# Patient Record
Sex: MALE | Race: WHITE | Employment: UNEMPLOYED | URBAN - METROPOLITAN AREA
[De-identification: names, ages, dates, MRNs, and addresses within clinical notes are randomized per-mention and may not be internally consistent; named-entity substitution may affect disease eponyms.]

---

## 2019-03-15 ENCOUNTER — APPOINTMENT (OUTPATIENT)
Dept: GENERAL RADIOLOGY | Age: 69
DRG: 683 | End: 2019-03-15
Payer: MEDICARE

## 2019-03-15 ENCOUNTER — APPOINTMENT (OUTPATIENT)
Dept: ULTRASOUND IMAGING | Age: 69
DRG: 683 | End: 2019-03-15
Payer: MEDICARE

## 2019-03-15 ENCOUNTER — HOSPITAL ENCOUNTER (INPATIENT)
Age: 69
LOS: 5 days | Discharge: NURSING, REHAB OR LONG-TERM CARE | DRG: 683 | End: 2019-03-20
Attending: EMERGENCY MEDICINE | Admitting: INTERNAL MEDICINE
Payer: MEDICARE

## 2019-03-15 DIAGNOSIS — I95.9 HYPOTENSION, UNSPECIFIED HYPOTENSION TYPE: ICD-10-CM

## 2019-03-15 DIAGNOSIS — M10.00 IDIOPATHIC GOUT, UNSPECIFIED CHRONICITY, UNSPECIFIED SITE: ICD-10-CM

## 2019-03-15 DIAGNOSIS — M25.561 ACUTE PAIN OF RIGHT KNEE: ICD-10-CM

## 2019-03-15 DIAGNOSIS — N17.9 AKI (ACUTE KIDNEY INJURY) (HCC): Primary | ICD-10-CM

## 2019-03-15 DIAGNOSIS — M25.571 ACUTE RIGHT ANKLE PAIN: ICD-10-CM

## 2019-03-15 DIAGNOSIS — D50.8 OTHER IRON DEFICIENCY ANEMIA: ICD-10-CM

## 2019-03-15 DIAGNOSIS — N43.3 HYDROCELE, UNSPECIFIED HYDROCELE TYPE: ICD-10-CM

## 2019-03-15 LAB
ALBUMIN SERPL-MCNC: 2.8 G/DL (ref 3.5–4.6)
ALP BLD-CCNC: 190 U/L (ref 35–104)
ALT SERPL-CCNC: 16 U/L (ref 0–41)
ANION GAP SERPL CALCULATED.3IONS-SCNC: 15 MEQ/L (ref 9–15)
AST SERPL-CCNC: 20 U/L (ref 0–40)
BASOPHILS ABSOLUTE: 0 K/UL (ref 0–0.2)
BASOPHILS RELATIVE PERCENT: 0.2 %
BILIRUB SERPL-MCNC: 0.8 MG/DL (ref 0.2–0.7)
BUN BLDV-MCNC: 72 MG/DL (ref 8–23)
C-REACTIVE PROTEIN: 279.5 MG/L (ref 0–5)
CALCIUM SERPL-MCNC: 8.3 MG/DL (ref 8.5–9.9)
CHLORIDE BLD-SCNC: 99 MEQ/L (ref 95–107)
CHP ED QC CHECK: NORMAL
CO2: 24 MEQ/L (ref 20–31)
CREAT SERPL-MCNC: 2.53 MG/DL (ref 0.7–1.2)
EOSINOPHILS ABSOLUTE: 0 K/UL (ref 0–0.7)
EOSINOPHILS RELATIVE PERCENT: 0.3 %
GFR AFRICAN AMERICAN: 30.5
GFR NON-AFRICAN AMERICAN: 25.2
GLOBULIN: 4.3 G/DL (ref 2.3–3.5)
GLUCOSE BLD-MCNC: 154 MG/DL (ref 60–115)
GLUCOSE BLD-MCNC: 181 MG/DL (ref 60–115)
GLUCOSE BLD-MCNC: 43 MG/DL (ref 70–99)
GLUCOSE BLD-MCNC: 46 MG/DL (ref 60–115)
GLUCOSE BLD-MCNC: 58 MG/DL
GLUCOSE BLD-MCNC: 58 MG/DL (ref 60–115)
GLUCOSE BLD-MCNC: 95 MG/DL (ref 60–115)
HBA1C MFR BLD: 8.4 % (ref 4.8–5.9)
HCT VFR BLD CALC: 25 % (ref 42–52)
HEMOGLOBIN: 7.9 G/DL (ref 14–18)
LYMPHOCYTES ABSOLUTE: 0.4 K/UL (ref 1–4.8)
LYMPHOCYTES RELATIVE PERCENT: 3 %
MCH RBC QN AUTO: 23.6 PG (ref 27–31.3)
MCHC RBC AUTO-ENTMCNC: 31.4 % (ref 33–37)
MCV RBC AUTO: 75.3 FL (ref 80–100)
MONOCYTES ABSOLUTE: 1 K/UL (ref 0.2–0.8)
MONOCYTES RELATIVE PERCENT: 6.9 %
NEUTROPHILS ABSOLUTE: 13.2 K/UL (ref 1.4–6.5)
NEUTROPHILS RELATIVE PERCENT: 89.6 %
PDW BLD-RTO: 18.7 % (ref 11.5–14.5)
PERFORMED ON: ABNORMAL
PERFORMED ON: NORMAL
PLATELET # BLD: 244 K/UL (ref 130–400)
POTASSIUM SERPL-SCNC: 4 MEQ/L (ref 3.4–4.9)
RAPID INFLUENZA  B AGN: NEGATIVE
RAPID INFLUENZA A AGN: NEGATIVE
RBC # BLD: 3.32 M/UL (ref 4.7–6.1)
SEDIMENTATION RATE, ERYTHROCYTE: 120 MM (ref 0–20)
SODIUM BLD-SCNC: 138 MEQ/L (ref 135–144)
TOTAL PROTEIN: 7.1 G/DL (ref 6.3–8)
URIC ACID, SERUM: 12.1 MG/DL (ref 3.4–7)
WBC # BLD: 14.8 K/UL (ref 4.8–10.8)

## 2019-03-15 PROCEDURE — 6360000002 HC RX W HCPCS: Performed by: EMERGENCY MEDICINE

## 2019-03-15 PROCEDURE — 86140 C-REACTIVE PROTEIN: CPT

## 2019-03-15 PROCEDURE — 85025 COMPLETE CBC W/AUTO DIFF WBC: CPT

## 2019-03-15 PROCEDURE — 2580000003 HC RX 258: Performed by: EMERGENCY MEDICINE

## 2019-03-15 PROCEDURE — 87804 INFLUENZA ASSAY W/OPTIC: CPT

## 2019-03-15 PROCEDURE — 2580000003 HC RX 258: Performed by: INTERNAL MEDICINE

## 2019-03-15 PROCEDURE — 96375 TX/PRO/DX INJ NEW DRUG ADDON: CPT

## 2019-03-15 PROCEDURE — 1210000000 HC MED SURG R&B

## 2019-03-15 PROCEDURE — 6360000002 HC RX W HCPCS: Performed by: INTERNAL MEDICINE

## 2019-03-15 PROCEDURE — 76870 US EXAM SCROTUM: CPT

## 2019-03-15 PROCEDURE — 84550 ASSAY OF BLOOD/URIC ACID: CPT

## 2019-03-15 PROCEDURE — 94664 DEMO&/EVAL PT USE INHALER: CPT

## 2019-03-15 PROCEDURE — 99285 EMERGENCY DEPT VISIT HI MDM: CPT

## 2019-03-15 PROCEDURE — 36415 COLL VENOUS BLD VENIPUNCTURE: CPT

## 2019-03-15 PROCEDURE — 6370000000 HC RX 637 (ALT 250 FOR IP): Performed by: INTERNAL MEDICINE

## 2019-03-15 PROCEDURE — 2580000003 HC RX 258

## 2019-03-15 PROCEDURE — 73610 X-RAY EXAM OF ANKLE: CPT

## 2019-03-15 PROCEDURE — 71046 X-RAY EXAM CHEST 2 VIEWS: CPT

## 2019-03-15 PROCEDURE — 85652 RBC SED RATE AUTOMATED: CPT

## 2019-03-15 PROCEDURE — 80053 COMPREHEN METABOLIC PANEL: CPT

## 2019-03-15 PROCEDURE — 87040 BLOOD CULTURE FOR BACTERIA: CPT

## 2019-03-15 PROCEDURE — 96374 THER/PROPH/DIAG INJ IV PUSH: CPT

## 2019-03-15 PROCEDURE — 83036 HEMOGLOBIN GLYCOSYLATED A1C: CPT

## 2019-03-15 PROCEDURE — 73562 X-RAY EXAM OF KNEE 3: CPT

## 2019-03-15 RX ORDER — FINASTERIDE 5 MG/1
5 TABLET, FILM COATED ORAL DAILY
COMMUNITY

## 2019-03-15 RX ORDER — ATENOLOL 50 MG/1
50 TABLET ORAL 2 TIMES DAILY
Status: DISCONTINUED | OUTPATIENT
Start: 2019-03-15 | End: 2019-03-20 | Stop reason: HOSPADM

## 2019-03-15 RX ORDER — FUROSEMIDE 80 MG
80 TABLET ORAL DAILY
COMMUNITY

## 2019-03-15 RX ORDER — DEXTROSE MONOHYDRATE 25 G/50ML
25 INJECTION, SOLUTION INTRAVENOUS ONCE
Status: DISCONTINUED | OUTPATIENT
Start: 2019-03-15 | End: 2019-03-19

## 2019-03-15 RX ORDER — AMLODIPINE BESYLATE 5 MG/1
5 TABLET ORAL DAILY
COMMUNITY

## 2019-03-15 RX ORDER — SIMVASTATIN 5 MG
5 TABLET ORAL NIGHTLY
Status: ON HOLD | COMMUNITY
End: 2019-03-20 | Stop reason: HOSPADM

## 2019-03-15 RX ORDER — METHYLPREDNISOLONE SODIUM SUCCINATE 40 MG/ML
40 INJECTION, POWDER, LYOPHILIZED, FOR SOLUTION INTRAMUSCULAR; INTRAVENOUS DAILY
Status: DISCONTINUED | OUTPATIENT
Start: 2019-03-15 | End: 2019-03-17

## 2019-03-15 RX ORDER — SIMVASTATIN 10 MG
10 TABLET ORAL NIGHTLY
Status: DISCONTINUED | OUTPATIENT
Start: 2019-03-15 | End: 2019-03-20 | Stop reason: HOSPADM

## 2019-03-15 RX ORDER — IPRATROPIUM BROMIDE AND ALBUTEROL SULFATE 2.5; .5 MG/3ML; MG/3ML
1 SOLUTION RESPIRATORY (INHALATION) EVERY 4 HOURS PRN
Status: DISCONTINUED | OUTPATIENT
Start: 2019-03-15 | End: 2019-03-20 | Stop reason: HOSPADM

## 2019-03-15 RX ORDER — SODIUM CHLORIDE 9 MG/ML
INJECTION, SOLUTION INTRAVENOUS CONTINUOUS
Status: DISCONTINUED | OUTPATIENT
Start: 2019-03-15 | End: 2019-03-16

## 2019-03-15 RX ORDER — MORPHINE SULFATE 2 MG/ML
4 INJECTION, SOLUTION INTRAMUSCULAR; INTRAVENOUS
Status: DISCONTINUED | OUTPATIENT
Start: 2019-03-15 | End: 2019-03-20 | Stop reason: HOSPADM

## 2019-03-15 RX ORDER — LOSARTAN POTASSIUM 50 MG/1
50 TABLET ORAL DAILY
COMMUNITY

## 2019-03-15 RX ORDER — ONDANSETRON 2 MG/ML
4 INJECTION INTRAMUSCULAR; INTRAVENOUS ONCE
Status: COMPLETED | OUTPATIENT
Start: 2019-03-15 | End: 2019-03-15

## 2019-03-15 RX ORDER — ALBUTEROL SULFATE 2.5 MG/3ML
2.5 SOLUTION RESPIRATORY (INHALATION) EVERY 6 HOURS PRN
Status: DISCONTINUED | OUTPATIENT
Start: 2019-03-15 | End: 2019-03-20 | Stop reason: HOSPADM

## 2019-03-15 RX ORDER — INSULIN GLARGINE 100 [IU]/ML
44 INJECTION, SOLUTION SUBCUTANEOUS 2 TIMES DAILY
Status: ON HOLD | COMMUNITY
End: 2020-11-24 | Stop reason: SDUPTHER

## 2019-03-15 RX ORDER — LOPERAMIDE HYDROCHLORIDE 2 MG/1
2 CAPSULE ORAL 4 TIMES DAILY PRN
COMMUNITY

## 2019-03-15 RX ORDER — ATENOLOL 50 MG/1
50 TABLET ORAL 2 TIMES DAILY
COMMUNITY

## 2019-03-15 RX ORDER — KETOROLAC TROMETHAMINE 15 MG/ML
15 INJECTION, SOLUTION INTRAMUSCULAR; INTRAVENOUS ONCE
Status: COMPLETED | OUTPATIENT
Start: 2019-03-15 | End: 2019-03-15

## 2019-03-15 RX ORDER — DEXTROSE MONOHYDRATE 50 MG/ML
100 INJECTION, SOLUTION INTRAVENOUS PRN
Status: DISCONTINUED | OUTPATIENT
Start: 2019-03-15 | End: 2019-03-20 | Stop reason: HOSPADM

## 2019-03-15 RX ORDER — ONDANSETRON 2 MG/ML
4 INJECTION INTRAMUSCULAR; INTRAVENOUS EVERY 6 HOURS PRN
Status: DISCONTINUED | OUTPATIENT
Start: 2019-03-15 | End: 2019-03-20 | Stop reason: HOSPADM

## 2019-03-15 RX ORDER — 0.9 % SODIUM CHLORIDE 0.9 %
1000 INTRAVENOUS SOLUTION INTRAVENOUS ONCE
Status: COMPLETED | OUTPATIENT
Start: 2019-03-15 | End: 2019-03-15

## 2019-03-15 RX ORDER — DEXTROSE MONOHYDRATE 25 G/50ML
INJECTION, SOLUTION INTRAVENOUS
Status: COMPLETED
Start: 2019-03-15 | End: 2019-03-15

## 2019-03-15 RX ORDER — SODIUM CHLORIDE 0.9 % (FLUSH) 0.9 %
10 SYRINGE (ML) INJECTION EVERY 12 HOURS SCHEDULED
Status: DISCONTINUED | OUTPATIENT
Start: 2019-03-15 | End: 2019-03-20 | Stop reason: HOSPADM

## 2019-03-15 RX ORDER — SODIUM CHLORIDE 9 MG/ML
INJECTION, SOLUTION INTRAVENOUS CONTINUOUS
Status: DISCONTINUED | OUTPATIENT
Start: 2019-03-15 | End: 2019-03-15

## 2019-03-15 RX ORDER — ASPIRIN 81 MG/1
81 TABLET, CHEWABLE ORAL DAILY
Status: DISCONTINUED | OUTPATIENT
Start: 2019-03-15 | End: 2019-03-19

## 2019-03-15 RX ORDER — NICOTINE POLACRILEX 4 MG
15 LOZENGE BUCCAL PRN
Status: DISCONTINUED | OUTPATIENT
Start: 2019-03-15 | End: 2019-03-20 | Stop reason: HOSPADM

## 2019-03-15 RX ORDER — TAMSULOSIN HYDROCHLORIDE 0.4 MG/1
2 CAPSULE ORAL NIGHTLY
COMMUNITY

## 2019-03-15 RX ORDER — IPRATROPIUM BROMIDE AND ALBUTEROL SULFATE 2.5; .5 MG/3ML; MG/3ML
1 SOLUTION RESPIRATORY (INHALATION)
Status: DISCONTINUED | OUTPATIENT
Start: 2019-03-15 | End: 2019-03-15

## 2019-03-15 RX ORDER — TAMSULOSIN HYDROCHLORIDE 0.4 MG/1
0.4 CAPSULE ORAL DAILY
Status: DISCONTINUED | OUTPATIENT
Start: 2019-03-15 | End: 2019-03-20 | Stop reason: HOSPADM

## 2019-03-15 RX ORDER — FUROSEMIDE 80 MG
80 TABLET ORAL DAILY
Status: ON HOLD | COMMUNITY
End: 2019-03-15

## 2019-03-15 RX ORDER — SODIUM CHLORIDE 0.9 % (FLUSH) 0.9 %
10 SYRINGE (ML) INJECTION PRN
Status: DISCONTINUED | OUTPATIENT
Start: 2019-03-15 | End: 2019-03-20 | Stop reason: HOSPADM

## 2019-03-15 RX ORDER — DEXTROSE MONOHYDRATE 25 G/50ML
12.5 INJECTION, SOLUTION INTRAVENOUS PRN
Status: DISCONTINUED | OUTPATIENT
Start: 2019-03-15 | End: 2019-03-20 | Stop reason: HOSPADM

## 2019-03-15 RX ORDER — AMLODIPINE BESYLATE 5 MG/1
5 TABLET ORAL DAILY
Status: DISCONTINUED | OUTPATIENT
Start: 2019-03-15 | End: 2019-03-20 | Stop reason: HOSPADM

## 2019-03-15 RX ADMIN — MORPHINE SULFATE 4 MG: 2 INJECTION, SOLUTION INTRAMUSCULAR; INTRAVENOUS at 11:37

## 2019-03-15 RX ADMIN — DEXTROSE MONOHYDRATE 50 ML: 25 INJECTION, SOLUTION INTRAVENOUS at 13:25

## 2019-03-15 RX ADMIN — SODIUM CHLORIDE: 9 INJECTION, SOLUTION INTRAVENOUS at 18:29

## 2019-03-15 RX ADMIN — VANCOMYCIN HYDROCHLORIDE 1250 MG: 5 INJECTION, POWDER, LYOPHILIZED, FOR SOLUTION INTRAVENOUS at 18:29

## 2019-03-15 RX ADMIN — SODIUM CHLORIDE 1000 ML: 9 INJECTION, SOLUTION INTRAVENOUS at 11:37

## 2019-03-15 RX ADMIN — SIMVASTATIN 10 MG: 10 TABLET, FILM COATED ORAL at 23:00

## 2019-03-15 RX ADMIN — SODIUM CHLORIDE 1000 ML: 9 INJECTION, SOLUTION INTRAVENOUS at 13:48

## 2019-03-15 RX ADMIN — ATENOLOL 50 MG: 50 TABLET ORAL at 23:00

## 2019-03-15 RX ADMIN — ONDANSETRON 4 MG: 2 INJECTION INTRAMUSCULAR; INTRAVENOUS at 11:36

## 2019-03-15 RX ADMIN — PIPERACILLIN SODIUM,TAZOBACTAM SODIUM 3.38 G: 3; .375 INJECTION, POWDER, FOR SOLUTION INTRAVENOUS at 23:03

## 2019-03-15 RX ADMIN — METHYLPREDNISOLONE SODIUM SUCCINATE 40 MG: 40 INJECTION, POWDER, FOR SOLUTION INTRAMUSCULAR; INTRAVENOUS at 18:29

## 2019-03-15 RX ADMIN — KETOROLAC TROMETHAMINE 15 MG: 15 INJECTION, SOLUTION INTRAMUSCULAR; INTRAVENOUS at 11:36

## 2019-03-15 ASSESSMENT — PAIN DESCRIPTION - ONSET: ONSET: ON-GOING

## 2019-03-15 ASSESSMENT — ENCOUNTER SYMPTOMS
COUGH: 0
SORE THROAT: 0
NAUSEA: 0
DIARRHEA: 0
SHORTNESS OF BREATH: 0
BACK PAIN: 0
VOMITING: 0
ABDOMINAL PAIN: 0

## 2019-03-15 ASSESSMENT — PAIN DESCRIPTION - FREQUENCY: FREQUENCY: CONTINUOUS

## 2019-03-15 ASSESSMENT — PAIN DESCRIPTION - PROGRESSION: CLINICAL_PROGRESSION: GRADUALLY IMPROVING

## 2019-03-15 ASSESSMENT — PAIN SCALES - GENERAL
PAINLEVEL_OUTOF10: 5
PAINLEVEL_OUTOF10: 7
PAINLEVEL_OUTOF10: 7

## 2019-03-15 ASSESSMENT — PAIN DESCRIPTION - PAIN TYPE: TYPE: ACUTE PAIN

## 2019-03-15 ASSESSMENT — PAIN DESCRIPTION - ORIENTATION: ORIENTATION: LOWER

## 2019-03-15 ASSESSMENT — PAIN DESCRIPTION - LOCATION: LOCATION: BACK

## 2019-03-15 ASSESSMENT — PAIN DESCRIPTION - DESCRIPTORS: DESCRIPTORS: ACHING;CONSTANT;DISCOMFORT

## 2019-03-16 LAB
ANION GAP SERPL CALCULATED.3IONS-SCNC: 15 MEQ/L (ref 9–15)
BASOPHILS ABSOLUTE: 0 K/UL (ref 0–0.2)
BASOPHILS RELATIVE PERCENT: 0.1 %
BUN BLDV-MCNC: 71 MG/DL (ref 8–23)
CALCIUM SERPL-MCNC: 8.4 MG/DL (ref 8.5–9.9)
CHLORIDE BLD-SCNC: 101 MEQ/L (ref 95–107)
CO2: 20 MEQ/L (ref 20–31)
CREAT SERPL-MCNC: 2.38 MG/DL (ref 0.7–1.2)
CREATININE URINE: 88.9 MG/DL
EOSINOPHILS ABSOLUTE: 0 K/UL (ref 0–0.7)
EOSINOPHILS RELATIVE PERCENT: 0 %
GFR AFRICAN AMERICAN: 32.8
GFR NON-AFRICAN AMERICAN: 27.1
GLUCOSE BLD-MCNC: 224 MG/DL (ref 70–99)
GLUCOSE BLD-MCNC: 231 MG/DL (ref 60–115)
GLUCOSE BLD-MCNC: 314 MG/DL (ref 60–115)
GLUCOSE BLD-MCNC: 348 MG/DL (ref 60–115)
GLUCOSE BLD-MCNC: 352 MG/DL (ref 60–115)
HCT VFR BLD CALC: 24.7 % (ref 42–52)
HEMOGLOBIN: 7.6 G/DL (ref 14–18)
LYMPHOCYTES ABSOLUTE: 0.3 K/UL (ref 1–4.8)
LYMPHOCYTES RELATIVE PERCENT: 2.1 %
MAGNESIUM: 2.4 MG/DL (ref 1.7–2.4)
MCH RBC QN AUTO: 23.4 PG (ref 27–31.3)
MCHC RBC AUTO-ENTMCNC: 30.8 % (ref 33–37)
MCV RBC AUTO: 75.8 FL (ref 80–100)
MICROALBUMIN UR-MCNC: 1.4 MG/DL
MICROALBUMIN/CREAT UR-RTO: 15.7 MG/G (ref 0–30)
MONOCYTES ABSOLUTE: 0.3 K/UL (ref 0.2–0.8)
MONOCYTES RELATIVE PERCENT: 2.3 %
NEUTROPHILS ABSOLUTE: 13.1 K/UL (ref 1.4–6.5)
NEUTROPHILS RELATIVE PERCENT: 95.5 %
PDW BLD-RTO: 18.6 % (ref 11.5–14.5)
PERFORMED ON: ABNORMAL
PLATELET # BLD: 226 K/UL (ref 130–400)
POTASSIUM REFLEX MAGNESIUM: 5.1 MEQ/L (ref 3.4–4.9)
RBC # BLD: 3.26 M/UL (ref 4.7–6.1)
SODIUM BLD-SCNC: 136 MEQ/L (ref 135–144)
WBC # BLD: 13.7 K/UL (ref 4.8–10.8)

## 2019-03-16 PROCEDURE — 82570 ASSAY OF URINE CREATININE: CPT

## 2019-03-16 PROCEDURE — 2580000003 HC RX 258: Performed by: INTERNAL MEDICINE

## 2019-03-16 PROCEDURE — 85025 COMPLETE CBC W/AUTO DIFF WBC: CPT

## 2019-03-16 PROCEDURE — 6370000000 HC RX 637 (ALT 250 FOR IP): Performed by: INTERNAL MEDICINE

## 2019-03-16 PROCEDURE — 6360000002 HC RX W HCPCS: Performed by: INTERNAL MEDICINE

## 2019-03-16 PROCEDURE — 36415 COLL VENOUS BLD VENIPUNCTURE: CPT

## 2019-03-16 PROCEDURE — 80048 BASIC METABOLIC PNL TOTAL CA: CPT

## 2019-03-16 PROCEDURE — 82043 UR ALBUMIN QUANTITATIVE: CPT

## 2019-03-16 PROCEDURE — 83735 ASSAY OF MAGNESIUM: CPT

## 2019-03-16 PROCEDURE — 1210000000 HC MED SURG R&B

## 2019-03-16 RX ORDER — INSULIN GLARGINE 100 [IU]/ML
25 INJECTION, SOLUTION SUBCUTANEOUS 2 TIMES DAILY
Status: DISCONTINUED | OUTPATIENT
Start: 2019-03-16 | End: 2019-03-16

## 2019-03-16 RX ORDER — INSULIN GLARGINE 100 [IU]/ML
30 INJECTION, SOLUTION SUBCUTANEOUS 2 TIMES DAILY
Status: DISCONTINUED | OUTPATIENT
Start: 2019-03-16 | End: 2019-03-17

## 2019-03-16 RX ADMIN — PIPERACILLIN SODIUM,TAZOBACTAM SODIUM 3.38 G: 3; .375 INJECTION, POWDER, FOR SOLUTION INTRAVENOUS at 22:54

## 2019-03-16 RX ADMIN — SIMVASTATIN 10 MG: 10 TABLET, FILM COATED ORAL at 22:54

## 2019-03-16 RX ADMIN — METHYLPREDNISOLONE SODIUM SUCCINATE 40 MG: 40 INJECTION, POWDER, FOR SOLUTION INTRAMUSCULAR; INTRAVENOUS at 22:54

## 2019-03-16 RX ADMIN — ATENOLOL 50 MG: 50 TABLET ORAL at 08:42

## 2019-03-16 RX ADMIN — PIPERACILLIN SODIUM,TAZOBACTAM SODIUM 3.38 G: 3; .375 INJECTION, POWDER, FOR SOLUTION INTRAVENOUS at 07:01

## 2019-03-16 RX ADMIN — VANCOMYCIN HYDROCHLORIDE 1250 MG: 5 INJECTION, POWDER, LYOPHILIZED, FOR SOLUTION INTRAVENOUS at 17:01

## 2019-03-16 RX ADMIN — INSULIN LISPRO 2 UNITS: 100 INJECTION, SOLUTION INTRAVENOUS; SUBCUTANEOUS at 08:19

## 2019-03-16 RX ADMIN — INSULIN LISPRO 4 UNITS: 100 INJECTION, SOLUTION INTRAVENOUS; SUBCUTANEOUS at 16:55

## 2019-03-16 RX ADMIN — AMLODIPINE BESYLATE 5 MG: 5 TABLET ORAL at 08:43

## 2019-03-16 RX ADMIN — ATENOLOL 50 MG: 50 TABLET ORAL at 22:53

## 2019-03-16 RX ADMIN — SODIUM CHLORIDE: 9 INJECTION, SOLUTION INTRAVENOUS at 08:44

## 2019-03-16 RX ADMIN — INSULIN GLARGINE 30 UNITS: 100 INJECTION, SOLUTION SUBCUTANEOUS at 22:54

## 2019-03-16 RX ADMIN — INSULIN GLARGINE 25 UNITS: 100 INJECTION, SOLUTION SUBCUTANEOUS at 10:53

## 2019-03-16 RX ADMIN — ASPIRIN 81 MG 81 MG: 81 TABLET ORAL at 08:43

## 2019-03-16 RX ADMIN — INSULIN LISPRO 4 UNITS: 100 INJECTION, SOLUTION INTRAVENOUS; SUBCUTANEOUS at 12:30

## 2019-03-16 RX ADMIN — TAMSULOSIN HYDROCHLORIDE 0.4 MG: 0.4 CAPSULE ORAL at 08:43

## 2019-03-17 LAB
ANION GAP SERPL CALCULATED.3IONS-SCNC: 13 MEQ/L (ref 9–15)
BASOPHILS ABSOLUTE: 0 K/UL (ref 0–0.2)
BASOPHILS RELATIVE PERCENT: 0.1 %
BUN BLDV-MCNC: 86 MG/DL (ref 8–23)
CALCIUM SERPL-MCNC: 8.2 MG/DL (ref 8.5–9.9)
CHLORIDE BLD-SCNC: 99 MEQ/L (ref 95–107)
CO2: 21 MEQ/L (ref 20–31)
CREAT SERPL-MCNC: 2.83 MG/DL (ref 0.7–1.2)
EOSINOPHILS ABSOLUTE: 0 K/UL (ref 0–0.7)
EOSINOPHILS RELATIVE PERCENT: 0 %
GFR AFRICAN AMERICAN: 26.8
GFR NON-AFRICAN AMERICAN: 22.2
GLUCOSE BLD-MCNC: 362 MG/DL (ref 60–115)
GLUCOSE BLD-MCNC: 378 MG/DL (ref 70–99)
GLUCOSE BLD-MCNC: 385 MG/DL (ref 60–115)
GLUCOSE BLD-MCNC: 411 MG/DL (ref 60–115)
GLUCOSE BLD-MCNC: 421 MG/DL (ref 60–115)
HCT VFR BLD CALC: 25.3 % (ref 42–52)
HEMOGLOBIN: 7.7 G/DL (ref 14–18)
LYMPHOCYTES ABSOLUTE: 0.3 K/UL (ref 1–4.8)
LYMPHOCYTES RELATIVE PERCENT: 2 %
MCH RBC QN AUTO: 23.4 PG (ref 27–31.3)
MCHC RBC AUTO-ENTMCNC: 30.5 % (ref 33–37)
MCV RBC AUTO: 76.5 FL (ref 80–100)
MONOCYTES ABSOLUTE: 0.4 K/UL (ref 0.2–0.8)
MONOCYTES RELATIVE PERCENT: 2.3 %
NEUTROPHILS ABSOLUTE: 15 K/UL (ref 1.4–6.5)
NEUTROPHILS RELATIVE PERCENT: 95.6 %
PDW BLD-RTO: 18.8 % (ref 11.5–14.5)
PERFORMED ON: ABNORMAL
PLATELET # BLD: 287 K/UL (ref 130–400)
POTASSIUM SERPL-SCNC: 5.1 MEQ/L (ref 3.4–4.9)
POTASSIUM SERPL-SCNC: 5.4 MEQ/L (ref 3.4–4.9)
RBC # BLD: 3.3 M/UL (ref 4.7–6.1)
SODIUM BLD-SCNC: 133 MEQ/L (ref 135–144)
VANCOMYCIN TROUGH: 11.7 UG/ML (ref 10–20)
WBC # BLD: 15.7 K/UL (ref 4.8–10.8)

## 2019-03-17 PROCEDURE — 85025 COMPLETE CBC W/AUTO DIFF WBC: CPT

## 2019-03-17 PROCEDURE — 1210000000 HC MED SURG R&B

## 2019-03-17 PROCEDURE — 84132 ASSAY OF SERUM POTASSIUM: CPT

## 2019-03-17 PROCEDURE — 36415 COLL VENOUS BLD VENIPUNCTURE: CPT

## 2019-03-17 PROCEDURE — 6360000002 HC RX W HCPCS: Performed by: INTERNAL MEDICINE

## 2019-03-17 PROCEDURE — 80048 BASIC METABOLIC PNL TOTAL CA: CPT

## 2019-03-17 PROCEDURE — 80202 ASSAY OF VANCOMYCIN: CPT

## 2019-03-17 PROCEDURE — 6370000000 HC RX 637 (ALT 250 FOR IP): Performed by: INTERNAL MEDICINE

## 2019-03-17 PROCEDURE — 99222 1ST HOSP IP/OBS MODERATE 55: CPT | Performed by: INTERNAL MEDICINE

## 2019-03-17 PROCEDURE — G8978 MOBILITY CURRENT STATUS: HCPCS

## 2019-03-17 PROCEDURE — 2580000003 HC RX 258: Performed by: INTERNAL MEDICINE

## 2019-03-17 PROCEDURE — 97161 PT EVAL LOW COMPLEX 20 MIN: CPT

## 2019-03-17 PROCEDURE — 99221 1ST HOSP IP/OBS SF/LOW 40: CPT | Performed by: UROLOGY

## 2019-03-17 PROCEDURE — G8979 MOBILITY GOAL STATUS: HCPCS

## 2019-03-17 RX ORDER — INSULIN GLARGINE 100 [IU]/ML
40 INJECTION, SOLUTION SUBCUTANEOUS 2 TIMES DAILY
Status: DISCONTINUED | OUTPATIENT
Start: 2019-03-17 | End: 2019-03-20 | Stop reason: HOSPADM

## 2019-03-17 RX ADMIN — ASPIRIN 81 MG 81 MG: 81 TABLET ORAL at 09:42

## 2019-03-17 RX ADMIN — AMLODIPINE BESYLATE 5 MG: 5 TABLET ORAL at 09:41

## 2019-03-17 RX ADMIN — VANCOMYCIN HYDROCHLORIDE 1250 MG: 5 INJECTION, POWDER, LYOPHILIZED, FOR SOLUTION INTRAVENOUS at 22:55

## 2019-03-17 RX ADMIN — INSULIN GLARGINE 30 UNITS: 100 INJECTION, SOLUTION SUBCUTANEOUS at 08:15

## 2019-03-17 RX ADMIN — Medication 10 ML: at 00:03

## 2019-03-17 RX ADMIN — PIPERACILLIN SODIUM,TAZOBACTAM SODIUM 3.38 G: 3; .375 INJECTION, POWDER, FOR SOLUTION INTRAVENOUS at 14:29

## 2019-03-17 RX ADMIN — TAMSULOSIN HYDROCHLORIDE 0.4 MG: 0.4 CAPSULE ORAL at 09:42

## 2019-03-17 RX ADMIN — PIPERACILLIN SODIUM,TAZOBACTAM SODIUM 3.38 G: 3; .375 INJECTION, POWDER, FOR SOLUTION INTRAVENOUS at 06:15

## 2019-03-17 ASSESSMENT — PAIN DESCRIPTION - DESCRIPTORS: DESCRIPTORS: ACHING;CONSTANT;SHARP

## 2019-03-17 ASSESSMENT — PAIN DESCRIPTION - PAIN TYPE: TYPE: ACUTE PAIN

## 2019-03-17 ASSESSMENT — PAIN SCALES - GENERAL: PAINLEVEL_OUTOF10: 5

## 2019-03-17 ASSESSMENT — PAIN DESCRIPTION - FREQUENCY: FREQUENCY: CONTINUOUS

## 2019-03-17 ASSESSMENT — PAIN DESCRIPTION - LOCATION: LOCATION: FOOT;LEG

## 2019-03-17 ASSESSMENT — PAIN DESCRIPTION - ORIENTATION: ORIENTATION: RIGHT

## 2019-03-18 ENCOUNTER — APPOINTMENT (OUTPATIENT)
Dept: ULTRASOUND IMAGING | Age: 69
DRG: 683 | End: 2019-03-18
Payer: MEDICARE

## 2019-03-18 LAB
ANION GAP SERPL CALCULATED.3IONS-SCNC: 15 MEQ/L (ref 9–15)
BASOPHILS ABSOLUTE: 0 K/UL (ref 0–0.2)
BASOPHILS RELATIVE PERCENT: 0.3 %
BUN BLDV-MCNC: 90 MG/DL (ref 8–23)
CALCIUM SERPL-MCNC: 8.4 MG/DL (ref 8.5–9.9)
CHLORIDE BLD-SCNC: 101 MEQ/L (ref 95–107)
CO2: 20 MEQ/L (ref 20–31)
CREAT SERPL-MCNC: 2.97 MG/DL (ref 0.7–1.2)
EOSINOPHILS ABSOLUTE: 0 K/UL (ref 0–0.7)
EOSINOPHILS RELATIVE PERCENT: 0.2 %
FERRITIN: 103.6 NG/ML (ref 30–400)
GFR AFRICAN AMERICAN: 25.4
GFR NON-AFRICAN AMERICAN: 21
GLUCOSE BLD-MCNC: 124 MG/DL (ref 60–115)
GLUCOSE BLD-MCNC: 233 MG/DL (ref 70–99)
GLUCOSE BLD-MCNC: 405 MG/DL (ref 60–115)
GLUCOSE BLD-MCNC: 75 MG/DL (ref 60–115)
GLUCOSE BLD-MCNC: 91 MG/DL (ref 60–115)
HCT VFR BLD CALC: 25.7 % (ref 42–52)
HEMOGLOBIN: 7.9 G/DL (ref 14–18)
IRON SATURATION: 8 % (ref 11–46)
IRON: 20 UG/DL (ref 59–158)
LYMPHOCYTES ABSOLUTE: 0.8 K/UL (ref 1–4.8)
LYMPHOCYTES RELATIVE PERCENT: 6.6 %
MCH RBC QN AUTO: 23.4 PG (ref 27–31.3)
MCHC RBC AUTO-ENTMCNC: 30.6 % (ref 33–37)
MCV RBC AUTO: 76.4 FL (ref 80–100)
MONOCYTES ABSOLUTE: 1.1 K/UL (ref 0.2–0.8)
MONOCYTES RELATIVE PERCENT: 9 %
NEUTROPHILS ABSOLUTE: 10.2 K/UL (ref 1.4–6.5)
NEUTROPHILS RELATIVE PERCENT: 83.9 %
PDW BLD-RTO: 18.6 % (ref 11.5–14.5)
PERFORMED ON: ABNORMAL
PERFORMED ON: ABNORMAL
PERFORMED ON: NORMAL
PERFORMED ON: NORMAL
PLATELET # BLD: 340 K/UL (ref 130–400)
POTASSIUM SERPL-SCNC: 4.9 MEQ/L (ref 3.4–4.9)
RBC # BLD: 3.37 M/UL (ref 4.7–6.1)
SODIUM BLD-SCNC: 136 MEQ/L (ref 135–144)
TOTAL IRON BINDING CAPACITY: 250 UG/DL (ref 178–450)
WBC # BLD: 12.2 K/UL (ref 4.8–10.8)

## 2019-03-18 PROCEDURE — 6360000002 HC RX W HCPCS: Performed by: INTERNAL MEDICINE

## 2019-03-18 PROCEDURE — 99232 SBSQ HOSP IP/OBS MODERATE 35: CPT | Performed by: INTERNAL MEDICINE

## 2019-03-18 PROCEDURE — G8988 SELF CARE GOAL STATUS: HCPCS

## 2019-03-18 PROCEDURE — 6370000000 HC RX 637 (ALT 250 FOR IP): Performed by: NURSE PRACTITIONER

## 2019-03-18 PROCEDURE — 85025 COMPLETE CBC W/AUTO DIFF WBC: CPT

## 2019-03-18 PROCEDURE — G8987 SELF CARE CURRENT STATUS: HCPCS

## 2019-03-18 PROCEDURE — 82728 ASSAY OF FERRITIN: CPT

## 2019-03-18 PROCEDURE — 97116 GAIT TRAINING THERAPY: CPT

## 2019-03-18 PROCEDURE — 2580000003 HC RX 258: Performed by: INTERNAL MEDICINE

## 2019-03-18 PROCEDURE — 83550 IRON BINDING TEST: CPT

## 2019-03-18 PROCEDURE — 36415 COLL VENOUS BLD VENIPUNCTURE: CPT

## 2019-03-18 PROCEDURE — 76775 US EXAM ABDO BACK WALL LIM: CPT

## 2019-03-18 PROCEDURE — 51798 US URINE CAPACITY MEASURE: CPT

## 2019-03-18 PROCEDURE — 83540 ASSAY OF IRON: CPT

## 2019-03-18 PROCEDURE — 6370000000 HC RX 637 (ALT 250 FOR IP): Performed by: INTERNAL MEDICINE

## 2019-03-18 PROCEDURE — 97166 OT EVAL MOD COMPLEX 45 MIN: CPT

## 2019-03-18 PROCEDURE — 80048 BASIC METABOLIC PNL TOTAL CA: CPT

## 2019-03-18 PROCEDURE — 1210000000 HC MED SURG R&B

## 2019-03-18 RX ORDER — ACETAMINOPHEN 325 MG/1
650 TABLET ORAL EVERY 4 HOURS PRN
Status: DISCONTINUED | OUTPATIENT
Start: 2019-03-18 | End: 2019-03-20 | Stop reason: HOSPADM

## 2019-03-18 RX ADMIN — PIPERACILLIN SODIUM,TAZOBACTAM SODIUM 3.38 G: 3; .375 INJECTION, POWDER, FOR SOLUTION INTRAVENOUS at 09:03

## 2019-03-18 RX ADMIN — ACETAMINOPHEN 650 MG: 325 TABLET ORAL at 09:11

## 2019-03-18 RX ADMIN — ACETAMINOPHEN 650 MG: 325 TABLET ORAL at 16:13

## 2019-03-18 RX ADMIN — INSULIN GLARGINE 40 UNITS: 100 INJECTION, SOLUTION SUBCUTANEOUS at 12:27

## 2019-03-18 RX ADMIN — ASPIRIN 81 MG 81 MG: 81 TABLET ORAL at 09:02

## 2019-03-18 RX ADMIN — INSULIN GLARGINE 40 UNITS: 100 INJECTION, SOLUTION SUBCUTANEOUS at 00:05

## 2019-03-18 RX ADMIN — Medication 10 ML: at 00:03

## 2019-03-18 RX ADMIN — AMLODIPINE BESYLATE 5 MG: 5 TABLET ORAL at 09:02

## 2019-03-18 RX ADMIN — ATENOLOL 50 MG: 50 TABLET ORAL at 00:00

## 2019-03-18 RX ADMIN — ACETAMINOPHEN 650 MG: 325 TABLET ORAL at 01:23

## 2019-03-18 RX ADMIN — ATENOLOL 50 MG: 50 TABLET ORAL at 09:02

## 2019-03-18 RX ADMIN — PIPERACILLIN SODIUM,TAZOBACTAM SODIUM 3.38 G: 3; .375 INJECTION, POWDER, FOR SOLUTION INTRAVENOUS at 00:00

## 2019-03-18 RX ADMIN — SIMVASTATIN 10 MG: 10 TABLET, FILM COATED ORAL at 23:20

## 2019-03-18 RX ADMIN — TAMSULOSIN HYDROCHLORIDE 0.4 MG: 0.4 CAPSULE ORAL at 09:02

## 2019-03-18 RX ADMIN — SIMVASTATIN 10 MG: 10 TABLET, FILM COATED ORAL at 00:00

## 2019-03-18 RX ADMIN — Medication 10 ML: at 23:23

## 2019-03-18 RX ADMIN — AMPICILLIN SODIUM 1 G: 1 INJECTION, POWDER, FOR SOLUTION INTRAMUSCULAR; INTRAVENOUS at 16:03

## 2019-03-18 RX ADMIN — Medication 10 ML: at 09:22

## 2019-03-18 ASSESSMENT — PAIN SCALES - GENERAL
PAINLEVEL_OUTOF10: 6
PAINLEVEL_OUTOF10: 7
PAINLEVEL_OUTOF10: 8
PAINLEVEL_OUTOF10: 5
PAINLEVEL_OUTOF10: 8
PAINLEVEL_OUTOF10: 7
PAINLEVEL_OUTOF10: 8

## 2019-03-18 ASSESSMENT — PAIN DESCRIPTION - LOCATION
LOCATION: LEG
LOCATION: KNEE;LEG

## 2019-03-18 ASSESSMENT — PAIN DESCRIPTION - ORIENTATION
ORIENTATION: RIGHT

## 2019-03-18 ASSESSMENT — PAIN DESCRIPTION - DESCRIPTORS
DESCRIPTORS: ACHING
DESCRIPTORS: ACHING
DESCRIPTORS: SHOOTING
DESCRIPTORS: ACHING
DESCRIPTORS: ACHING

## 2019-03-18 ASSESSMENT — PAIN DESCRIPTION - FREQUENCY
FREQUENCY: INTERMITTENT
FREQUENCY: INTERMITTENT

## 2019-03-18 ASSESSMENT — PAIN DESCRIPTION - PAIN TYPE
TYPE: ACUTE PAIN

## 2019-03-18 ASSESSMENT — PAIN DESCRIPTION - PROGRESSION: CLINICAL_PROGRESSION: NOT CHANGED

## 2019-03-18 ASSESSMENT — ENCOUNTER SYMPTOMS
COLOR CHANGE: 1
RESPIRATORY NEGATIVE: 1
GASTROINTESTINAL NEGATIVE: 1

## 2019-03-19 ENCOUNTER — APPOINTMENT (OUTPATIENT)
Dept: ULTRASOUND IMAGING | Age: 69
DRG: 683 | End: 2019-03-19
Payer: MEDICARE

## 2019-03-19 PROBLEM — N40.0 UNSPECIFIED HYPERPLASIA OF PROSTATE WITHOUT URINARY OBSTRUCTION AND OTHER LOWER URINARY TRACT SYMPTOMS (LUTS): Status: ACTIVE | Noted: 2019-03-19

## 2019-03-19 PROBLEM — E66.01 MORBID OBESITY (HCC): Status: ACTIVE | Noted: 2019-03-19

## 2019-03-19 PROBLEM — B88.8 INFESTATION BY BED BUG: Chronic | Status: ACTIVE | Noted: 2019-03-19

## 2019-03-19 PROBLEM — I10 HYPERTENSION: Status: ACTIVE | Noted: 2019-03-19

## 2019-03-19 PROBLEM — M10.9 ACUTE GOUTY ARTHRITIS: Chronic | Status: ACTIVE | Noted: 2019-03-19

## 2019-03-19 PROBLEM — M54.9 BACK PAIN: Status: ACTIVE | Noted: 2019-03-19

## 2019-03-19 PROBLEM — M25.579 PAIN IN JOINT INVOLVING ANKLE AND FOOT: Status: ACTIVE | Noted: 2019-03-19

## 2019-03-19 PROBLEM — N40.0 UNSPECIFIED HYPERPLASIA OF PROSTATE WITHOUT URINARY OBSTRUCTION AND OTHER LOWER URINARY TRACT SYMPTOMS (LUTS): Chronic | Status: ACTIVE | Noted: 2019-03-19

## 2019-03-19 PROBLEM — E66.01 MORBID OBESITY (HCC): Chronic | Status: ACTIVE | Noted: 2019-03-19

## 2019-03-19 PROBLEM — D12.6 ADENOMATOUS POLYP OF COLON: Status: ACTIVE | Noted: 2019-03-19

## 2019-03-19 PROBLEM — M10.9 ACUTE GOUTY ARTHRITIS: Status: ACTIVE | Noted: 2019-03-19

## 2019-03-19 PROBLEM — D64.9 ANEMIA: Status: ACTIVE | Noted: 2019-03-19

## 2019-03-19 PROBLEM — I10 HYPERTENSION: Chronic | Status: ACTIVE | Noted: 2019-03-19

## 2019-03-19 PROBLEM — C61 PRIMARY MALIGNANT NEOPLASM OF PROSTATE (HCC): Status: ACTIVE | Noted: 2019-03-19

## 2019-03-19 PROBLEM — E11.39 TYPE 2 OR UNSPECIFIED TYPE DIABETES MELLITUS WITH OPHTHALMIC MANIFESTATIONS: Status: ACTIVE | Noted: 2019-03-19

## 2019-03-19 PROBLEM — D12.6 ADENOMATOUS POLYP OF COLON: Chronic | Status: ACTIVE | Noted: 2019-03-19

## 2019-03-19 PROBLEM — E11.39 TYPE 2 OR UNSPECIFIED TYPE DIABETES MELLITUS WITH OPHTHALMIC MANIFESTATIONS: Chronic | Status: ACTIVE | Noted: 2019-03-19

## 2019-03-19 PROBLEM — G47.33 OBSTRUCTIVE SLEEP APNEA OF ADULT: Chronic | Status: ACTIVE | Noted: 2019-03-19

## 2019-03-19 PROBLEM — E78.5 OTHER AND UNSPECIFIED HYPERLIPIDEMIA: Chronic | Status: ACTIVE | Noted: 2019-03-19

## 2019-03-19 PROBLEM — C61 PRIMARY MALIGNANT NEOPLASM OF PROSTATE (HCC): Chronic | Status: ACTIVE | Noted: 2019-03-19

## 2019-03-19 PROBLEM — E78.5 OTHER AND UNSPECIFIED HYPERLIPIDEMIA: Status: ACTIVE | Noted: 2019-03-19

## 2019-03-19 PROBLEM — B88.8 INFESTATION BY BED BUG: Status: ACTIVE | Noted: 2019-03-19

## 2019-03-19 PROBLEM — D64.9 ANEMIA: Chronic | Status: ACTIVE | Noted: 2019-03-19

## 2019-03-19 PROBLEM — M54.9 BACK PAIN: Chronic | Status: ACTIVE | Noted: 2019-03-19

## 2019-03-19 PROBLEM — G47.33 OBSTRUCTIVE SLEEP APNEA OF ADULT: Status: ACTIVE | Noted: 2019-03-19

## 2019-03-19 LAB
CREATININE URINE: 49.6 MG/DL
GLUCOSE BLD-MCNC: 112 MG/DL (ref 60–115)
GLUCOSE BLD-MCNC: 117 MG/DL (ref 60–115)
GLUCOSE BLD-MCNC: 150 MG/DL (ref 60–115)
GLUCOSE BLD-MCNC: 163 MG/DL (ref 60–115)
GLUCOSE BLD-MCNC: 60 MG/DL (ref 60–115)
GLUCOSE BLD-MCNC: 61 MG/DL (ref 60–115)
GLUCOSE BLD-MCNC: 79 MG/DL (ref 60–115)
GLUCOSE BLD-MCNC: 84 MG/DL (ref 60–115)
PERFORMED ON: ABNORMAL
PERFORMED ON: NORMAL
PROTEIN PROTEIN: 5 MG/DL
PROTEIN/CREAT RATIO: 0.1 ML/ML
PROTEIN/CREAT RATIO: 0.1 ML/ML (ref 0–0.2)

## 2019-03-19 PROCEDURE — 6360000002 HC RX W HCPCS: Performed by: INTERNAL MEDICINE

## 2019-03-19 PROCEDURE — 93971 EXTREMITY STUDY: CPT

## 2019-03-19 PROCEDURE — 6370000000 HC RX 637 (ALT 250 FOR IP): Performed by: INTERNAL MEDICINE

## 2019-03-19 PROCEDURE — 99232 SBSQ HOSP IP/OBS MODERATE 35: CPT | Performed by: INTERNAL MEDICINE

## 2019-03-19 PROCEDURE — 1210000000 HC MED SURG R&B

## 2019-03-19 PROCEDURE — 2580000003 HC RX 258: Performed by: INTERNAL MEDICINE

## 2019-03-19 PROCEDURE — 97110 THERAPEUTIC EXERCISES: CPT

## 2019-03-19 PROCEDURE — 97116 GAIT TRAINING THERAPY: CPT

## 2019-03-19 PROCEDURE — 84156 ASSAY OF PROTEIN URINE: CPT

## 2019-03-19 RX ORDER — HEPARIN SODIUM 5000 [USP'U]/ML
5000 INJECTION, SOLUTION INTRAVENOUS; SUBCUTANEOUS EVERY 8 HOURS SCHEDULED
Status: DISCONTINUED | OUTPATIENT
Start: 2019-03-19 | End: 2019-03-20 | Stop reason: HOSPADM

## 2019-03-19 RX ORDER — AMOXICILLIN 250 MG/1
500 CAPSULE ORAL 3 TIMES DAILY
Qty: 60 CAPSULE | Refills: 0 | Status: SHIPPED | OUTPATIENT
Start: 2019-03-19 | End: 2019-03-29

## 2019-03-19 RX ORDER — AMOXICILLIN 500 MG/1
500 CAPSULE ORAL EVERY 8 HOURS SCHEDULED
Status: DISCONTINUED | OUTPATIENT
Start: 2019-03-19 | End: 2019-03-20 | Stop reason: HOSPADM

## 2019-03-19 RX ADMIN — INSULIN GLARGINE 40 UNITS: 100 INJECTION, SOLUTION SUBCUTANEOUS at 23:18

## 2019-03-19 RX ADMIN — TAMSULOSIN HYDROCHLORIDE 0.4 MG: 0.4 CAPSULE ORAL at 08:50

## 2019-03-19 RX ADMIN — HEPARIN SODIUM 5000 UNITS: 5000 INJECTION INTRAVENOUS; SUBCUTANEOUS at 23:11

## 2019-03-19 RX ADMIN — AMPICILLIN SODIUM 1 G: 1 INJECTION, POWDER, FOR SOLUTION INTRAMUSCULAR; INTRAVENOUS at 08:48

## 2019-03-19 RX ADMIN — Medication 10 ML: at 08:52

## 2019-03-19 RX ADMIN — AMPICILLIN SODIUM 1 G: 1 INJECTION, POWDER, FOR SOLUTION INTRAMUSCULAR; INTRAVENOUS at 00:43

## 2019-03-19 RX ADMIN — ASPIRIN 81 MG 81 MG: 81 TABLET ORAL at 08:51

## 2019-03-19 RX ADMIN — AMOXICILLIN 500 MG: 500 CAPSULE ORAL at 16:11

## 2019-03-19 RX ADMIN — ATENOLOL 50 MG: 50 TABLET ORAL at 23:10

## 2019-03-19 RX ADMIN — Medication 10 ML: at 23:18

## 2019-03-19 RX ADMIN — AMOXICILLIN 500 MG: 500 CAPSULE ORAL at 23:11

## 2019-03-19 RX ADMIN — SIMVASTATIN 10 MG: 10 TABLET, FILM COATED ORAL at 23:11

## 2019-03-19 ASSESSMENT — PAIN SCALES - GENERAL: PAINLEVEL_OUTOF10: 7

## 2019-03-19 ASSESSMENT — ENCOUNTER SYMPTOMS
GASTROINTESTINAL NEGATIVE: 1
RESPIRATORY NEGATIVE: 1
COLOR CHANGE: 1

## 2019-03-19 ASSESSMENT — PAIN DESCRIPTION - LOCATION: LOCATION: LEG

## 2019-03-19 ASSESSMENT — PAIN DESCRIPTION - ORIENTATION: ORIENTATION: RIGHT

## 2019-03-19 ASSESSMENT — PAIN DESCRIPTION - DESCRIPTORS: DESCRIPTORS: ACHING;TENDER

## 2019-03-19 ASSESSMENT — PAIN DESCRIPTION - PAIN TYPE: TYPE: ACUTE PAIN

## 2019-03-20 VITALS
OXYGEN SATURATION: 94 % | BODY MASS INDEX: 49.43 KG/M2 | SYSTOLIC BLOOD PRESSURE: 137 MMHG | DIASTOLIC BLOOD PRESSURE: 48 MMHG | HEART RATE: 59 BPM | HEIGHT: 66 IN | RESPIRATION RATE: 22 BRPM | TEMPERATURE: 97.9 F | WEIGHT: 307.54 LBS

## 2019-03-20 LAB
ANION GAP SERPL CALCULATED.3IONS-SCNC: 13 MEQ/L (ref 9–15)
BLOOD CULTURE, ROUTINE: NORMAL
BUN BLDV-MCNC: 59 MG/DL (ref 8–23)
CALCIUM SERPL-MCNC: 8.2 MG/DL (ref 8.5–9.9)
CHLORIDE BLD-SCNC: 107 MEQ/L (ref 95–107)
CO2: 24 MEQ/L (ref 20–31)
CREAT SERPL-MCNC: 1.67 MG/DL (ref 0.7–1.2)
CULTURE, BLOOD 2: NORMAL
GFR AFRICAN AMERICAN: 49.3
GFR NON-AFRICAN AMERICAN: 40.8
GLUCOSE BLD-MCNC: 134 MG/DL (ref 60–115)
GLUCOSE BLD-MCNC: 136 MG/DL (ref 70–99)
GLUCOSE BLD-MCNC: 139 MG/DL (ref 60–115)
GLUCOSE BLD-MCNC: 140 MG/DL (ref 60–115)
HCT VFR BLD CALC: 25.8 % (ref 42–52)
HEMOGLOBIN: 8.1 G/DL (ref 14–18)
LV EF: 55 %
LVEF MODALITY: NORMAL
PERFORMED ON: ABNORMAL
POTASSIUM SERPL-SCNC: 5 MEQ/L (ref 3.4–4.9)
SODIUM BLD-SCNC: 144 MEQ/L (ref 135–144)

## 2019-03-20 PROCEDURE — 6370000000 HC RX 637 (ALT 250 FOR IP): Performed by: NURSE PRACTITIONER

## 2019-03-20 PROCEDURE — 2580000003 HC RX 258: Performed by: INTERNAL MEDICINE

## 2019-03-20 PROCEDURE — 80048 BASIC METABOLIC PNL TOTAL CA: CPT

## 2019-03-20 PROCEDURE — 99232 SBSQ HOSP IP/OBS MODERATE 35: CPT | Performed by: INTERNAL MEDICINE

## 2019-03-20 PROCEDURE — 6370000000 HC RX 637 (ALT 250 FOR IP): Performed by: INTERNAL MEDICINE

## 2019-03-20 PROCEDURE — 97535 SELF CARE MNGMENT TRAINING: CPT

## 2019-03-20 PROCEDURE — 85018 HEMOGLOBIN: CPT

## 2019-03-20 PROCEDURE — 6360000002 HC RX W HCPCS: Performed by: INTERNAL MEDICINE

## 2019-03-20 PROCEDURE — 93306 TTE W/DOPPLER COMPLETE: CPT

## 2019-03-20 PROCEDURE — 2700000000 HC OXYGEN THERAPY PER DAY

## 2019-03-20 PROCEDURE — 36415 COLL VENOUS BLD VENIPUNCTURE: CPT

## 2019-03-20 PROCEDURE — 85014 HEMATOCRIT: CPT

## 2019-03-20 RX ORDER — AMOXICILLIN 500 MG/1
500 CAPSULE ORAL EVERY 8 HOURS SCHEDULED
Qty: 30 CAPSULE | Refills: 0 | Status: SHIPPED | OUTPATIENT
Start: 2019-03-20 | End: 2019-03-30

## 2019-03-20 RX ORDER — SIMVASTATIN 10 MG
10 TABLET ORAL NIGHTLY
Qty: 30 TABLET | Refills: 3 | Status: SHIPPED | OUTPATIENT
Start: 2019-03-20

## 2019-03-20 RX ADMIN — AMLODIPINE BESYLATE 5 MG: 5 TABLET ORAL at 08:54

## 2019-03-20 RX ADMIN — ACETAMINOPHEN 650 MG: 325 TABLET ORAL at 14:14

## 2019-03-20 RX ADMIN — AMOXICILLIN 500 MG: 500 CAPSULE ORAL at 05:55

## 2019-03-20 RX ADMIN — AMOXICILLIN 500 MG: 500 CAPSULE ORAL at 13:26

## 2019-03-20 RX ADMIN — INSULIN GLARGINE 40 UNITS: 100 INJECTION, SOLUTION SUBCUTANEOUS at 08:55

## 2019-03-20 RX ADMIN — HEPARIN SODIUM 5000 UNITS: 5000 INJECTION INTRAVENOUS; SUBCUTANEOUS at 13:26

## 2019-03-20 RX ADMIN — Medication 10 ML: at 09:00

## 2019-03-20 RX ADMIN — ATENOLOL 50 MG: 50 TABLET ORAL at 08:54

## 2019-03-20 RX ADMIN — DARBEPOETIN ALFA 40 MCG: 40 SOLUTION INTRAVENOUS; SUBCUTANEOUS at 16:59

## 2019-03-20 RX ADMIN — HEPARIN SODIUM 5000 UNITS: 5000 INJECTION INTRAVENOUS; SUBCUTANEOUS at 05:54

## 2019-03-20 RX ADMIN — TAMSULOSIN HYDROCHLORIDE 0.4 MG: 0.4 CAPSULE ORAL at 08:54

## 2019-03-20 ASSESSMENT — PAIN DESCRIPTION - PAIN TYPE: TYPE: ACUTE PAIN

## 2019-03-20 ASSESSMENT — PAIN DESCRIPTION - LOCATION: LOCATION: LEG

## 2019-03-20 ASSESSMENT — ENCOUNTER SYMPTOMS
COLOR CHANGE: 1
RESPIRATORY NEGATIVE: 1
GASTROINTESTINAL NEGATIVE: 1

## 2019-03-20 ASSESSMENT — PAIN DESCRIPTION - ORIENTATION: ORIENTATION: RIGHT

## 2019-03-20 ASSESSMENT — PAIN SCALES - GENERAL
PAINLEVEL_OUTOF10: 5

## 2020-01-20 ENCOUNTER — HOSPITAL ENCOUNTER (EMERGENCY)
Age: 70
Discharge: HOME | End: 2020-01-20
Attending: EMERGENCY MEDICINE
Payer: MEDICARE

## 2020-01-20 VITALS
OXYGEN SATURATION: 94 % | SYSTOLIC BLOOD PRESSURE: 156 MMHG | WEIGHT: 300 LBS | DIASTOLIC BLOOD PRESSURE: 85 MMHG | TEMPERATURE: 98.9 F | BODY MASS INDEX: 48.21 KG/M2 | HEART RATE: 63 BPM | HEIGHT: 66 IN | RESPIRATION RATE: 18 BRPM

## 2020-01-20 PROCEDURE — 6370000000 HC RX 637 (ALT 250 FOR IP): Performed by: EMERGENCY MEDICINE

## 2020-01-20 PROCEDURE — 99283 EMERGENCY DEPT VISIT LOW MDM: CPT

## 2020-01-20 RX ORDER — ACETAMINOPHEN 325 MG/1
650 TABLET ORAL ONCE
Status: COMPLETED | OUTPATIENT
Start: 2020-01-20 | End: 2020-01-20

## 2020-01-20 RX ADMIN — ACETAMINOPHEN 650 MG: 325 TABLET ORAL at 06:28

## 2020-01-20 ASSESSMENT — PAIN DESCRIPTION - FREQUENCY: FREQUENCY: CONTINUOUS

## 2020-01-20 ASSESSMENT — PAIN SCALES - GENERAL
PAINLEVEL_OUTOF10: 8
PAINLEVEL_OUTOF10: 2

## 2020-01-20 ASSESSMENT — PAIN DESCRIPTION - ORIENTATION: ORIENTATION: RIGHT

## 2020-01-20 ASSESSMENT — PAIN DESCRIPTION - LOCATION: LOCATION: KNEE

## 2020-01-20 ASSESSMENT — PAIN DESCRIPTION - PAIN TYPE: TYPE: ACUTE PAIN

## 2020-01-20 ASSESSMENT — PAIN DESCRIPTION - DESCRIPTORS: DESCRIPTORS: ACHING

## 2020-01-20 NOTE — ED TRIAGE NOTES
Patient brought in by [facility] for pain that was similar to cellulitis. Patient states \"the pain is in his right knee. \" no injury or fall noted.

## 2020-01-20 NOTE — ED NOTES
Patient ambulated with the assistance of this nurse and walker. Patient tolerated walking 15 ft.       Bryan Santo RN  01/20/20 0700

## 2020-01-20 NOTE — ED NOTES
Dr. Janet Tellez at the bedside at this time. Assessment completed.       Srini Grider RN  01/20/20 4548

## 2020-01-20 NOTE — ED PROVIDER NOTES
mouth daily    SIMVASTATIN (ZOCOR) 10 MG TABLET    Take 1 tablet by mouth nightly    TAMSULOSIN (FLOMAX) 0.4 MG CAPSULE    Take 2 tablets by mouth nightly    VITAMIN D 1000 UNITS CAPS    Take 2 tablets by mouth daily       ALLERGIES     Patient has no known allergies. FAMILY HISTORY     History reviewed. No pertinent family history.        SOCIAL HISTORY       Social History     Socioeconomic History    Marital status: Single     Spouse name: None    Number of children: None    Years of education: None    Highest education level: None   Occupational History    None   Social Needs    Financial resource strain: None    Food insecurity:     Worry: None     Inability: None    Transportation needs:     Medical: None     Non-medical: None   Tobacco Use    Smoking status: Never Smoker    Smokeless tobacco: Never Used   Substance and Sexual Activity    Alcohol use: None    Drug use: Never    Sexual activity: None   Lifestyle    Physical activity:     Days per week: None     Minutes per session: None    Stress: None   Relationships    Social connections:     Talks on phone: None     Gets together: None     Attends Bahai service: None     Active member of club or organization: None     Attends meetings of clubs or organizations: None     Relationship status: None    Intimate partner violence:     Fear of current or ex partner: None     Emotionally abused: None     Physically abused: None     Forced sexual activity: None   Other Topics Concern    None   Social History Narrative         Lives With: Alone    Type of Home: House Two level, Able to Live on Main level with bedroom/bathroom    Home Access: Stairs to enter without rails - Number of Steps: 6    Bathroom Shower/Tub: Tub/Shower unit    Bathroom Toilet: Standard    Home Equipment: Cane, Rolling walker    ADL Assistance: Independent    Homemaking Assistance: Independent    Homemaking Responsibilities: Yes    Ambulation Assistance: Independent(Cane) below, if available at the time of this note:    No orders to display       LABS:  Labs Reviewed - No data to display    All other labs were within normal range or not returned as of this dictation. EMERGENCY DEPARTMENT COURSE and DIFFERENTIAL DIAGNOSIS/MDM:   Vitals:    Vitals:    01/20/20 0610   BP: (!) 156/85   Pulse: 63   Resp: 18   Temp: 98.9 °F (37.2 °C)   TempSrc: Oral   SpO2: 94%   Weight: 300 lb (136.1 kg)   Height: 5' 6\" (1.676 m)       MDM  Number of Diagnoses or Management Options  Acute pain of right knee:   Diagnosis management comments: Presents with R knee pain. Has not taken anything prior to arrival.  Given tylenol for symptom relief. No injury noted, will not XR at this time. Patient has not swelling, no history of DVT; will not order ultrasound. No erythema or fever to suggest cellulitis. Suspect musculoskeletal origin, patient instructed to call PCP for outpatient follow up (at [facility]) upon discharge for appt in 2-3 days. Patient will be discharged home in good condition. Patient has been hemodynamically stable throughout ED course and is appropriate for outpatient follow up. Patient should follow up with PCP in 2-3 days or return to ED immediately for any new or worsening symptoms. Patient is well appearing on discharge and agreeable with plan of care. Patient Progress  Patient progress: stable      Procedures    CRITICAL CARE TIME   Total Critical Care time was 0 minutes, excluding separately reportable procedures. There was a high probability of clinically significant/life threatening deterioration in the patient's condition which required my urgent intervention. FINAL IMPRESSION      1.  Acute pain of right knee          DISPOSITION/PLAN   DISPOSITION Decision To Discharge 01/20/2020 06:17:10 AM      (Please note that portions of this note were completed with a voice recognition program.  Efforts were made to edit the dictations but occasionally words are mis-transcribed.)    Moon Jefferson MD (electronically signed)  Attending Emergency Physician        Moon Jefferson MD  01/20/20 1265

## 2020-11-20 ENCOUNTER — HOSPITAL ENCOUNTER (INPATIENT)
Age: 70
LOS: 4 days | Discharge: HOME | DRG: 689 | End: 2020-11-24
Attending: FAMILY MEDICINE | Admitting: FAMILY MEDICINE
Payer: MEDICARE

## 2020-11-20 ENCOUNTER — APPOINTMENT (OUTPATIENT)
Dept: GENERAL RADIOLOGY | Age: 70
DRG: 689 | End: 2020-11-20
Payer: MEDICARE

## 2020-11-20 PROBLEM — N39.0 UTI (URINARY TRACT INFECTION): Status: ACTIVE | Noted: 2020-11-20

## 2020-11-20 LAB
ALBUMIN SERPL-MCNC: 3.9 G/DL (ref 3.5–4.6)
ALP BLD-CCNC: 134 U/L (ref 35–104)
ALT SERPL-CCNC: 11 U/L (ref 0–41)
ANION GAP SERPL CALCULATED.3IONS-SCNC: 9 MEQ/L (ref 9–15)
AST SERPL-CCNC: 16 U/L (ref 0–40)
BACTERIA: ABNORMAL /HPF
BASOPHILS ABSOLUTE: 0 K/UL (ref 0–0.2)
BASOPHILS RELATIVE PERCENT: 0.8 %
BILIRUB SERPL-MCNC: 0.6 MG/DL (ref 0.2–0.7)
BILIRUBIN URINE: NEGATIVE
BLOOD, URINE: ABNORMAL
BUN BLDV-MCNC: 44 MG/DL (ref 8–23)
CALCIUM SERPL-MCNC: 8.8 MG/DL (ref 8.5–9.9)
CHLORIDE BLD-SCNC: 106 MEQ/L (ref 95–107)
CLARITY: ABNORMAL
CO2: 30 MEQ/L (ref 20–31)
COLOR: YELLOW
CREAT SERPL-MCNC: 2.17 MG/DL (ref 0.7–1.2)
EKG ATRIAL RATE: 46 BPM
EKG P AXIS: 60 DEGREES
EKG P-R INTERVAL: 182 MS
EKG Q-T INTERVAL: 540 MS
EKG QRS DURATION: 100 MS
EKG QTC CALCULATION (BAZETT): 472 MS
EKG R AXIS: 0 DEGREES
EKG T AXIS: 0 DEGREES
EKG VENTRICULAR RATE: 46 BPM
EOSINOPHILS ABSOLUTE: 0 K/UL (ref 0–0.7)
EOSINOPHILS RELATIVE PERCENT: 0.8 %
EPITHELIAL CELLS, UA: ABNORMAL /HPF (ref 0–5)
GFR AFRICAN AMERICAN: 36.3
GFR NON-AFRICAN AMERICAN: 30
GLOBULIN: 3.1 G/DL (ref 2.3–3.5)
GLUCOSE BLD-MCNC: 120 MG/DL (ref 70–99)
GLUCOSE BLD-MCNC: 96 MG/DL (ref 60–115)
GLUCOSE BLD-MCNC: 98 MG/DL (ref 60–115)
GLUCOSE URINE: NEGATIVE MG/DL
HBA1C MFR BLD: 8.2 % (ref 4.8–5.9)
HCT VFR BLD CALC: 39.6 % (ref 42–52)
HEMOGLOBIN: 12.6 G/DL (ref 14–18)
HYALINE CASTS: ABNORMAL /HPF (ref 0–5)
KETONES, URINE: NEGATIVE MG/DL
LEUKOCYTE ESTERASE, URINE: ABNORMAL
LYMPHOCYTES ABSOLUTE: 0.5 K/UL (ref 1–4.8)
LYMPHOCYTES RELATIVE PERCENT: 9.8 %
MAGNESIUM: 2.6 MG/DL (ref 1.7–2.4)
MCH RBC QN AUTO: 29.4 PG (ref 27–31.3)
MCHC RBC AUTO-ENTMCNC: 31.9 % (ref 33–37)
MCV RBC AUTO: 92.2 FL (ref 80–100)
MONOCYTES ABSOLUTE: 0.7 K/UL (ref 0.2–0.8)
MONOCYTES RELATIVE PERCENT: 13.3 %
NEUTROPHILS ABSOLUTE: 4.1 K/UL (ref 1.4–6.5)
NEUTROPHILS RELATIVE PERCENT: 75.3 %
NITRITE, URINE: POSITIVE
PDW BLD-RTO: 17.2 % (ref 11.5–14.5)
PERFORMED ON: NORMAL
PERFORMED ON: NORMAL
PH UA: 7.5 (ref 5–9)
PLATELET # BLD: 162 K/UL (ref 130–400)
POTASSIUM SERPL-SCNC: 4.5 MEQ/L (ref 3.4–4.9)
PROTEIN UA: NEGATIVE MG/DL
RBC # BLD: 4.3 M/UL (ref 4.7–6.1)
RBC UA: ABNORMAL /HPF (ref 0–5)
SODIUM BLD-SCNC: 145 MEQ/L (ref 135–144)
SPECIFIC GRAVITY UA: 1.01 (ref 1–1.03)
TOTAL PROTEIN: 7 G/DL (ref 6.3–8)
TROPONIN: 0.04 NG/ML (ref 0–0.01)
URINE REFLEX TO CULTURE: YES
UROBILINOGEN, URINE: 0.2 E.U./DL
WBC # BLD: 5.4 K/UL (ref 4.8–10.8)
WBC UA: >100 /HPF (ref 0–5)

## 2020-11-20 PROCEDURE — 6370000000 HC RX 637 (ALT 250 FOR IP): Performed by: FAMILY MEDICINE

## 2020-11-20 PROCEDURE — 36415 COLL VENOUS BLD VENIPUNCTURE: CPT

## 2020-11-20 PROCEDURE — 6360000002 HC RX W HCPCS: Performed by: NURSE PRACTITIONER

## 2020-11-20 PROCEDURE — 2580000003 HC RX 258: Performed by: FAMILY MEDICINE

## 2020-11-20 PROCEDURE — 2580000003 HC RX 258: Performed by: NURSE PRACTITIONER

## 2020-11-20 PROCEDURE — 93005 ELECTROCARDIOGRAM TRACING: CPT

## 2020-11-20 PROCEDURE — 87086 URINE CULTURE/COLONY COUNT: CPT

## 2020-11-20 PROCEDURE — 1210000000 HC MED SURG R&B

## 2020-11-20 PROCEDURE — 81001 URINALYSIS AUTO W/SCOPE: CPT

## 2020-11-20 PROCEDURE — 87186 SC STD MICRODIL/AGAR DIL: CPT

## 2020-11-20 PROCEDURE — 99283 EMERGENCY DEPT VISIT LOW MDM: CPT

## 2020-11-20 PROCEDURE — 6360000002 HC RX W HCPCS: Performed by: FAMILY MEDICINE

## 2020-11-20 PROCEDURE — 80053 COMPREHEN METABOLIC PANEL: CPT

## 2020-11-20 PROCEDURE — 83735 ASSAY OF MAGNESIUM: CPT

## 2020-11-20 PROCEDURE — 84484 ASSAY OF TROPONIN QUANT: CPT

## 2020-11-20 PROCEDURE — 85025 COMPLETE CBC W/AUTO DIFF WBC: CPT

## 2020-11-20 PROCEDURE — 83036 HEMOGLOBIN GLYCOSYLATED A1C: CPT

## 2020-11-20 PROCEDURE — 87077 CULTURE AEROBIC IDENTIFY: CPT

## 2020-11-20 PROCEDURE — 71045 X-RAY EXAM CHEST 1 VIEW: CPT

## 2020-11-20 RX ORDER — ATENOLOL 50 MG/1
50 TABLET ORAL 2 TIMES DAILY
Status: ON HOLD | COMMUNITY
End: 2020-11-23 | Stop reason: HOSPADM

## 2020-11-20 RX ORDER — AMLODIPINE BESYLATE 5 MG/1
5 TABLET ORAL DAILY
Status: DISCONTINUED | OUTPATIENT
Start: 2020-11-20 | End: 2020-11-24 | Stop reason: HOSPADM

## 2020-11-20 RX ORDER — SODIUM CHLORIDE 0.9 % (FLUSH) 0.9 %
10 SYRINGE (ML) INJECTION PRN
Status: DISCONTINUED | OUTPATIENT
Start: 2020-11-20 | End: 2020-11-24 | Stop reason: HOSPADM

## 2020-11-20 RX ORDER — DEXTROSE MONOHYDRATE 25 G/50ML
12.5 INJECTION, SOLUTION INTRAVENOUS PRN
Status: DISCONTINUED | OUTPATIENT
Start: 2020-11-20 | End: 2020-11-24 | Stop reason: HOSPADM

## 2020-11-20 RX ORDER — LOSARTAN POTASSIUM 50 MG/1
50 TABLET ORAL DAILY
Status: DISCONTINUED | OUTPATIENT
Start: 2020-11-20 | End: 2020-11-24 | Stop reason: HOSPADM

## 2020-11-20 RX ORDER — ASPIRIN 81 MG/1
162 TABLET, CHEWABLE ORAL DAILY
Status: DISCONTINUED | OUTPATIENT
Start: 2020-11-20 | End: 2020-11-24 | Stop reason: HOSPADM

## 2020-11-20 RX ORDER — ATENOLOL 50 MG/1
50 TABLET ORAL 2 TIMES DAILY
Status: DISCONTINUED | OUTPATIENT
Start: 2020-11-20 | End: 2020-11-24 | Stop reason: HOSPADM

## 2020-11-20 RX ORDER — DEXTROSE MONOHYDRATE 50 MG/ML
100 INJECTION, SOLUTION INTRAVENOUS PRN
Status: DISCONTINUED | OUTPATIENT
Start: 2020-11-20 | End: 2020-11-24 | Stop reason: HOSPADM

## 2020-11-20 RX ORDER — NICOTINE POLACRILEX 4 MG
15 LOZENGE BUCCAL PRN
Status: DISCONTINUED | OUTPATIENT
Start: 2020-11-20 | End: 2020-11-24 | Stop reason: HOSPADM

## 2020-11-20 RX ORDER — POLYETHYLENE GLYCOL 3350 17 G/17G
17 POWDER, FOR SOLUTION ORAL DAILY PRN
Status: DISCONTINUED | OUTPATIENT
Start: 2020-11-20 | End: 2020-11-24 | Stop reason: HOSPADM

## 2020-11-20 RX ORDER — PROMETHAZINE HYDROCHLORIDE 25 MG/1
12.5 TABLET ORAL EVERY 6 HOURS PRN
Status: DISCONTINUED | OUTPATIENT
Start: 2020-11-20 | End: 2020-11-24 | Stop reason: HOSPADM

## 2020-11-20 RX ORDER — AMLODIPINE BESYLATE 5 MG/1
5 TABLET ORAL DAILY
Status: ON HOLD | COMMUNITY
End: 2020-11-20

## 2020-11-20 RX ORDER — FINASTERIDE 5 MG/1
5 TABLET, FILM COATED ORAL DAILY
Status: DISCONTINUED | OUTPATIENT
Start: 2020-11-20 | End: 2020-11-24 | Stop reason: HOSPADM

## 2020-11-20 RX ORDER — ACETAMINOPHEN 325 MG/1
650 TABLET ORAL EVERY 6 HOURS PRN
Status: DISCONTINUED | OUTPATIENT
Start: 2020-11-20 | End: 2020-11-24 | Stop reason: HOSPADM

## 2020-11-20 RX ORDER — ACETAMINOPHEN 650 MG/1
650 SUPPOSITORY RECTAL EVERY 6 HOURS PRN
Status: DISCONTINUED | OUTPATIENT
Start: 2020-11-20 | End: 2020-11-24 | Stop reason: HOSPADM

## 2020-11-20 RX ORDER — ATORVASTATIN CALCIUM 10 MG/1
10 TABLET, FILM COATED ORAL DAILY
Status: DISCONTINUED | OUTPATIENT
Start: 2020-11-20 | End: 2020-11-24 | Stop reason: HOSPADM

## 2020-11-20 RX ORDER — INSULIN GLARGINE 100 [IU]/ML
44 INJECTION, SOLUTION SUBCUTANEOUS 2 TIMES DAILY
Status: DISCONTINUED | OUTPATIENT
Start: 2020-11-20 | End: 2020-11-24

## 2020-11-20 RX ORDER — FUROSEMIDE 40 MG/1
40 TABLET ORAL DAILY
Status: ON HOLD | COMMUNITY
End: 2020-11-20

## 2020-11-20 RX ORDER — SODIUM CHLORIDE 0.9 % (FLUSH) 0.9 %
10 SYRINGE (ML) INJECTION EVERY 12 HOURS SCHEDULED
Status: DISCONTINUED | OUTPATIENT
Start: 2020-11-20 | End: 2020-11-24 | Stop reason: HOSPADM

## 2020-11-20 RX ORDER — ONDANSETRON 2 MG/ML
4 INJECTION INTRAMUSCULAR; INTRAVENOUS EVERY 6 HOURS PRN
Status: DISCONTINUED | OUTPATIENT
Start: 2020-11-20 | End: 2020-11-24 | Stop reason: HOSPADM

## 2020-11-20 RX ORDER — 0.9 % SODIUM CHLORIDE 0.9 %
1000 INTRAVENOUS SOLUTION INTRAVENOUS ONCE
Status: COMPLETED | OUTPATIENT
Start: 2020-11-20 | End: 2020-11-20

## 2020-11-20 RX ORDER — TAMSULOSIN HYDROCHLORIDE 0.4 MG/1
0.4 CAPSULE ORAL DAILY
Status: DISCONTINUED | OUTPATIENT
Start: 2020-11-20 | End: 2020-11-24 | Stop reason: HOSPADM

## 2020-11-20 RX ORDER — FUROSEMIDE 80 MG
80 TABLET ORAL DAILY
Status: DISCONTINUED | OUTPATIENT
Start: 2020-11-20 | End: 2020-11-24 | Stop reason: HOSPADM

## 2020-11-20 RX ADMIN — SODIUM CHLORIDE 1000 ML: 9 INJECTION, SOLUTION INTRAVENOUS at 16:08

## 2020-11-20 RX ADMIN — ACETAMINOPHEN 650 MG: 325 TABLET ORAL at 21:41

## 2020-11-20 RX ADMIN — PIPERACILLIN AND TAZOBACTAM 3.38 G: 3; .375 INJECTION, POWDER, LYOPHILIZED, FOR SOLUTION INTRAVENOUS at 20:04

## 2020-11-20 RX ADMIN — ENOXAPARIN SODIUM 40 MG: 40 INJECTION SUBCUTANEOUS at 21:39

## 2020-11-20 RX ADMIN — Medication 10 ML: at 20:04

## 2020-11-20 RX ADMIN — CEFTRIAXONE SODIUM 1 G: 1 INJECTION, POWDER, FOR SOLUTION INTRAMUSCULAR; INTRAVENOUS at 18:32

## 2020-11-20 ASSESSMENT — ENCOUNTER SYMPTOMS
RHINORRHEA: 0
SHORTNESS OF BREATH: 0
BACK PAIN: 0
WHEEZING: 0
COLOR CHANGE: 0
CONSTIPATION: 0
SORE THROAT: 0
COUGH: 0
BLOOD IN STOOL: 0
DIARRHEA: 0
NAUSEA: 0
EYE DISCHARGE: 0
EYE PAIN: 0
ABDOMINAL PAIN: 0
EYE REDNESS: 0
TROUBLE SWALLOWING: 0
VOMITING: 0

## 2020-11-20 ASSESSMENT — PAIN SCALES - GENERAL: PAINLEVEL_OUTOF10: 4

## 2020-11-20 NOTE — ED NOTES
Bed: 08  Expected date: 11/20/20  Expected time:   Means of arrival:   Comments:  65 yea old hypoglycemia     April L Ford Basurto RN  11/20/20 6913

## 2020-11-20 NOTE — ED TRIAGE NOTES
Pt comes to the ED today [facility]  [facility] was sent to do a well check on pt and found him on the floor with a blood sugar of 48  [facility] administered 25g of D-10 pts BS was 215 before arrival to the ED  Pt lives at home by himself  Pt states his BS is usually high  Pt seems confused with a delay in response A&Ox3 but knows where he is at and the year.

## 2020-11-20 NOTE — ED PROVIDER NOTES
[address] [facility] ED  EMERGENCY DEPARTMENT ENCOUNTER      Pt Name: Maulik Yepez  MRN: [id number]  [place] 1950  Date of evaluation: 11/20/2020  Provider: SHALA Newberry CNP    CHIEF COMPLAINT       Chief Complaint   Patient presents with    Hypoglycemia         HISTORY OF PRESENT ILLNESS   (Location/Symptom, Timing/Onset,Context/Setting, Quality, Duration, Modifying Factors, Severity)  Note limiting factors. Maulik Yepez is a 65 y.o. male who presents to the emergency department with a chief complaint of hyperglycemia. Upon arrival patient states that he does not feel good. He is bradycardic and he states that usually his heart rate is high. He is denying any chest pain or shortness of breath. He is denying nausea vomiting or diarrhea. He reports mild dizziness but no diaphoresis. Nursing Notes were reviewed. REVIEW OF SYSTEMS    (2-9 systems for level 4, 10 or more for level 5)     Review of Systems   Constitutional: Negative for activity change, appetite change, fatigue and fever. HENT: Negative for congestion, ear pain, rhinorrhea, sore throat and trouble swallowing. Eyes: Negative for pain, discharge and redness. Respiratory: Negative for cough, shortness of breath and wheezing. Cardiovascular: Negative for chest pain and palpitations. Gastrointestinal: Negative for abdominal pain, blood in stool, constipation, diarrhea, nausea and vomiting. Endocrine: Negative for polydipsia and polyuria. Genitourinary: Negative for decreased urine volume, dysuria, flank pain and hematuria. Musculoskeletal: Negative for arthralgias, back pain and myalgias. Skin: Negative for color change, rash and wound. Neurological: Positive for dizziness and weakness. Negative for syncope, light-headedness and headaches. Psychiatric/Behavioral: Negative for behavioral problems. All other systems reviewed and are negative.       Except as noted above the remainder of the review of systems was reviewed and negative. PAST MEDICAL HISTORY     Past Medical History:   Diagnosis Date    Chronic kidney disease     Diabetes mellitus ([facility] [address]     Hypertension      History reviewed. No pertinent surgical history.   Social History     Socioeconomic History    Marital status: Single     Spouse name: None    Number of children: None    Years of education: None    Highest education level: None   Occupational History    None   Social Needs    Financial resource strain: None    Food insecurity     Worry: None     Inability: None    Transportation needs     Medical: None     Non-medical: None   Tobacco Use    Smoking status: Never Smoker    Smokeless tobacco: Never Used   Substance and Sexual Activity    Alcohol use: None    Drug use: Never    Sexual activity: None   Lifestyle    Physical activity     Days per week: None     Minutes per session: None    Stress: None   Relationships    Social connections     Talks on phone: None     Gets together: None     Attends Orthodox service: None     Active member of club or organization: None     Attends meetings of clubs or organizations: None     Relationship status: None    Intimate partner violence     Fear of current or ex partner: None     Emotionally abused: None     Physically abused: None     Forced sexual activity: None   Other Topics Concern    None   Social History Narrative         Lives With: Alone    Type of Home: House Two level, Able to Live on Main level with bedroom/bathroom    Home Access: Stairs to enter without rails - Number of Steps: 6    Bathroom Shower/Tub: Tub/Shower unit    Bathroom Toilet: Standard    Home Equipment: Cane, Rolling walker    ADL Assistance: Independent    Homemaking Assistance: Independent    Homemaking Responsibilities: Yes    Ambulation Assistance: Independent(Cane)    Transfer Assistance: Independent    Active : Yes       SCREENINGS             PHYSICAL EXAM    (up to 7 for level 4, 8 or more for level 5)     ED Triage Vitals [11/20/20 1512]   BP Temp Temp Source Pulse Resp SpO2 Height Weight   115/70 96.1 °F (35.6 °C) Temporal (!) 46 18 98 % 5' 6\" (1.676 m) 270 lb (122.5 kg)       Physical Exam  Vitals signs and nursing note reviewed. Constitutional:       General: He is not in acute distress. Appearance: He is well-developed. He is not diaphoretic. HENT:      Head: Normocephalic and atraumatic. Nose: Nose normal.   Eyes:      Conjunctiva/sclera: Conjunctivae normal.      Pupils: Pupils are equal, round, and reactive to light. Neck:      Musculoskeletal: Normal range of motion and neck supple. Cardiovascular:      Rate and Rhythm: Regular rhythm. Bradycardia present. Heart sounds: Normal heart sounds. Pulmonary:      Effort: Pulmonary effort is normal. No respiratory distress. Breath sounds: Normal breath sounds. No wheezing. Abdominal:      General: Bowel sounds are normal.      Palpations: Abdomen is soft. Tenderness: There is no abdominal tenderness. Skin:     General: Skin is warm and dry. Capillary Refill: Capillary refill takes less than 2 seconds. Findings: No rash. Neurological:      Mental Status: He is alert and oriented to person, place, and time. Cranial Nerves: No cranial nerve deficit. Psychiatric:         Behavior: Behavior normal.         RESULTS     EKG: All EKG's are interpreted by the Emergency Department Physician who either signs or Co-signsthis chart in the absence of a cardiologist.    Sinus bradycardia rate of 46 bpm no ST ovation is noted QT of 540.     RADIOLOGY:   Non-plain filmimages such as CT, Ultrasound and MRI are read by the radiologist. Plain radiographic images are visualized and preliminarily interpreted by the emergency physician with the below findings:    Bilateral poss infiltrates     Interpretation per the Radiologist below, if available at the time ofthis note:    XR CHEST PORTABLE    (Results Pending) ED BEDSIDE ULTRASOUND:   Performed by ED Physician - none    LABS:  Labs Reviewed   COMPREHENSIVE METABOLIC PANEL - Abnormal; Notable for the following components:       Result Value    Sodium 145 (*)     Glucose 120 (*)     BUN 44 (*)     CREATININE 2.17 (*)     GFR Non- 30.0 (*)     GFR  36.3 (*)     Alkaline Phosphatase 134 (*)     All other components within normal limits   CBC WITH AUTO DIFFERENTIAL - Abnormal; Notable for the following components:    RBC 4.30 (*)     Hemoglobin 12.6 (*)     Hematocrit 39.6 (*)     MCHC 31.9 (*)     RDW 17.2 (*)     Lymphocytes Absolute 0.5 (*)     All other components within normal limits   MAGNESIUM - Abnormal; Notable for the following components:    Magnesium 2.6 (*)     All other components within normal limits   TROPONIN - Abnormal; Notable for the following components:    Troponin 0.042 (*)     All other components within normal limits    Narrative:     Dafne Stuart tel. [phone],  Trop results called to and read back by Violette Cho, 11/20/2020 16:44, by  Ken Tovar   URINE RT REFLEX TO CULTURE - Abnormal; Notable for the following components:    Clarity, UA CLOUDY (*)     Blood, Urine TRACE (*)     Nitrite, Urine POSITIVE (*)     Leukocyte Esterase, Urine LARGE (*)     All other components within normal limits   MICROSCOPIC URINALYSIS - Abnormal; Notable for the following components:    Bacteria, UA FEW (*)     WBC, UA >100 (*)     RBC, UA 3-5 (*)     All other components within normal limits   CULTURE, URINE   LACTIC ACID, PLASMA       All other labs were within normal range or not returned as of this dictation.     EMERGENCY DEPARTMENT COURSE and DIFFERENTIAL DIAGNOSIS/MDM:   Vitals:    Vitals:    11/20/20 1530 11/20/20 1545 11/20/20 1600 11/20/20 1714   BP: 107/65  120/64 (!) 118/56   Pulse:  (!) 48 (!) 44 (!) 46   Resp:  14 11 11   Temp:       TempSrc:       SpO2: 96% 99% 97% 96%   Weight:       Height:                MDM Patient is a 75-year-old male presenting to the ER for hyperglycemia. Patient is hemodynamically stable nontoxic-appearing. Incidental finding of 46 heart rate and patient a little dizzy with it and he has a UTI. His chest x-ray is also mildly concerning for bilateral pneumonia. I spoke to Dr. Terrace Riedel and patient is admitted for the UTI and bradycardia. Also spoke to , cardiologist, who can see the patient in consult about the bradycardia. CRITICAL CARE TIME       CONSULTS:  None    PROCEDURES:  Unless otherwise noted below, none     Procedures    FINAL IMPRESSION      1. Urinary tract infection without hematuria, site unspecified    2. Bradycardia          DISPOSITION/PLAN   DISPOSITION Decision To Admit 11/20/2020 05:41:32 PM      PATIENT REFERRED TO:  No follow-up provider specified.     DISCHARGE MEDICATIONS:  New Prescriptions    No medications on file          (Please notethat portions of this note were completed with a voice recognition program.  Efforts were made to edit the dictations but occasionally words are mis-transcribed.)    SHALA Newberry CNP (electronically signed)  Attending Emergency Physician          SHALA Allen CNP  11/20/20 3219

## 2020-11-21 LAB
GLUCOSE BLD-MCNC: 146 MG/DL (ref 60–115)
GLUCOSE BLD-MCNC: 184 MG/DL (ref 60–115)
GLUCOSE BLD-MCNC: 268 MG/DL (ref 60–115)
GLUCOSE BLD-MCNC: 70 MG/DL (ref 60–115)
PERFORMED ON: ABNORMAL
PERFORMED ON: NORMAL

## 2020-11-21 PROCEDURE — 6360000002 HC RX W HCPCS: Performed by: FAMILY MEDICINE

## 2020-11-21 PROCEDURE — 1210000000 HC MED SURG R&B

## 2020-11-21 PROCEDURE — 2580000003 HC RX 258: Performed by: FAMILY MEDICINE

## 2020-11-21 PROCEDURE — 97162 PT EVAL MOD COMPLEX 30 MIN: CPT

## 2020-11-21 PROCEDURE — 2700000000 HC OXYGEN THERAPY PER DAY

## 2020-11-21 PROCEDURE — 6370000000 HC RX 637 (ALT 250 FOR IP): Performed by: FAMILY MEDICINE

## 2020-11-21 RX ADMIN — AMLODIPINE BESYLATE 5 MG: 5 TABLET ORAL at 09:06

## 2020-11-21 RX ADMIN — Medication 10 ML: at 09:04

## 2020-11-21 RX ADMIN — Medication 10 ML: at 20:12

## 2020-11-21 RX ADMIN — PIPERACILLIN AND TAZOBACTAM 3.38 G: 3; .375 INJECTION, POWDER, LYOPHILIZED, FOR SOLUTION INTRAVENOUS at 12:43

## 2020-11-21 RX ADMIN — ACETAMINOPHEN 650 MG: 325 TABLET ORAL at 09:12

## 2020-11-21 RX ADMIN — ENOXAPARIN SODIUM 40 MG: 40 INJECTION SUBCUTANEOUS at 20:11

## 2020-11-21 RX ADMIN — ASPIRIN 81 MG 162 MG: 81 TABLET ORAL at 09:06

## 2020-11-21 RX ADMIN — TAMSULOSIN HYDROCHLORIDE 0.4 MG: 0.4 CAPSULE ORAL at 09:07

## 2020-11-21 RX ADMIN — ACETAMINOPHEN 650 MG: 325 TABLET ORAL at 16:55

## 2020-11-21 RX ADMIN — LOSARTAN POTASSIUM 50 MG: 50 TABLET, FILM COATED ORAL at 09:06

## 2020-11-21 RX ADMIN — FINASTERIDE 5 MG: 5 TABLET, FILM COATED ORAL at 09:12

## 2020-11-21 RX ADMIN — ATORVASTATIN CALCIUM 10 MG: 10 TABLET, FILM COATED ORAL at 09:06

## 2020-11-21 RX ADMIN — FUROSEMIDE 80 MG: 80 TABLET ORAL at 09:06

## 2020-11-21 RX ADMIN — PIPERACILLIN AND TAZOBACTAM 3.38 G: 3; .375 INJECTION, POWDER, LYOPHILIZED, FOR SOLUTION INTRAVENOUS at 20:12

## 2020-11-21 RX ADMIN — ATENOLOL 50 MG: 50 TABLET ORAL at 20:11

## 2020-11-21 RX ADMIN — PIPERACILLIN AND TAZOBACTAM 3.38 G: 3; .375 INJECTION, POWDER, LYOPHILIZED, FOR SOLUTION INTRAVENOUS at 04:13

## 2020-11-21 RX ADMIN — INSULIN GLARGINE 44 UNITS: 100 INJECTION, SOLUTION SUBCUTANEOUS at 21:48

## 2020-11-21 ASSESSMENT — PAIN SCALES - GENERAL
PAINLEVEL_OUTOF10: 0
PAINLEVEL_OUTOF10: 3
PAINLEVEL_OUTOF10: 2
PAINLEVEL_OUTOF10: 4

## 2020-11-21 ASSESSMENT — PAIN DESCRIPTION - PAIN TYPE: TYPE: CHRONIC PAIN

## 2020-11-21 ASSESSMENT — PAIN DESCRIPTION - ORIENTATION: ORIENTATION: RIGHT;LEFT

## 2020-11-21 ASSESSMENT — PAIN DESCRIPTION - LOCATION: LOCATION: HIP

## 2020-11-21 NOTE — PROGRESS NOTES
Treatment Pain Screening:   Pain Screening  Patient Currently in Pain: Yes  Pain Assessment  Pain Assessment: 0-10  Pain Level: 2  Pain Type: Chronic pain  Pain Location: Hip  Pain Orientation: Right;Left    Prior Level of Function:  Social/Functional History  Lives With: Alone  Type of Home: House  Home Layout: Two level, Bed/Bath upstairs(stair lift)  Home Access: Ramped entrance  Bathroom Shower/Tub: Tub/Shower unit  Bathroom Equipment: Shower chair, Hand-held shower  Home Equipment: (rollator)  ADL Assistance: Independent  Homemaking Assistance: Independent  Homemaking Responsibilities: Yes  Ambulation Assistance: Independent(rollator)  Transfer Assistance: Independent  Active : Yes    OBJECTIVE:   Vision: Impaired  Vision Exceptions: Wears glasses for reading  Hearing: Exceptions to [employer]  Hearing Exceptions: Hard of hearing/hearing concerns; No hearing aid    Cognition:  Overall Orientation Status: Within Functional Limits  Follows Commands: Within Functional Limits    Observation/Palpation  Observation: pleasant, cooperative, on 2 L O2 NC    ROM:  RLE PROM: WFL  LLE PROM: WFL    Strength:  Strength RLE  Comment: grossly 4+/5 except hip4/5  Strength LLE  Comment: grossly 4+/5 except hip4/5    Neuro:  Balance  Posture: Fair  Sitting - Static: Good  Sitting - Dynamic: Good  Standing - Static: Fair;+  Standing - Dynamic: Fair     Tone RLE  RLE Tone: Normotonic  Tone LLE  LLE Tone: Normotonic  Motor Control  Gross Motor?: WFL  Sensation  Overall Sensation Status: WFL    Bed mobility  Supine to Sit: Unable to assess  Sit to Supine: Unable to assess  Comment: pt up to bed side chair prior to and after eval    Transfers  Sit to Stand: Independent  Stand to sit: Independent    Ambulation  Ambulation?: Yes  Ambulation 1  Surface: level tile  Device: No Device  Assistance: Supervision  Gait Deviations: Increased TRISH  Distance: 40ft x2  Comments: trendelenburg lurch gait pattern that normalized by end of gait trials. Pt reports that he has  OA    Activity Tolerance  Activity Tolerance: Patient Tolerated treatment well      PT Education  PT Education: PT Role;Plan of Care;Home Exercise Program    ASSESSMENT:   Body structures, Functions, Activity limitations: Decreased ROM  Decision Making: Medium Complexity  History: med  Exam: med  Clinical Presentation: med    Prognosis: Good  Barriers to Learning: none    DISCHARGE RECOMMENDATIONS:  Discharge Recommendations: Continue to assess pending progress    Assessment: Pt demonstrates safe functional mobility in room without AD. Pt mildly SOB on 2 L O2 NC, SPO2 98%, BP 90 bpm. Pt reports that he is functioning at his baseline. N Pt needs identified at this time. REQUIRES PT FOLLOW UP: Yes      PLAN OF CARE:  Plan  Times per week: eval only  Safety Devices  Type of devices: Left in chair, Chair alarm in place, Call light within reach    Goals:  Patient goals : \"go home\"    [facility] ([address] [place] [address]) [address] Raw Score : 22     Therapy Time:   Individual   Time In 32 61 16   Time Out 1550   Minutes [address] [place], [place], 11/21/20 at 4:20 PM       Definitions for assistance levels  Independent = pt does not require any physical supervision or assistance from another person for activity completion. Device may be needed.   Stand by assistance = pt requires verbal cues or instructions from another person, close to but not touching, to perform the activity  Minimal assistance= pt performs 75% or more of the activity; assistance is required to complete the activity  Moderate assistance= pt performs 50% of the activity; assistance is required to complete the activity  Maximal assistance = pt performs 25% of the activity; assistance is required to complete the activity  Dependent = pt requires total physical assistance to accomplish the task

## 2020-11-21 NOTE — PLAN OF CARE
Problem: Fluid Volume:  Goal: Ability to maintain a balanced intake and output will improve  Description: Ability to maintain a balanced intake and output will improve  11/21/2020 1121 by Sonia Shah RN  Outcome: Ongoing  11/20/2020 2207 by Yudith Ontiveros RN  Outcome: Ongoing     Problem: Health Behavior:  Goal: Ability to identify and utilize available resources and services will improve  Description: Ability to identify and utilize available resources and services will improve  11/21/2020 1121 by Sonia Shah RN  Outcome: Ongoing  11/20/2020 2207 by Yudith Ontiveros RN  Outcome: Ongoing  Goal: Ability to manage health-related needs will improve  Description: Ability to manage health-related needs will improve  11/21/2020 1121 by Sonia Shah RN  Outcome: Ongoing  11/20/2020 2207 by Yudith Ontiveros RN  Outcome: Ongoing     Problem: Metabolic:  Goal: Ability to maintain appropriate glucose levels will improve  Description: Ability to maintain appropriate glucose levels will improve  11/21/2020 1121 by Sonia Shah RN  Outcome: Ongoing  11/20/2020 2207 by Yudith Ontiveros RN  Outcome: Ongoing     Problem: Nutritional:  Goal: Maintenance of adequate nutrition will improve  Description: Maintenance of adequate nutrition will improve  11/21/2020 1121 by Sonia Shah RN  Outcome: Ongoing  11/20/2020 2207 by Yudith Ontiveros RN  Outcome: Ongoing  Goal: Progress toward achieving an optimal weight will improve  Description: Progress toward achieving an optimal weight will improve  11/21/2020 1121 by Sonia Shah RN  Outcome: Ongoing  11/20/2020 2207 by Yudith Ontiveros RN  Outcome: Ongoing     Problem: Falls - Risk of:  Goal: Will remain free from falls  Description: Will remain free from falls  Outcome: Ongoing  Goal: Absence of physical injury  Description: Absence of physical injury  Outcome: Ongoing

## 2020-11-21 NOTE — H&P
Hospital Medicine  History and Physical    Patient:  Guido King  MRN: [id number]    CHIEF COMPLAINT:    Chief Complaint   Patient presents with    Hypoglycemia       History Obtained From:  patient  Primary Care Physician: [facility]    HISTORY OF PRESENT ILLNESS:   The patient is a 65 y.o. male who presents with a generalized complaint of \"not feeling well\" and fatigue. He was noted to have a bg of 49. He states this began earlier in the day. He endorses pain with urination. He denies cp, sob. Past Medical History:      Diagnosis Date    Chronic kidney disease     Diabetes mellitus ([facility] [address]     Hypertension        Past Surgical History:  History reviewed. No pertinent surgical history. Medications Prior to Admission:    Prior to Admission medications    Medication Sig Start Date End Date Taking?  Authorizing Provider   atenolol (TENORMIN) 50 MG tablet Take 50 mg by mouth 2 times daily    Historical Provider, MD   simvastatin (ZOCOR) 10 MG tablet Take 1 tablet by mouth nightly 3/20/19   Reji Common, MD   amLODIPine (NORVASC) 5 MG tablet Take 5 mg by mouth daily    Historical Provider, MD   aspirin 81 MG tablet Take 2 tablets by mouth daily    Historical Provider, MD   vitamin D 1000 units CAPS Take 2 tablets by mouth daily    Historical Provider, MD   furosemide (LASIX) 80 MG tablet Take 80 mg by mouth daily    Historical Provider, MD   insulin glargine (LANTUS) 100 UNIT/ML injection vial Inject 44 Units into the skin 2 times daily    Historical Provider, MD   losartan (COZAAR) 50 MG tablet Take 50 mg by mouth daily    Historical Provider, MD   tamsulosin (FLOMAX) 0.4 MG capsule Take 2 tablets by mouth nightly    Historical Provider, MD   atenolol (TENORMIN) 50 MG tablet Take 50 mg by mouth 2 times daily    Historical Provider, MD   finasteride (PROSCAR) 5 MG tablet Take 5 mg by mouth daily    Historical Provider, MD   loperamide (IMODIUM) 2 MG capsule Take 2 mg by mouth 4 times daily as needed for Diarrhea    Historical Provider, MD       Allergies:  Niacin and Fluorescein    Social History:   TOBACCO:   reports that he has never smoked. He has never used smokeless tobacco.  ETOH:   has no history on file for alcohol. Family History:   History reviewed. No pertinent family history. REVIEW OF SYSTEMS:  Ten systems reviewed and negative except for as above. Physical Exam:    Vitals: /69   Pulse 53   Temp 97.4 °F (36.3 °C) (Oral)   Resp 16   Ht 5' 6\" (1.676 m)   Wt 270 lb (122.5 kg)   SpO2 100%   BMI 43.58 kg/m²   Constitutional: alert, appears stated age and cooperative  Skin: Skin color, texture, turgor normal. No rashes or lesions  Eyes:Eye: Normal external eye, conjunctiva, WILLOW. ENT: Head: Normocephalic, no lesions, without obvious abnormality. Neck: no adenopathy, no carotid bruit, no JVD, supple, symmetrical, trachea midline and thyroid not enlarged, symmetric, no tenderness/mass/nodules  Respiratory: clear to auscultation bilaterally  Cardiovascular: regular rate and rhythm, S1, S2 normal, no murmur, click, rub or gallop  Gastrointestinal: soft, non-tender; bowel sounds normal; no masses,  no organomegaly  Genitourinary: Deferred  Musculoskeletal:extremities normal, atraumatic, no cyanosis or edema  Neurologic: Mental status AAOx3 No facial asymmetry or droop. Normal muscle strength b/l. CN II-XII grossly intact. Psychiatric: Appropriate mood and affect.  Good insight and judgement  Hematologic: No obvious bruising or bleeding    Recent Labs     11/20/20  1600   WBC 5.4   HGB 12.6*        Recent Labs     11/20/20  1600   *   K 4.5      CO2 30   BUN 44*   CREATININE 2.17*   GLUCOSE 120*   AST 16   ALT 11   BILITOT 0.6   ALKPHOS 134*     Troponin T:   Recent Labs     11/20/20  1600   TROPONINI 0.042*     URINALYSIS:  Recent Labs     11/20/20  1600   COLORU Yellow   PHUR 7.5   WBCUA >100*   RBCUA 3-5*   BACTERIA FEW*   CLARITYU CLOUDY*   SPECGRAV 1.010 LEUKOCYTESUR LARGE*   UROBILINOGEN 0.2   BILIRUBINUR Negative   BLOODU TRACE*   GLUCOSEU Negative     -----------------------------------------------------------------   No results found. Assessment and Plan   1. Pyelonephritis  1. 11/20 - IV antibiotics, urine culture pending  2. Bibasilar PNA  1. 11/20 - on zosyn as above, speech eval  3. CKD stage 3b  4. DM2  1. 11/20 - ssi, ac/hs checks, a1c  5. HTN  1. 11/20 - cont home meds  6.  DVT proph    Patient Active Problem List   Diagnosis Code    MAYKEL (acute kidney injury) ([facility] [address] N17.9    Hydrocele N43.3    Hypertension I10    Type II diabetes mellitus, uncontrolled ([facility] [address] E11.65    Acute gouty arthritis M10.9    Adenomatous polyp of colon D12.6    Anemia D64.9    Back pain M54.9    Infestation by bed bug B88.8    Morbid obesity (HCC) E66.01    Obstructive sleep apnea of adult G47.33    Other and unspecified hyperlipidemia E78.5    Unspecified hyperplasia of prostate without urinary obstruction and other lower urinary tract symptoms (LUTS) N40.0    Type 2 or unspecified type diabetes mellitus with ophthalmic manifestations E11.39    Primary malignant neoplasm of prostate ([facility] [address] C61    Pain in joint involving ankle and foot M25.579    UTI (urinary tract infection) N39.0       Hamlet Scruggs MD  Admitting Hospitalist

## 2020-11-22 LAB
ANION GAP SERPL CALCULATED.3IONS-SCNC: 10 MEQ/L (ref 9–15)
BASOPHILS ABSOLUTE: 0.1 K/UL (ref 0–0.2)
BASOPHILS RELATIVE PERCENT: 1.1 %
BUN BLDV-MCNC: 46 MG/DL (ref 8–23)
CALCIUM SERPL-MCNC: 8.7 MG/DL (ref 8.5–9.9)
CHLORIDE BLD-SCNC: 104 MEQ/L (ref 95–107)
CO2: 29 MEQ/L (ref 20–31)
CREAT SERPL-MCNC: 2.24 MG/DL (ref 0.7–1.2)
EOSINOPHILS ABSOLUTE: 0.2 K/UL (ref 0–0.7)
EOSINOPHILS RELATIVE PERCENT: 4 %
GFR AFRICAN AMERICAN: 35
GFR NON-AFRICAN AMERICAN: 28.9
GLUCOSE BLD-MCNC: 108 MG/DL (ref 60–115)
GLUCOSE BLD-MCNC: 110 MG/DL (ref 60–115)
GLUCOSE BLD-MCNC: 198 MG/DL (ref 60–115)
GLUCOSE BLD-MCNC: 79 MG/DL (ref 60–115)
GLUCOSE BLD-MCNC: 89 MG/DL (ref 70–99)
HCT VFR BLD CALC: 37.3 % (ref 42–52)
HEMOGLOBIN: 11.8 G/DL (ref 14–18)
LYMPHOCYTES ABSOLUTE: 0.9 K/UL (ref 1–4.8)
LYMPHOCYTES RELATIVE PERCENT: 15.8 %
MCH RBC QN AUTO: 29.1 PG (ref 27–31.3)
MCHC RBC AUTO-ENTMCNC: 31.5 % (ref 33–37)
MCV RBC AUTO: 92.3 FL (ref 80–100)
MONOCYTES ABSOLUTE: 0.8 K/UL (ref 0.2–0.8)
MONOCYTES RELATIVE PERCENT: 15 %
NEUTROPHILS ABSOLUTE: 3.6 K/UL (ref 1.4–6.5)
NEUTROPHILS RELATIVE PERCENT: 64.1 %
PDW BLD-RTO: 17.1 % (ref 11.5–14.5)
PERFORMED ON: ABNORMAL
PERFORMED ON: NORMAL
PLATELET # BLD: 153 K/UL (ref 130–400)
POTASSIUM REFLEX MAGNESIUM: 4.8 MEQ/L (ref 3.4–4.9)
RBC # BLD: 4.04 M/UL (ref 4.7–6.1)
SODIUM BLD-SCNC: 143 MEQ/L (ref 135–144)
WBC # BLD: 5.6 K/UL (ref 4.8–10.8)

## 2020-11-22 PROCEDURE — 97166 OT EVAL MOD COMPLEX 45 MIN: CPT

## 2020-11-22 PROCEDURE — 2700000000 HC OXYGEN THERAPY PER DAY

## 2020-11-22 PROCEDURE — 80048 BASIC METABOLIC PNL TOTAL CA: CPT

## 2020-11-22 PROCEDURE — 1210000000 HC MED SURG R&B

## 2020-11-22 PROCEDURE — 92610 EVALUATE SWALLOWING FUNCTION: CPT

## 2020-11-22 PROCEDURE — 2580000003 HC RX 258: Performed by: FAMILY MEDICINE

## 2020-11-22 PROCEDURE — 36415 COLL VENOUS BLD VENIPUNCTURE: CPT

## 2020-11-22 PROCEDURE — 6360000002 HC RX W HCPCS: Performed by: FAMILY MEDICINE

## 2020-11-22 PROCEDURE — 85025 COMPLETE CBC W/AUTO DIFF WBC: CPT

## 2020-11-22 PROCEDURE — 6370000000 HC RX 637 (ALT 250 FOR IP): Performed by: FAMILY MEDICINE

## 2020-11-22 RX ORDER — SODIUM CHLORIDE 9 MG/ML
INJECTION, SOLUTION INTRAVENOUS CONTINUOUS
Status: DISCONTINUED | OUTPATIENT
Start: 2020-11-22 | End: 2020-11-23

## 2020-11-22 RX ADMIN — SODIUM CHLORIDE: 9 INJECTION, SOLUTION INTRAVENOUS at 16:33

## 2020-11-22 RX ADMIN — ATENOLOL 50 MG: 50 TABLET ORAL at 08:29

## 2020-11-22 RX ADMIN — INSULIN GLARGINE 44 UNITS: 100 INJECTION, SOLUTION SUBCUTANEOUS at 08:29

## 2020-11-22 RX ADMIN — AMLODIPINE BESYLATE 5 MG: 5 TABLET ORAL at 08:29

## 2020-11-22 RX ADMIN — ENOXAPARIN SODIUM 40 MG: 40 INJECTION SUBCUTANEOUS at 08:29

## 2020-11-22 RX ADMIN — FUROSEMIDE 80 MG: 80 TABLET ORAL at 08:29

## 2020-11-22 RX ADMIN — PIPERACILLIN AND TAZOBACTAM 3.38 G: 3; .375 INJECTION, POWDER, LYOPHILIZED, FOR SOLUTION INTRAVENOUS at 19:57

## 2020-11-22 RX ADMIN — ATORVASTATIN CALCIUM 10 MG: 10 TABLET, FILM COATED ORAL at 08:29

## 2020-11-22 RX ADMIN — PIPERACILLIN AND TAZOBACTAM 3.38 G: 3; .375 INJECTION, POWDER, LYOPHILIZED, FOR SOLUTION INTRAVENOUS at 12:03

## 2020-11-22 RX ADMIN — FINASTERIDE 5 MG: 5 TABLET, FILM COATED ORAL at 08:29

## 2020-11-22 RX ADMIN — ATENOLOL 50 MG: 50 TABLET ORAL at 19:57

## 2020-11-22 RX ADMIN — PIPERACILLIN AND TAZOBACTAM 3.38 G: 3; .375 INJECTION, POWDER, LYOPHILIZED, FOR SOLUTION INTRAVENOUS at 04:31

## 2020-11-22 RX ADMIN — LOSARTAN POTASSIUM 50 MG: 50 TABLET, FILM COATED ORAL at 08:29

## 2020-11-22 RX ADMIN — ASPIRIN 81 MG 162 MG: 81 TABLET ORAL at 08:29

## 2020-11-22 RX ADMIN — TAMSULOSIN HYDROCHLORIDE 0.4 MG: 0.4 CAPSULE ORAL at 08:29

## 2020-11-22 RX ADMIN — INSULIN GLARGINE 44 UNITS: 100 INJECTION, SOLUTION SUBCUTANEOUS at 21:11

## 2020-11-22 ASSESSMENT — PAIN SCALES - GENERAL
PAINLEVEL_OUTOF10: 4
PAINLEVEL_OUTOF10: 3

## 2020-11-22 ASSESSMENT — PAIN DESCRIPTION - PAIN TYPE: TYPE: CHRONIC PAIN

## 2020-11-22 ASSESSMENT — PAIN DESCRIPTION - ORIENTATION: ORIENTATION: RIGHT;LEFT

## 2020-11-22 ASSESSMENT — PAIN DESCRIPTION - LOCATION
LOCATION: HIP;KNEE
LOCATION: HIP

## 2020-11-22 ASSESSMENT — PAIN - FUNCTIONAL ASSESSMENT: PAIN_FUNCTIONAL_ASSESSMENT: 0-10

## 2020-11-22 NOTE — PROGRESS NOTES
[facility]   Facility/Department: [facility] SURG UNIT  Speech Language Pathology  Clinical Bedside Swallow Evaluation    NAME:Amrik Haro  : 1950 (57 y.o.)   MRN: [id number]  ROOM: [place] [address]  ADMISSION DATE: 2020  PATIENT DIAGNOSIS(ES): UTI (urinary tract infection) [N39.0]  Chief Complaint   Patient presents with    Hypoglycemia     Patient Active Problem List    Diagnosis Date Noted    UTI (urinary tract infection) 2020    Hypertension 2019    Type II diabetes mellitus, uncontrolled ([address] 2019    Acute gouty arthritis 2019    Adenomatous polyp of colon 2019    Anemia 2019    Back pain 2019    Infestation by bed bug 2019    Morbid obesity ([address] 2019    Obstructive sleep apnea of adult 2019    Other and unspecified hyperlipidemia 2019    Unspecified hyperplasia of prostate without urinary obstruction and other lower urinary tract symptoms (LUTS) 2019    Type 2 or unspecified type diabetes mellitus with ophthalmic manifestations 2019    Primary malignant neoplasm of prostate ([address] 2019    Pain in joint involving ankle and foot 2019    Hydrocele     MAYKEL (acute kidney injury) ([address] 03/15/2019     Past Medical History:   Diagnosis Date    Chronic kidney disease     Diabetes mellitus ([address]     Hypertension      History reviewed. No pertinent surgical history. Allergies   Allergen Reactions    Niacin     Fluorescein Diarrhea and Nausea And Vomiting       DATE ONSET: 2020    Date of Evaluation: 2020   Evaluating Therapist: Humberto Talavera SLP    Recommended Diet and Intervention  Diet Solids Recommendation: Regular  Liquid Consistency Recommendation: Thin  Recommended Form of Meds: PO     Compensatory Swallowing Strategies  Compensatory Swallowing Strategies:  Alternate solids and liquids;Small bites/sips;Upright as possible for all oral intake    Reason for Referral  Katarina Winters Princess M[place]ares was referred for a bedside swallow evaluation to assess the efficiency of his swallow function, identify signs and symptoms of aspiration and make recommendations regarding safe dietary consistencies, effective compensatory strategies, and safe eating environment. General  Chart Reviewed: Yes  Subjective  Subjective: Patient was pleasant and cooperative throughout evaluation. Patient reports poor dentition but reports that this has never stopped him from eating solid and tough foods. Patient also reports hx of Bell's Palsy on both the right and left side of his face. He reports no residual difficulties other than difficulty closing his eyes at times. Behavior/Cognition: Alert; Cooperative;Pleasant mood  Respiratory Status: O2 via nasual cannula  O2 Device: Nasal cannula  Communication Observation: Functional  Follows Directions: Simple  Dentition: Some missing teeth;Poor dental/oral hygeine  Patient Positioning: Upright in chair  Baseline Vocal Quality: Normal  Volitional Cough: Strong  Prior Dysphagia History: Patient denies hx of dysphagia  Consistencies Administered: Reg solid; Dysphagia Pureed (Dysphagia I); Thin    Current Diet level:  Current Diet : Regular  Current Liquid Diet : Thin    Oral Motor Deficits  Oral/Motor  Oral Motor: Exceptions to [facility]  Labial Symmetry: Abnormal symmetry left    Oral Phase Dysfunction  Oral Phase  Oral Phase: Exceptions  Oral Phase Dysfunction  Impaired Mastication: Reg Solid  Lingual/Palatal Residue: Reg solid  Oral Phase  Oral Phase - Comment: Patient presents with mild oral dysphagia characterized by left side facial droop, impaired and prolonged mastication, and lingual residue following regular solids. Patient was able to independently clear all residue with liquid wash.      Indicators of Pharyngeal Phase Dysfunction   Pharyngeal Phase  Pharyngeal Phase: WFL  Pharyngeal Phase   Pharyngeal: Patient presents with functional pharyngeal phase of the swallow. Impression  Dysphagia Diagnosis: Mild oral stage dysphagia  Dysphagia Impression : Patient presents with mild oral dysphagia characterized by left side facial droop, impaired and prolonged mastication, and lingual residue following regular solids. Patient was able to independently clear all residue with liquid wash. Patient presents with pharyngeal phase of the swallow that is within functional limits. Patient demonstrated a timely swallow onset, good laryngeal elevation, and no s/s of aspiration. Dysphagia Outcome Severity Scale: Level 6: Within functional limits/Modified independence     Treatment Plan  Requires SLP Intervention: No  Duration/Frequency of Treatment: N/A        Prognosis  Individuals consulted  Consulted and agree with results and recommendations: Patient;RN(LISBETH mullen)    Education  Patient Education: Patient educated on results of BSE  Patient Education Response: Verbalizes understanding  Safety Devices in place: Yes  Type of devices: Chair alarm in place;Call light within reach    Pain Assessment:  Initial Assessment:  Patient c/o: Level 4 pain in hip but reported that it is an acceptable level for treatment. Re-assessment:  Patient c/o: Level 4 pain in hip.         Therapy Time  SLP Individual Minutes  Time In: 0900  Time Out: 9233  Minutes: 25              Signature: Electronically signed by PIPO Salgado on 11/22/2020 at 9:45 AM

## 2020-11-22 NOTE — CARE COORDINATION
[facility] IS A [facility]. HE STATES IF BED IS AVAILABLE HE WILL TRANSFER IF NOT BE DISCHARGED. IMM WENT OVER WITH THE PATIENT/VERBALLY UNDERSTOOD/SIGNED ON CHART. [facility] Case Management Initial Discharge Assessment    Met with Patient to discuss discharge plan. PCP: [facility]        PATIENT STATES HE GOES TO THE [facility]  OFFICE--HAS A FEMALE PHYSICIAN BUT CANNOT REMEMBER HER NAME                        Date of Last Visit: ABOUT A MONTH    If no PCP, list provided? N/A    Discharge Planning    Living Arrangements: independently at home    Who do you live with? ALONE    Who helps you with your care:  self    If lives at home:     Do you have any barriers navigating in your home? no    Patient can perform ADL? Yes    Current Services (outpatient and in home) :  None    Dialysis: No    Is transportation available to get to your appointments? Yes    DME Equipment:  yes - HAS ELECTRIC W/C TO GO UP AND DOWN STEPS, HAS A CANE/WALKER DENIES ANY NEW NEEDS. Respiratory equipment: CPAP without O2--STATES HIS CPAP KEEPS BREAKING AT THE SEAL. PATIENT INSTURCTED TO [facility] MACHINE TO [facility] AND HAVE THEM LOOK AT IT. HE MAY NEED REFITTED OR A DIFFERENT SEAL. PATIENT THINKS HE NEEDS HIS PRESSURE LOWERED. PATIENT INSTRUCTED TO FOLLOW UP ON [facility] REGARDING THE CPAP. [facility] provider[facility]     Pharmacy: [address] [facility] [address] with Medication Assistance Program?  No      Patient agreeable to [facility]? Declined--STATES HE CAN ONLY GET THAT THROUGH THE [facility]  BUT REALLY DOESN'T THINK HE NEEDS IT. Patient agreeable to SNF/Rehab? Declined--SAME AS ABOVE    Other discharge needs identified? Other CPAP HELP AT [facility] PER PATIENT THE SEAL BREAKS/HE NEEDS A DIFFERENT MASK AND DECREASE IN PRESSURE ON MACHINE. PATIENT IS ALSO ON 2L 02 WILL ATTEMPT TO WEAN PRIOR TO D/C BUT IF NEEDED PATIENT WILL HAVE TO FOLLOW UP AT THE [facility] FOR OXYGEN TESTING TO BE APPROVED.  ALSO OCCUPATIONAL THERAPY CONCERNED REGARDING PATIENT ABILITY TO TAKE CARE OF SELF. PATIENT INCONTINENT IN THE CHAIR IN THE ROOM DURING HER EVAL. PT DID NOT PUT ON PROGRAM    Freedom of choice list provided with basic dialogue that supports the patient's individualized plan of care/goals and shares the quality data associated with the providers. Yes--HOWEVER PATIENT IS LIMITED D/T HIS REQUEST TO BE ALL [facility]    Does Patient Have a High-Risk for Readmission Diagnosis (CHF, PN, MI, COPD)? No    The plan for Transition of Care is related to the following treatment goals: TO [facility]     Initial Discharge Plan? (Note: please see concurrent daily documentation for any updates after initial note). TO GO BACK HOME. NEEDS NEW CPAP, ? O2. PLANS TO ONLY FOLLOW UP WITH [facility] FOR HIS TREATMENTS.      The Patient and/or patient representative: Bryan Pineda was provided with choice of any post-acute providers for care and equipment and agrees with discharge plan  Yes    Electronically signed by Naun Cottrell RN on 11/22/2020 at 10:23 AM

## 2020-11-22 NOTE — CARE COORDINATION
CALL [facility]ED TO [facility] TO VERIFY BENEFITS AND TO NOTIFY OF ADMISSION. PHONE NUMBER [phone] EXT [phone] DOES NOT ANSWER. NO VOICEMAIL AVAILABLE. WILL ATTEMPT TO CALL AGAIN TOMORROW.

## 2020-11-22 NOTE — PROGRESS NOTES
Hospitalist Daily Progress Note  Name: Albino Lewis  Age: 65 y.o. Gender: male  CodeStatus: Full Code  Allergies: Niacin  Fluorescein    Chief Complaint:Hypoglycemia      Primary Care Provider: Rohit Gallardo Team: Treatment Team: Attending Provider: Ena Cruz MD; Utilization Reviewer: Senthil Charles RN; Registered Nurse: Lana Serrano RN; Tech: Spencer Butler    Admission Date: 11/20/2020      Subjective: No chest pain, sob, nausea. Resting in chair, feels somewhat improved. Physical Exam  Vitals signs and nursing note reviewed. Constitutional:       Appearance: Normal appearance. Cardiovascular:      Rate and Rhythm: Normal rate and regular rhythm. Pulmonary:      Effort: Pulmonary effort is normal.      Breath sounds: Normal breath sounds. Abdominal:      General: Bowel sounds are normal.      Palpations: Abdomen is soft. Musculoskeletal: Normal range of motion. Skin:     General: Skin is warm and dry. Neurological:      Mental Status: He is alert and oriented to person, place, and time. Mental status is at baseline.          Medications:  Reviewed    Infusion Medications:    sodium chloride      dextrose       Scheduled Medications:    sodium chloride flush  10 mL Intravenous 2 times per day    enoxaparin  40 mg Subcutaneous Daily    piperacillin-tazobactam  3.375 g Intravenous Q8H    influenza virus vaccine  0.5 mL Intramuscular Prior to discharge    amLODIPine  5 mg Oral Daily    aspirin  162 mg Oral Daily    atenolol  50 mg Oral BID    finasteride  5 mg Oral Daily    furosemide  80 mg Oral Daily    insulin glargine  44 Units Subcutaneous BID    losartan  50 mg Oral Daily    atorvastatin  10 mg Oral Daily    tamsulosin  0.4 mg Oral Daily    insulin lispro  0-12 Units Subcutaneous TID WC    insulin lispro  0-6 Units Subcutaneous Nightly     PRN Meds: sodium chloride flush, acetaminophen **OR** acetaminophen, polyethylene glycol, promethazine **OR** ondansetron, glucose, dextrose, glucagon (rDNA), dextrose    Labs:   Recent Labs     11/20/20  1600 11/22/20  0635   WBC 5.4 5.6   HGB 12.6* 11.8*   HCT 39.6* 37.3*    153     Recent Labs     11/20/20  1600 11/22/20  0635   * 143   K 4.5 4.8    104   CO2 30 29   BUN 44* 46*   CREATININE 2.17* 2.24*   CALCIUM 8.8 8.7     Recent Labs     11/20/20  1600   AST 16   ALT 11   BILITOT 0.6   ALKPHOS 134*     No results for input(s): INR in the last 72 hours. Recent Labs     11/20/20  1600   TROPONINI 0.042*       Urinalysis:   Lab Results   Component Value Date    NITRU POSITIVE 11/20/2020    WBCUA >100 11/20/2020    BACTERIA FEW 11/20/2020    RBCUA 3-5 11/20/2020    BLOODU TRACE 11/20/2020    SPECGRAV 1.010 11/20/2020    GLUCOSEU Negative 11/20/2020       Radiology:   Most recent    Chest CT      WITH CONTRAST:No results found for this or any previous visit. WITHOUT CONTRAST: No results found for this or any previous visit. CXR      2-view:   Results for orders placed during the hospital encounter of 03/15/19   XR CHEST STANDARD (2 VW)    Narrative EXAMINATION: XR CHEST (2 VW)    CLINICAL HISTORY: WHEEZING, SHORTNESS OF BREATH    COMPARISONS: AUGUST 1, 2013    FINDINGS: Osseous structures intact. Cardiopericardial silhouette normal. Pulmonary vasculature normal. Lungs clear. Impression NO ACUTE CARDIOPULMONARY DISEASE. Portable:   Results for orders placed during the hospital encounter of 11/20/20   XR CHEST PORTABLE    Narrative XR CHEST PORTABLE : 11/20/2020    CLINICAL HISTORY:  cp .    COMPARISON: 3/15/2019. TECHNIQUE: ROUTINE      FINDINGS:    A poor inspiratory volume is present with mild patchy infiltrate of the mid to lower lung fields, suspicious for bronchopneumonia and/or aspiration. The heart remains borderline enlarged, exaggerated by technique.     There is no significant pleural effusion, vascular congestion, pneumothorax, or displaced fractures identified. Impression BIBASILAR INFILTRATES SUSPICIOUS FOR BRONCHOPNEUMONIA AND/OR ASPIRATION. Echo No results found for this or any previous visit. Assessment/Plan:    Active Hospital Problems    Diagnosis Date Noted    UTI (urinary tract infection) [N39.0] 11/20/2020     1. Pyelonephritis  1. 11/20 - IV antibiotics, urine culture pending  2. 11/21 - proteus, sens pending  2. Bibasilar PNA  1. 11/20 - on zosyn as above, speech eval  3. CKD stage 3b  4. DM2  1. 11/20 - ssi, ac/hs checks, a1c  5. HTN  1. 11/20 - cont home meds  6.  DVT proph    Electronically signed by Dash Ahmadi MD on 11/22/2020 at 3:42 PM

## 2020-11-22 NOTE — PROGRESS NOTES
[facility] OCCUPATIONAL THERAPY EVALUATION - ACUTE     NAME: Julia Snow  : 1950 (51 y.o.)  MRN: [id number]  CODE STATUS: Full Code  Room: P787/E946-56    Date of Service: 2020    Patient Diagnosis(es): UTI (urinary tract infection) [N39.0]   Chief Complaint   Patient presents with    Hypoglycemia     Patient Active Problem List    Diagnosis Date Noted    UTI (urinary tract infection) 2020    Hypertension 2019    Type II diabetes mellitus, uncontrolled ([facility] 2019    Acute gouty arthritis 2019    Adenomatous polyp of colon 2019    Anemia 2019    Back pain 2019    Infestation by bed bug 2019    Morbid obesity ([facility] 2019    Obstructive sleep apnea of adult 2019    Other and unspecified hyperlipidemia 2019    Unspecified hyperplasia of prostate without urinary obstruction and other lower urinary tract symptoms (LUTS) 2019    Type 2 or unspecified type diabetes mellitus with ophthalmic manifestations 2019    Primary malignant neoplasm of prostate ([facility] 2019    Pain in joint involving ankle and foot 2019    Hydrocele     MAYKEL (acute kidney injury) ([facility] 03/15/2019        Past Medical History:   Diagnosis Date    Chronic kidney disease     Diabetes mellitus ([facility]     Hypertension      History reviewed. No pertinent surgical history. Restrictions  Restrictions/Precautions: Fall Risk     Safety Devices: Safety Devices  Safety Devices in place: Yes  Type of devices:  All fall risk precautions in place        Subjective  Pre Treatment Pain Screening  Pain at present: 3  Intervention List: Patient able to continue with treatment    Pain Reassessment:   Pain Assessment  Patient Currently in Pain: Yes  Pain Level: 3  Pain Type: Chronic pain  Pain Location: Hip, Knee  Pain Orientation: Right, Left       Prior Level of Function:  Social/Functional History  Lives With: Alone  Type of Home: [facility] was soiled, clothing wet and socks wet)    Vision and Hearing Status:  Vision  Vision: Impaired  Vision Exceptions: Wears glasses for reading  Hearing  Hearing: Exceptions to [facility]  Hearing Exceptions: Hard of hearing/hearing concerns, No hearing aid     ROM:   LUE AROM (degrees)  LUE AROM : WFL  RUE AROM (degrees)  RUE AROM : WFL    Strength:  LUE Strength  Gross LUE Strength: Exceptions to [facility]  LUE Strength Comment: grossly 3+/5  RUE Strength  Gross RUE Strength: Exceptions to [facility]  RUE Strength Comment: grossly 3+/5    Coordination, Tone, Quality of Movement:    Tone RUE  RUE Tone: Normotonic  Tone LUE  LUE Tone: Normotonic  Coordination  Movements Are Fluid And Coordinated: Yes    Hand Dominance:  Hand Dominance  Hand Dominance: Right    ADL Status:  ADL  Grooming: Supervision  UE Bathing: Supervision  LE Bathing: Stand by assistance  UE Dressing: Stand by assistance  LE Dressing: Maximum assistance(max A to don socks- pt reports that he wears sandles at home and does not don socks)  Toileting: Contact guard assistance  Toilet Transfers  Toilet - Technique: Ambulating  Equipment Used: Grab bars  Toilet Transfer: Contact guard assistance  Toilet Transfers Comments: Assist for sit to stand transfer from toilet       Therapy key for assistance levels -   Independent = Pt. is able to perform task with no assistance but may require a device   Stand by assistance = Pt. does not perform task at an independent level but does not need physical assistance, requires verbal cues  Minimal, Moderate, Maximal Assistance = Pt. requires physical assistance (25%, 50%, 75% assist from helper) for task but is able to actively participate in task   Dependent = Pt. requires total assistance with task and is not able to actively participate with task completion     Functional Mobility:  Functional Mobility  Functional - Mobility Device: Rolling Walker  Assist Level: Contact guard assistance  Functional Mobility Comments: SOB with ambulation, LOB x 1  Transfers  Sit to stand: Stand by assistance  Stand to sit: Stand by assistance    Bed Mobility  Bed mobility  Supine to Sit: Unable to assess  Sit to Supine: Unable to assess    Seated and Standing Balance:  Balance  Sitting Balance: Independent  Standing Balance: Supervision    Functional Endurance:  Activity Tolerance  Activity Tolerance: Patient Tolerated treatment well  Activity Tolerance: SOB with ambulation and toileting    D/C Recommendations:  OT D/C RECOMMENDATIONS  REQUIRES OT FOLLOW UP: Yes    Equipment Recommendations:  OT Equipment Recommendations  Equipment Needed: Yes  Mobility Devices: ADL Assistive Devices  ADL Assistive Devices: Long-handled Sponge, Reacher    OT Education:   OT Education  OT Education: OT Role, Plan of Care, ADL Adaptive Strategies, Precautions    OT Follow Up:  OT D/C RECOMMENDATIONS  REQUIRES OT FOLLOW UP: Yes       Assessment/Discharge Disposition:  Assessment: Pt is a 66 y/o male, recently admitted to [facility] d/t UTI. Pt lives alone and was found at home by [facility] while they were completing routine checks. Pt presented with very low blood sugar. Pt states he has a gf and neighbor to assist as needed. Pt was not oriented to date (stated it was December) and was unaware that he was soiled (brief, clothing, and socks) but stated that sensation was [facility]. Pt presents with increased edema in B LEs and stated that he does not wear socks at home and was previously indepednent. This therapist has concern with self care and awareness of need for bathing/cleaning. Pt may benefit from skilled OT intervention for increased independence and safety with ADLs, IADLs, and safety to decrease the burden of care.   Performance deficits / Impairments: Decreased functional mobility , Decreased endurance, Decreased ADL status, Decreased sensation, Decreased cognition, Decreased safe awareness, Decreased high-level IADLs, Decreased strength  Prognosis: [address]  Discharge Recommendations: Continue to assess pending progress  Decision Making: Medium Complexity  History: multi comorbidities  Exam: 8 deficits  Assistance / Modification: CGA (max A for LB dressing)    Six Click Score   How much help for putting on and taking off regular lower body clothing?: A Lot  How much help for Bathing?: A Lot  How much help for Toileting?: A Little  How much help for putting on and taking off regular upper body clothing?: A Little  How much help for taking care of personal grooming?: A Little  How much help for eating meals?: None  AM-PAC Inpatient Daily Activity Raw Score: 17  AM-PAC Inpatient ADL T-Scale Score : 37.26  ADL Inpatient CMS 0-100% Score: 50.11    Plan:  Plan  Times per week: 1-3x/week  Times per day: Daily  Plan weeks: Duration of acute stay  Current Treatment Recommendations: Strengthening, Endurance Training, Patient/Caregiver Education & Training, Cognitive Reorientation, Self-Care / ADL, Equipment Evaluation, Education, & procurement, Home Management Training, Functional Mobility Training, Safety Education & Training    Goals:   Patient will:    - Improve functional endurance to tolerate/complete 30 mins of ADL's  - Be MOD I  in UB ADLs   - Be MOD I  in LB ADLs  - Be MOD I  in ADL transfers without LOB  - Be MOD I  in toileting tasks    Patient Goal: Patient goals :  To get back home     Discussed and agreed upon: Yes Comments:     Therapy Time:   OT Individual Minutes  Time In: 1002  Time Out: 1028  Minutes: 26    Eval: 26 minutes     Electronically signed by:    LA Griffith  11/22/2020, 10:50 AM

## 2020-11-22 NOTE — PROGRESS NOTES
Assessment completed. Patient sitting comfortably in chair. Denies any SOB, N/V, dizziness, or pain at this time. Denies any further needs. Call light is within reach.   Electronically signed by Riya Kauffman RN on 11/22/2020 at 3:29 AM [facility] telephone: [phone]

## 2020-11-22 NOTE — CARE COORDINATION
[facility]OND [facility] TO VERIFY BENEFITS AND TO NOTIFY OF ADMISSION. PHONE NUMBER [phone] EXT [phone] DOES NOT ANSWER. NO VOICEMAIL AVAILABLE. WILL NEED TO HAVE  REACH OUT ON Monday FOR [facility] BENEFIT INFORMATION.

## 2020-11-23 ENCOUNTER — APPOINTMENT (OUTPATIENT)
Dept: GENERAL RADIOLOGY | Age: 70
DRG: 689 | End: 2020-11-23
Payer: MEDICARE

## 2020-11-23 LAB
ANION GAP SERPL CALCULATED.3IONS-SCNC: 9 MEQ/L (ref 9–15)
BUN BLDV-MCNC: 44 MG/DL (ref 8–23)
CALCIUM SERPL-MCNC: 8.1 MG/DL (ref 8.5–9.9)
CHLORIDE BLD-SCNC: 107 MEQ/L (ref 95–107)
CO2: 29 MEQ/L (ref 20–31)
CREAT SERPL-MCNC: 2.12 MG/DL (ref 0.7–1.2)
GFR AFRICAN AMERICAN: 37.3
GFR NON-AFRICAN AMERICAN: 30.8
GLUCOSE BLD-MCNC: 109 MG/DL (ref 60–115)
GLUCOSE BLD-MCNC: 137 MG/DL (ref 60–115)
GLUCOSE BLD-MCNC: 49 MG/DL (ref 70–99)
GLUCOSE BLD-MCNC: 88 MG/DL (ref 60–115)
ORGANISM: ABNORMAL
PERFORMED ON: ABNORMAL
PERFORMED ON: NORMAL
PERFORMED ON: NORMAL
POTASSIUM REFLEX MAGNESIUM: 4.3 MEQ/L (ref 3.4–4.9)
SODIUM BLD-SCNC: 145 MEQ/L (ref 135–144)
URINE CULTURE, ROUTINE: ABNORMAL

## 2020-11-23 PROCEDURE — 6370000000 HC RX 637 (ALT 250 FOR IP): Performed by: INTERNAL MEDICINE

## 2020-11-23 PROCEDURE — 80048 BASIC METABOLIC PNL TOTAL CA: CPT

## 2020-11-23 PROCEDURE — 6360000002 HC RX W HCPCS: Performed by: INTERNAL MEDICINE

## 2020-11-23 PROCEDURE — 6360000002 HC RX W HCPCS: Performed by: FAMILY MEDICINE

## 2020-11-23 PROCEDURE — 1210000000 HC MED SURG R&B

## 2020-11-23 PROCEDURE — 94640 AIRWAY INHALATION TREATMENT: CPT

## 2020-11-23 PROCEDURE — 6370000000 HC RX 637 (ALT 250 FOR IP): Performed by: FAMILY MEDICINE

## 2020-11-23 PROCEDURE — 36415 COLL VENOUS BLD VENIPUNCTURE: CPT

## 2020-11-23 PROCEDURE — 71046 X-RAY EXAM CHEST 2 VIEWS: CPT

## 2020-11-23 PROCEDURE — 94761 N-INVAS EAR/PLS OXIMETRY MLT: CPT

## 2020-11-23 PROCEDURE — 2580000003 HC RX 258: Performed by: FAMILY MEDICINE

## 2020-11-23 PROCEDURE — 2700000000 HC OXYGEN THERAPY PER DAY

## 2020-11-23 RX ORDER — IPRATROPIUM BROMIDE AND ALBUTEROL SULFATE 2.5; .5 MG/3ML; MG/3ML
1 SOLUTION RESPIRATORY (INHALATION) ONCE
Status: COMPLETED | OUTPATIENT
Start: 2020-11-23 | End: 2020-11-23

## 2020-11-23 RX ORDER — CEFUROXIME AXETIL 500 MG/1
250 TABLET ORAL EVERY 12 HOURS SCHEDULED
Status: DISCONTINUED | OUTPATIENT
Start: 2020-11-23 | End: 2020-11-24 | Stop reason: HOSPADM

## 2020-11-23 RX ORDER — CEFUROXIME AXETIL 250 MG/1
250 TABLET ORAL EVERY 12 HOURS SCHEDULED
Qty: 10 TABLET | Refills: 0 | Status: SHIPPED | OUTPATIENT
Start: 2020-11-23 | End: 2020-11-28

## 2020-11-23 RX ORDER — FUROSEMIDE 10 MG/ML
20 INJECTION INTRAMUSCULAR; INTRAVENOUS ONCE
Status: COMPLETED | OUTPATIENT
Start: 2020-11-23 | End: 2020-11-23

## 2020-11-23 RX ADMIN — ATENOLOL 50 MG: 50 TABLET ORAL at 09:52

## 2020-11-23 RX ADMIN — ASPIRIN 81 MG 162 MG: 81 TABLET ORAL at 09:55

## 2020-11-23 RX ADMIN — ACETAMINOPHEN 650 MG: 325 TABLET ORAL at 21:33

## 2020-11-23 RX ADMIN — CEFUROXIME AXETIL 250 MG: 500 TABLET ORAL at 13:39

## 2020-11-23 RX ADMIN — Medication 10 ML: at 10:15

## 2020-11-23 RX ADMIN — FINASTERIDE 5 MG: 5 TABLET, FILM COATED ORAL at 09:55

## 2020-11-23 RX ADMIN — AMLODIPINE BESYLATE 5 MG: 5 TABLET ORAL at 09:55

## 2020-11-23 RX ADMIN — ATENOLOL 50 MG: 50 TABLET ORAL at 21:31

## 2020-11-23 RX ADMIN — PIPERACILLIN AND TAZOBACTAM 3.38 G: 3; .375 INJECTION, POWDER, LYOPHILIZED, FOR SOLUTION INTRAVENOUS at 04:29

## 2020-11-23 RX ADMIN — IPRATROPIUM BROMIDE AND ALBUTEROL SULFATE 1 AMPULE: .5; 3 SOLUTION RESPIRATORY (INHALATION) at 16:40

## 2020-11-23 RX ADMIN — ATORVASTATIN CALCIUM 10 MG: 10 TABLET, FILM COATED ORAL at 09:55

## 2020-11-23 RX ADMIN — CEFUROXIME AXETIL 250 MG: 500 TABLET ORAL at 21:31

## 2020-11-23 RX ADMIN — ACETAMINOPHEN 650 MG: 325 TABLET ORAL at 09:52

## 2020-11-23 RX ADMIN — INSULIN GLARGINE 44 UNITS: 100 INJECTION, SOLUTION SUBCUTANEOUS at 09:56

## 2020-11-23 RX ADMIN — INSULIN GLARGINE 44 UNITS: 100 INJECTION, SOLUTION SUBCUTANEOUS at 21:37

## 2020-11-23 RX ADMIN — SODIUM CHLORIDE: 9 INJECTION, SOLUTION INTRAVENOUS at 02:14

## 2020-11-23 RX ADMIN — FUROSEMIDE 20 MG: 10 INJECTION, SOLUTION INTRAVENOUS at 17:48

## 2020-11-23 RX ADMIN — ENOXAPARIN SODIUM 40 MG: 40 INJECTION SUBCUTANEOUS at 09:55

## 2020-11-23 RX ADMIN — LOSARTAN POTASSIUM 50 MG: 50 TABLET, FILM COATED ORAL at 09:55

## 2020-11-23 RX ADMIN — FUROSEMIDE 80 MG: 80 TABLET ORAL at 09:55

## 2020-11-23 RX ADMIN — TAMSULOSIN HYDROCHLORIDE 0.4 MG: 0.4 CAPSULE ORAL at 09:55

## 2020-11-23 RX ADMIN — Medication 10 ML: at 21:46

## 2020-11-23 ASSESSMENT — PAIN SCALES - GENERAL
PAINLEVEL_OUTOF10: 6
PAINLEVEL_OUTOF10: 2
PAINLEVEL_OUTOF10: 6
PAINLEVEL_OUTOF10: 6

## 2020-11-23 NOTE — PROGRESS NOTES
Hospitalist Daily Progress Note  Name: Elmer Rivera  Age: 65 y.o. Gender: male  CodeStatus: Full Code  Allergies: Niacin  Fluorescein    Chief Complaint:Hypoglycemia      Primary Care Provider: Rohit Gallardo Team: Treatment Team: Attending Provider: Irving Gomes MD; Utilization Reviewer: Nela Hutchinson RN; Registered Nurse: Tasha Diaz RN; Tech: Lemond Or; Registered Nurse: Maryjo Borjas RN; Patient Care Tech: Shannan Rosenberg    Admission Date: 11/20/2020      Subjective: No chest pain, sob, nausea. Resting in chair, unchanged from previous. Physical Exam  Vitals signs and nursing note reviewed. Constitutional:       Appearance: Normal appearance. Cardiovascular:      Rate and Rhythm: Normal rate and regular rhythm. Pulmonary:      Effort: Pulmonary effort is normal.      Breath sounds: Normal breath sounds. Abdominal:      General: Bowel sounds are normal.      Palpations: Abdomen is soft. Musculoskeletal: Normal range of motion. Skin:     General: Skin is warm and dry. Neurological:      Mental Status: He is alert and oriented to person, place, and time. Mental status is at baseline.          Medications:  Reviewed    Infusion Medications:    sodium chloride 100 mL/hr at 11/22/20 1633    dextrose       Scheduled Medications:    sodium chloride flush  10 mL Intravenous 2 times per day    enoxaparin  40 mg Subcutaneous Daily    piperacillin-tazobactam  3.375 g Intravenous Q8H    influenza virus vaccine  0.5 mL Intramuscular Prior to discharge    amLODIPine  5 mg Oral Daily    aspirin  162 mg Oral Daily    atenolol  50 mg Oral BID    finasteride  5 mg Oral Daily    furosemide  80 mg Oral Daily    insulin glargine  44 Units Subcutaneous BID    losartan  50 mg Oral Daily    atorvastatin  10 mg Oral Daily    tamsulosin  0.4 mg Oral Daily    insulin lispro  0-12 Units Subcutaneous TID WC    insulin lispro  0-6 Units Subcutaneous Nightly PRN Meds: sodium chloride flush, acetaminophen **OR** acetaminophen, polyethylene glycol, promethazine **OR** ondansetron, glucose, dextrose, glucagon (rDNA), dextrose    Labs:   Recent Labs     11/20/20  1600 11/22/20  0635   WBC 5.4 5.6   HGB 12.6* 11.8*   HCT 39.6* 37.3*    153     Recent Labs     11/20/20  1600 11/22/20  0635   * 143   K 4.5 4.8    104   CO2 30 29   BUN 44* 46*   CREATININE 2.17* 2.24*   CALCIUM 8.8 8.7     Recent Labs     11/20/20  1600   AST 16   ALT 11   BILITOT 0.6   ALKPHOS 134*     No results for input(s): INR in the last 72 hours. Recent Labs     11/20/20  1600   TROPONINI 0.042*       Urinalysis:   Lab Results   Component Value Date    NITRU POSITIVE 11/20/2020    WBCUA >100 11/20/2020    BACTERIA FEW 11/20/2020    RBCUA 3-5 11/20/2020    BLOODU TRACE 11/20/2020    SPECGRAV 1.010 11/20/2020    GLUCOSEU Negative 11/20/2020       Radiology:   Most recent    Chest CT      WITH CONTRAST:No results found for this or any previous visit. WITHOUT CONTRAST: No results found for this or any previous visit. CXR      2-view:   Results for orders placed during the hospital encounter of 03/15/19   XR CHEST STANDARD (2 VW)    Narrative EXAMINATION: XR CHEST (2 VW)    CLINICAL HISTORY: WHEEZING, SHORTNESS OF BREATH    COMPARISONS: AUGUST 1, 2013    FINDINGS: Osseous structures intact. Cardiopericardial silhouette normal. Pulmonary vasculature normal. Lungs clear. Impression NO ACUTE CARDIOPULMONARY DISEASE. Portable:   Results for orders placed during the hospital encounter of 11/20/20   XR CHEST PORTABLE    Narrative XR CHEST PORTABLE : 11/20/2020    CLINICAL HISTORY:  cp .    COMPARISON: 3/15/2019. TECHNIQUE: ROUTINE      FINDINGS:    A poor inspiratory volume is present with mild patchy infiltrate of the mid to lower lung fields, suspicious for bronchopneumonia and/or aspiration.      The heart remains borderline enlarged, exaggerated by technique. There is no significant pleural effusion, vascular congestion, pneumothorax, or displaced fractures identified. Impression BIBASILAR INFILTRATES SUSPICIOUS FOR BRONCHOPNEUMONIA AND/OR ASPIRATION. Echo No results found for this or any previous visit. Assessment/Plan:    Active Hospital Problems    Diagnosis Date Noted    UTI (urinary tract infection) [N39.0] 11/20/2020     1. Pyelonephritis  1. 11/20 - IV antibiotics, urine culture pending  2. 11/21 - proteus, sens pending  3. 11/22 - culture still pending. 2. Bibasilar PNA  1. 11/20 - on zosyn as above, speech eval  3. CKD stage 3b  4. DM2  1. 11/20 - ssi, ac/hs checks, a1c  5. HTN  1. 11/20 - cont home meds  6.  DVT proph    Dispo - home likely 11/23 if sensitivities return    Electronically signed by Rudolph Coburn MD on 11/22/2020 at 7:34 PM

## 2020-11-23 NOTE — DISCHARGE SUMMARY
Hospital Medicine Discharge Summary    Prince Marcum  :  1950  MRN:  [id number]    Admit date:  2020  Discharge date:  2020    Admitting Physician:  Myrima Palacio MD  Primary Care Physician: Shonna 32      Discharge Diagnoses:      Pyelonephritis  Bronchopneumonia  Debility  Obesity  CKD3  DM2      Chief Complaint   Patient presents with   [employer] Hypoglycemia     Hospital Course:       Patient was admitted with hypoglycemia. However he was found to have pyelonephritis and pneumonia. He was treated with IV antibiotic. He grew Proteus in his urine. His chest x-ray was consistent with bronchopneumonia. He was treated with IV Rocephin. He received 5 days while hospitalized. He was switched to Ceftin prior to discharge. He was discharged home in a stable condition. Of note, patient required 2 L of oxygen during this hospitalization. Patient oxygen was weaned off prior to discharge. Patient is to follow-up with his PCP at the [address] [place] [address] in 1 week. Exam on discharge:   BP (!) 123/48   Pulse 58   Temp 97.2 °F (36.2 °C) (Oral)   Resp 17   Ht 5' 6\" (1.676 m)   Wt 270 lb (122.5 kg)   SpO2 99%   BMI 43.58 kg/m²   General appearance: No apparent distress, appears stated age and cooperative. HEENT: Pupils equal, round, and reactive to light. Conjunctivae/corneas clear. Neck: Supple, with full range of motion. No jugular venous distention. Trachea midline. Respiratory:  Normal respiratory effort. Clear to auscultation, bilaterally without Rales/Wheezes/Rhonchi. Cardiovascular: Regular rate and rhythm with normal S1/S2 without murmurs, rubs or gallops. Abdomen: Soft, non-tender, non-distended with normal bowel sounds. Musculoskeletal: No clubbing, cyanosis or edema bilaterally. Full range of motion without deformity. Skin: Skin color, texture, turgor normal.  No rashes or lesions. Neuro: Non Focal. Symetrical motor and tone. Nl Comprehension, Alert,awake and oriented. NL CN. Symetrical tone and reflexes. Psychiatric: Alert and oriented, thought content appropriate, normal insight  Capillary Refill: Brisk,< 3 seconds   Peripheral Pulses: +2 palpable, equal bilaterally     Patient was seen by the following consultants while admitted to [facility]:   Consults:  None    Significant Diagnostic Studies:    Refer to chart     Please refer to chart if no studies are shown here    Xr Chest Portable    Result Date: 11/21/2020  XR CHEST PORTABLE : 11/20/2020 CLINICAL HISTORY:  cp . COMPARISON: 3/15/2019. TECHNIQUE: ROUTINE FINDINGS: A poor inspiratory volume is present with mild patchy infiltrate of the mid to lower lung fields, suspicious for bronchopneumonia and/or aspiration. The heart remains borderline enlarged, exaggerated by technique. There is no significant pleural effusion, vascular congestion, pneumothorax, or displaced fractures identified. BIBASILAR INFILTRATES SUSPICIOUS FOR BRONCHOPNEUMONIA AND/OR ASPIRATION.         Discharge Medications:       Giovanni Harman   Home Medication Instructions F:[id number]    Printed on:11/23/20 1053   Medication Information                      amLODIPine (NORVASC) 5 MG tablet  Take 5 mg by mouth daily             aspirin 81 MG tablet  Take 2 tablets by mouth daily             atenolol (TENORMIN) 50 MG tablet  Take 50 mg by mouth 2 times daily             finasteride (PROSCAR) 5 MG tablet  Take 5 mg by mouth daily             furosemide (LASIX) 80 MG tablet  Take 80 mg by mouth daily             insulin glargine (LANTUS) 100 UNIT/ML injection vial  Inject 44 Units into the skin 2 times daily             loperamide (IMODIUM) 2 MG capsule  Take 2 mg by mouth 4 times daily as needed for Diarrhea             losartan (COZAAR) 50 MG tablet  Take 50 mg by mouth daily             simvastatin (ZOCOR) 10 MG tablet  Take 1 tablet by mouth nightly             tamsulosin (FLOMAX) 0.4 MG capsule  Take 2 tablets by mouth nightly vitamin D 1000 units CAPS  Take 2 tablets by mouth daily                 Disposition:   If discharged to Home, Any [facility] needs that were indicated and/or required as been addressed and set up by Social Work. Condition at discharge: good     Activity: activity as tolerated    Total time taken for discharging this patient: 40 minutes. Greater than 70% of time was spent focused exclusively on this patient. Time was taken to review chart, discuss plans with consultants, reconciling medications, discussing plan answering questions with patient.      Ajit Bae  11/23/2020, 10:51 AM  ----------------------------------------------------------------------------------------------------------------------    Frankie Priest,

## 2020-11-23 NOTE — PROGRESS NOTES
Pt resting in bed no distress. Up in room stand by assist.  meds given per orders. Tolerating general diet. No nausea. No SOB or discomfort. Fall precautions in place. Call light in reach.

## 2020-11-23 NOTE — DISCHARGE INSTR - DIET

## 2020-11-23 NOTE — PLAN OF CARE
Problem: Fluid Volume:  Goal: Ability to maintain a balanced intake and output will improve  Outcome: Ongoing     Problem: Health Behavior:  Goal: Ability to identify and utilize available resources and services will improve  Outcome: Ongoing  Goal: Ability to manage health-related needs will improve  Outcome: Ongoing     Problem: Metabolic:  Goal: Ability to maintain appropriate glucose levels will improve  Outcome: Ongoing     Problem: Nutritional:  Goal: Maintenance of adequate nutrition will improve  Outcome: Ongoing  Goal: Progress toward achieving an optimal weight will improve  Outcome: Ongoing     Problem: Nutritional:  Goal: Maintenance of adequate nutrition will improve  Outcome: Ongoing  Goal: Progress toward achieving an optimal weight will improve  Outcome: Ongoing     Problem: Falls - Risk of:  Goal: Will remain free from falls  Outcome: Ongoing  Goal: Absence of physical injury  Outcome: Ongoing     Problem: IP MOBILITY  Goal: LTG - patient will demonstrate safe mobility requirements  Outcome: Ongoing     Problem: Pain:  Description: Pain management should include both nonpharmacologic and pharmacologic interventions.   Goal: Pain level will decrease  Outcome: Ongoing  Goal: Control of acute pain  Outcome: Ongoing  Goal: Control of chronic pain  Outcome: Ongoing

## 2020-11-23 NOTE — FLOWSHEET NOTE
Patient has been sitting up in the chair since early this  Morning; attempted to wean off oxygen but O2. sat will drop to 87-90%Rheumatoid Arthritis. Notified DR Jessica Lopez about it. Incontinent of urine at times but also uses the urinal. Appetite good. Lungs sound clear and diminished. Has bilateral lower legs edema and slightly red or discolored. Alert and oriented. No complaints offered.

## 2020-11-24 VITALS
HEIGHT: 66 IN | DIASTOLIC BLOOD PRESSURE: 54 MMHG | TEMPERATURE: 97.2 F | BODY MASS INDEX: 43.39 KG/M2 | SYSTOLIC BLOOD PRESSURE: 133 MMHG | WEIGHT: 270 LBS | RESPIRATION RATE: 18 BRPM | HEART RATE: 60 BPM | OXYGEN SATURATION: 91 %

## 2020-11-24 LAB
GLUCOSE BLD-MCNC: 104 MG/DL (ref 60–115)
GLUCOSE BLD-MCNC: 127 MG/DL (ref 60–115)
GLUCOSE BLD-MCNC: 41 MG/DL (ref 60–115)
PERFORMED ON: ABNORMAL
PERFORMED ON: ABNORMAL
PERFORMED ON: NORMAL

## 2020-11-24 PROCEDURE — 94761 N-INVAS EAR/PLS OXIMETRY MLT: CPT

## 2020-11-24 PROCEDURE — 6360000002 HC RX W HCPCS: Performed by: FAMILY MEDICINE

## 2020-11-24 PROCEDURE — 2580000003 HC RX 258: Performed by: FAMILY MEDICINE

## 2020-11-24 PROCEDURE — 6370000000 HC RX 637 (ALT 250 FOR IP): Performed by: INTERNAL MEDICINE

## 2020-11-24 PROCEDURE — 6370000000 HC RX 637 (ALT 250 FOR IP): Performed by: FAMILY MEDICINE

## 2020-11-24 RX ORDER — INSULIN GLARGINE 100 [IU]/ML
20 INJECTION, SOLUTION SUBCUTANEOUS 2 TIMES DAILY
Qty: 1 VIAL | Refills: 3 | Status: SHIPPED
Start: 2020-11-24 | End: 2021-02-02 | Stop reason: SDUPTHER

## 2020-11-24 RX ORDER — INSULIN GLARGINE 100 [IU]/ML
20 INJECTION, SOLUTION SUBCUTANEOUS 2 TIMES DAILY
Status: DISCONTINUED | OUTPATIENT
Start: 2020-11-24 | End: 2020-11-24 | Stop reason: HOSPADM

## 2020-11-24 RX ADMIN — Medication 15 G: at 07:40

## 2020-11-24 RX ADMIN — Medication 10 ML: at 09:44

## 2020-11-24 RX ADMIN — AMLODIPINE BESYLATE 5 MG: 5 TABLET ORAL at 09:43

## 2020-11-24 RX ADMIN — FINASTERIDE 5 MG: 5 TABLET, FILM COATED ORAL at 09:43

## 2020-11-24 RX ADMIN — LOSARTAN POTASSIUM 50 MG: 50 TABLET, FILM COATED ORAL at 09:44

## 2020-11-24 RX ADMIN — FUROSEMIDE 80 MG: 80 TABLET ORAL at 09:44

## 2020-11-24 RX ADMIN — ATORVASTATIN CALCIUM 10 MG: 10 TABLET, FILM COATED ORAL at 09:43

## 2020-11-24 RX ADMIN — ENOXAPARIN SODIUM 40 MG: 40 INJECTION SUBCUTANEOUS at 09:44

## 2020-11-24 RX ADMIN — ATENOLOL 50 MG: 50 TABLET ORAL at 09:43

## 2020-11-24 RX ADMIN — ACETAMINOPHEN 650 MG: 325 TABLET ORAL at 09:42

## 2020-11-24 RX ADMIN — TAMSULOSIN HYDROCHLORIDE 0.4 MG: 0.4 CAPSULE ORAL at 09:44

## 2020-11-24 RX ADMIN — ASPIRIN 81 MG 162 MG: 81 TABLET ORAL at 09:44

## 2020-11-24 RX ADMIN — CEFUROXIME AXETIL 250 MG: 500 TABLET ORAL at 09:43

## 2020-11-24 ASSESSMENT — PAIN SCALES - GENERAL: PAINLEVEL_OUTOF10: 3

## 2020-11-24 NOTE — PROGRESS NOTES
Physician Progress Note    11/24/2020   9:57 AM    Name:  Guillermo Gerber  MRN:    [id number]      Day: 4     Admit Date: 11/20/2020  3:24 PM  PCP: [facility]    Code Status:  Full Code      Assessment and Plan: Active Problems/ diagnosis:     Pyelonephritis  Bronchopneumonia with hypoxia   Debility  Obesity  CKD3  DM2  hypoglycemia     Plan  Home O2 eval today  Decrease lantus to 20 bid given hypoglcemia   Dc on oral abx as ordered   Resume home meds  Follow up with PCP at the [place] [id number] 1 week     DVT PPx     Subjective:      no new events. No new symptoms. Glucose dropped to 40's this morning. Eating well. Physical Examination:      Vitals:  BP (!) 133/54   Pulse 60   Temp 97.2 °F (36.2 °C) (Oral)   Resp 17   Ht 5' 6\" (1.676 m)   Wt 270 lb (122.5 kg)   SpO2 98%   BMI 43.58 kg/m²   No data recorded. General appearance: alert, cooperative and no distress  Mental Status: oriented to person, place and time and normal affect  Lungs: clear to auscultation bilaterally, normal effort  Heart: regular rate and rhythm, no murmur  Abdomen: soft, nontender, nondistended, bowel sounds present, no masses  Extremities: no edema, redness, tenderness in the calves  Skin: no gross lesions, rashes    Data:     Labs:  Recent Labs     11/22/20  0635   WBC 5.6   HGB 11.8*        Recent Labs     11/22/20  0635 11/23/20  0629    145*   K 4.8 4.3    107   CO2 29 29   BUN 46* 44*   CREATININE 2.24* 2.12*   GLUCOSE 89 49*     No results for input(s): AST, ALT, ALB, BILITOT, ALKPHOS in the last 72 hours.     Current Facility-Administered Medications   Medication Dose Route Frequency Provider Last Rate Last Dose    insulin glargine (LANTUS) injection vial 20 Units  20 Units Subcutaneous BID Pitney Fadumo, DO        cefUROXime (CEFTIN) tablet 250 mg  250 mg Oral 2 times per day Pitney Fadumo, DO   250 mg at 11/24/20 0943    sodium chloride flush 0.9 % injection 10 mL  10 mL Intravenous 2 times per day Maru Westbrook MD   10 mL at 11/24/20 0944    sodium chloride flush 0.9 % injection 10 mL  10 mL Intravenous PRN Maru Westbrook MD        acetaminophen (TYLENOL) tablet 650 mg  650 mg Oral Q6H PRN Maru Westbrook MD   650 mg at 11/24/20 2879    Or    acetaminophen (TYLENOL) suppository 650 mg  650 mg Rectal Q6H PRN Maru Westbrook MD        polyethylene glycol [facility]) packet 17 g  17 g Oral Daily PRN Maru Westbrook MD        [facility]) tablet 12.5 mg  12.5 mg Oral Q6H PRN Maru Westbrook MD        Or    ondansetron [facility]) injection 4 mg  4 mg Intravenous Q6H PRN Maru Westbrook MD        enoxaparin (LOVENOX) injection 40 mg  40 mg Subcutaneous Daily Maru Westbrook MD   40 mg at 11/24/20 0944    influenza quadrivalent split vaccine (FLUZONE;FLUARIX;FLULAVAL;AFLURIA) injection 0.5 mL  0.5 mL Intramuscular Prior to discharge Maru Westbrook MD        amLODIPine [facility]) tablet 5 mg  5 mg Oral Daily Maru Westbrook MD   5 mg at 11/24/20 0070    aspirin chewable tablet 162 mg  162 mg Oral Daily Maru Westbrook MD   162 mg at 11/24/20 0944    atenolol (TENORMIN) tablet 50 mg  50 mg Oral BID Maru Westbrook MD   50 mg at 11/24/20 1822    finasteride (PROSCAR) tablet 5 mg  5 mg Oral Daily Maru Westbrook MD   5 mg at 11/24/20 7153    furosemide (LASIX) tablet 80 mg  80 mg Oral Daily Maru Westbrook MD   80 mg at 11/24/20 0944    losartan (COZAAR) tablet 50 mg  50 mg Oral Daily Maru Westbrook MD   50 mg at 11/24/20 0944    atorvastatin (LIPITOR) tablet 10 mg  10 mg Oral Daily Maru Westbrook MD   10 mg at 11/24/20 0943    tamsulosin (FLOMAX) capsule 0.4 mg  0.4 mg Oral Daily Maru Westbrook MD   0.4 mg at 11/24/20 0944    glucose (GLUTOSE) 40 % oral gel 15 g  15 g Oral PRN Maru Westbrook MD   15 g at 11/24/20 0740    dextrose 50 % IV solution  12.5 g Intravenous PRN Maru Westbrook MD        glucagon (rDNA) injection 1 mg  1 mg Intramuscular PRN Ena Cruz MD        dextrose 5 % solution  100 mL/hr Intravenous PRN Ena Cruz MD        insulin lispro (HUMALOG) injection vial 0-12 Units  0-12 Units Subcutaneous TID [facility] Ena Cruz MD   2 Units at 11/23/20 0956    insulin lispro (HUMALOG) injection vial 0-6 Units  0-6 Units Subcutaneous Nightly Ena Cruz MD   1 Units at 11/22/20 2110       Additional work up or/and treatment plan may be added today or then after based on clinical progression. I am managing a portion of pt care. Some medical issues are handled by other specialists. Additional work up and treatment should be done in out pt setting by pt PCP and other out pt providers. In addition to examining and evaluating pt, I spent additional time explaining care, normaland abnormal findings, and treatment plan. All of pt questions were answered. Counseling, diet and education were provided. Case will be discussed with nursing staff when appropriate. Family will be updated if and when appropriate.        Electronically signed by Feliciano Loaiza DO on 11/24/2020 at 9:57 AM

## 2020-11-24 NOTE — PROGRESS NOTES
Pt's last glucose was 127. He did not qualify for home oxygen. I went over d/c paperwork with the patient. He has a good understanding of med changes & follow up appointment. He is currently waiting on his ride home. IV was taken out.

## 2020-11-24 NOTE — PROGRESS NOTES
Home 02 eval= Pt on room air with 02 sat of 91% at rest.Pt tried to stand up at bed side and 02 sat increased to 94%the patient having hard time to stand up. No 02 needed.  RN notified(Josseline)

## 2020-11-24 NOTE — FLOWSHEET NOTE
Patient resting in bed with eyes closed. O2 off per requested with no respiratory distress noted. Last O2.sat 95% on RA. At 19:30 pm. Lung clear and dimiiiiiiiiiiiinished. Sat up in the chair all day this tonight. Bilateral lower extremety has 3-4+ non-pitting edema . Medicated with Tylenol 2 tabs for right knee pain before bedtime.

## 2020-11-24 NOTE — ADT AUTH CERT
cr for 11/22 per request by Shayne Gooden RN         Review Status  Review Entered    In Primary  11/24/2020 13:55        Criteria Review    11/22/2020  care day 3  ? VS  temp 97.9, pulse 62, resp 18, bp 139/53, pulse ox 95 % on 1l nc   ?    ? Physical Exam  ? Cardiovascular:   ?    Rate and Rhythm: Normal rate and regular rhythm. Pulmonary:   ?    Effort: Pulmonary effort is normal.      Breath sounds: Normal breath sounds. Abdominal:      General: Bowel sounds are normal.      Palpations: Abdomen is soft. ?    ? Radiology testing/Labs pertinent  ? Bun 46  ? Creat 2.24  ? Gfr 28.98  ? Rbc 4.04  ? H/h 11.8/ 37.2  ? Mchc 31.5  ? Rdw 17.1  ? Lymph 0.9   ?    ? Meds-name, dose, route,rate  ? Scheduled Meds:   insulin glargine  20 Units Subcutaneous BID    cefUROXime  250 mg Oral 2 times per day    sodium chloride flush  10 mL Intravenous 2 times per day    enoxaparin  40 mg Subcutaneous Daily    influenza virus vaccine  0.5 mL Intramuscular Prior to discharge    amLODIPine  5 mg Oral Daily    aspirin  162 mg Oral Daily    atenolol  50 mg Oral BID    finasteride  5 mg Oral Daily    furosemide  80 mg Oral Daily    losartan  50 mg Oral Daily    atorvastatin  10 mg Oral Daily    tamsulosin  0.4 mg Oral Daily    insulin lispro  0-12 Units Subcutaneous TID WC    insulin lispro  0-6 Units Subcutaneous Nightly §      § Continuous Infusions:ns at 100 cc/h  §    § PRN Meds:.prn- none this day   §    § Treatment plan-attending, consults  § Medical impression       Active Hospital Problems     Diagnosis Date Noted    UTI (urinary tract infection) [N39.0] 11/20/2020 §      2. Pyelonephritis  § 11/20 - IV antibiotics, urine culture pending  § 11/21 - proteus, sens pending  3. Bibasilar PNA  § 11/20 - on zosyn as above, speech eval  4. CKD stage 3b  5. DM2  § 11/20 - ssi, ac/hs checks, a1c  6. HTN  § 11/20 - cont home meds  7.  DVT proph  §            Urinary Tract Infection (UTI) - Care Day 4 (11/23/2020) by Nael Pagan LISBETH Ely         Review Status  Review Entered    Completed  11/23/2020 17:41        Criteria Review       Care Day: 4 Care Date: 11/23/2020 Level of Care: Inpatient Floor    Guideline Day 2    Level Of Care    (X) Floor    11/23/2020 5:41 PM EST by Rosalio Ely      inpt floor diet- general tele monitor ordered up with assist o2 therapy ordered    Clinical Status    (X) * Hypotension absent    11/23/2020 5:41 PM EST by Ro Ely      temp 97.2, pulse 58,. resp 17, bp 123/48, pulse ox 87 % on 1l nc    (X) * Mental status at baseline    (X) * Afebrile or fever improved    (X) * Vomiting absent or improved    Activity    (X) * Ambulatory    11/23/2020 5:41 PM EST by Rosalio Ely      up with assist    Routes    (X) IV fluids, medications    11/23/2020 5:41 PM EST by Rosalio Ely      lovenox 40 mg sc daily  iv ns at 100 cc/h  prn- tylneol 650 mg po prn x1    (X) Advance diet as tolerated    11/23/2020 5:41 PM EST by Rosalio Ely      diet- general    Interventions    ( ) * Reversible urinary system abnormality (eg, obstruction, abscess) absent, addressed, or to be treated at next level of care    ( ) WBC    11/23/2020 5:41 PM EST by Rosalio Ely      na 145  bun 44  creat 2.12  gfr 30.8  gluc 49  ca 8.1    (X) Renal function tests    11/23/2020 5:41 PM EST by Rosalio Ely      cxr  There is blunting of the costophrenic recesses seen on the lateral view, posteriorly indicating small bilateral pleural effusions.  This is a low inspiratory volume study with bronchovascular crowding. Medications    (X) Antibiotics    11/23/2020 5:41 PM EST by Rosalio Ely      po ceftin 250 mg po bid  iv zosyn 3.375 gm iv q8h    * Milestone    Additional Notes    11/23/2020       Physical Exam    Cardiovascular: Regular rate and rhythm with normal S1/S2 without murmurs, rubs or gallops. Abdomen: Soft, non-tender, non-distended with normal bowel sounds.     Musculoskeletal: No clubbing, cyanosis or edema bilaterally.  Full range of motion without deformity. Skin: Skin color, texture, turgor normal.  No rashes or lesions. Neuro: Non Focal. Symetrical motor and tone. Nl Comprehension, Alert,awake and oriented. NL CN. Symetrical tone and reflexes. Meds- names, dose, route, rate    Scheduled Meds:cefUROXime, 250 mg, Oral, 2 times per day    furosemide, 20 mg, Intravenous, Once    enoxaparin, 40 mg, Subcutaneous, Daily    influenza virus vaccine, 0.5 mL, Intramuscular, Prior to discharge    amLODIPine, 5 mg, Oral, Daily    aspirin, 162 mg, Oral, Daily    atenolol, 50 mg, Oral, BID    finasteride, 5 mg, Oral, Daily    furosemide, 80 mg, Oral, Daily    insulin glargine, 44 Units, Subcutaneous, BID    losartan, 50 mg, Oral, Daily    atorvastatin, 10 mg, Oral, Daily    tamsulosin, 0.4 mg, Oral, Daily    insulin lispro, 0-12 Units, Subcutaneous, TID WC    insulin lispro, 0-6 Units, Subcutaneous, Nightly       Treatment plan attending/ consults    Medical impression    Pyelonephritis    Bronchopneumonia    Debility    Obesity    CKD3    DM2         Patient was admitted with hypoglycemia.  However he was found to have pyelonephritis and pneumonia.  He was treated with IV antibiotic. [facility] grew Proteus in his urine.  His chest x-ray was consistent with bronchopneumonia.  He was treated with IV Rocephin.  He received 5 days while hospitalized. [facility] was switched to Ceftin prior to discharge. [facility] was discharged home in a stable condition.  Of note, patient required 2 L of oxygen during this hospitalization.  Patient oxygen was weaned off prior to discharge.        Possible d/c later today

## 2020-11-25 NOTE — TELEPHONE ENCOUNTER
Pt calling not seen yet here establishing with Dr Sabino Garcia on Monday 11/30. Was in the hospital and put on  Ceftin was told to call the Dr because he has diarrhea.  Going to the bathroom 3-4 times a day watery       Pt [phone]

## 2020-11-30 ENCOUNTER — OFFICE VISIT (OUTPATIENT)
Dept: FAMILY MEDICINE CLINIC | Age: 70
End: 2020-11-30
Payer: MEDICARE

## 2020-11-30 ENCOUNTER — TELEPHONE (OUTPATIENT)
Dept: FAMILY MEDICINE CLINIC | Age: 70
End: 2020-11-30

## 2020-11-30 VITALS
HEART RATE: 47 BPM | TEMPERATURE: 97.8 F | SYSTOLIC BLOOD PRESSURE: 124 MMHG | RESPIRATION RATE: 16 BRPM | HEIGHT: 66 IN | WEIGHT: 315 LBS | DIASTOLIC BLOOD PRESSURE: 80 MMHG | OXYGEN SATURATION: 98 % | BODY MASS INDEX: 50.62 KG/M2

## 2020-11-30 PROCEDURE — 3052F HG A1C>EQUAL 8.0%<EQUAL 9.0%: CPT | Performed by: STUDENT IN AN ORGANIZED HEALTH CARE EDUCATION/TRAINING PROGRAM

## 2020-11-30 PROCEDURE — A6537 GC STOCKING FULL LNGTH 30-40: HCPCS | Performed by: STUDENT IN AN ORGANIZED HEALTH CARE EDUCATION/TRAINING PROGRAM

## 2020-11-30 PROCEDURE — 99203 OFFICE O/P NEW LOW 30 MIN: CPT | Performed by: STUDENT IN AN ORGANIZED HEALTH CARE EDUCATION/TRAINING PROGRAM

## 2020-11-30 RX ORDER — ERYTHROMYCIN 5 MG/G
OINTMENT OPHTHALMIC
COMMUNITY
Start: 2020-07-21 | End: 2020-11-30

## 2020-11-30 RX ORDER — AMMONIUM LACTATE 12 G/100G
LOTION TOPICAL
COMMUNITY
Start: 2020-02-28

## 2020-11-30 RX ORDER — CEFUROXIME AXETIL 250 MG/1
250 TABLET ORAL 2 TIMES DAILY
COMMUNITY
End: 2021-01-04 | Stop reason: ALTCHOICE

## 2020-11-30 RX ORDER — LANCING DEVICE/LANCETS
KIT MISCELLANEOUS
COMMUNITY
Start: 2020-07-20

## 2020-11-30 RX ORDER — ALBUTEROL SULFATE 90 UG/1
AEROSOL, METERED RESPIRATORY (INHALATION)
COMMUNITY
Start: 2020-08-26

## 2020-11-30 RX ORDER — INSULIN ASPART 100 [IU]/ML
INJECTION, SOLUTION INTRAVENOUS; SUBCUTANEOUS
COMMUNITY
Start: 2020-09-01 | End: 2021-02-02 | Stop reason: SDUPTHER

## 2020-11-30 RX ORDER — ALLOPURINOL 100 MG/1
TABLET ORAL
COMMUNITY
Start: 2020-06-22

## 2020-11-30 RX ORDER — LORATADINE 10 MG/1
TABLET ORAL
COMMUNITY
Start: 2020-02-28

## 2020-11-30 RX ORDER — SILDENAFIL 100 MG/1
TABLET, FILM COATED ORAL
COMMUNITY
Start: 2020-07-27

## 2020-11-30 SDOH — ECONOMIC STABILITY: FOOD INSECURITY: WITHIN THE PAST 12 MONTHS, THE FOOD YOU BOUGHT JUST DIDN'T LAST AND YOU DIDN'T HAVE MONEY TO GET MORE.: SOMETIMES TRUE

## 2020-11-30 SDOH — ECONOMIC STABILITY: INCOME INSECURITY: HOW HARD IS IT FOR YOU TO PAY FOR THE VERY BASICS LIKE FOOD, HOUSING, MEDICAL CARE, AND HEATING?: NOT VERY HARD

## 2020-11-30 SDOH — ECONOMIC STABILITY: TRANSPORTATION INSECURITY
IN THE PAST 12 MONTHS, HAS THE LACK OF TRANSPORTATION KEPT YOU FROM MEDICAL APPOINTMENTS OR FROM GETTING MEDICATIONS?: NO

## 2020-11-30 SDOH — ECONOMIC STABILITY: TRANSPORTATION INSECURITY
IN THE PAST 12 MONTHS, HAS LACK OF TRANSPORTATION KEPT YOU FROM MEETINGS, WORK, OR FROM GETTING THINGS NEEDED FOR DAILY LIVING?: NO

## 2020-11-30 SDOH — ECONOMIC STABILITY: FOOD INSECURITY: WITHIN THE PAST 12 MONTHS, YOU WORRIED THAT YOUR FOOD WOULD RUN OUT BEFORE YOU GOT MONEY TO BUY MORE.: NEVER TRUE

## 2020-11-30 ASSESSMENT — ENCOUNTER SYMPTOMS
SINUS PRESSURE: 0
ABDOMINAL PAIN: 0
SORE THROAT: 0
COUGH: 0
SHORTNESS OF BREATH: 0
VOMITING: 0

## 2020-11-30 ASSESSMENT — PATIENT HEALTH QUESTIONNAIRE - PHQ9
1. LITTLE INTEREST OR PLEASURE IN DOING THINGS: 0
2. FEELING DOWN, DEPRESSED OR HOPELESS: 0
SUM OF ALL RESPONSES TO PHQ QUESTIONS 1-9: 0
SUM OF ALL RESPONSES TO PHQ9 QUESTIONS 1 & 2: 0
SUM OF ALL RESPONSES TO PHQ QUESTIONS 1-9: 0
SUM OF ALL RESPONSES TO PHQ QUESTIONS 1-9: 0

## 2020-11-30 NOTE — TELEPHONE ENCOUNTER
Pt is confused about his simvastatin 10 mg tablet. He states that he was originally taking 1/2 of a tablet, but now you have him taking a full tablet. Pt would like to discuss this.

## 2020-11-30 NOTE — TELEPHONE ENCOUNTER
I didn't change his medication. It may have been entered into his list incorrectly. He can continue with his 1/2 tablet if that is what the [place] prescribed for him.  Thank you

## 2020-11-30 NOTE — PATIENT INSTRUCTIONS
Patient Education        Leg and Ankle Edema: Care Instructions  Your Care Instructions  Swelling in the legs, ankles, and feet is called edema. It is common after you sit or stand for a while. Long plane flights or car rides often cause swelling in the legs and feet. You may also have swelling if you have to stand for long periods of time at your job. Problems with the veins in the legs (varicose veins) and changes in hormones can also cause swelling. Sometimes the swelling in the ankles and feet is caused by a more serious problem, such as heart failure, infection, blood clots, or liver or kidney disease. Follow-up care is a key part of your treatment and safety. Be sure to make and go to all appointments, and call your doctor if you are having problems. It's also a good idea to know your test results and keep a list of the medicines you take. How can you care for yourself at home? · If your doctor gave you medicine, take it as prescribed. Call your doctor if you think you are having a problem with your medicine. · Whenever you are resting, raise your legs up. Try to keep the swollen area higher than the level of your heart. · Take breaks from standing or sitting in one position. ? Walk around to increase the blood flow in your lower legs. ? Move your feet and ankles often while you stand, or tighten and relax your leg muscles. · Wear support stockings. Put them on in the morning, before swelling gets worse. · Eat a balanced diet. Lose weight if you need to. · Limit the amount of salt (sodium) in your diet. Salt holds fluid in the body and may increase swelling. When should you call for help? Call 911 anytime you think you may need emergency care. For example, call if:    · You have symptoms of a blood clot in your lung (called a pulmonary embolism). These may include:  ? Sudden chest pain. ? Trouble breathing. ? Coughing up blood.    Call your doctor now or seek immediate medical care if:    · You have signs of a blood clot, such as:  ? Pain in your calf, back of the knee, thigh, or groin. ? Redness and swelling in your leg or groin.     · You have symptoms of infection, such as:  ? Increased pain, swelling, warmth, or redness. ? Red streaks or pus. ? A fever. Watch closely for changes in your health, and be sure to contact your doctor if:    · Your swelling is getting worse.     · You have new or worsening pain in your legs.     · You do not get better as expected. Where can you learn more? Go to https://[email or web address] org and sign in to your [employer] account. Enter J789 in the [employer] box to learn more about \"Leg and Ankle Edema: Care Instructions. \"     If you do not have an account, please click on the \"Sign Up Now\" link. Current as of: June 26, 2019               Content Version: 12.6  © 5020-3880 [employer]. Care instructions adapted under license by [facility] ([facility]). If you have questions about a medical condition or this instruction, always ask your healthcare professional. [place] [address] any warranty or liability for your use of this information.

## 2020-12-04 NOTE — TELEPHONE ENCOUNTER
Okay, if he calls back he can continue his current dose of simvastatin. I thought he was just following up with us acutely after his hospital follow up and continuing to see the [facility] for his chronic conditions.

## 2020-12-20 PROBLEM — N39.0 UTI (URINARY TRACT INFECTION): Status: RESOLVED | Noted: 2020-11-20 | Resolved: 2020-12-20

## 2021-01-04 ENCOUNTER — OFFICE VISIT (OUTPATIENT)
Dept: FAMILY MEDICINE CLINIC | Age: 71
End: 2021-01-04
Payer: MEDICARE

## 2021-01-04 VITALS
DIASTOLIC BLOOD PRESSURE: 70 MMHG | BODY MASS INDEX: 48.86 KG/M2 | HEART RATE: 46 BPM | HEIGHT: 66 IN | SYSTOLIC BLOOD PRESSURE: 136 MMHG | WEIGHT: 304 LBS | OXYGEN SATURATION: 97 % | TEMPERATURE: 98.5 F

## 2021-01-04 DIAGNOSIS — Z00.00 ROUTINE GENERAL MEDICAL EXAMINATION AT A HEALTH CARE FACILITY: Primary | ICD-10-CM

## 2021-01-04 DIAGNOSIS — E11.65 UNCONTROLLED TYPE 2 DIABETES MELLITUS WITH HYPERGLYCEMIA (HCC): ICD-10-CM

## 2021-01-04 DIAGNOSIS — B35.6 TINEA CRURIS: ICD-10-CM

## 2021-01-04 PROCEDURE — G0439 PPPS, SUBSEQ VISIT: HCPCS | Performed by: STUDENT IN AN ORGANIZED HEALTH CARE EDUCATION/TRAINING PROGRAM

## 2021-01-04 RX ORDER — CLOTRIMAZOLE 1 %
CREAM (GRAM) TOPICAL
Qty: 28 G | Refills: 1 | Status: SHIPPED | OUTPATIENT
Start: 2021-01-04 | End: 2021-01-11

## 2021-01-04 ASSESSMENT — PATIENT HEALTH QUESTIONNAIRE - PHQ9
SUM OF ALL RESPONSES TO PHQ QUESTIONS 1-9: 0
SUM OF ALL RESPONSES TO PHQ QUESTIONS 1-9: 0
1. LITTLE INTEREST OR PLEASURE IN DOING THINGS: 0

## 2021-01-04 ASSESSMENT — LIFESTYLE VARIABLES
HOW OFTEN DURING THE LAST YEAR HAVE YOU FOUND THAT YOU WERE NOT ABLE TO STOP DRINKING ONCE YOU HAD STARTED: 0
HOW OFTEN DURING THE LAST YEAR HAVE YOU BEEN UNABLE TO REMEMBER WHAT HAPPENED THE NIGHT BEFORE BECAUSE YOU HAD BEEN DRINKING: 0
AUDIT TOTAL SCORE: 1
HOW OFTEN DURING THE LAST YEAR HAVE YOU FAILED TO DO WHAT WAS NORMALLY EXPECTED FROM YOU BECAUSE OF DRINKING: 0
HAVE YOU OR SOMEONE ELSE BEEN INJURED AS A RESULT OF YOUR DRINKING: 0
HOW OFTEN DURING THE LAST YEAR HAVE YOU NEEDED AN ALCOHOLIC DRINK FIRST THING IN THE MORNING TO GET YOURSELF GOING AFTER A NIGHT OF HEAVY DRINKING: 0

## 2021-01-04 NOTE — PATIENT INSTRUCTIONS
Keep a log of sugars and insulin doses over the next two weeks      Personalized Preventive Plan for Fidel Franks - 1/4/2021  Medicare offers a range of preventive health benefits. Some of the tests and screenings are paid in full while other may be subject to a deductible, co-insurance, and/or copay. Some of these benefits include a comprehensive review of your medical history including lifestyle, illnesses that may run in your family, and various assessments and screenings as appropriate. After reviewing your medical record and screening and assessments performed today your provider may have ordered immunizations, labs, imaging, and/or referrals for you. A list of these orders (if applicable) as well as your Preventive Care list are included within your After Visit Summary for your review. Other Preventive Recommendations:    · A preventive eye exam performed by an eye specialist is recommended every 1-2 years to screen for glaucoma; cataracts, macular degeneration, and other eye disorders. · A preventive dental visit is recommended every 6 months. · Try to get at least 150 minutes of exercise per week or 10,000 steps per day on a pedometer . · Order or download the FREE \"Exercise & Physical Activity: Your Everyday Guide\" from [employer]. Call 4-339.129.9125 or search [employer] online. · You need 7131-5864 mg of calcium and 3451-9775 IU of vitamin D per day. It is possible to meet your calcium requirement with diet alone, but a vitamin D supplement is usually necessary to meet this goal.  · When exposed to the sun, use a sunscreen that protects against both UVA and UVB radiation with an SPF of 30 or greater. Reapply every 2 to 3 hours or after sweating, drying off with a towel, or swimming. · Always wear a seat belt when traveling in a car. Always wear a helmet when riding a bicycle or motorcycle.

## 2021-02-02 ENCOUNTER — OFFICE VISIT (OUTPATIENT)
Dept: ENDOCRINOLOGY | Age: 71
End: 2021-02-02
Payer: MEDICARE

## 2021-02-02 VITALS
WEIGHT: 302 LBS | BODY MASS INDEX: 48.74 KG/M2 | SYSTOLIC BLOOD PRESSURE: 139 MMHG | HEART RATE: 62 BPM | OXYGEN SATURATION: 91 % | DIASTOLIC BLOOD PRESSURE: 72 MMHG

## 2021-02-02 DIAGNOSIS — E11.65 UNCONTROLLED TYPE 2 DIABETES MELLITUS WITH HYPERGLYCEMIA (HCC): Primary | Chronic | ICD-10-CM

## 2021-02-02 DIAGNOSIS — N18.4 STAGE 4 CHRONIC KIDNEY DISEASE (HCC): ICD-10-CM

## 2021-02-02 LAB
CHP ED QC CHECK: NORMAL
GLUCOSE BLD-MCNC: 214 MG/DL

## 2021-02-02 PROCEDURE — 95250 CONT GLUC MNTR PHYS/QHP EQP: CPT | Performed by: INTERNAL MEDICINE

## 2021-02-02 PROCEDURE — 99203 OFFICE O/P NEW LOW 30 MIN: CPT | Performed by: INTERNAL MEDICINE

## 2021-02-02 RX ORDER — INSULIN ASPART 100 [IU]/ML
INJECTION, SOLUTION INTRAVENOUS; SUBCUTANEOUS
Qty: 5 PEN | Refills: 3
Start: 2021-02-02 | End: 2021-03-02 | Stop reason: SDUPTHER

## 2021-02-02 RX ORDER — INSULIN GLARGINE 100 [IU]/ML
INJECTION, SOLUTION SUBCUTANEOUS
Qty: 1 VIAL | Refills: 3
Start: 2021-02-02 | End: 2021-03-02 | Stop reason: SDUPTHER

## 2021-02-02 ASSESSMENT — ENCOUNTER SYMPTOMS: BLURRED VISION: 1

## 2021-02-02 NOTE — PROGRESS NOTES
Subjective:      Patient ID: Denice Patel is a 65 y.o. male. Patient referred here for uncontrolled diabetes complication diabetes include heart disease nephropathy neuropathy apathy patient getting as a diabetes for 20 years currently on Lantus 12 while takes 20 units twice a day and NovoLog 3 times a day occasionally has low sugars in the test usually 2 times daily  Patient recently admitted in the hospital for hypoglycemia fluctuating glucose pneumonia  Diabetes  He presents for his initial diabetic visit. He has type 2 diabetes mellitus. His disease course has been fluctuating. There are no hypoglycemic associated symptoms. Associated symptoms include blurred vision. Pertinent negatives for diabetes include no polydipsia and no polyuria. Symptoms are worsening. Diabetic complications include heart disease, nephropathy, peripheral neuropathy and retinopathy. Risk factors for coronary artery disease include obesity and stress. Current diabetic treatment includes insulin injections. He is currently taking insulin pre-breakfast, pre-lunch, pre-dinner and at bedtime.  (Lab Results       Component                Value               Date                       LABA1C                   8.2 (H)             11/20/2020              )       Patient Active Problem List   Diagnosis    MAYKEL (acute kidney injury) ([address]    Hydrocele    Hypertension    Type II diabetes mellitus, uncontrolled ([address]    Acute gouty arthritis    Adenomatous polyp of colon    Anemia    Back pain    Infestation by bed bug    Morbid obesity ([address]    Obstructive sleep apnea of adult    Other and unspecified hyperlipidemia    Unspecified hyperplasia of prostate without urinary obstruction and other lower urinary tract symptoms (LUTS)    Type 2 or unspecified type diabetes mellitus with ophthalmic manifestations    Primary malignant neoplasm of prostate ([address]    Pain in joint involving ankle and foot     Social History     Socioeconomic History    Marital status: Single     Spouse name: Not on file    Number of children: Not on file    Years of education: Not on file    Highest education level: Not on file   Occupational History    Not on file   Social Needs    Financial resource strain: Not very hard    Food insecurity     Worry: Never true     Inability: Sometimes true    Transportation needs     Medical: No     Non-medical: No   Tobacco Use    Smoking status: Never Smoker    Smokeless tobacco: Never Used   Substance and Sexual Activity    Alcohol use: Not on file    Drug use: Never    Sexual activity: Not on file   Lifestyle    Physical activity     Days per week: Not on file     Minutes per session: Not on file    Stress: Not on file   Relationships    Social connections     Talks on phone: Not on file     Gets together: Not on file     Attends Oriental orthodox service: Not on file     Active member of club or organization: Not on file     Attends meetings of clubs or organizations: Not on file     Relationship status: Not on file    Intimate partner violence     Fear of current or ex partner: Not on file     Emotionally abused: Not on file     Physically abused: Not on file     Forced sexual activity: Not on file   Other Topics Concern    Not on file   Social History Narrative         Lives With: Alone    Type of Home: House Two level, Able to Live on Main level with bedroom/bathroom    Home Access: Stairs to enter without rails - Number of Steps: 6    Bathroom Shower/Tub: Tub/Shower unit    Bathroom Toilet: Standard    Home Equipment: Cane, Rolling walker    ADL Assistance: Independent    Homemaking Assistance: Independent    Homemaking Responsibilities: Yes    Ambulation Assistance: Independent(Cane)    Transfer Assistance: Independent    Active : Yes     Past Surgical History:   Procedure Laterality Date    APPENDECTOMY      age 13     No family history on file.     Allergies   Allergen Reactions    Niacin     Fluorescein Diarrhea and Nausea And Vomiting       Current Outpatient Medications:     insulin aspart (NOVOLOG FLEXPEN) 100 UNIT/ML injection pen, INJECT 24 UNITS SUBCUTANEOUSLY WITH BREAKFAST AND INJECT 12 UNITS WITH LUNCH AND INJECT 24 UNITS WITH DINNER SKIP IF NO MEAL/CARBOHYDRATE INTAKE (DISCARD PEN 28 DAYS AFTER FIRST USE), Disp: 5 pen, Rfl: 3    insulin glargine (LANTUS) 100 UNIT/ML injection vial, 30 units at lunch, Disp: 1 vial, Rfl: 3    Lancets Misc. (ACCU-CHEK SOFTCLIX LANCET DEV) KIT, USE LANCET(S) TESTING AS DIRECTED FOR BLOOD SUGAR, Disp: , Rfl:     allopurinol (ZYLOPRIM) 100 MG tablet, TAKE TWO TABLETS BY MOUTH EVERY DAY (WITH FOOD AND FLUIDS), Disp: , Rfl:     ammonium lactate (LAC-HYDRIN) 12 % lotion, APPLY A SUFFICIENT AMOUNT EXTERNALLY TWICE A DAY TO DRY SKIN ON LEGS, Disp: , Rfl:     loratadine (CLARITIN) 10 MG tablet, TAKE ONE TABLET BY MOUTH EVERY DAY (WITH FOOD), Disp: , Rfl:     sildenafil (VIAGRA) 100 MG tablet, TAKE ONE-HALF TABLET BY MOUTH AN HOUR_BEFORE SEX.  (NO MORE THAN 1 DOSE PER 24 HOURS) NO NITRATES, Disp: , Rfl:     albuterol sulfate  (90 Base) MCG/ACT inhaler, INHALE 2 PUFFS BY MOUTH FOUR TIMES A DAY AS NEEDED FOR SHORTNESS OF BREATH OR WHEEZING., Disp: , Rfl:     Dextrose, Diabetic Use, (GLUCOSE) 1 g CHEW, Take by mouth, Disp: , Rfl:     simvastatin (ZOCOR) 10 MG tablet, Take 1 tablet by mouth nightly, Disp: 30 tablet, Rfl: 3    amLODIPine (NORVASC) 5 MG tablet, Take 5 mg by mouth daily, Disp: , Rfl:     aspirin 81 MG tablet, Take 2 tablets by mouth daily, Disp: , Rfl:     vitamin D 1000 units CAPS, Take 2 tablets by mouth daily, Disp: , Rfl:     furosemide (LASIX) 80 MG tablet, Take 80 mg by mouth daily, Disp: , Rfl:     losartan (COZAAR) 50 MG tablet, Take 50 mg by mouth daily, Disp: , Rfl:     tamsulosin (FLOMAX) 0.4 MG capsule, Take 2 tablets by mouth nightly, Disp: , Rfl:     atenolol (TENORMIN) 50 MG tablet, Take 50 mg by mouth 2 times daily, Disp: , Rfl:    finasteride (PROSCAR) 5 MG tablet, Take 5 mg by mouth daily, Disp: , Rfl:     loperamide (IMODIUM) 2 MG capsule, Take 2 mg by mouth 4 times daily as needed for Diarrhea, Disp: , Rfl:       Review of Systems   Eyes: Positive for blurred vision. Cardiovascular: Positive for leg swelling. Endocrine: Negative for polydipsia and polyuria. Musculoskeletal: Positive for arthralgias. Neurological: Negative. Psychiatric/Behavioral: Positive for dysphoric mood. All other systems reviewed and are negative. Vitals:    02/02/21 1105   BP: 139/72   Pulse: 62   SpO2: 91%   Weight: (!) 302 lb (137 kg)       Objective:   Physical Exam  Vitals signs reviewed. Constitutional:       Appearance: Normal appearance. He is obese. HENT:      Head: Normocephalic and atraumatic. Right Ear: External ear normal.      Left Ear: External ear normal.      Nose: Nose normal.      Mouth/Throat:      Mouth: Mucous membranes are moist.   Eyes:      General: No scleral icterus. Right eye: No discharge. Left eye: No discharge. Extraocular Movements: Extraocular movements intact. Conjunctiva/sclera: Conjunctivae normal.   Neck:      Musculoskeletal: Normal range of motion. Cardiovascular:      Rate and Rhythm: Normal rate. Heart sounds: Normal heart sounds. Pulmonary:      Breath sounds: Wheezing and rhonchi present. Abdominal:      Palpations: Abdomen is soft. Musculoskeletal: Normal range of motion. Right lower leg: Edema present. Left lower leg: Edema present. Skin:     General: Skin is warm and dry. Neurological:      General: No focal deficit present. Mental Status: He is alert and oriented to person, place, and time. Psychiatric:         Mood and Affect: Mood normal.         Behavior: Behavior normal.         Assessment:       Diagnosis Orders   1. Uncontrolled type 2 diabetes mellitus with hyperglycemia (HCC)  POCT Glucose   2.  Stage 4 chronic kidney disease ([address]             Plan:      Orders Placed This Encounter   Procedures    POCT Glucose     Orders Placed This Encounter   Procedures    POCT Glucose    OH CONT GLUC MNTR PHYSICIAN/QHP PROVIDED EQUIPTMENT     Orders Placed This Encounter   Medications    insulin aspart (NOVOLOG FLEXPEN) 100 UNIT/ML injection pen     Sig: INJECT 24 UNITS SUBCUTANEOUSLY WITH BREAKFAST AND INJECT 12 UNITS WITH LUNCH AND INJECT 24 UNITS WITH DINNER SKIP IF NO MEAL/CARBOHYDRATE INTAKE (DISCARD PEN 28 DAYS AFTER FIRST USE)     Dispense:  5 pen     Refill:  3    insulin glargine (LANTUS) 100 UNIT/ML injection vial     Si units at lunch     Dispense:  1 vial     Refill:  3     Lower or change Lantus to 30 units at lunchtime discontinue Lantus at bedtime continue NovoLog 24 units at breakfast 12 at lunch and 24 units at dinnertime will do 2-week continuous glucose monitoring avoid hypoglycemia in view of part of multiple comorbid condition  More than 50% of our 35-minute spent patient education counseling thank you for the referral        Reena Doran MD

## 2021-02-18 ENCOUNTER — NURSE ONLY (OUTPATIENT)
Dept: ENDOCRINOLOGY | Age: 71
End: 2021-02-18
Payer: MEDICARE

## 2021-02-18 DIAGNOSIS — E11.65 UNCONTROLLED TYPE 2 DIABETES MELLITUS WITH HYPERGLYCEMIA (HCC): Primary | ICD-10-CM

## 2021-02-18 PROCEDURE — 95251 CONT GLUC MNTR ANALYSIS I&R: CPT | Performed by: INTERNAL MEDICINE

## 2021-03-02 ENCOUNTER — OFFICE VISIT (OUTPATIENT)
Dept: ENDOCRINOLOGY | Age: 71
End: 2021-03-02
Payer: MEDICARE

## 2021-03-02 VITALS
BODY MASS INDEX: 47.89 KG/M2 | DIASTOLIC BLOOD PRESSURE: 61 MMHG | SYSTOLIC BLOOD PRESSURE: 119 MMHG | OXYGEN SATURATION: 92 % | HEART RATE: 52 BPM | WEIGHT: 298 LBS | HEIGHT: 66 IN

## 2021-03-02 DIAGNOSIS — E11.65 UNCONTROLLED TYPE 2 DIABETES MELLITUS WITH HYPERGLYCEMIA (HCC): Primary | ICD-10-CM

## 2021-03-02 LAB
CHP ED QC CHECK: NORMAL
GLUCOSE BLD-MCNC: 289 MG/DL

## 2021-03-02 PROCEDURE — 82962 GLUCOSE BLOOD TEST: CPT | Performed by: INTERNAL MEDICINE

## 2021-03-02 PROCEDURE — 99213 OFFICE O/P EST LOW 20 MIN: CPT | Performed by: INTERNAL MEDICINE

## 2021-03-02 RX ORDER — INSULIN GLARGINE 100 [IU]/ML
INJECTION, SOLUTION SUBCUTANEOUS
Qty: 1 VIAL | Refills: 3 | Status: SHIPPED | OUTPATIENT
Start: 2021-03-02 | End: 2021-10-20 | Stop reason: SDUPTHER

## 2021-03-02 RX ORDER — INSULIN ASPART 100 [IU]/ML
INJECTION, SOLUTION INTRAVENOUS; SUBCUTANEOUS
Qty: 5 PEN | Refills: 3 | Status: SHIPPED | OUTPATIENT
Start: 2021-03-02 | End: 2021-11-02 | Stop reason: SDUPTHER

## 2021-03-02 NOTE — PROGRESS NOTES
Subjective:      Patient ID: Aleida Nicole is a 66 y.o. male. Follow-up on type 2 diabetes patient currently on Lantus and Humalog had 2-week continuous glucose monitoring  Complicated diabetes include chronic kidney disease  Diabetes  He presents for his follow-up diabetic visit. He has type 2 diabetes mellitus. Risk factors for coronary artery disease include obesity. Current diabetic treatment includes insulin injections. He is currently taking insulin pre-breakfast, pre-lunch, pre-dinner and at bedtime. His overall blood glucose range is >200 mg/dl.  (Reviewed 2-week glucose monitoring average blood sugar 237 target range 24% high 33% very high 42% low was 1%    Overall significant variability of glucose all throughout the day higher blood sugars in the second half of the day)        Patient Active Problem List   Diagnosis    [facility]EL [facility]) ([facility]    Hydrocele    Hypertension    Type II diabetes mellitus, uncontrolled (HCC)    Acute gouty arthritis    Adenomatous polyp of colon    Anemia    Back pain    Infestation by bed bug    Morbid obesity (HCC)    Obstructive sleep apnea of adult    Other and unspecified hyperlipidemia    Unspecified hyperplasia of prostate without urinary obstruction and other lower urinary tract symptoms (LUTS)    Type 2 or unspecified type diabetes mellitus with ophthalmic manifestations    Primary malignant neoplasm of prostate (HCC)    Pain in joint involving ankle and foot     Allergies   Allergen Reactions    Niacin     Fluorescein Diarrhea and Nausea And Vomiting       Current Outpatient Medications:     insulin aspart (NOVOLOG FLEXPEN) 100 UNIT/ML injection pen, INJECT 24 UNITS SUBCUTANEOUSLY WITH BREAKFAST AND INJECT 18 UNITS WITH LUNCH AND INJECT 28UNITS WITH DINNER SKIP IF NO MEAL/CARBOHYDRATE INTAKE (DISCARD PEN 28 DAYS AFTER FIRST USE), Disp: 5 pen, Rfl: 3    insulin glargine (LANTUS) 100 UNIT/ML injection vial, 40 units at lunch, Disp: 1 vial, Rfl: 3    Lancets Misc. (ACCU-CHEK SOFTCLIX LANCET DEV) KIT, USE LANCET(S) TESTING AS DIRECTED FOR BLOOD SUGAR, Disp: , Rfl:     allopurinol (ZYLOPRIM) 100 MG tablet, TAKE TWO TABLETS BY MOUTH EVERY DAY (WITH FOOD AND FLUIDS), Disp: , Rfl:     ammonium lactate (LAC-HYDRIN) 12 % lotion, APPLY A SUFFICIENT AMOUNT EXTERNALLY TWICE A DAY TO DRY SKIN ON LEGS, Disp: , Rfl:     loratadine (CLARITIN) 10 MG tablet, TAKE ONE TABLET BY MOUTH EVERY DAY (WITH FOOD), Disp: , Rfl:     sildenafil (VIAGRA) 100 MG tablet, TAKE ONE-HALF TABLET BY MOUTH AN HOUR_BEFORE SEX. (NO MORE THAN 1 DOSE PER 24 HOURS) NO NITRATES, Disp: , Rfl:     albuterol sulfate  (90 Base) MCG/ACT inhaler, INHALE 2 PUFFS BY MOUTH FOUR TIMES A DAY AS NEEDED FOR SHORTNESS OF BREATH OR WHEEZING., Disp: , Rfl:     Dextrose, Diabetic Use, (GLUCOSE) 1 g CHEW, Take by mouth, Disp: , Rfl:     simvastatin (ZOCOR) 10 MG tablet, Take 1 tablet by mouth nightly, Disp: 30 tablet, Rfl: 3    amLODIPine (NORVASC) 5 MG tablet, Take 5 mg by mouth daily, Disp: , Rfl:     aspirin 81 MG tablet, Take 2 tablets by mouth daily, Disp: , Rfl:     vitamin D 1000 units CAPS, Take 2 tablets by mouth daily, Disp: , Rfl:     furosemide (LASIX) 80 MG tablet, Take 80 mg by mouth daily, Disp: , Rfl:     losartan (COZAAR) 50 MG tablet, Take 50 mg by mouth daily, Disp: , Rfl:     tamsulosin (FLOMAX) 0.4 MG capsule, Take 2 tablets by mouth nightly, Disp: , Rfl:     atenolol (TENORMIN) 50 MG tablet, Take 50 mg by mouth 2 times daily, Disp: , Rfl:     finasteride (PROSCAR) 5 MG tablet, Take 5 mg by mouth daily, Disp: , Rfl:     loperamide (IMODIUM) 2 MG capsule, Take 2 mg by mouth 4 times daily as needed for Diarrhea, Disp: , Rfl:       Review of Systems    Vitals:    03/02/21 1106   BP: 119/61   Pulse: 52   SpO2: 92%   Weight: 298 lb (135.2 kg)   Height: 5' 6\" (1.676 m)       Objective:   Physical Exam  Vitals signs reviewed.    Constitutional:       Appearance: Normal appearance. He is obese. HENT:      Head: Normocephalic and atraumatic. Right Ear: External ear normal.      Left Ear: External ear normal.      Nose: Nose normal.   Neck:      Musculoskeletal: Normal range of motion and neck supple. Cardiovascular:      Rate and Rhythm: Bradycardia present. Musculoskeletal:      Right lower leg: Edema present. Left lower leg: Edema present. Neurological:      Mental Status: He is alert. Assessment:       Diagnosis Orders   1.  Uncontrolled type 2 diabetes mellitus with hyperglycemia ([facility])  POCT Glucose           Plan:       Increase NovoLog to 24 units at breakfast 8 units at lunch and 28 units at supper  Increase Lantus to 40 units at lunch A1c goal of 8 or lower  Orders Placed This Encounter   Medications    insulin aspart (NOVOLOG FLEXPEN) 100 UNIT/ML injection pen     Sig: INJECT 24 UNITS SUBCUTANEOUSLY WITH BREAKFAST AND INJECT 18 UNITS WITH LUNCH AND INJECT 28UNITS WITH DINNER SKIP IF NO MEAL/CARBOHYDRATE INTAKE (DISCARD PEN 28 DAYS AFTER FIRST USE)     Dispense:  5 pen     Refill:  3    insulin glargine (LANTUS) 100 UNIT/ML injection vial     Si units at lunch     Dispense:  1 vial     Refill:  3     Orders Placed This Encounter   Procedures    Basic Metabolic Panel     Standing Status:   Future     Standing Expiration Date:   3/2/2022    Hemoglobin A1C     Standing Status:   Future     Standing Expiration Date:   3/2/2022    POCT Glucose               Reena Doran MD

## 2021-08-10 ENCOUNTER — APPOINTMENT (OUTPATIENT)
Dept: GENERAL RADIOLOGY | Age: 71
End: 2021-08-10
Payer: MEDICARE

## 2021-08-10 ENCOUNTER — HOSPITAL ENCOUNTER (EMERGENCY)
Age: 71
Discharge: HOME | End: 2021-08-10
Payer: MEDICARE

## 2021-08-10 VITALS
OXYGEN SATURATION: 93 % | BODY MASS INDEX: 48.21 KG/M2 | TEMPERATURE: 96.7 F | DIASTOLIC BLOOD PRESSURE: 71 MMHG | HEIGHT: 66 IN | HEART RATE: 57 BPM | RESPIRATION RATE: 18 BRPM | WEIGHT: 300 LBS | SYSTOLIC BLOOD PRESSURE: 144 MMHG

## 2021-08-10 DIAGNOSIS — Z00.8 ENCOUNTER FOR MEDICAL ASSESSMENT: Primary | ICD-10-CM

## 2021-08-10 PROCEDURE — 99283 EMERGENCY DEPT VISIT LOW MDM: CPT

## 2021-08-10 PROCEDURE — 74019 RADEX ABDOMEN 2 VIEWS: CPT

## 2021-08-10 RX ORDER — CEPHALEXIN 500 MG/1
1000 CAPSULE ORAL 2 TIMES DAILY
Qty: 28 CAPSULE | Refills: 0 | Status: SHIPPED | OUTPATIENT
Start: 2021-08-10 | End: 2021-08-17

## 2021-08-10 ASSESSMENT — ENCOUNTER SYMPTOMS
COUGH: 0
DIARRHEA: 0
ABDOMINAL PAIN: 0
COLOR CHANGE: 0
SHORTNESS OF BREATH: 0
RHINORRHEA: 0
BLOOD IN STOOL: 0
NAUSEA: 0
SORE THROAT: 0
VOMITING: 0

## 2021-08-10 NOTE — ED TRIAGE NOTES
Pt to ER with c/o broken insulin needle in abdomen, pt denies pain, pt a&ox3, resp even and unlabored

## 2021-08-10 NOTE — ED PROVIDER NOTES
[facility] ED  eMERGENCY dEPARTMENT eNCOUnter      Pt Name: Joe Sunshine  MRN: [id number]  [place] 1950  Date of evaluation: 8/10/2021  Provider: DINESH Poole      HISTORY OF PRESENT ILLNESS    Joe Sunshine is a 66 y.o. male with PMHx of DM 2, obesity, hypertension presents to the emergency department with FB in abdomen. Pt says around 11pm he was injected himself with insulin needle in LLQ when he started to feel the medicine drip down his abdomen and leg and pulled out needle and no needle was noted on syringe. HPI    Nursing Notes were reviewed. REVIEW OF SYSTEMS       Review of Systems   Constitutional: Negative for appetite change, chills and fever. HENT: Negative for congestion, rhinorrhea and sore throat. Respiratory: Negative for cough and shortness of breath. Cardiovascular: Negative for chest pain. Gastrointestinal: Negative for abdominal pain, blood in stool, diarrhea, nausea and vomiting. Genitourinary: Negative for difficulty urinating. Musculoskeletal: Negative for neck stiffness. Skin: Negative for color change and rash. Neurological: Negative for dizziness, syncope, weakness, light-headedness, numbness and headaches. All other systems reviewed and are negative. PAST MEDICAL HISTORY     Past Medical History:   Diagnosis Date    Chronic kidney disease     Diabetes mellitus ([address]     Hypertension          SURGICAL HISTORY       Past Surgical History:   Procedure Laterality Date    APPENDECTOMY      age 13         CURRENT MEDICATIONS       Previous Medications    ALBUTEROL SULFATE  (90 BASE) MCG/ACT INHALER    INHALE 2 PUFFS BY MOUTH FOUR TIMES A DAY AS NEEDED FOR SHORTNESS OF BREATH OR WHEEZING.     ALLOPURINOL (ZYLOPRIM) 100 MG TABLET    TAKE TWO TABLETS BY MOUTH EVERY DAY (WITH FOOD AND FLUIDS)    AMLODIPINE (NORVASC) 5 MG TABLET    Take 5 mg by mouth daily    AMMONIUM LACTATE (LAC-HYDRIN) 12 % LOTION    APPLY A SUFFICIENT AMOUNT EXTERNALLY TWICE A DAY TO DRY SKIN ON LEGS    ASPIRIN 81 MG TABLET    Take 2 tablets by mouth daily    ATENOLOL (TENORMIN) 50 MG TABLET    Take 50 mg by mouth 2 times daily    DEXTROSE, DIABETIC USE, (GLUCOSE) 1 G CHEW    Take by mouth    FINASTERIDE (PROSCAR) 5 MG TABLET    Take 5 mg by mouth daily    FUROSEMIDE (LASIX) 80 MG TABLET    Take 80 mg by mouth daily    INSULIN ASPART (NOVOLOG FLEXPEN) 100 UNIT/ML INJECTION PEN    INJECT 24 UNITS SUBCUTANEOUSLY WITH BREAKFAST AND INJECT 18 UNITS WITH LUNCH AND INJECT 28UNITS WITH DINNER SKIP IF NO MEAL/CARBOHYDRATE INTAKE (DISCARD PEN 28 DAYS AFTER FIRST USE)    INSULIN GLARGINE (LANTUS) 100 UNIT/ML INJECTION VIAL    40 units at lunch    LANCETS MISC. (ACCU-CHEK SOFTCLIX LANCET DEV) KIT    USE LANCET(S) TESTING AS DIRECTED FOR BLOOD SUGAR    LOPERAMIDE (IMODIUM) 2 MG CAPSULE    Take 2 mg by mouth 4 times daily as needed for Diarrhea    LORATADINE (CLARITIN) 10 MG TABLET    TAKE ONE TABLET BY MOUTH EVERY DAY (WITH FOOD)    LOSARTAN (COZAAR) 50 MG TABLET    Take 50 mg by mouth daily    SILDENAFIL (VIAGRA) 100 MG TABLET    TAKE ONE-HALF TABLET BY MOUTH AN HOUR_BEFORE SEX. (NO MORE THAN 1 DOSE PER 24 HOURS) NO NITRATES    SIMVASTATIN (ZOCOR) 10 MG TABLET    Take 1 tablet by mouth nightly    TAMSULOSIN (FLOMAX) 0.4 MG CAPSULE    Take 2 tablets by mouth nightly    VITAMIN D 1000 UNITS CAPS    Take 2 tablets by mouth daily       ALLERGIES     Niacin and Fluorescein    FAMILY HISTORY     History reviewed. No pertinent family history.        SOCIAL HISTORY       Social History     Socioeconomic History    Marital status: Single     Spouse name: None    Number of children: None    Years of education: None    Highest education level: None   Occupational History    None   Tobacco Use    Smoking status: Never Smoker    Smokeless tobacco: Never Used   Substance and Sexual Activity    Alcohol use: None    Drug use: Never    Sexual activity: None   Other Topics Concern  None   Social History Narrative         Lives With: Alone    Type of Home: House Two level, Able to Live on Main level with bedroom/bathroom    Home Access: Stairs to enter without rails - Number of Steps: 6    Bathroom Shower/Tub: Tub/Shower unit    Bathroom Toilet: Standard    Home Equipment: Trudell Organ, Rolling walker    ADL Assistance: Independent    Homemaking Assistance: Independent    Homemaking Responsibilities: Yes    Ambulation Assistance: Independent(Cane)    Transfer Assistance: Independent    Active : Yes     Social Determinants of Health     Financial Resource Strain: Low Risk     Difficulty of Paying Living Expenses: Not very hard   Food Insecurity: Food Insecurity Present    Worried About Running Out of Food in the Last Year: Never true    Amy of Food in the Last Year: Sometimes true   Transportation Needs: No Transportation Needs    Lack of Transportation (Medical): No    Lack of Transportation (Non-Medical): No   Physical Activity:     Days of Exercise per Week:     Minutes of Exercise per Session:    Stress:     Feeling of Stress :    Social Connections:     Frequency of Communication with Friends and Family:     Frequency of Social Gatherings with Friends and Family:     Attends Gnosticism Services:     Active Member of Clubs or Organizations:     Attends Club or Organization Meetings:     Marital Status:    Intimate Partner Violence:     Fear of Current or Ex-Partner:     Emotionally Abused:     Physically Abused:     Sexually Abused:          PHYSICAL EXAM         ED Triage Vitals   BP Temp Temp Source Pulse Resp SpO2 Height Weight   08/10/21 0012 08/10/21 0012 08/10/21 0012 08/10/21 0012 08/10/21 0012 08/10/21 0015 08/10/21 0012 08/10/21 0012   (!) 144/71 96.7 °F (35.9 °C) Temporal 57 18 93 % 5' 6\" (1.676 m) 300 lb (136.1 kg)       Physical Exam  Constitutional:       Appearance: He is well-developed. HENT:      Head: Normocephalic and atraumatic.    Eyes: Conjunctiva/sclera: Conjunctivae normal.      Pupils: Pupils are equal, round, and reactive to light. Neck:      Trachea: No tracheal deviation. Cardiovascular:      Heart sounds: Normal heart sounds. Pulmonary:      Effort: Pulmonary effort is normal. No respiratory distress. Breath sounds: Normal breath sounds. No stridor. Abdominal:      General: Bowel sounds are normal. There is no distension. Palpations: Abdomen is soft. There is no mass. Tenderness: There is no abdominal tenderness. There is no guarding or rebound. Musculoskeletal:         General: Normal range of motion. Cervical back: Normal range of motion and neck supple. Skin:     General: Skin is warm and dry. Capillary Refill: Capillary refill takes less than 2 seconds. Findings: No rash. Neurological:      Mental Status: He is alert and oriented to person, place, and time. Deep Tendon Reflexes: Reflexes are normal and symmetric. Psychiatric:         Behavior: Behavior normal.         Thought Content: Thought content normal.         Judgment: Judgment normal.         DIAGNOSTIC RESULTS     EKG:All EKG's are interpreted by the Emergency Department Physician who either signs or Co-signs this chart in the absence of a cardiologist.        RADIOLOGY:   Non-plain film images such as CT, Ultrasound and MRI are read by theradiologist. Plain radiographic images are visualized and preliminarily interpreted by the emergency physician with the below findings:    Interpretation per theRadiologist below, if available at the time of this note:    XR ABDOMEN (2 VIEWS)    (Results Pending)           LABS:  Labs Reviewed - No data to display    All other labs were within normal range or not returned as of this dictation.     EMERGENCY DEPARTMENT COURSE and DIFFERENTIAL DIAGNOSIS/MDM:   Vitals:    Vitals:    08/10/21 0012 08/10/21 0015   BP: (!) 144/71    Pulse: 57    Resp: 18    Temp: 96.7 °F (35.9 °C)    TempSrc: Temporal    SpO2:  93%   Weight: 300 lb (136.1 kg)    Height: 5' 6\" (1.676 m)          MDM    Abdominal XR obtained showing no obvious FB. Bedside US used without needle or other FB noted. Patient is now thinking that maybe the needle fell out afterwards. No obvious palpable foreign body or needle noted. Patient placed on Keflex and given general surgery follow-up. Standard anticipatory guidance given to patient upon discharge. Have given them a specific time frame in which to follow-up and who to follow-up with. I have also advised them that they should return to the emergency department if they get worse, or not getting better or develop any new or concerning symptoms. Patient demonstrates understanding. CRITICAL CARE TIME   Total Critical Caretime was 0 minutes, excluding separately reportable procedures. There was a high probability of clinically significant/life threatening deterioration in the patient's condition which required my urgent intervention. Procedures    FINAL IMPRESSION      1. Encounter for medical assessment          DISPOSITION/PLAN   DISPOSITION Decision To Discharge 08/10/2021 02:41:58 AM      PATIENT REFERRED TO:  Thor Welch MD  [facility] [address]  [address] [facility]  [phone]            DISCHARGE MEDICATIONS:  New Prescriptions    CEPHALEXIN (KEFLEX) 500 MG CAPSULE    Take 2 capsules by mouth 2 times daily for 7 days          (Please notethat portions of this note were completed with a voice recognition program.  Efforts were made to edit the dictations but occasionally words are mis-transcribed. )    DINESH Reyes (electronically signed)  Emergency Physician Assistant         [place], [place]  08/10/21 [id number]

## 2021-10-20 DIAGNOSIS — E11.65 UNCONTROLLED TYPE 2 DIABETES MELLITUS WITH HYPERGLYCEMIA (HCC): Primary | ICD-10-CM

## 2021-10-20 RX ORDER — INSULIN GLARGINE 100 [IU]/ML
INJECTION, SOLUTION SUBCUTANEOUS
Qty: 10 ML | Refills: 2 | Status: SHIPPED | OUTPATIENT
Start: 2021-10-20 | End: 2021-11-02 | Stop reason: SDUPTHER

## 2021-11-02 ENCOUNTER — OFFICE VISIT (OUTPATIENT)
Dept: ENDOCRINOLOGY | Age: 71
End: 2021-11-02
Payer: MEDICARE

## 2021-11-02 VITALS
BODY MASS INDEX: 49.02 KG/M2 | SYSTOLIC BLOOD PRESSURE: 134 MMHG | HEIGHT: 66 IN | WEIGHT: 305 LBS | DIASTOLIC BLOOD PRESSURE: 78 MMHG | OXYGEN SATURATION: 90 % | HEART RATE: 59 BPM

## 2021-11-02 DIAGNOSIS — E11.65 UNCONTROLLED TYPE 2 DIABETES MELLITUS WITH HYPERGLYCEMIA (HCC): Primary | ICD-10-CM

## 2021-11-02 LAB
CHP ED QC CHECK: NORMAL
GLUCOSE BLD-MCNC: 182 MG/DL
HBA1C MFR BLD: 9.6 %

## 2021-11-02 PROCEDURE — 99213 OFFICE O/P EST LOW 20 MIN: CPT | Performed by: INTERNAL MEDICINE

## 2021-11-02 PROCEDURE — 83036 HEMOGLOBIN GLYCOSYLATED A1C: CPT | Performed by: INTERNAL MEDICINE

## 2021-11-02 PROCEDURE — 95250 CONT GLUC MNTR PHYS/QHP EQP: CPT | Performed by: INTERNAL MEDICINE

## 2021-11-02 RX ORDER — INSULIN ASPART 100 [IU]/ML
INJECTION, SOLUTION INTRAVENOUS; SUBCUTANEOUS
Qty: 5 PEN | Refills: 3 | Status: SHIPPED | OUTPATIENT
Start: 2021-11-02 | End: 2021-11-30 | Stop reason: SDUPTHER

## 2021-11-02 RX ORDER — INSULIN GLARGINE 100 [IU]/ML
INJECTION, SOLUTION SUBCUTANEOUS
Qty: 30 ML | Refills: 2 | Status: SHIPPED | OUTPATIENT
Start: 2021-11-02 | End: 2022-03-01 | Stop reason: SDUPTHER

## 2021-11-02 NOTE — PROGRESS NOTES
2021    Assessment:       Diagnosis Orders   1.  Uncontrolled type 2 diabetes mellitus with hyperglycemia (HCC)  POCT Glucose    POCT glycosylated hemoglobin (Hb A1C)    Basic Metabolic Panel    Hemoglobin A1C    Microalbumin / Creatinine Urine Ratio    Lipid Panel         PLAN:     Orders Placed This Encounter   Procedures    Basic Metabolic Panel     Standing Status:   Future     Standing Expiration Date:   2022    Hemoglobin A1C     Standing Status:   Future     Standing Expiration Date:   2022    Microalbumin / Creatinine Urine Ratio     Standing Status:   Future     Standing Expiration Date:   2022    Lipid Panel     Standing Status:   Future     Standing Expiration Date:   2022     Order Specific Question:   Is Patient Fasting?/# of Hours     Answer:   10-12    POCT Glucose    POCT glycosylated hemoglobin (Hb A1C)     Increase Lantus to 50 units twice a day NovoLog increased to 35 am  20 lunchtime 35 units dinner hyperbilirubin 2 weeks glucose glucose monitoring  A1c goal of 7 or lower  Orders Placed This Encounter   Medications    insulin glargine (LANTUS) 100 UNIT/ML injection vial     Si units  Twice day     Dispense:  30 mL     Refill:  2    insulin aspart (NOVOLOG FLEXPEN) 100 UNIT/ML injection pen     Sig: INJECT 35 UNITS SUBCUTANEOUSLY WITH BREAKFAST AND INJECT 20 UNITS WITH LUNCH AND INJECT 35 UNITS WITH DINNER SKIP IF NO MEAL/CARBOHYDRATE INTAKE (DISCARD PEN 28 DAYS AFTER FIRST USE)     Dispense:  5 pen     Refill:  3           Orders Placed This Encounter   Procedures    POCT Glucose    POCT glycosylated hemoglobin (Hb A1C)       Subjective:     Chief Complaint   Patient presents with    Diabetes     Vitals:    21 0928   BP: 134/78   Pulse: 59   SpO2: 90%   Weight: (!) 305 lb (138.3 kg)   Height: 5' 6\" (1.676 m)     Wt Readings from Last 3 Encounters:   21 (!) 305 lb (138.3 kg)   08/10/21 300 lb (136.1 kg)   21 298 lb (135.2 kg)     BP Readings from Last 3 Encounters:   11/02/21 134/78   08/10/21 (!) 144/71   03/02/21 119/61     7 to 8-month follow-up for type 2 diabetes patient is currently taking Lantus 40 units twice a day NovoLog 3 times a day 30 in the morning 15 at lunch and 30 units at bedtime overall blood sugars 2-300 A1c done today 0.6 testing twice daily history of morbid obesity chronic kidney disease no recent labs to review    Diabetes  He presents for his follow-up diabetic visit. He has type 2 diabetes mellitus. His disease course has been fluctuating. Associated symptoms include fatigue. Symptoms are worsening. Diabetic complications include nephropathy. Risk factors for coronary artery disease include obesity. Current diabetic treatment includes insulin injections. He is currently taking insulin pre-breakfast, pre-lunch, pre-dinner and at bedtime. His overall blood glucose range is >200 mg/dl.  (Lab Results       Component                Value               Date                       LABA1C                   9.6                 11/02/2021            )     Past Medical History:   Diagnosis Date    Chronic kidney disease     Diabetes mellitus ([facility] [address]     Hypertension      Past Surgical History:   Procedure Laterality Date    APPENDECTOMY      age 13     Social History     Socioeconomic History    Marital status: Single     Spouse name: Not on file    Number of children: Not on file    Years of education: Not on file    Highest education level: Not on file   Occupational History    Not on file   Tobacco Use    Smoking status: Never Smoker    Smokeless tobacco: Never Used   Substance and Sexual Activity    Alcohol use: Not on file    Drug use: Never    Sexual activity: Not on file   Other Topics Concern    Not on file   Social History Narrative         Lives With: Alone    Type of Home: House Two level, Able to Live on Main level with bedroom/bathroom    Home Access: Stairs to enter without rails - Number of Steps: 6 Bathroom Shower/Tub: Tub/Shower unit    Bathroom Toilet: Standard    Home Equipment: [employer], Rolling walker    ADL Assistance: Independent    Homemaking Assistance: Independent    Homemaking Responsibilities: Yes    Ambulation Assistance: Independent(Cane)    Transfer Assistance: Independent    Active : Yes     Social Determinants of Health     Financial Resource Strain: Low Risk     Difficulty of Paying Living Expenses: Not very hard   Food Insecurity: Food Insecurity Present    Worried About Running Out of Food in the Last Year: Never true    Amy of Food in the Last Year: Sometimes true   Transportation Needs: No Transportation Needs    Lack of Transportation (Medical): No    Lack of Transportation (Non-Medical): No   Physical Activity:     Days of Exercise per Week:     Minutes of Exercise per Session:    Stress:     Feeling of Stress :    Social Connections:     Frequency of Communication with Friends and Family:     Frequency of Social Gatherings with Friends and Family:     Attends Orthodoxy Services:     Active Member of Clubs or Organizations:     Attends Club or Organization Meetings:     Marital Status:    Intimate Partner Violence:     Fear of Current or Ex-Partner:     Emotionally Abused:     Physically Abused:     Sexually Abused:      No family history on file.   Allergies   Allergen Reactions    Niacin     Fluorescein Diarrhea and Nausea And Vomiting       Current Outpatient Medications:     insulin glargine (LANTUS) 100 UNIT/ML injection vial, 40 units at lunch, Disp: 10 mL, Rfl: 2    insulin aspart (NOVOLOG FLEXPEN) 100 UNIT/ML injection pen, INJECT 24 UNITS SUBCUTANEOUSLY WITH BREAKFAST AND INJECT 18 UNITS WITH LUNCH AND INJECT 28UNITS WITH DINNER SKIP IF NO MEAL/CARBOHYDRATE INTAKE (DISCARD PEN 28 DAYS AFTER FIRST USE), Disp: 5 pen, Rfl: 3    Lancets Misc. (ACCU-CHEK SOFTCLIX LANCET DEV) KIT, USE LANCET(S) TESTING AS DIRECTED FOR BLOOD SUGAR, Disp: , Rfl:    allopurinol (ZYLOPRIM) 100 MG tablet, TAKE TWO TABLETS BY MOUTH EVERY DAY (WITH FOOD AND FLUIDS), Disp: , Rfl:     ammonium lactate (LAC-HYDRIN) 12 % lotion, APPLY A SUFFICIENT AMOUNT EXTERNALLY TWICE A DAY TO DRY SKIN ON LEGS, Disp: , Rfl:     loratadine (CLARITIN) 10 MG tablet, TAKE ONE TABLET BY MOUTH EVERY DAY (WITH FOOD), Disp: , Rfl:     sildenafil (VIAGRA) 100 MG tablet, TAKE ONE-HALF TABLET BY MOUTH AN HOUR_BEFORE SEX.  (NO MORE THAN 1 DOSE PER 24 HOURS) NO NITRATES, Disp: , Rfl:     albuterol sulfate  (90 Base) MCG/ACT inhaler, INHALE 2 PUFFS BY MOUTH FOUR TIMES A DAY AS NEEDED FOR SHORTNESS OF BREATH OR WHEEZING., Disp: , Rfl:     Dextrose, Diabetic Use, (GLUCOSE) 1 g CHEW, Take by mouth, Disp: , Rfl:     simvastatin (ZOCOR) 10 MG tablet, Take 1 tablet by mouth nightly, Disp: 30 tablet, Rfl: 3    amLODIPine (NORVASC) 5 MG tablet, Take 5 mg by mouth daily, Disp: , Rfl:     aspirin 81 MG tablet, Take 2 tablets by mouth daily, Disp: , Rfl:     vitamin D 1000 units CAPS, Take 2 tablets by mouth daily, Disp: , Rfl:     furosemide (LASIX) 80 MG tablet, Take 80 mg by mouth daily, Disp: , Rfl:     losartan (COZAAR) 50 MG tablet, Take 50 mg by mouth daily, Disp: , Rfl:     tamsulosin (FLOMAX) 0.4 MG capsule, Take 2 tablets by mouth nightly, Disp: , Rfl:     atenolol (TENORMIN) 50 MG tablet, Take 50 mg by mouth 2 times daily, Disp: , Rfl:     finasteride (PROSCAR) 5 MG tablet, Take 5 mg by mouth daily, Disp: , Rfl:     loperamide (IMODIUM) 2 MG capsule, Take 2 mg by mouth 4 times daily as needed for Diarrhea, Disp: , Rfl:   Lab Results   Component Value Date     (H) 11/23/2020    K 4.3 11/23/2020     11/23/2020    CO2 29 11/23/2020    BUN 44 (H) 11/23/2020    CREATININE 2.12 (H) 11/23/2020    GLUCOSE 182 11/02/2021    CALCIUM 8.1 (L) 11/23/2020    PROT 7.0 11/20/2020    LABALBU 3.9 11/20/2020    BILITOT 0.6 11/20/2020    ALKPHOS 134 (H) 11/20/2020    AST 16 11/20/2020    ALT 11 11/20/2020    LABGLOM 30.8 (L) 11/23/2020    GFRAA 37.3 (L) 11/23/2020    GLOB 3.1 11/20/2020     Lab Results   Component Value Date    WBC 5.6 11/22/2020    HGB 11.8 (L) 11/22/2020    HCT 37.3 (L) 11/22/2020    MCV 92.3 11/22/2020     11/22/2020     Lab Results   Component Value Date    LABA1C 9.6 11/02/2021    LABA1C 8.2 (H) 11/20/2020    LABA1C 8.4 (H) 03/15/2019     Lab Results   Component Value Date    HDL 29 (L) 08/01/2013    LDLCALC 40 08/01/2013    CHOL 127 08/01/2013    TRIG 288 (H) 08/01/2013     No results found for: TESTM  No results found for: TSH, TSHREFLEX, FT3, T4FREE  No results found for: TPOABS    Review of Systems   Constitutional: Positive for fatigue. Objective:   Physical Exam  Vitals reviewed. Constitutional:       General: He is not in acute distress. Appearance: Normal appearance. He is obese. HENT:      Head: Normocephalic and atraumatic. Hair is normal.      Right Ear: External ear normal.      Left Ear: External ear normal.      Nose: Nose normal.   Eyes:      General: No scleral icterus. Right eye: No discharge. Left eye: No discharge. Extraocular Movements: Extraocular movements intact. Conjunctiva/sclera: Conjunctivae normal.   Neck:      Trachea: Trachea normal.   Cardiovascular:      Rate and Rhythm: Normal rate. Pulmonary:      Effort: Pulmonary effort is normal.   Abdominal:      Palpations: Abdomen is soft. Musculoskeletal:         General: Normal range of motion. Cervical back: Normal range of motion and neck supple. Neurological:      General: No focal deficit present. Mental Status: He is alert and oriented to person, place, and time.    Psychiatric:         Mood and Affect: Mood normal.         Behavior: Behavior normal.

## 2021-11-12 ENCOUNTER — TELEPHONE (OUTPATIENT)
Dept: FAMILY MEDICINE CLINIC | Age: 71
End: 2021-11-12

## 2021-11-23 DIAGNOSIS — E11.65 UNCONTROLLED TYPE 2 DIABETES MELLITUS WITH HYPERGLYCEMIA (HCC): ICD-10-CM

## 2021-11-23 LAB
ANION GAP SERPL CALCULATED.3IONS-SCNC: 10 MEQ/L (ref 9–15)
BUN BLDV-MCNC: 47 MG/DL (ref 8–23)
CALCIUM SERPL-MCNC: 9.2 MG/DL (ref 8.5–9.9)
CHLORIDE BLD-SCNC: 103 MEQ/L (ref 95–107)
CHOLESTEROL, TOTAL: 105 MG/DL (ref 0–199)
CO2: 30 MEQ/L (ref 20–31)
CREAT SERPL-MCNC: 1.94 MG/DL (ref 0.7–1.2)
CREATININE URINE: 59.4 MG/DL
GFR AFRICAN AMERICAN: 41.2
GFR NON-AFRICAN AMERICAN: 34
GLUCOSE BLD-MCNC: 101 MG/DL (ref 70–99)
HDLC SERPL-MCNC: 31 MG/DL (ref 40–59)
LDL CHOLESTEROL CALCULATED: 58 MG/DL (ref 0–129)
MICROALBUMIN UR-MCNC: 2.6 MG/DL
MICROALBUMIN/CREAT UR-RTO: 43.8 MG/G (ref 0–30)
POTASSIUM SERPL-SCNC: 4.5 MEQ/L (ref 3.4–4.9)
SODIUM BLD-SCNC: 143 MEQ/L (ref 135–144)
TRIGL SERPL-MCNC: 80 MG/DL (ref 0–150)

## 2021-11-25 LAB — HBA1C MFR BLD: 9.4 % (ref 4.8–5.9)

## 2021-11-30 ENCOUNTER — OFFICE VISIT (OUTPATIENT)
Dept: ENDOCRINOLOGY | Age: 71
End: 2021-11-30
Payer: MEDICARE

## 2021-11-30 VITALS
SYSTOLIC BLOOD PRESSURE: 128 MMHG | HEIGHT: 66 IN | WEIGHT: 306 LBS | DIASTOLIC BLOOD PRESSURE: 64 MMHG | BODY MASS INDEX: 49.18 KG/M2 | HEART RATE: 56 BPM

## 2021-11-30 DIAGNOSIS — E11.65 UNCONTROLLED TYPE 2 DIABETES MELLITUS WITH HYPERGLYCEMIA (HCC): Primary | ICD-10-CM

## 2021-11-30 LAB
CHP ED QC CHECK: NORMAL
GLUCOSE BLD-MCNC: 146 MG/DL

## 2021-11-30 PROCEDURE — 99213 OFFICE O/P EST LOW 20 MIN: CPT | Performed by: INTERNAL MEDICINE

## 2021-11-30 PROCEDURE — 82962 GLUCOSE BLOOD TEST: CPT | Performed by: INTERNAL MEDICINE

## 2021-11-30 RX ORDER — INSULIN ASPART 100 [IU]/ML
INJECTION, SOLUTION INTRAVENOUS; SUBCUTANEOUS
Qty: 10 PEN | Refills: 3 | Status: SHIPPED | OUTPATIENT
Start: 2021-11-30 | End: 2022-03-01 | Stop reason: SDUPTHER

## 2021-11-30 NOTE — PROGRESS NOTES
coronary artery disease include obesity. Current diabetic treatment includes insulin injections. He is currently taking insulin pre-breakfast, pre-lunch, pre-dinner and at bedtime. His overall blood glucose range is >200 mg/dl. (Lab Results       Component                Value               Date                       LABA1C                   9.4 (H)             11/23/2021              ) An ACE inhibitor/angiotensin II receptor blocker is being taken.      Past Medical History:   Diagnosis Date    Chronic kidney disease     Diabetes mellitus ([facility] [address]     Hypertension      Past Surgical History:   Procedure Laterality Date    APPENDECTOMY      age 13     Social History     Socioeconomic History    Marital status: Single     Spouse name: Not on file    Number of children: Not on file    Years of education: Not on file    Highest education level: Not on file   Occupational History    Not on file   Tobacco Use    Smoking status: Never Smoker    Smokeless tobacco: Never Used   Substance and Sexual Activity    Alcohol use: Not on file    Drug use: Never    Sexual activity: Not on file   Other Topics Concern    Not on file   Social History Narrative         Lives With: Alone    Type of Home: House Two level, Able to Live on Main level with bedroom/bathroom    Home Access: Stairs to enter without rails - Number of Steps: 6    Bathroom Shower/Tub: Tub/Shower unit    Bathroom Toilet: Standard    Home Equipment: [place], Rolling walker    ADL Assistance: [address]: Independent    Homemaking Responsibilities: Yes    Ambulation Assistance: Independent(Cane)    Transfer Assistance: Independent    Active : Yes     Social Determinants of Health     Financial Resource Strain: Low Risk     Difficulty of Paying Living Expenses: Not very hard   Food Insecurity: Food Insecurity Present    Worried About Running Out of Food in the Last Year: Never true    Amy of Food in the Last Year: Sometimes true   Transportation Needs: No Transportation Needs    Lack of Transportation (Medical): No    Lack of Transportation (Non-Medical): No   Physical Activity:     Days of Exercise per Week: Not on file    Minutes of Exercise per Session: Not on file   Stress:     Feeling of Stress : Not on file   Social Connections:     Frequency of Communication with Friends and Family: Not on file    Frequency of Social Gatherings with Friends and Family: Not on file    Attends Alevism Services: Not on file    Active Member of [address] [place] [address] or Organizations: Not on file    Attends Club or Organization Meetings: Not on file    Marital Status: Not on file   Intimate Partner Violence:     Fear of Current or Ex-Partner: Not on file    Emotionally Abused: Not on file    Physically Abused: Not on file    Sexually Abused: Not on file   Housing Stability:     Unable to Pay for Housing in the Last Year: Not on file    Number of [place] in the Last Year: Not on file    Unstable Housing in the Last Year: Not on file     History reviewed. No pertinent family history.   Allergies   Allergen Reactions    Niacin     Fluorescein Diarrhea and Nausea And Vomiting       Current Outpatient Medications:     insulin glargine (LANTUS) 100 UNIT/ML injection vial, 50 units  Twice day, Disp: 30 mL, Rfl: 2    insulin aspart (NOVOLOG FLEXPEN) 100 UNIT/ML injection pen, INJECT 35 UNITS SUBCUTANEOUSLY WITH BREAKFAST AND INJECT 20 UNITS WITH LUNCH AND INJECT 35 UNITS WITH DINNER SKIP IF NO MEAL/CARBOHYDRATE INTAKE (DISCARD PEN 28 DAYS AFTER FIRST USE), Disp: 5 pen, Rfl: 3    Lancets Misc. (ACCU-CHEK SOFTCLIX LANCET DEV) KIT, USE LANCET(S) TESTING AS DIRECTED FOR BLOOD SUGAR, Disp: , Rfl:     allopurinol (ZYLOPRIM) 100 MG tablet, TAKE TWO TABLETS BY MOUTH EVERY DAY (WITH FOOD AND FLUIDS), Disp: , Rfl:     ammonium lactate (LAC-HYDRIN) 12 % lotion, APPLY A SUFFICIENT AMOUNT EXTERNALLY TWICE A DAY TO DRY SKIN ON LEGS, Disp: , Rfl:   loratadine (CLARITIN) 10 MG tablet, TAKE ONE TABLET BY MOUTH EVERY DAY (WITH FOOD), Disp: , Rfl:     sildenafil (VIAGRA) 100 MG tablet, TAKE ONE-HALF TABLET BY MOUTH AN HOUR_BEFORE SEX.  (NO MORE THAN 1 DOSE PER 24 HOURS) NO NITRATES, Disp: , Rfl:     albuterol sulfate  (90 Base) MCG/ACT inhaler, INHALE 2 PUFFS BY MOUTH FOUR TIMES A DAY AS NEEDED FOR SHORTNESS OF BREATH OR WHEEZING., Disp: , Rfl:     Dextrose, Diabetic Use, (GLUCOSE) 1 g CHEW, Take by mouth, Disp: , Rfl:     simvastatin (ZOCOR) 10 MG tablet, Take 1 tablet by mouth nightly, Disp: 30 tablet, Rfl: 3    amLODIPine (NORVASC) 5 MG tablet, Take 5 mg by mouth daily, Disp: , Rfl:     aspirin 81 MG tablet, Take 2 tablets by mouth daily, Disp: , Rfl:     vitamin D 1000 units CAPS, Take 2 tablets by mouth daily, Disp: , Rfl:     furosemide (LASIX) 80 MG tablet, Take 80 mg by mouth daily, Disp: , Rfl:     losartan (COZAAR) 50 MG tablet, Take 50 mg by mouth daily, Disp: , Rfl:     tamsulosin (FLOMAX) 0.4 MG capsule, Take 2 tablets by mouth nightly, Disp: , Rfl:     atenolol (TENORMIN) 50 MG tablet, Take 50 mg by mouth 2 times daily, Disp: , Rfl:     finasteride (PROSCAR) 5 MG tablet, Take 5 mg by mouth daily, Disp: , Rfl:     loperamide (IMODIUM) 2 MG capsule, Take 2 mg by mouth 4 times daily as needed for Diarrhea, Disp: , Rfl:   Lab Results   Component Value Date     11/23/2021    K 4.5 11/23/2021     11/23/2021    CO2 30 11/23/2021    BUN 47 (H) 11/23/2021    CREATININE 1.94 (H) 11/23/2021    GLUCOSE 146 11/30/2021    CALCIUM 9.2 11/23/2021    PROT 7.0 11/20/2020    LABALBU 3.9 11/20/2020    BILITOT 0.6 11/20/2020    ALKPHOS 134 (H) 11/20/2020    AST 16 11/20/2020    ALT 11 11/20/2020    LABGLOM 34.0 (L) 11/23/2021    GFRAA 41.2 (L) 11/23/2021    GLOB 3.1 11/20/2020     Lab Results   Component Value Date    WBC 5.6 11/22/2020    HGB 11.8 (L) 11/22/2020    HCT 37.3 (L) 11/22/2020    MCV 92.3 11/22/2020     11/22/2020 Lab Results   Component Value Date    LABA1C 9.4 (H) 11/23/2021    LABA1C 9.6 11/02/2021    LABA1C 8.2 (H) 11/20/2020     Lab Results   Component Value Date    HDL 31 (L) 11/23/2021    HDL 29 (L) 08/01/2013    LDLCALC 58 11/23/2021    LDLCALC 40 08/01/2013    CHOL 105 11/23/2021    CHOL 127 08/01/2013    TRIG 80 11/23/2021    TRIG 288 (H) 08/01/2013     No results found for: TESTM  No results found for: TSH, TSHREFLEX, FT3, T4FREE  No results found for: TPOABS    Review of Systems   Cardiovascular: Negative. Endocrine: Negative. All other systems reviewed and are negative. Objective:   Physical Exam  Vitals reviewed. Constitutional:       Appearance: Normal appearance. He is obese. HENT:      Head: Normocephalic and atraumatic. Hair is normal.      Right Ear: External ear normal.      Left Ear: External ear normal.      Nose: Nose normal.   Eyes:      General: No scleral icterus. Right eye: No discharge. Left eye: No discharge. Extraocular Movements: Extraocular movements intact. Conjunctiva/sclera: Conjunctivae normal.   Neck:      Trachea: Trachea normal.   Cardiovascular:      Rate and Rhythm: Normal rate. Pulmonary:      Effort: Pulmonary effort is normal.   Musculoskeletal:         General: Normal range of motion. Cervical back: Normal range of motion and neck supple. Neurological:      General: No focal deficit present. Mental Status: He is alert and oriented to person, place, and time.    Psychiatric:         Mood and Affect: Mood normal.         Behavior: Behavior normal.

## 2022-01-07 ENCOUNTER — OFFICE VISIT (OUTPATIENT)
Dept: FAMILY MEDICINE CLINIC | Age: 72
End: 2022-01-07
Payer: MEDICARE

## 2022-01-07 VITALS
HEIGHT: 66 IN | DIASTOLIC BLOOD PRESSURE: 68 MMHG | WEIGHT: 305 LBS | HEART RATE: 52 BPM | SYSTOLIC BLOOD PRESSURE: 124 MMHG | BODY MASS INDEX: 49.02 KG/M2 | TEMPERATURE: 97.4 F | OXYGEN SATURATION: 95 %

## 2022-01-07 DIAGNOSIS — E11.9 TYPE 2 DIABETES MELLITUS WITHOUT COMPLICATION, WITHOUT LONG-TERM CURRENT USE OF INSULIN (HCC): Primary | ICD-10-CM

## 2022-01-07 DIAGNOSIS — E78.49 OTHER HYPERLIPIDEMIA: ICD-10-CM

## 2022-01-07 DIAGNOSIS — I10 PRIMARY HYPERTENSION: Chronic | ICD-10-CM

## 2022-01-07 DIAGNOSIS — E66.01 CLASS 3 SEVERE OBESITY WITHOUT SERIOUS COMORBIDITY WITH BODY MASS INDEX (BMI) OF 45.0 TO 49.9 IN ADULT, UNSPECIFIED OBESITY TYPE (HCC): ICD-10-CM

## 2022-01-07 DIAGNOSIS — Z00.00 ROUTINE GENERAL MEDICAL EXAMINATION AT A HEALTH CARE FACILITY: ICD-10-CM

## 2022-01-07 DIAGNOSIS — G47.33 OBSTRUCTIVE SLEEP APNEA OF ADULT: Chronic | ICD-10-CM

## 2022-01-07 PROBLEM — E66.813 CLASS 3 SEVERE OBESITY WITHOUT SERIOUS COMORBIDITY WITH BODY MASS INDEX (BMI) OF 45.0 TO 49.9 IN ADULT: Status: ACTIVE | Noted: 2022-01-07

## 2022-01-07 PROCEDURE — G0439 PPPS, SUBSEQ VISIT: HCPCS | Performed by: STUDENT IN AN ORGANIZED HEALTH CARE EDUCATION/TRAINING PROGRAM

## 2022-01-07 RX ORDER — MONTELUKAST SODIUM 10 MG/1
TABLET ORAL
COMMUNITY
Start: 2021-11-01

## 2022-01-07 RX ORDER — ERYTHROMYCIN 5 MG/G
OINTMENT OPHTHALMIC
COMMUNITY
Start: 2021-11-22

## 2022-01-07 RX ORDER — ROSUVASTATIN CALCIUM 20 MG/1
TABLET, COATED ORAL
COMMUNITY
Start: 2021-11-23

## 2022-01-07 SDOH — ECONOMIC STABILITY: FOOD INSECURITY: WITHIN THE PAST 12 MONTHS, YOU WORRIED THAT YOUR FOOD WOULD RUN OUT BEFORE YOU GOT MONEY TO BUY MORE.: NEVER TRUE

## 2022-01-07 SDOH — ECONOMIC STABILITY: FOOD INSECURITY: WITHIN THE PAST 12 MONTHS, THE FOOD YOU BOUGHT JUST DIDN'T LAST AND YOU DIDN'T HAVE MONEY TO GET MORE.: NEVER TRUE

## 2022-01-07 ASSESSMENT — LIFESTYLE VARIABLES
HOW OFTEN DURING THE LAST YEAR HAVE YOU FOUND THAT YOU WERE NOT ABLE TO STOP DRINKING ONCE YOU HAD STARTED: 0
HOW MANY STANDARD DRINKS CONTAINING ALCOHOL DO YOU HAVE ON A TYPICAL DAY: 0
HOW OFTEN DO YOU HAVE A DRINK CONTAINING ALCOHOL: 1
HAVE YOU OR SOMEONE ELSE BEEN INJURED AS A RESULT OF YOUR DRINKING: 0
HOW OFTEN DURING THE LAST YEAR HAVE YOU NEEDED AN ALCOHOLIC DRINK FIRST THING IN THE MORNING TO GET YOURSELF GOING AFTER A NIGHT OF HEAVY DRINKING: 0
HOW OFTEN DURING THE LAST YEAR HAVE YOU BEEN UNABLE TO REMEMBER WHAT HAPPENED THE NIGHT BEFORE BECAUSE YOU HAD BEEN DRINKING: 0
HOW OFTEN DURING THE LAST YEAR HAVE YOU FAILED TO DO WHAT WAS NORMALLY EXPECTED FROM YOU BECAUSE OF DRINKING: 0
HAS A RELATIVE, FRIEND, DOCTOR, OR ANOTHER HEALTH PROFESSIONAL EXPRESSED CONCERN ABOUT YOUR DRINKING OR SUGGESTED YOU CUT DOWN: 0
HOW OFTEN DURING THE LAST YEAR HAVE YOU HAD A FEELING OF GUILT OR REMORSE AFTER DRINKING: 0

## 2022-01-07 ASSESSMENT — PATIENT HEALTH QUESTIONNAIRE - PHQ9
SUM OF ALL RESPONSES TO PHQ QUESTIONS 1-9: 0
2. FEELING DOWN, DEPRESSED OR HOPELESS: 0
SUM OF ALL RESPONSES TO PHQ QUESTIONS 1-9: 0
SUM OF ALL RESPONSES TO PHQ9 QUESTIONS 1 & 2: 0
1. LITTLE INTEREST OR PLEASURE IN DOING THINGS: 0

## 2022-01-07 ASSESSMENT — SOCIAL DETERMINANTS OF HEALTH (SDOH): HOW HARD IS IT FOR YOU TO PAY FOR THE VERY BASICS LIKE FOOD, HOUSING, MEDICAL CARE, AND HEATING?: NOT HARD AT ALL

## 2022-01-07 NOTE — PROGRESS NOTES
Medicare Annual Wellness Visit  Name: Liane Correia Date: 2022   MRN: [id number] Sex: Male   Age: 66 y.o. Ethnicity: Non- / Non    : 1950 Race: White (non-)      Vikram Corral is here for Medicare AWV, Advance Care Planning, and Health Maintenance (No for colonoscopy. Does Health Maintaince through the [facility]. )    Screenings for behavioral, psychosocial and functional/safety risks, and cognitive dysfunction are all negative except as indicated below. These results, as well as other patient data from the [address] form, are documented in Flowsheets linked to this Encounter. Allergies   Allergen Reactions    Niacin     Fluorescein Diarrhea and Nausea And Vomiting         Prior to Visit Medications    Medication Sig Taking?  Authorizing Provider   rosuvastatin (CRESTOR) 20 MG tablet TAKE ONE TABLET BY MOUTH AT BEDTIME Yes Historical Provider, MD   montelukast (SINGULAIR) 10 MG tablet TAKE ONE TABLET BY MOUTH AT BEDTIME Yes Historical Provider, MD   erythromycin (ROMYCIN) 5 MG/GM ophthalmic ointment APPLY A QUARTER INCH RIBBON TO EACH EYE AT BEDTIME Yes Historical Provider, MD   insulin aspart (NOVOLOG FLEXPEN) 100 UNIT/ML injection pen INJECT 35 UNITS SUBCUTANEOUSLY WITH BREAKFAST AND INJECT 35 UNITS WITH LUNCH AND INJECT 35 UNITS WITH DINNER SKIP IF NO MEAL/CARBOHYDRATE INTAKE (DISCARD PEN 28 DAYS AFTER FIRST USE) Yes Lester Pollard MD   insulin glargine (LANTUS) 100 UNIT/ML injection vial 50 units  Twice day Yes Lester Pollard MD   Lancets Misc. (ACCU-CHEK SOFTCLIX LANCET DEV) KIT USE LANCET(S) TESTING AS DIRECTED FOR BLOOD SUGAR Yes Historical Provider, MD   allopurinol (ZYLOPRIM) 100 MG tablet TAKE TWO TABLETS BY MOUTH EVERY DAY (WITH FOOD AND FLUIDS) Yes Historical Provider, MD   ammonium lactate (LAC-HYDRIN) 12 % lotion APPLY A SUFFICIENT AMOUNT EXTERNALLY TWICE A DAY TO DRY SKIN ON LEGS Yes Historical Provider, MD   loratadine (CLARITIN) 10 MG tablet TAKE ONE TABLET BY MOUTH EVERY DAY (WITH FOOD) Yes Historical Provider, MD   sildenafil (VIAGRA) 100 MG tablet TAKE ONE-HALF TABLET BY MOUTH AN HOUR_BEFORE SEX. (NO MORE THAN 1 DOSE PER 24 HOURS) NO NITRATES Yes Historical Provider, MD   albuterol sulfate  (90 Base) MCG/ACT inhaler INHALE 2 PUFFS BY MOUTH FOUR TIMES A DAY AS NEEDED FOR SHORTNESS OF BREATH OR WHEEZING. Yes Historical Provider, MD   Dextrose, Diabetic Use, (GLUCOSE) 1 g CHEW Take by mouth Yes Historical Provider, MD   simvastatin (ZOCOR) 10 MG tablet Take 1 tablet by mouth nightly Yes Brittany Byers MD   amLODIPine (NORVASC) 5 MG tablet Take 5 mg by mouth daily Yes Historical Provider, MD   aspirin 81 MG tablet Take 2 tablets by mouth daily Yes Historical Provider, MD   vitamin D 1000 units CAPS Take 2 tablets by mouth daily Yes Historical Provider, MD   furosemide (LASIX) 80 MG tablet Take 80 mg by mouth daily Yes Historical Provider, MD   losartan (COZAAR) 50 MG tablet Take 50 mg by mouth daily Yes Historical Provider, MD   tamsulosin (FLOMAX) 0.4 MG capsule Take 2 tablets by mouth nightly Yes Historical Provider, MD   atenolol (TENORMIN) 50 MG tablet Take 50 mg by mouth 2 times daily Yes Historical Provider, MD   finasteride (PROSCAR) 5 MG tablet Take 5 mg by mouth daily Yes Historical Provider, MD   loperamide (IMODIUM) 2 MG capsule Take 2 mg by mouth 4 times daily as needed for Diarrhea Yes Historical Provider, MD         Past Medical History:   Diagnosis Date    Chronic kidney disease     Diabetes mellitus ([facility] [address]     Hypertension      Past Surgical History:   Procedure Laterality Date    APPENDECTOMY      age 13     No family history on file.     CareTeam (Including outside providers/suppliers regularly involved in providing care):   [place]  [facility] [place]    Wt Readings from Last 3 Encounters:   01/07/22 (!) 305 lb (138.3 kg)   11/30/21 (!) 306 lb (138.8 kg)   11/02/21 (!) 305 lb (138.3 kg)     Vitals: 22 0807   BP: 124/68   Pulse: 52   Temp: 97.4 °F (36.3 °C)   SpO2: 95%   Weight: (!) 305 lb (138.3 kg)   Height: 5' 6\" (1.676 m)     Body mass index is 49.23 kg/m². Based upon direct observation of the patient, evaluation of cognition reveals recent and remote memory intact. Patient's complete Health Risk Assessment and screening values have been reviewed and are found in Flowsheets. The following problems were reviewed today and where indicated follow up appointments were made and/or referrals ordered. Positive Risk Factor Screenings with Interventions:              General Health and ACP:  General  In general, how would you say your health is?: Fair  In the past 7 days, have you experienced any of the following?  New or Increased Pain, New or Increased Fatigue, Loneliness, Social Isolation, Stress or Anger?: (!) Anger  Do you get the social and emotional support that you need?: (!) No  Do you have a Living Will?: (!) No  Advance Directives     Power of  Living Will ACP-Advance Directive ACP-Power of     Not on File Filed on 19 Filed Not on File      General Health Risk Interventions:  · Anger: patient's comments regarding reasons for stress and/or anger: yesterday - at Dr office and was sent home without being seen  · Inadequate social/emotional support: patient's comments regarding inadequate social support: patient's friend recently   · No Living Will: pt had a will but her emergency contact recently     Health Habits/Nutrition:  Health Habits/Nutrition  Do you exercise for at least 20 minutes 2-3 times per week?: (!) No  Have you lost any weight without trying in the past 3 months?: No  Do you eat only one meal per day?: No  Have you seen the dentist within the past year?: (!) No  Body mass index: (!) 49.22  Health Habits/Nutrition Interventions:  · Inadequate physical activity:  patient is not ready to increase his/her physical activity level at this time - knee and leg pain    Hearing/Vision:  No exam data present  Hearing/Vision  Do you or your family notice any trouble with your hearing that hasn't been managed with hearing aids?: (!) Yes  Do you have difficulty driving, watching TV, or doing any of your daily activities because of your eyesight?: No  Have you had an eye exam within the past year?: Yes  Hearing/Vision Interventions:  · Hearing concerns:  patient declines any further evaluation/treatment for hearing issues    Safety:  Safety  Do you have working smoke detectors?: (!) No  Have all throw rugs been removed or fastened?: Yes  Do you have non-slip mats or surfaces in all bathtubs/showers?: (!) No  Do all of your stairways have a railing or banister?: Yes  Are your doorways, halls and stairs free of clutter?: Yes  Do you always fasten your seatbelt when you are in a car?: Yes  Safety Interventions:  · Home safety tips provided    ADL:  ADLs  In the past 7 days, did you need help from others to perform any of the following everyday activities? Eating, dressing, grooming, bathing, toileting, or walking/balance?: None  In the past 7 days, did you need help from others to take care of any of the following?  Laundry, housekeeping, banking/finances, shopping, telephone use, food preparation, transportation, or taking medications?: (!) Housekeeping  ADL Interventions:  · Patient declines any further evaluation/treatment for this issue      Personalized Preventive Plan   Current Health Maintenance Status  Immunization History   Administered Date(s) Administered    COVID-19, Pfizer, PF, 30mcg/0.3mL 06/03/2021, 06/24/2021    DTaP vaccine 06/17/2011    Pneumococcal Conjugate 13-valent ([id number]) 11/21/2016    Pneumococcal Polysaccharide ([id number]) 03/05/2018    Pneumococcal Vaccine 03/08/2004    Tdap (Boostrix, Adacel) 10/29/2021    Zoster Recombinant (Shingrix) 09/27/2018, 11/30/2018        Health Maintenance   Topic Date Due    Hepatitis C screen  Never done    Depression Screen  Never done    Diabetic retinal exam  Never done    Colon cancer screen colonoscopy  Never done    PSA counseling  Never done    Flu vaccine (1) Never done    Diabetic foot exam  11/30/2021    COVID-19 Vaccine (3 - Booster for Pfizer series) 12/24/2021    A1C test (Diabetic or Prediabetic)  02/23/2022    Diabetic microalbuminuria test  11/23/2022    Lipid screen  11/23/2022    Potassium monitoring  11/23/2022    Creatinine monitoring  11/23/2022    DTaP/Tdap/Td vaccine (3 - Td or Tdap) 10/29/2031    Shingles Vaccine  Completed    Pneumococcal 65+ years Vaccine  Completed    Hepatitis A vaccine  Aged Out    Hib vaccine  Aged Out    Meningococcal (ACWY) vaccine  Aged Out     Recommendations for [employer] Due: see orders and patient instructions/AVS.  . Recommended screening schedule for the next 5-10 years is provided to the patient in written form: see Patient Annika Quiles was seen today for medicare awv, advance care planning and health maintenance.     Diagnoses and all orders for this visit:    Type 2 diabetes mellitus without complication, without long-term current use of insulin (HCC)    Primary hypertension    Obstructive sleep apnea of adult    Other hyperlipidemia    Routine general medical examination at a health care facility    Class 3 severe obesity without serious comorbidity with body mass index (BMI) of 45.0 to 49.9 in adult, unspecified obesity type ([address]

## 2022-03-01 ENCOUNTER — OFFICE VISIT (OUTPATIENT)
Dept: ENDOCRINOLOGY | Age: 72
End: 2022-03-01
Payer: MEDICARE

## 2022-03-01 VITALS
WEIGHT: 303 LBS | HEART RATE: 52 BPM | DIASTOLIC BLOOD PRESSURE: 63 MMHG | SYSTOLIC BLOOD PRESSURE: 120 MMHG | BODY MASS INDEX: 48.91 KG/M2 | OXYGEN SATURATION: 91 %

## 2022-03-01 DIAGNOSIS — E11.65 UNCONTROLLED TYPE 2 DIABETES MELLITUS WITH HYPERGLYCEMIA (HCC): Primary | ICD-10-CM

## 2022-03-01 DIAGNOSIS — I89.0 LYMPHEDEMA: ICD-10-CM

## 2022-03-01 DIAGNOSIS — N18.30 STAGE 3 CHRONIC KIDNEY DISEASE, UNSPECIFIED WHETHER STAGE 3A OR 3B CKD (HCC): ICD-10-CM

## 2022-03-01 DIAGNOSIS — B35.1 ONYCHOMYCOSIS: ICD-10-CM

## 2022-03-01 LAB
CHP ED QC CHECK: NORMAL
GLUCOSE BLD-MCNC: 178 MG/DL
HBA1C MFR BLD: 9.7 %

## 2022-03-01 PROCEDURE — 83036 HEMOGLOBIN GLYCOSYLATED A1C: CPT | Performed by: INTERNAL MEDICINE

## 2022-03-01 PROCEDURE — 82962 GLUCOSE BLOOD TEST: CPT | Performed by: INTERNAL MEDICINE

## 2022-03-01 PROCEDURE — 99214 OFFICE O/P EST MOD 30 MIN: CPT | Performed by: INTERNAL MEDICINE

## 2022-03-01 RX ORDER — INSULIN ASPART 100 [IU]/ML
INJECTION, SOLUTION INTRAVENOUS; SUBCUTANEOUS
Qty: 10 PEN | Refills: 3 | Status: SHIPPED | OUTPATIENT
Start: 2022-03-01 | End: 2022-07-12 | Stop reason: SDUPTHER

## 2022-03-01 RX ORDER — INSULIN GLARGINE 100 [IU]/ML
INJECTION, SOLUTION SUBCUTANEOUS
Qty: 30 ML | Refills: 2 | Status: SHIPPED | OUTPATIENT
Start: 2022-03-01 | End: 2022-09-09 | Stop reason: SDUPTHER

## 2022-03-01 ASSESSMENT — ENCOUNTER SYMPTOMS
RESPIRATORY NEGATIVE: 1
EYES NEGATIVE: 1

## 2022-03-01 NOTE — PROGRESS NOTES
3/1/2022    Assessment:       Diagnosis Orders   1. Uncontrolled type 2 diabetes mellitus with hyperglycemia (HCC)  POCT Glucose    POCT glycosylated hemoglobin (Hb A1C)    Hemoglobin V3E    Basic Metabolic Panel, Fasting    HM DIABETES FOOT EXAM    insulin glargine (LANTUS) 100 UNIT/ML injection vial   2. Onychomycosis  Viviane Alonso DPM, Podiatry, [place]   3. Freddie Leal MD, Interventional Radiology, [place]   4.  Stage 3 chronic kidney disease, unspecified whether stage 3a or 3b CKD ([facility] [address]  DANITA Garcia DO, Nephrology, [place]         PLAN:     Increase insulin dose  Patient to interventional radiology for lymphedema clinic refer patient to podiatry for fungal infection and also for nephrology chronic kidney disease A1c goal of 7 or lower advised to test blood sugars 3-4 times daily  More than 50% of 30 was spent patient education counseling    Orders Placed This Encounter   Procedures    Hemoglobin A1C     Standing Status:   Future     Standing Expiration Date:   3/1/2023    Basic Metabolic Panel, Fasting     Standing Status:   Future     Standing Expiration Date:   3/1/2023   Saturnino Alonso DPM, Podiatry, [place]     Referral Priority:   Routine     Referral Type:   Eval and Treat     Referral Reason:   Specialty Services Required     Referred to Provider:   Makenzie Baig DPM     Requested Specialty:   Podiatry     Number of Visits Requested:   1    DANITA Duenas, Nephrology, [place]     Referral Priority:   Routine     Referral Type:   Eval and Treat     Referral Reason:   Specialty Services Required     Referred to Provider:   Sara Blood DO     Requested Specialty:   Nephrology     Number of Visits Requested:   Romario Nelson MD, Interventional Radiology, [place]     Referral Priority:   Routine     Referral Type:   Eval and Treat     Referral Reason:   Specialty Services Required     Referred to Provider: Dev Fabian MD     Requested Specialty:   Interventional Radiology     Number of Visits Requested:   1    POCT Glucose    POCT glycosylated hemoglobin (Hb A1C)    HM DIABETES FOOT EXAM     Orders Placed This Encounter   Medications    insulin aspart (NOVOLOG FLEXPEN) 100 UNIT/ML injection pen     Sig: INJECT 40 UNITS SUBCUTANEOUSLY WITH BREAKFAST AND INJECT 40 UNITS WITH LUNCH AND INJECT 40 UNITS WITH DINNER SKIP IF NO MEAL/CARBOHYDRATE INTAKE (DISCARD PEN 28 DAYS AFTER FIRST USE)     Dispense:  10 pen     Refill:  3    insulin glargine (LANTUS) 100 UNIT/ML injection vial     Si units  Twice day     Dispense:  30 mL     Refill:  2         Orders Placed This Encounter   Procedures    POCT Glucose    POCT glycosylated hemoglobin (Hb A1C)       Subjective:     Chief Complaint   Patient presents with    Diabetes     Vitals:    22 1306   BP: 120/63   Pulse: 52   SpO2: 91%   Weight: (!) 303 lb (137.4 kg)     Wt Readings from Last 3 Encounters:   22 (!) 303 lb (137.4 kg)   22 (!) 305 lb (138.3 kg)   21 (!) 306 lb (138.8 kg)     BP Readings from Last 3 Encounters:   22 120/63   22 124/68   21 128/64     Follow-up on type 2 diabetes patient is on Lantus at bedtime NovoLog with each meals Lantus was NovoLog) overall blood sugars are staying in the 300 range complains of swelling of both legs denies any chest pain history of chronic kidney disease does not see nephrology GFR is in the 32 range  Morbid obesity BMI 48  Patient on Lantus 50 units twice daily plus NovoLog 35 with each meals    Diabetes  He presents for his follow-up diabetic visit. He has type 2 diabetes mellitus. His disease course has been fluctuating. There are no hypoglycemic associated symptoms. Associated symptoms include fatigue. Pertinent negatives for diabetes include no weight loss. Symptoms are worsening. Diabetic complications include nephropathy.  Current diabetic treatment includes insulin injections. He is currently taking insulin pre-breakfast, pre-lunch, pre-dinner and at bedtime. He never participates in exercise. His overall blood glucose range is >200 mg/dl. (Lab Results       Component                Value               Date                       LABA1C                   9.7                 03/01/2022              )     Past Medical History:   Diagnosis Date    Chronic kidney disease     Diabetes mellitus ([facility] [address]     Hypertension      Past Surgical History:   Procedure Laterality Date    APPENDECTOMY      age 13     Social History     Socioeconomic History    Marital status: Single     Spouse name: Not on file    Number of children: Not on file    Years of education: Not on file    Highest education level: Not on file   Occupational History    Not on file   Tobacco Use    Smoking status: Never Smoker    Smokeless tobacco: Never Used   Substance and Sexual Activity    Alcohol use: Not on file    Drug use: Never    Sexual activity: Not on file   Other Topics Concern    Not on file   Social History Narrative         Lives With: Alone    Type of Home: House Two level, Able to Live on Main level with bedroom/bathroom    Home Access: Stairs to enter without rails - Number of Steps: 6    Bathroom Shower/Tub: Tub/Shower unit    Bathroom Toilet: Standard    Home Equipment: [employer], Rolling walker    ADL Assistance: [facility]: Independent    Homemaking Responsibilities: Yes    Ambulation Assistance: Independent(Cane)    Transfer Assistance: Independent    Active : Yes     Social Determinants of Health     Financial Resource Strain: Low Risk     Difficulty of Paying Living Expenses: Not hard at all   Food Insecurity: No Food Insecurity    Worried About [address] in the Last Year: Never true    [address] in the Last Year: Never true   Transportation Needs:     Lack of Transportation (Medical):  Not on file    Lack of Transportation MOUTH EVERY DAY (WITH FOOD AND FLUIDS), Disp: , Rfl:     ammonium lactate (LAC-HYDRIN) 12 % lotion, APPLY A SUFFICIENT AMOUNT EXTERNALLY TWICE A DAY TO DRY SKIN ON LEGS, Disp: , Rfl:     loratadine (CLARITIN) 10 MG tablet, TAKE ONE TABLET BY MOUTH EVERY DAY (WITH FOOD), Disp: , Rfl:     sildenafil (VIAGRA) 100 MG tablet, TAKE ONE-HALF TABLET BY MOUTH AN HOUR_BEFORE SEX.  (NO MORE THAN 1 DOSE PER 24 HOURS) NO NITRATES, Disp: , Rfl:     albuterol sulfate  (90 Base) MCG/ACT inhaler, INHALE 2 PUFFS BY MOUTH FOUR TIMES A DAY AS NEEDED FOR SHORTNESS OF BREATH OR WHEEZING., Disp: , Rfl:     Dextrose, Diabetic Use, (GLUCOSE) 1 g CHEW, Take by mouth, Disp: , Rfl:     simvastatin (ZOCOR) 10 MG tablet, Take 1 tablet by mouth nightly, Disp: 30 tablet, Rfl: 3    amLODIPine (NORVASC) 5 MG tablet, Take 5 mg by mouth daily, Disp: , Rfl:     aspirin 81 MG tablet, Take 2 tablets by mouth daily, Disp: , Rfl:     vitamin D 1000 units CAPS, Take 2 tablets by mouth daily, Disp: , Rfl:     furosemide (LASIX) 80 MG tablet, Take 80 mg by mouth daily, Disp: , Rfl:     losartan (COZAAR) 50 MG tablet, Take 50 mg by mouth daily, Disp: , Rfl:     tamsulosin (FLOMAX) 0.4 MG capsule, Take 2 tablets by mouth nightly, Disp: , Rfl:     atenolol (TENORMIN) 50 MG tablet, Take 50 mg by mouth 2 times daily, Disp: , Rfl:     finasteride (PROSCAR) 5 MG tablet, Take 5 mg by mouth daily, Disp: , Rfl:     loperamide (IMODIUM) 2 MG capsule, Take 2 mg by mouth 4 times daily as needed for Diarrhea, Disp: , Rfl:   Lab Results   Component Value Date     11/23/2021    K 4.5 11/23/2021     11/23/2021    CO2 30 11/23/2021    BUN 47 (H) 11/23/2021    CREATININE 1.94 (H) 11/23/2021    GLUCOSE 178 03/01/2022    CALCIUM 9.2 11/23/2021    PROT 7.0 11/20/2020    LABALBU 3.9 11/20/2020    BILITOT 0.6 11/20/2020    ALKPHOS 134 (H) 11/20/2020    AST 16 11/20/2020    ALT 11 11/20/2020    LABGLOM 34.0 (L) 11/23/2021    GFRAA 41.2 (L) 11/23/2021 place, and time.    Psychiatric:         Mood and Affect: Mood normal.         Behavior: Behavior normal.

## 2022-04-05 ENCOUNTER — OFFICE VISIT (OUTPATIENT)
Dept: ENDOCRINOLOGY | Age: 72
End: 2022-04-05
Payer: MEDICARE

## 2022-04-05 VITALS
WEIGHT: 299 LBS | SYSTOLIC BLOOD PRESSURE: 116 MMHG | HEIGHT: 66 IN | DIASTOLIC BLOOD PRESSURE: 64 MMHG | BODY MASS INDEX: 48.05 KG/M2 | HEART RATE: 52 BPM | OXYGEN SATURATION: 90 %

## 2022-04-05 DIAGNOSIS — N18.30 STAGE 3 CHRONIC KIDNEY DISEASE, UNSPECIFIED WHETHER STAGE 3A OR 3B CKD (HCC): ICD-10-CM

## 2022-04-05 DIAGNOSIS — E11.65 UNCONTROLLED TYPE 2 DIABETES MELLITUS WITH HYPERGLYCEMIA (HCC): Primary | ICD-10-CM

## 2022-04-05 LAB
CHP ED QC CHECK: NORMAL
GLUCOSE BLD-MCNC: 139 MG/DL

## 2022-04-05 PROCEDURE — 82962 GLUCOSE BLOOD TEST: CPT | Performed by: INTERNAL MEDICINE

## 2022-04-05 PROCEDURE — 3046F HEMOGLOBIN A1C LEVEL >9.0%: CPT | Performed by: INTERNAL MEDICINE

## 2022-04-05 PROCEDURE — 99213 OFFICE O/P EST LOW 20 MIN: CPT | Performed by: INTERNAL MEDICINE

## 2022-04-05 NOTE — PROGRESS NOTES
4/5/2022    Assessment:       Diagnosis Orders   1. Uncontrolled type 2 diabetes mellitus with hyperglycemia ([facility] [address]  POCT Glucose    [facility] Betsey Celestin Patient, DPM, Podiatry, [place]   2.  Stage 3 chronic kidney disease, unspecified whether stage 3a or 3b CKD ([facility] [address]  DANITA Jennings DO, Nephrology, [place]    Basic Metabolic Panel    Hemoglobin A1C         PLAN:     Orders Placed This Encounter   Procedures    Basic Metabolic Panel     Standing Status:   Future     Standing Expiration Date:   4/5/2023    Hemoglobin A1C     Standing Status:   Future     Standing Expiration Date:   4/5/2023    DANITA Jennings DO, Nephrology, [place]     Referral Priority:   Routine     Referral Type:   Eval and Treat     Referral Reason:   Specialty Services Required     Referred to Provider:   Emily Bowers DO     Requested Specialty:   Nephrology     Number of Visits Requested:   [facility] Jed Celestin Patient, DPM, Podiatry, [place]     Referral Priority:   Routine     Referral Type:   Eval and Treat     Referral Reason:   Specialty Services Required     Referred to Provider:   Abrahan Padron DPM     Requested Specialty:   Podiatry     Number of Visits Requested:   1    POCT Glucose     Referred patient to podiatry and nephrology  Continue current medication regimen for diabetes  Follow-up in 3 months    Orders Placed This Encounter   Procedures    POCT Glucose       Subjective:     Chief Complaint   Patient presents with    Diabetes     Vitals:    04/05/22 1426   BP: 116/64   Pulse: 52   SpO2: 90%   Weight: 299 lb (135.6 kg)   Height: 5' 6\" (1.676 m)     Wt Readings from Last 3 Encounters:   04/05/22 299 lb (135.6 kg)   03/01/22 (!) 303 lb (137.4 kg)   01/07/22 (!) 305 lb (138.3 kg)     BP Readings from Last 3 Encounters:   04/05/22 116/64   03/01/22 120/63   01/07/22 124/68     Follow-up on type 2 diabetes complications include chronic kidney disease history of morbid obesity patient wants referral for podiatry and nephrology hemoglobin A1c was 9.7  Patient on Lantus 60 units twice a day plus NovoLog 40 units with each meals overall blood sugar is close to 250 range    Diabetes  He presents for his follow-up diabetic visit. He has type 2 diabetes mellitus. His disease course has been fluctuating. Associated symptoms include fatigue. Pertinent negatives for diabetes include no polyphagia and no polyuria. Risk factors for coronary artery disease include obesity. Current diabetic treatment includes insulin injections. He is currently taking insulin pre-breakfast, pre-lunch, pre-dinner and at bedtime. His overall blood glucose range is >200 mg/dl.  (Lab Results       Component                Value               Date                       LABA1C                   9.7                 03/01/2022              )     Past Medical History:   Diagnosis Date    Chronic kidney disease     Diabetes mellitus ([facility] [address]     Hypertension      Past Surgical History:   Procedure Laterality Date    APPENDECTOMY      age 13     Social History     Socioeconomic History    Marital status: Single     Spouse name: Not on file    Number of children: Not on file    Years of education: Not on file    Highest education level: Not on file   Occupational History    Not on file   Tobacco Use    Smoking status: Never Smoker    Smokeless tobacco: Never Used   Substance and Sexual Activity    Alcohol use: Not on file    Drug use: Never    Sexual activity: Not on file   Other Topics Concern    Not on file   Social History Narrative         Lives With: Alone    Type of Home: House Two level, Able to Live on Main level with bedroom/bathroom    Home Access: Stairs to enter without rails - Number of Steps: 6    Bathroom Shower/Tub: Tub/Shower unit    Bathroom Toilet: Standard    Home Equipment: Cane, Rolling walker    ADL Assistance: [facility]: Independent    Homemaking Responsibilities: Yes    Ambulation Assistance: Independent(Cane)    Transfer Assistance: Independent    Active : Yes     Social Determinants of Health     Financial Resource Strain: Low Risk     Difficulty of Paying Living Expenses: Not hard at all   Food Insecurity: No Food Insecurity    Worried About Running Out of Food in the Last Year: Never true    [address] in the Last Year: Never true   Transportation Needs:     Lack of Transportation (Medical): Not on file    Lack of Transportation (Non-Medical): Not on file   Physical Activity:     Days of Exercise per Week: Not on file    Minutes of Exercise per Session: Not on file   Stress:     Feeling of Stress : Not on file   Social Connections:     Frequency of Communication with Friends and Family: Not on file    Frequency of Social Gatherings with Friends and Family: Not on file    Attends Gnosticism Services: Not on file    Active Member of [address] [place] [address] [employer] or Organizations: Not on file    Attends Club or Organization Meetings: Not on file    Marital Status: Not on file   Intimate Partner Violence:     Fear of Current or Ex-Partner: Not on file    Emotionally Abused: Not on file    Physically Abused: Not on file    Sexually Abused: Not on file   Housing Stability:     Unable to Pay for Housing in the Last Year: Not on file    Number of [place] in the Last Year: Not on file    Unstable Housing in the Last Year: Not on file     History reviewed. No pertinent family history.   Allergies   Allergen Reactions    Niacin     Fluorescein Diarrhea and Nausea And Vomiting       Current Outpatient Medications:     insulin aspart (NOVOLOG FLEXPEN) 100 UNIT/ML injection pen, INJECT 40 UNITS SUBCUTANEOUSLY WITH BREAKFAST AND INJECT 40 UNITS WITH LUNCH AND INJECT 40 UNITS WITH DINNER SKIP IF NO MEAL/CARBOHYDRATE INTAKE (DISCARD PEN 28 DAYS AFTER FIRST USE), Disp: 10 pen, Rfl: 3    insulin glargine (LANTUS) 100 UNIT/ML injection vial, 60 units  Twice day, Disp: 30 mL, Rfl: 2    rosuvastatin (CRESTOR) 20 MG tablet, TAKE ONE TABLET BY MOUTH AT BEDTIME, Disp: , Rfl:     montelukast (SINGULAIR) 10 MG tablet, TAKE ONE TABLET BY MOUTH AT BEDTIME, Disp: , Rfl:     erythromycin (ROMYCIN) 5 MG/GM ophthalmic ointment, APPLY A QUARTER INCH RIBBON TO EACH EYE AT BEDTIME, Disp: , Rfl:     Lancets Misc. (ACCU-CHEK SOFTCLIX LANCET DEV) KIT, USE LANCET(S) TESTING AS DIRECTED FOR BLOOD SUGAR, Disp: , Rfl:     allopurinol (ZYLOPRIM) 100 MG tablet, TAKE TWO TABLETS BY MOUTH EVERY DAY (WITH FOOD AND FLUIDS), Disp: , Rfl:     ammonium lactate (LAC-HYDRIN) 12 % lotion, APPLY A SUFFICIENT AMOUNT EXTERNALLY TWICE A DAY TO DRY SKIN ON LEGS, Disp: , Rfl:     loratadine (CLARITIN) 10 MG tablet, TAKE ONE TABLET BY MOUTH EVERY DAY (WITH FOOD), Disp: , Rfl:     sildenafil (VIAGRA) 100 MG tablet, TAKE ONE-HALF TABLET BY MOUTH AN HOUR_BEFORE SEX.  (NO MORE THAN 1 DOSE PER 24 HOURS) NO NITRATES, Disp: , Rfl:     albuterol sulfate  (90 Base) MCG/ACT inhaler, INHALE 2 PUFFS BY MOUTH FOUR TIMES A DAY AS NEEDED FOR SHORTNESS OF BREATH OR WHEEZING., Disp: , Rfl:     Dextrose, Diabetic Use, (GLUCOSE) 1 g CHEW, Take by mouth, Disp: , Rfl:     simvastatin (ZOCOR) 10 MG tablet, Take 1 tablet by mouth nightly, Disp: 30 tablet, Rfl: 3    amLODIPine (NORVASC) 5 MG tablet, Take 5 mg by mouth daily, Disp: , Rfl:     aspirin 81 MG tablet, Take 2 tablets by mouth daily, Disp: , Rfl:     vitamin D 1000 units CAPS, Take 2 tablets by mouth daily, Disp: , Rfl:     furosemide (LASIX) 80 MG tablet, Take 80 mg by mouth daily, Disp: , Rfl:     losartan (COZAAR) 50 MG tablet, Take 50 mg by mouth daily, Disp: , Rfl:     tamsulosin (FLOMAX) 0.4 MG capsule, Take 2 tablets by mouth nightly, Disp: , Rfl:     atenolol (TENORMIN) 50 MG tablet, Take 50 mg by mouth 2 times daily, Disp: , Rfl:     finasteride (PROSCAR) 5 MG tablet, Take 5 mg by mouth daily, Disp: , Rfl:     loperamide (IMODIUM) 2 MG capsule, Take 2 mg by mouth 4 times daily as needed for Diarrhea, Disp: , Rfl:   Lab Results   Component Value Date     11/23/2021    K 4.5 11/23/2021     11/23/2021    CO2 30 11/23/2021    BUN 47 (H) 11/23/2021    CREATININE 1.94 (H) 11/23/2021    GLUCOSE 139 04/05/2022    CALCIUM 9.2 11/23/2021    PROT 7.0 11/20/2020    LABALBU 3.9 11/20/2020    BILITOT 0.6 11/20/2020    ALKPHOS 134 (H) 11/20/2020    AST 16 11/20/2020    ALT 11 11/20/2020    LABGLOM 34.0 (L) 11/23/2021    GFRAA 41.2 (L) 11/23/2021    GLOB 3.1 11/20/2020     Lab Results   Component Value Date    WBC 5.6 11/22/2020    HGB 11.8 (L) 11/22/2020    HCT 37.3 (L) 11/22/2020    MCV 92.3 11/22/2020     11/22/2020     Lab Results   Component Value Date    LABA1C 9.7 03/01/2022    LABA1C 9.4 (H) 11/23/2021    LABA1C 9.6 11/02/2021     Lab Results   Component Value Date    HDL 31 (L) 11/23/2021    HDL 29 (L) 08/01/2013    LDLCALC 58 11/23/2021    LDLCALC 40 08/01/2013    CHOL 105 11/23/2021    CHOL 127 08/01/2013    TRIG 80 11/23/2021    TRIG 288 (H) 08/01/2013       Review of Systems   Constitutional: Positive for fatigue. Eyes: Negative. Cardiovascular: Negative. Endocrine: Negative for polyphagia and polyuria. Genitourinary: Negative. Neurological: Negative. Objective:   Physical Exam  Vitals reviewed. Constitutional:       General: He is not in acute distress. Appearance: Normal appearance. He is obese. HENT:      Head: Normocephalic and atraumatic. Right Ear: External ear normal.      Left Ear: External ear normal.      Nose: Nose normal.   Eyes:      General: No scleral icterus. Right eye: No discharge. Left eye: No discharge. Extraocular Movements: Extraocular movements intact. Conjunctiva/sclera: Conjunctivae normal.   Cardiovascular:      Rate and Rhythm: Normal rate. Pulmonary:      Effort: Pulmonary effort is normal.   Musculoskeletal:         General: Normal range of motion.       Cervical back: Normal range of

## 2022-04-17 ASSESSMENT — ENCOUNTER SYMPTOMS: EYES NEGATIVE: 1

## 2022-04-19 DIAGNOSIS — N18.30 STAGE 3 CHRONIC KIDNEY DISEASE, UNSPECIFIED WHETHER STAGE 3A OR 3B CKD (HCC): ICD-10-CM

## 2022-04-19 LAB
ANION GAP SERPL CALCULATED.3IONS-SCNC: 19 MEQ/L (ref 9–15)
BUN BLDV-MCNC: 43 MG/DL (ref 8–23)
CALCIUM SERPL-MCNC: 9.8 MG/DL (ref 8.5–9.9)
CHLORIDE BLD-SCNC: 105 MEQ/L (ref 95–107)
CO2: 21 MEQ/L (ref 20–31)
CREAT SERPL-MCNC: 2.29 MG/DL (ref 0.7–1.2)
GFR AFRICAN AMERICAN: 34
GFR NON-AFRICAN AMERICAN: 28.1
GLUCOSE BLD-MCNC: 162 MG/DL (ref 70–99)
HBA1C MFR BLD: 10 % (ref 4.8–5.9)
POTASSIUM SERPL-SCNC: 4.4 MEQ/L (ref 3.4–4.9)
SODIUM BLD-SCNC: 145 MEQ/L (ref 135–144)

## 2022-04-21 DIAGNOSIS — E11.65 UNCONTROLLED TYPE 2 DIABETES MELLITUS WITH HYPERGLYCEMIA (HCC): ICD-10-CM

## 2022-05-09 ENCOUNTER — TELEPHONE (OUTPATIENT)
Dept: GASTROENTEROLOGY | Age: 72
End: 2022-05-09

## 2022-07-12 ENCOUNTER — OFFICE VISIT (OUTPATIENT)
Dept: ENDOCRINOLOGY | Age: 72
End: 2022-07-12
Payer: MEDICARE

## 2022-07-12 VITALS
WEIGHT: 295 LBS | HEART RATE: 55 BPM | OXYGEN SATURATION: 90 % | BODY MASS INDEX: 47.41 KG/M2 | HEIGHT: 66 IN | SYSTOLIC BLOOD PRESSURE: 117 MMHG | DIASTOLIC BLOOD PRESSURE: 63 MMHG

## 2022-07-12 DIAGNOSIS — E11.65 UNCONTROLLED TYPE 2 DIABETES MELLITUS WITH HYPERGLYCEMIA (HCC): Primary | ICD-10-CM

## 2022-07-12 LAB
CHP ED QC CHECK: NORMAL
GLUCOSE BLD-MCNC: 76 MG/DL
HBA1C MFR BLD: 9.7 %

## 2022-07-12 PROCEDURE — 83036 HEMOGLOBIN GLYCOSYLATED A1C: CPT | Performed by: INTERNAL MEDICINE

## 2022-07-12 PROCEDURE — 1123F ACP DISCUSS/DSCN MKR DOCD: CPT | Performed by: INTERNAL MEDICINE

## 2022-07-12 PROCEDURE — 3046F HEMOGLOBIN A1C LEVEL >9.0%: CPT | Performed by: INTERNAL MEDICINE

## 2022-07-12 PROCEDURE — 99213 OFFICE O/P EST LOW 20 MIN: CPT | Performed by: INTERNAL MEDICINE

## 2022-07-12 PROCEDURE — 82962 GLUCOSE BLOOD TEST: CPT | Performed by: INTERNAL MEDICINE

## 2022-07-12 RX ORDER — FLASH GLUCOSE SENSOR
KIT MISCELLANEOUS
Qty: 2 EACH | Refills: 3 | Status: SHIPPED | OUTPATIENT
Start: 2022-07-12

## 2022-07-12 RX ORDER — FLASH GLUCOSE SCANNING READER
EACH MISCELLANEOUS
Qty: 1 EACH | Refills: 0 | Status: SHIPPED | OUTPATIENT
Start: 2022-07-12

## 2022-07-12 RX ORDER — INSULIN ASPART 100 [IU]/ML
INJECTION, SOLUTION INTRAVENOUS; SUBCUTANEOUS
Qty: 15 PEN | Refills: 3 | Status: SHIPPED | OUTPATIENT
Start: 2022-07-12

## 2022-07-12 NOTE — PROGRESS NOTES
7/12/2022    Assessment:       Diagnosis Orders   1.  Uncontrolled type 2 diabetes mellitus with hyperglycemia (HCC)  POCT Glucose    POCT glycosylated hemoglobin (Hb A1C)         PLAN:     Orders Placed This Encounter   Procedures    Hemoglobin A1C     Standing Status:   Future     Standing Expiration Date:   2/85/5635    Basic Metabolic Panel, Fasting     Standing Status:   Future     Standing Expiration Date:   7/12/2023    POCT Glucose    POCT glycosylated hemoglobin (Hb A1C)     Orders Placed This Encounter   Medications    insulin aspart (NOVOLOG FLEXPEN) 100 UNIT/ML injection pen     Sig: INJECT 35 UNITS SUBCUTANEOUSLY WITH BREAKFAST AND INJECT 35 UNITS WITH LUNCH AND INJECT 35 UNITS WITH DINNER SKIP IF NO MEAL/CARBOHYDRATE INTAKE     Dispense:  15 pen     Refill:  3    Continuous Blood Gluc Sensor (FREESTYLE ANEUDY 14 DAY SENSOR) MISC     Sig: Every 2 weeks     Dispense:  2 each     Refill:  3    Continuous Blood Gluc  (FREESTYLE ANEUDY 14 DAY READER) MAKENZIE     Sig: As directed     Dispense:  1 each     Refill:  0     Continue current dose of Lantus 60 units twice daily lower NovoLog to 35 freestyle aneudy continuous glucose monitoring prescribed A1c goal of 8 or lower      Orders Placed This Encounter   Procedures    POCT Glucose    POCT glycosylated hemoglobin (Hb A1C)       Subjective:     Chief Complaint   Patient presents with    Diabetes     Vitals:    07/12/22 1410   BP: 117/63   Pulse: 55   SpO2: 90%   Weight: 295 lb (133.8 kg)   Height: 5' 6\" (1.676 m)     Wt Readings from Last 3 Encounters:   07/12/22 295 lb (133.8 kg)   04/05/22 299 lb (135.6 kg)   03/01/22 (!) 303 lb (137.4 kg)     BP Readings from Last 3 Encounters:   07/12/22 117/63   04/05/22 116/64   03/01/22 120/63     Follow-up on type 2 diabetes with chronic kidney disease patient on Lantus 60 units twice a day plus NovoLog 40 with each meals has had low blood sugars in the daytime based on p.o. intake requesting aneudy continuous glucose monitoring  Morbid obesity BMI 47  Hemoglobin A1c was 9.7 glucose today was lower in the office at 68    Diabetes  He presents for his follow-up diabetic visit. He has type 2 diabetes mellitus. Associated symptoms include fatigue. Diabetic complications include nephropathy. Risk factors for coronary artery disease include obesity. Current diabetic treatment includes insulin injections. He is currently taking insulin pre-breakfast, pre-lunch, pre-dinner and at bedtime. His overall blood glucose range is 130-140 mg/dl.  (Lab Results       Component                Value               Date                       LABA1C                   9.7                 07/12/2022            No results found for: EAG  )   Past Medical History:   Diagnosis Date    Chronic kidney disease     Diabetes mellitus ([facility] [address]     Hypertension      Past Surgical History:   Procedure Laterality Date    APPENDECTOMY      age 13     Social History     Socioeconomic History    Marital status: Single     Spouse name: Not on file    Number of children: Not on file    Years of education: Not on file    Highest education level: Not on file   Occupational History    Not on file   Tobacco Use    Smoking status: Never Smoker    Smokeless tobacco: Never Used   Substance and Sexual Activity    Alcohol use: Not on file    Drug use: Never    Sexual activity: Not on file   Other Topics Concern    Not on file   Social History Narrative         Lives With: Alone    Type of Home: House Two level, Able to Live on Main level with bedroom/bathroom    Home Access: Stairs to enter without rails - Number of Steps: 6    Bathroom Shower/Tub: Tub/Shower unit    Bathroom Toilet: Standard    Home Equipment: Georga Lacy, Rolling walker    ADL Assistance: Independent    Homemaking Assistance: Independent    Homemaking Responsibilities: Yes    Ambulation Assistance: Independent(Cane)    Transfer Assistance: Independent    Active : Yes     Social Determinants of Health Financial Resource Strain: Low Risk     Difficulty of Paying Living Expenses: Not hard at all   Food Insecurity: No Food Insecurity    Worried About [facility] in the Last Year: Never true    Ran Out of Food in the Last Year: Never true   Transportation Needs:     Lack of Transportation (Medical): Not on file    Lack of Transportation (Non-Medical): Not on file   Physical Activity:     Days of Exercise per Week: Not on file    Minutes of Exercise per Session: Not on file   Stress:     Feeling of Stress : Not on file   Social Connections:     Frequency of Communication with Friends and Family: Not on file    Frequency of Social Gatherings with Friends and Family: Not on file    Attends Anabaptism Services: Not on file    Active Member of Clubs or Organizations: Not on file    Attends Club or Organization Meetings: Not on file    Marital Status: Not on file   Intimate Partner Violence:     Fear of Current or Ex-Partner: Not on file    Emotionally Abused: Not on file    Physically Abused: Not on file    Sexually Abused: Not on file   Housing Stability:     Unable to Pay for Housing in the Last Year: Not on file    Number of Places Lived in the Last Year: Not on file    Unstable Housing in the Last Year: Not on file     No family history on file.   Allergies   Allergen Reactions    Niacin     Fluorescein Diarrhea and Nausea And Vomiting       Current Outpatient Medications:     insulin aspart (NOVOLOG FLEXPEN) 100 UNIT/ML injection pen, INJECT 40 UNITS SUBCUTANEOUSLY WITH BREAKFAST AND INJECT 40 UNITS WITH LUNCH AND INJECT 40 UNITS WITH DINNER SKIP IF NO MEAL/CARBOHYDRATE INTAKE (DISCARD PEN 28 DAYS AFTER FIRST USE), Disp: 10 pen, Rfl: 3    insulin glargine (LANTUS) 100 UNIT/ML injection vial, 60 units  Twice day, Disp: 30 mL, Rfl: 2    rosuvastatin (CRESTOR) 20 MG tablet, TAKE ONE TABLET BY MOUTH AT BEDTIME, Disp: , Rfl:     montelukast (SINGULAIR) 10 MG tablet, TAKE ONE TABLET BY MOUTH AT BEDTIME, Disp: , Rfl:     erythromycin (ROMYCIN) 5 MG/GM ophthalmic ointment, APPLY A QUARTER INCH RIBBON TO EACH EYE AT BEDTIME, Disp: , Rfl:     Lancets Misc. (ACCU-CHEK SOFTCLIX LANCET DEV) KIT, USE LANCET(S) TESTING AS DIRECTED FOR BLOOD SUGAR, Disp: , Rfl:     allopurinol (ZYLOPRIM) 100 MG tablet, TAKE TWO TABLETS BY MOUTH EVERY DAY (WITH FOOD AND FLUIDS), Disp: , Rfl:     ammonium lactate (LAC-HYDRIN) 12 % lotion, APPLY A SUFFICIENT AMOUNT EXTERNALLY TWICE A DAY TO DRY SKIN ON LEGS, Disp: , Rfl:     loratadine (CLARITIN) 10 MG tablet, TAKE ONE TABLET BY MOUTH EVERY DAY (WITH FOOD), Disp: , Rfl:     sildenafil (VIAGRA) 100 MG tablet, TAKE ONE-HALF TABLET BY MOUTH AN HOUR_BEFORE SEX.  (NO MORE THAN 1 DOSE PER 24 HOURS) NO NITRATES, Disp: , Rfl:     albuterol sulfate  (90 Base) MCG/ACT inhaler, INHALE 2 PUFFS BY MOUTH FOUR TIMES A DAY AS NEEDED FOR SHORTNESS OF BREATH OR WHEEZING., Disp: , Rfl:     Dextrose, Diabetic Use, (GLUCOSE) 1 g CHEW, Take by mouth, Disp: , Rfl:     simvastatin (ZOCOR) 10 MG tablet, Take 1 tablet by mouth nightly, Disp: 30 tablet, Rfl: 3    amLODIPine (NORVASC) 5 MG tablet, Take 5 mg by mouth daily, Disp: , Rfl:     aspirin 81 MG tablet, Take 2 tablets by mouth daily, Disp: , Rfl:     vitamin D 1000 units CAPS, Take 2 tablets by mouth daily, Disp: , Rfl:     furosemide (LASIX) 80 MG tablet, Take 80 mg by mouth daily, Disp: , Rfl:     losartan (COZAAR) 50 MG tablet, Take 50 mg by mouth daily, Disp: , Rfl:     tamsulosin (FLOMAX) 0.4 MG capsule, Take 2 tablets by mouth nightly, Disp: , Rfl:     atenolol (TENORMIN) 50 MG tablet, Take 50 mg by mouth 2 times daily, Disp: , Rfl:     finasteride (PROSCAR) 5 MG tablet, Take 5 mg by mouth daily, Disp: , Rfl:     loperamide (IMODIUM) 2 MG capsule, Take 2 mg by mouth 4 times daily as needed for Diarrhea, Disp: , Rfl:   Lab Results   Component Value Date     04/21/2022    K 4.8 04/21/2022     04/21/2022    CO2 24 04/21/2022    BUN 47 (H) 04/21/2022 CREATININE 2.48 (H) 04/21/2022    GLUCOSE 76 07/12/2022    CALCIUM 8.8 04/21/2022    PROT 7.0 11/20/2020    LABALBU 3.9 04/21/2022    BILITOT 0.6 11/20/2020    ALKPHOS 134 (H) 11/20/2020    AST 16 11/20/2020    ALT 11 11/20/2020    LABGLOM 25.6 (L) 04/21/2022    GFRAA 31.0 (L) 04/21/2022    GLOB 3.1 11/20/2020     Lab Results   Component Value Date    WBC 6.9 04/21/2022    HGB 14.3 04/21/2022    HCT 45.1 04/21/2022    MCV 95.4 04/21/2022     04/21/2022     Lab Results   Component Value Date    LABA1C 9.7 07/12/2022    LABA1C 10.0 (H) 04/19/2022    LABA1C 9.7 03/01/2022     Lab Results   Component Value Date    HDL 31 (L) 11/23/2021    HDL 29 (L) 08/01/2013    LDLCALC 58 11/23/2021    LDLCALC 40 08/01/2013    CHOL 105 11/23/2021    CHOL 127 08/01/2013    TRIG 80 11/23/2021    TRIG 288 (H) 08/01/2013     No results found for: TESTM  No results found for: TSH, TSHREFLEX, FT3, T4FREE  No results found for: TPOABS    Review of Systems   Constitutional:  Positive for fatigue. Eyes: Negative. Cardiovascular: Negative. Endocrine: Negative. Genitourinary: Negative.       Objective:   Physical Exam

## 2022-07-17 ASSESSMENT — ENCOUNTER SYMPTOMS: EYES NEGATIVE: 1

## 2022-07-28 DIAGNOSIS — E11.65 UNCONTROLLED TYPE 2 DIABETES MELLITUS WITH HYPERGLYCEMIA (HCC): ICD-10-CM

## 2022-07-28 LAB
ALBUMIN SERPL-MCNC: 3.9 G/DL (ref 3.5–4.6)
ANION GAP SERPL CALCULATED.3IONS-SCNC: 12 MEQ/L (ref 9–15)
ANION GAP SERPL CALCULATED.3IONS-SCNC: 12 MEQ/L (ref 9–15)
BILIRUBIN URINE: NEGATIVE
BLOOD, URINE: NEGATIVE
BUN BLDV-MCNC: 41 MG/DL (ref 8–23)
BUN BLDV-MCNC: 41 MG/DL (ref 8–23)
CALCIUM SERPL-MCNC: 9.2 MG/DL (ref 8.5–9.9)
CALCIUM SERPL-MCNC: 9.2 MG/DL (ref 8.5–9.9)
CHLORIDE BLD-SCNC: 96 MEQ/L (ref 95–107)
CHLORIDE BLD-SCNC: 98 MEQ/L (ref 95–107)
CLARITY: CLEAR
CO2: 28 MEQ/L (ref 20–31)
CO2: 29 MEQ/L (ref 20–31)
COLOR: YELLOW
CREAT SERPL-MCNC: 1.94 MG/DL (ref 0.7–1.2)
CREAT SERPL-MCNC: 2.01 MG/DL (ref 0.7–1.2)
CREATININE URINE: 35.5 MG/DL
GFR AFRICAN AMERICAN: 39.4
GFR AFRICAN AMERICAN: 41.1
GFR NON-AFRICAN AMERICAN: 32.6
GFR NON-AFRICAN AMERICAN: 34
GLUCOSE BLD-MCNC: 196 MG/DL (ref 70–99)
GLUCOSE FASTING: 194 MG/DL (ref 70–99)
GLUCOSE URINE: 100 MG/DL
HBA1C MFR BLD: 8.5 % (ref 4.8–5.9)
HCT VFR BLD CALC: 46.6 % (ref 42–52)
HEMOGLOBIN: 15 G/DL (ref 14–18)
KETONES, URINE: NEGATIVE MG/DL
LEUKOCYTE ESTERASE, URINE: NEGATIVE
MCH RBC QN AUTO: 29.2 PG (ref 27–31.3)
MCHC RBC AUTO-ENTMCNC: 32.3 % (ref 33–37)
MCV RBC AUTO: 90.6 FL (ref 80–100)
MICROALBUMIN UR-MCNC: 2.8 MG/DL
MICROALBUMIN/CREAT UR-RTO: 78.9 MG/G (ref 0–30)
NITRITE, URINE: NEGATIVE
PDW BLD-RTO: 16.6 % (ref 11.5–14.5)
PH UA: 5.5 (ref 5–9)
PHOSPHORUS: 4.7 MG/DL (ref 2.3–4.8)
PLATELET # BLD: 221 K/UL (ref 130–400)
POTASSIUM SERPL-SCNC: 4.2 MEQ/L (ref 3.4–4.9)
POTASSIUM SERPL-SCNC: 4.3 MEQ/L (ref 3.4–4.9)
PROTEIN UA: NEGATIVE MG/DL
RBC # BLD: 5.14 M/UL (ref 4.7–6.1)
SODIUM BLD-SCNC: 136 MEQ/L (ref 135–144)
SODIUM BLD-SCNC: 139 MEQ/L (ref 135–144)
SPECIFIC GRAVITY UA: 1.01 (ref 1–1.03)
UROBILINOGEN, URINE: 0.2 E.U./DL
WBC # BLD: 6.8 K/UL (ref 4.8–10.8)

## 2022-07-29 LAB
FERRITIN: 31 NG/ML (ref 30–400)
IRON SATURATION: 22 % (ref 20–55)
IRON: 73 UG/DL (ref 59–158)
TOTAL IRON BINDING CAPACITY: 336 UG/DL (ref 250–450)
UNSATURATED IRON BINDING CAPACITY: 263 UG/DL (ref 112–347)
VITAMIN D 25-HYDROXY: 80.9 NG/ML

## 2022-07-31 LAB
COMMENT: ABNORMAL
MISCELLANEOUS LAB TEST RESULT: ABNORMAL

## 2022-08-01 LAB
ALBUMIN SERPL-MCNC: 3.49 G/DL (ref 3.75–5.01)
ALPHA-1-GLOBULIN: 0.4 G/DL (ref 0.19–0.46)
ALPHA-2-GLOBULIN: 0.83 G/DL (ref 0.48–1.05)
BETA GLOBULIN: 0.99 G/DL (ref 0.48–1.1)
GAMMA GLOBULIN: 1.3 G/DL (ref 0.62–1.51)
PROTEIN ELECTROPHORESIS, SERUM: ABNORMAL
SPE/IFE INTERPRETATION: ABNORMAL
TOTAL PROTEIN: 7 G/DL (ref 6.3–8.2)

## 2022-09-09 DIAGNOSIS — E11.65 UNCONTROLLED TYPE 2 DIABETES MELLITUS WITH HYPERGLYCEMIA (HCC): ICD-10-CM

## 2022-09-09 RX ORDER — INSULIN GLARGINE 100 [IU]/ML
INJECTION, SOLUTION SUBCUTANEOUS
Qty: 30 ML | Refills: 2 | Status: SHIPPED | OUTPATIENT
Start: 2022-09-09 | End: 2022-10-20 | Stop reason: SDUPTHER

## 2022-09-13 ENCOUNTER — HOSPITAL ENCOUNTER (EMERGENCY)
Age: 72
Discharge: HOME | End: 2022-09-13
Payer: OTHER GOVERNMENT

## 2022-09-13 VITALS
RESPIRATION RATE: 18 BRPM | HEIGHT: 66 IN | WEIGHT: 300 LBS | HEART RATE: 56 BPM | OXYGEN SATURATION: 93 % | TEMPERATURE: 98.1 F | BODY MASS INDEX: 48.21 KG/M2 | DIASTOLIC BLOOD PRESSURE: 70 MMHG | SYSTOLIC BLOOD PRESSURE: 135 MMHG

## 2022-09-13 DIAGNOSIS — J32.0 LEFT MAXILLARY SINUSITIS: Primary | ICD-10-CM

## 2022-09-13 PROCEDURE — 99283 EMERGENCY DEPT VISIT LOW MDM: CPT

## 2022-09-13 PROCEDURE — 6370000000 HC RX 637 (ALT 250 FOR IP): Performed by: PHYSICIAN ASSISTANT

## 2022-09-13 RX ORDER — AMOXICILLIN AND CLAVULANATE POTASSIUM 875; 125 MG/1; MG/1
1 TABLET, FILM COATED ORAL ONCE
Status: COMPLETED | OUTPATIENT
Start: 2022-09-13 | End: 2022-09-13

## 2022-09-13 RX ORDER — AMOXICILLIN AND CLAVULANATE POTASSIUM 875; 125 MG/1; MG/1
1 TABLET, FILM COATED ORAL 2 TIMES DAILY
Qty: 14 TABLET | Refills: 0 | Status: SHIPPED | OUTPATIENT
Start: 2022-09-13 | End: 2022-09-20

## 2022-09-13 RX ORDER — FLUTICASONE PROPIONATE 50 MCG
2 SPRAY, SUSPENSION (ML) NASAL DAILY
Qty: 16 G | Refills: 0 | Status: SHIPPED | OUTPATIENT
Start: 2022-09-13

## 2022-09-13 RX ORDER — IBUPROFEN 600 MG/1
600 TABLET ORAL ONCE
Status: COMPLETED | OUTPATIENT
Start: 2022-09-13 | End: 2022-09-13

## 2022-09-13 RX ORDER — IBUPROFEN 600 MG/1
600 TABLET ORAL EVERY 6 HOURS PRN
Qty: 120 TABLET | Refills: 0 | Status: SHIPPED | OUTPATIENT
Start: 2022-09-13

## 2022-09-13 RX ADMIN — AMOXICILLIN AND CLAVULANATE POTASSIUM 1 TABLET: 875; 125 TABLET, FILM COATED ORAL at 12:09

## 2022-09-13 RX ADMIN — IBUPROFEN 600 MG: 600 TABLET ORAL at 12:09

## 2022-09-13 ASSESSMENT — ENCOUNTER SYMPTOMS
BACK PAIN: 0
NAUSEA: 0
FACIAL SWELLING: 0
SHORTNESS OF BREATH: 0
TROUBLE SWALLOWING: 0
RHINORRHEA: 0
DIARRHEA: 0
SINUS PAIN: 1
COUGH: 0
SORE THROAT: 0
PHOTOPHOBIA: 0
ABDOMINAL PAIN: 0
VOMITING: 0
SINUS PRESSURE: 1

## 2022-09-13 ASSESSMENT — PAIN DESCRIPTION - ORIENTATION: ORIENTATION: LEFT

## 2022-09-13 ASSESSMENT — PAIN DESCRIPTION - LOCATION
LOCATION: FACE
LOCATION: FACE

## 2022-09-13 ASSESSMENT — PAIN DESCRIPTION - PAIN TYPE: TYPE: ACUTE PAIN

## 2022-09-13 ASSESSMENT — PAIN SCALES - GENERAL
PAINLEVEL_OUTOF10: 7
PAINLEVEL_OUTOF10: 7

## 2022-09-13 ASSESSMENT — PAIN DESCRIPTION - DESCRIPTORS: DESCRIPTORS: ACHING

## 2022-09-13 ASSESSMENT — PAIN - FUNCTIONAL ASSESSMENT: PAIN_FUNCTIONAL_ASSESSMENT: 0-10

## 2022-09-13 NOTE — ED PROVIDER NOTES
[address] [facility] ED  eMERGENCY dEPARTMENTeNCOUnter      Pt Name: Mandi Mendiola  MRN: [id number]  Malissagfmukesh 1950  Date ofevaluation: 9/13/2022  Provider: Estefany Kaur PA-C    CHIEF COMPLAINT       Chief Complaint   Patient presents with    Facial Pain     Pain under R eye. States \"It feels like my sinus'\"         HISTORY OF PRESENT ILLNESS   (Location/Symptom, Timing/Onset,Context/Setting, Quality, Duration, Modifying Factors, Severity)  Note limiting factors. This is a 74 y.o. male with PMHx of multiple chronic medical conditions presenting to the ED for concerns of left-sided facial pain radiating into the left upper gumline. He thinks he may have a sinus infection. He has noticed congestion as well. He denies any fevers or chills. He denies any cough, shortness of breath, difficulty breathing or swallowing. Denies any headache. Has not taken anything for the pain prior to arrival.        NursingNotes were reviewed. REVIEW OF SYSTEMS    (2-9 systems for level 4, 10 or more for level 5)     Review of Systems   Constitutional:  Negative for chills, fatigue and fever. HENT:  Positive for congestion, sinus pressure and sinus pain. Negative for ear discharge, ear pain, facial swelling, rhinorrhea, sore throat and trouble swallowing. Eyes:  Negative for photophobia and visual disturbance. Respiratory:  Negative for cough and shortness of breath. Cardiovascular:  Negative for chest pain. Gastrointestinal:  Negative for abdominal pain, diarrhea, nausea and vomiting. Genitourinary:  Negative for dysuria, flank pain, frequency, hematuria and testicular pain. Musculoskeletal:  Negative for back pain, neck pain and neck stiffness. Skin:  Negative for rash and wound. Allergic/Immunologic: Positive for immunocompromised state. Neurological:  Negative for dizziness, weakness, light-headedness and headaches. Hematological:  Negative for adenopathy.  Does not bruise/bleed easily. Psychiatric/Behavioral:  Negative for confusion. The patient is not nervous/anxious. Except as noted above the remainder of the review of systems was reviewed and negative.        PAST MEDICAL HISTORY     Past Medical History:   Diagnosis Date    Chronic kidney disease     Diabetes mellitus ([address]     Hypertension          SURGICALHISTORY       Past Surgical History:   Procedure Laterality Date    APPENDECTOMY      age 13         CURRENT MEDICATIONS       Discharge Medication List as of 9/13/2022 12:17 PM        CONTINUE these medications which have NOT CHANGED    Details   insulin glargine (LANTUS) 100 UNIT/ML injection vial 60 units  Twice day, Disp-30 mL, R-2Normal      insulin aspart (NOVOLOG FLEXPEN) 100 UNIT/ML injection pen INJECT 35 UNITS SUBCUTANEOUSLY WITH BREAKFAST AND INJECT 35 UNITS WITH LUNCH AND INJECT 35 UNITS WITH DINNER SKIP IF NO MEAL/CARBOHYDRATE INTAKE, Disp-15 pen, R-3Normal      Continuous Blood Gluc Sensor (FREESTYLE ANEUDY 14 DAY SENSOR) MISC Every 2 weeks, Disp-2 each, R-3Normal      Continuous Blood Gluc  (FREESTYLE ANEUDY 14 DAY READER) MAKENZIE As directed, Disp-1 each, R-0Normal      rosuvastatin (CRESTOR) 20 MG tablet TAKE ONE TABLET BY MOUTH AT BEDTIMEHistorical Med      montelukast (SINGULAIR) 10 MG tablet TAKE ONE TABLET BY MOUTH AT BEDTIMEHistorical Med      erythromycin (ROMYCIN) 5 MG/GM ophthalmic ointment APPLY A QUARTER INCH RIBBON TO EACH EYE AT BEDTIME, Historical Med      Lancets Misc. (ACCU-CHEK SOFTCLIX LANCET DEV) KIT Historical Med      allopurinol (ZYLOPRIM) 100 MG tablet TAKE TWO TABLETS BY MOUTH EVERY DAY (WITH FOOD AND FLUIDS)Historical Med      ammonium lactate (LAC-HYDRIN) 12 % lotion APPLY A SUFFICIENT AMOUNT EXTERNALLY TWICE A DAY TO DRY SKIN ON LEGS, Historical Med      loratadine (CLARITIN) 10 MG tablet TAKE ONE TABLET BY MOUTH EVERY DAY (WITH FOOD)Historical Med      sildenafil (VIAGRA) 100 MG tablet TAKE ONE-HALF TABLET BY MOUTH AN HOUR_BEFORE SEX. (NO MORE THAN 1 DOSE PER 24 HOURS) NO NITRATESHistorical Med      albuterol sulfate  (90 Base) MCG/ACT inhaler INHALE 2 PUFFS BY MOUTH FOUR TIMES A DAY AS NEEDED FOR SHORTNESS OF BREATH OR WHEEZING. Historical Med      Dextrose, Diabetic Use, (GLUCOSE) 1 g CHEW Take by mouthHistorical Med      simvastatin (ZOCOR) 10 MG tablet Take 1 tablet by mouth nightly, Disp-30 tablet, R-3Print      amLODIPine (NORVASC) 5 MG tablet Take 5 mg by mouth dailyHistorical Med      aspirin 81 MG tablet Take 2 tablets by mouth dailyHistorical Med      vitamin D 1000 units CAPS Take 2 tablets by mouth dailyHistorical Med      furosemide (LASIX) 80 MG tablet Take 80 mg by mouth dailyHistorical Med      losartan (COZAAR) 50 MG tablet Take 50 mg by mouth dailyHistorical Med      tamsulosin (FLOMAX) 0.4 MG capsule Take 2 tablets by mouth nightlyHistorical Med      atenolol (TENORMIN) 50 MG tablet Take 50 mg by mouth 2 times dailyHistorical Med      finasteride (PROSCAR) 5 MG tablet Take 5 mg by mouth dailyHistorical Med      loperamide (IMODIUM) 2 MG capsule Take 2 mg by mouth 4 times daily as needed for DiarrheaHistorical Med                  Niacin and Fluorescein    FAMILY HISTORY     History reviewed. No pertinent family history.        SOCIAL HISTORY       Social History     Socioeconomic History    Marital status: Single     Spouse name: None    Number of children: None    Years of education: None    Highest education level: None   Tobacco Use    Smoking status: Never    Smokeless tobacco: Never   Substance and Sexual Activity    Alcohol use: Not Currently    Drug use: Never   Social History Narrative         Lives With: Alone    Type of Home: House Two level, Able to Live on Main level with bedroom/bathroom    Home Access: Stairs to enter without rails - Number of Steps: 6    Bathroom Shower/Tub: Tub/Shower unit    Bathroom Toilet: Standard    Home Equipment: Cane, Rolling walker    ADL Assistance: Independent    Homemaking Assistance: Independent    Homemaking Responsibilities: Yes    Ambulation Assistance: Independent(Cane)    Transfer Assistance: Independent    Active : Yes     Social Determinants of Health     Financial Resource Strain: Low Risk     Difficulty of Paying Living Expenses: Not hard at all   Food Insecurity: No Food Insecurity    Worried About [address] in the Last Year: Never true    [address] [facility] in the Last Year: Never true       SCREENINGS    Parr[place] Coma Scale  Eye Opening: Spontaneous  Best Verbal Response: Oriented  Best Motor Response: Obeys commands  Jeimy Coma Scale Score: 15        PHYSICAL EXAM    (up to 7 for level 4, 8 or more for level 5)     ED Triage Vitals [09/13/22 1147]   BP Temp Temp Source Heart Rate Resp SpO2 Height Weight   135/70 98.1 °F (36.7 °C) Oral 56 18 93 % 5' 6\" (1.676 m) 300 lb (136.1 kg)       Physical Exam  Vitals and nursing note reviewed. Constitutional:       General: He is not in acute distress. Appearance: Normal appearance. He is normal weight. He is not ill-appearing, toxic-appearing or diaphoretic. HENT:      Head: Normocephalic and atraumatic. Nose: Congestion present. Right Sinus: No maxillary sinus tenderness or frontal sinus tenderness. Left Sinus: Maxillary sinus tenderness and frontal sinus tenderness present. Mouth/Throat:      Mouth: Mucous membranes are moist.      Pharynx: Oropharynx is clear. Eyes:      Extraocular Movements: Extraocular movements intact. Conjunctiva/sclera: Conjunctivae normal.   Pulmonary:      Effort: Pulmonary effort is normal. No respiratory distress. Musculoskeletal:         General: No swelling, tenderness, deformity or signs of injury. Normal range of motion. Cervical back: Normal range of motion and neck supple. Skin:     General: Skin is warm and dry. Capillary Refill: Capillary refill takes less than 2 seconds. Neurological:      General: No focal deficit present. Mental Status: He is alert and oriented to person, place, and time. Psychiatric:         Mood and Affect: Mood normal.         Behavior: Behavior normal.       RESULTS         No orders to display       LABS:  Labs Reviewed - No data to display    All other labs were within normal range or not returned as of this dictation. EMERGENCY DEPARTMENT COURSE and DIFFERENTIAL DIAGNOSIS/MDM:   Vitals:    Vitals:    09/13/22 1147   BP: 135/70   Pulse: 56   Resp: 18   Temp: 98.1 °F (36.7 °C)   TempSrc: Oral   SpO2: 93%   Weight: 300 lb (136.1 kg)   Height: 5' 6\" (1.676 m)             MDM        REASSESSMENT              PROCEDURES:  Unless otherwise noted below, none     Procedures    FINAL IMPRESSION      1.  Left maxillary sinusitis          DISPOSITION/PLAN   DISPOSITION Decision To Discharge 09/13/2022 12:17:39 PM      PATIENT REFERREDTO:  [facility]  [address] [address]  [phone]  Go to   As needed, If symptoms worsen      DISCHARGEMEDICATIONS:  Discharge Medication List as of 9/13/2022 12:17 PM        START taking these medications    Details   amoxicillin-clavulanate (AUGMENTIN) 875-125 MG per tablet Take 1 tablet by mouth 2 times daily for 7 days, Disp-14 tablet, R-0Normal      ibuprofen (IBU) 600 MG tablet Take 1 tablet by mouth every 6 hours as needed for Pain, Disp-120 tablet, R-0Normal      fluticasone (FLONASE) 50 MCG/ACT nasal spray 2 sprays by Each Nostril route daily, Disp-16 g, R-0Normal      Chlorphen-Phenyleph-APAP (CORICIDIN D COLD/FLU/SINUS) 2-5-325 MG TABS Take 1 tablet by mouth every 6-8 hours as needed (congestion), Disp-168 tablet, R-0Normal                (Please note that portions of this note were completed with a voice recognition program.  Efforts were made to edit the dictations but occasionally words are mis-transcribed.)    Kayla Jacob PA-C (electronically signed)  Attending Emergency Physician       Kayla Jacob PA-C  09/13/22 [id number]

## 2022-10-12 DIAGNOSIS — E11.65 UNCONTROLLED TYPE 2 DIABETES MELLITUS WITH HYPERGLYCEMIA (HCC): ICD-10-CM

## 2022-10-12 LAB
ANION GAP SERPL CALCULATED.3IONS-SCNC: 16 MEQ/L (ref 9–15)
BUN BLDV-MCNC: 52 MG/DL (ref 8–23)
CALCIUM SERPL-MCNC: 9.7 MG/DL (ref 8.5–9.9)
CHLORIDE BLD-SCNC: 97 MEQ/L (ref 95–107)
CO2: 28 MEQ/L (ref 20–31)
CREAT SERPL-MCNC: 2.33 MG/DL (ref 0.7–1.2)
GFR AFRICAN AMERICAN: 33.2
GFR NON-AFRICAN AMERICAN: 27.5
GLUCOSE FASTING: 231 MG/DL (ref 70–99)
HBA1C MFR BLD: 9.6 % (ref 4.8–5.9)
POTASSIUM SERPL-SCNC: 4.7 MEQ/L (ref 3.4–4.9)
SODIUM BLD-SCNC: 141 MEQ/L (ref 135–144)

## 2022-10-20 ENCOUNTER — OFFICE VISIT (OUTPATIENT)
Dept: ENDOCRINOLOGY | Age: 72
End: 2022-10-20
Payer: MEDICARE

## 2022-10-20 VITALS
DIASTOLIC BLOOD PRESSURE: 75 MMHG | BODY MASS INDEX: 46.12 KG/M2 | WEIGHT: 287 LBS | HEART RATE: 66 BPM | HEIGHT: 66 IN | OXYGEN SATURATION: 93 % | SYSTOLIC BLOOD PRESSURE: 115 MMHG

## 2022-10-20 DIAGNOSIS — E11.65 UNCONTROLLED TYPE 2 DIABETES MELLITUS WITH HYPERGLYCEMIA (HCC): Primary | ICD-10-CM

## 2022-10-20 DIAGNOSIS — E66.01 MORBID OBESITY (HCC): ICD-10-CM

## 2022-10-20 LAB
CHP ED QC CHECK: NORMAL
GLUCOSE BLD-MCNC: 138 MG/DL

## 2022-10-20 PROCEDURE — 1123F ACP DISCUSS/DSCN MKR DOCD: CPT | Performed by: INTERNAL MEDICINE

## 2022-10-20 PROCEDURE — 3046F HEMOGLOBIN A1C LEVEL >9.0%: CPT | Performed by: INTERNAL MEDICINE

## 2022-10-20 PROCEDURE — 99213 OFFICE O/P EST LOW 20 MIN: CPT | Performed by: INTERNAL MEDICINE

## 2022-10-20 PROCEDURE — 82962 GLUCOSE BLOOD TEST: CPT | Performed by: INTERNAL MEDICINE

## 2022-10-20 PROCEDURE — 3074F SYST BP LT 130 MM HG: CPT | Performed by: INTERNAL MEDICINE

## 2022-10-20 PROCEDURE — 3078F DIAST BP <80 MM HG: CPT | Performed by: INTERNAL MEDICINE

## 2022-10-20 RX ORDER — TORSEMIDE 20 MG/1
60 TABLET ORAL
COMMUNITY
Start: 2022-05-01

## 2022-10-20 RX ORDER — CARVEDILOL 12.5 MG/1
12.5 TABLET ORAL
COMMUNITY
Start: 2022-05-01

## 2022-10-20 RX ORDER — INSULIN GLARGINE 100 [IU]/ML
INJECTION, SOLUTION SUBCUTANEOUS
Qty: 30 ML | Refills: 2
Start: 2022-10-20

## 2022-10-20 NOTE — PROGRESS NOTES
10/20/2022    Assessment:       Diagnosis Orders   1. Uncontrolled type 2 diabetes mellitus with hyperglycemia (HCC)  POCT Glucose            PLAN:     Orders Placed This Encounter   Procedures    Basic Metabolic Panel     Standing Status:   Future     Standing Expiration Date:   10/20/2023    Hemoglobin A1C     Standing Status:   Future     Standing Expiration Date:   10/20/2023    POCT Glucose     Orders Placed This Encounter   Medications    insulin glargine (LANTUS) 100 UNIT/ML injection vial     Si units  Twice day     Dispense:  30 mL     Refill:  2     Continue current dose of Lantus 60 units twice a day plus Humalog 35 with each meals A1c goal of 7 or lower      Orders Placed This Encounter   Procedures    POCT Glucose     No orders of the defined types were placed in this encounter. No follow-ups on file. Subjective:     Chief Complaint   Patient presents with    Diabetes     Patient has concerns with Ozempic     Vitals:    10/20/22 1428   BP: 115/75   Site: Left Upper Arm   Position: Sitting   Cuff Size: Large Adult   Pulse: 66   SpO2: 93%   Weight: 287 lb (130.2 kg)   Height: 5' 6\" (1.676 m)     Wt Readings from Last 3 Encounters:   10/20/22 287 lb (130.2 kg)   22 300 lb (136.1 kg)   22 295 lb (133.8 kg)     BP Readings from Last 3 Encounters:   10/20/22 115/75   22 135/70   22 117/63     Follow-up on type 2 diabetes history of morbid obesity patient also has chronic kidney disease currently on Lantus 60 units twice a day plus Humalog 35 units with each meals overall blood sugars running close to 250 hemoglobin A1c was 9.6 obesity BMI 46  Patient also following up with nephrologist    Diabetes  He presents for his follow-up diabetic visit. He has type 2 diabetes mellitus. Associated symptoms include fatigue. Pertinent negatives for diabetes include no polyuria and no weight loss. Symptoms are worsening. Diabetic complications include nephropathy.  Risk factors for coronary artery disease include obesity. He is currently taking insulin pre-breakfast, pre-lunch, pre-dinner and at bedtime. He never participates in exercise. His overall blood glucose range is >200 mg/dl.  (Hemoglobin A1C       Date                     Value               Ref Range           Status                10/12/2022               9.6 (H)             4.8 - 5.9 %         Final            ----------  )   Past Medical History:   Diagnosis Date    Chronic kidney disease     Diabetes mellitus ([facility] [address]     Hypertension      Past Surgical History:   Procedure Laterality Date    APPENDECTOMY      age 13     Social History     Socioeconomic History    Marital status: Single     Spouse name: Not on file    Number of children: Not on file    Years of education: Not on file    Highest education level: Not on file   Occupational History    Not on file   Tobacco Use    Smoking status: Never    Smokeless tobacco: Never   Substance and Sexual Activity    Alcohol use: Not Currently    Drug use: Never    Sexual activity: Not on file   Other Topics Concern    Not on file   Social History Narrative         Lives With: Alone    Type of Home: House Two level, Able to Live on Main level with bedroom/bathroom    Home Access: Stairs to enter without rails - Number of Steps: 6    Bathroom Shower/Tub: Tub/Shower unit    Bathroom Toilet: Standard    Home Equipment: Ricardo Corine, Rolling walker    ADL Assistance: Independent    Homemaking Assistance: Independent    Homemaking Responsibilities: Yes    Ambulation Assistance: Independent(Cane)    Transfer Assistance: Independent    Active : Yes     Social Determinants of Health     Financial Resource Strain: Low Risk     Difficulty of Paying Living Expenses: Not hard at all   Food Insecurity: No Food Insecurity    Worried About Running Out of Food in the Last Year: Never true    Ran Out of Food in the Last Year: Never true   Transportation Needs: Not on file   Physical Activity: Not on file   Stress: Not on file   Social Connections: Not on file   Intimate Partner Violence: Not on file   Housing Stability: Not on file     No family history on file.   Allergies   Allergen Reactions    Niacin Other (See Comments)    Fluorescein Diarrhea, Nausea And Vomiting and Other (See Comments)       Current Outpatient Medications:     torsemide (DEMADEX) 20 MG tablet, 60 mg, Disp: , Rfl:     carvedilol (COREG) 12.5 MG tablet, 12.5 mg, Disp: , Rfl:     ibuprofen (IBU) 600 MG tablet, Take 1 tablet by mouth every 6 hours as needed for Pain, Disp: 120 tablet, Rfl: 0    fluticasone (FLONASE) 50 MCG/ACT nasal spray, 2 sprays by Each Nostril route daily, Disp: 16 g, Rfl: 0    Chlorphen-Phenyleph-APAP (CORICIDIN D COLD/FLU/SINUS) 2-5-325 MG TABS, Take 1 tablet by mouth every 6-8 hours as needed (congestion), Disp: 168 tablet, Rfl: 0    insulin glargine (LANTUS) 100 UNIT/ML injection vial, 60 units  Twice day, Disp: 30 mL, Rfl: 2    insulin aspart (NOVOLOG FLEXPEN) 100 UNIT/ML injection pen, INJECT 35 UNITS SUBCUTANEOUSLY WITH BREAKFAST AND INJECT 35 UNITS WITH LUNCH AND INJECT 35 UNITS WITH DINNER SKIP IF NO MEAL/CARBOHYDRATE INTAKE, Disp: 15 pen, Rfl: 3    Continuous Blood Gluc Sensor (FREESTYLE ANEUDY 14 DAY SENSOR) MISC, Every 2 weeks, Disp: 2 each, Rfl: 3    Continuous Blood Gluc  (FREESTYLE ANEUDY 14 DAY READER) MAKENZIE, As directed, Disp: 1 each, Rfl: 0    rosuvastatin (CRESTOR) 20 MG tablet, TAKE ONE TABLET BY MOUTH AT BEDTIME, Disp: , Rfl:     montelukast (SINGULAIR) 10 MG tablet, TAKE ONE TABLET BY MOUTH AT BEDTIME, Disp: , Rfl:     erythromycin (ROMYCIN) 5 MG/GM ophthalmic ointment, APPLY A QUARTER INCH RIBBON TO EACH EYE AT BEDTIME, Disp: , Rfl:     Lancets Mis. (ACCU-CHEK SOFTCLIX LANCET DEV) KIT, USE LANCET(S) TESTING AS DIRECTED FOR BLOOD SUGAR, Disp: , Rfl:     allopurinol (ZYLOPRIM) 100 MG tablet, TAKE TWO TABLETS BY MOUTH EVERY DAY (WITH FOOD AND FLUIDS), Disp: , Rfl:     ammonium lactate (LAC-HYDRIN) 12 % lotion, APPLY A SUFFICIENT AMOUNT EXTERNALLY TWICE A DAY TO DRY SKIN ON LEGS, Disp: , Rfl:     loratadine (CLARITIN) 10 MG tablet, TAKE ONE TABLET BY MOUTH EVERY DAY (WITH FOOD), Disp: , Rfl:     sildenafil (VIAGRA) 100 MG tablet, TAKE ONE-HALF TABLET BY MOUTH AN HOUR_BEFORE SEX.  (NO MORE THAN 1 DOSE PER 24 HOURS) NO NITRATES, Disp: , Rfl:     albuterol sulfate  (90 Base) MCG/ACT inhaler, INHALE 2 PUFFS BY MOUTH FOUR TIMES A DAY AS NEEDED FOR SHORTNESS OF BREATH OR WHEEZING., Disp: , Rfl:     Dextrose, Diabetic Use, (GLUCOSE) 1 g CHEW, Take by mouth, Disp: , Rfl:     simvastatin (ZOCOR) 10 MG tablet, Take 1 tablet by mouth nightly, Disp: 30 tablet, Rfl: 3    amLODIPine (NORVASC) 5 MG tablet, Take 5 mg by mouth daily, Disp: , Rfl:     aspirin 81 MG tablet, Take 2 tablets by mouth daily, Disp: , Rfl:     vitamin D 1000 units CAPS, Take 2 tablets by mouth daily, Disp: , Rfl:     furosemide (LASIX) 80 MG tablet, Take 80 mg by mouth daily, Disp: , Rfl:     losartan (COZAAR) 50 MG tablet, Take 50 mg by mouth daily, Disp: , Rfl:     tamsulosin (FLOMAX) 0.4 MG capsule, Take 2 tablets by mouth nightly, Disp: , Rfl:     atenolol (TENORMIN) 50 MG tablet, Take 50 mg by mouth 2 times daily, Disp: , Rfl:     finasteride (PROSCAR) 5 MG tablet, Take 5 mg by mouth daily, Disp: , Rfl:     loperamide (IMODIUM) 2 MG capsule, Take 2 mg by mouth 4 times daily as needed for Diarrhea, Disp: , Rfl:   Lab Results   Component Value Date     10/12/2022    K 4.7 10/12/2022    CL 97 10/12/2022    CO2 28 10/12/2022    BUN 52 (H) 10/12/2022    CREATININE 2.33 (H) 10/12/2022    GLUCOSE 138 10/20/2022    CALCIUM 9.7 10/12/2022    PROT 7.0 07/28/2022    LABALBU 3.9 07/28/2022    LABALBU 3.49 (L) 07/28/2022    BILITOT 0.6 11/20/2020    ALKPHOS 134 (H) 11/20/2020    AST 16 11/20/2020    ALT 11 11/20/2020    LABGLOM 27.5 (L) 10/12/2022    GFRAA 33.2 (L) 10/12/2022    GLOB 3.1 11/20/2020     Lab Results   Component Value Date    WBC 6.8 07/28/2022 HGB 15.0 07/28/2022    HCT 46.6 07/28/2022    MCV 90.6 07/28/2022     07/28/2022     Lab Results   Component Value Date    LABA1C 9.6 (H) 10/12/2022    LABA1C 8.5 (H) 07/28/2022    LABA1C 9.7 07/12/2022     Lab Results   Component Value Date    HDL 31 (L) 11/23/2021    HDL 29 (L) 08/01/2013    LDLCALC 58 11/23/2021    LDLCALC 40 08/01/2013    CHOL 105 11/23/2021    CHOL 127 08/01/2013    TRIG 80 11/23/2021    TRIG 288 (H) 08/01/2013     No results found for: TESTM  No results found for: TSH, TSHREFLEX, FT3, T4FREE  No results found for: TPOABS    Review of Systems   Constitutional:  Positive for fatigue. Negative for weight loss. Endocrine: Negative for polyuria. All other systems reviewed and are negative. Objective:   Physical Exam  Vitals reviewed. Constitutional:       General: He is not in acute distress. Appearance: Normal appearance. He is obese. HENT:      Head: Normocephalic and atraumatic. Right Ear: External ear normal.      Left Ear: External ear normal.      Nose: Nose normal.   Eyes:      General: No scleral icterus. Right eye: No discharge. Left eye: No discharge. Extraocular Movements: Extraocular movements intact. Conjunctiva/sclera: Conjunctivae normal.   Cardiovascular:      Rate and Rhythm: Normal rate. Pulmonary:      Effort: Pulmonary effort is normal.   Musculoskeletal:         General: Normal range of motion. Cervical back: Normal range of motion and neck supple. Neurological:      General: No focal deficit present. Mental Status: He is alert and oriented to person, place, and time.    Psychiatric:         Mood and Affect: Mood normal.         Behavior: Behavior normal.

## 2022-12-17 ENCOUNTER — APPOINTMENT (OUTPATIENT)
Dept: CT IMAGING | Age: 72
End: 2022-12-17
Payer: MEDICARE

## 2022-12-17 ENCOUNTER — HOSPITAL ENCOUNTER (EMERGENCY)
Age: 72
Discharge: HOME | End: 2022-12-18
Payer: MEDICARE

## 2022-12-17 DIAGNOSIS — S09.90XA INJURY OF HEAD, INITIAL ENCOUNTER: Primary | ICD-10-CM

## 2022-12-17 DIAGNOSIS — M54.2 NECK PAIN: ICD-10-CM
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
## 2022-12-17 DIAGNOSIS — N39.0 URINARY TRACT INFECTION WITHOUT HEMATURIA, SITE UNSPECIFIED: ICD-10-CM

## 2022-12-17 DIAGNOSIS — H10.9 CONJUNCTIVITIS OF BOTH EYES, UNSPECIFIED CONJUNCTIVITIS TYPE: ICD-10-CM

## 2022-12-17 LAB
ALBUMIN SERPL-MCNC: 3.7 G/DL (ref 3.5–4.6)
ALP BLD-CCNC: 125 U/L (ref 35–104)
ALT SERPL-CCNC: 13 U/L (ref 0–41)
ANION GAP SERPL CALCULATED.3IONS-SCNC: 15 MEQ/L (ref 9–15)
APTT: 32.1 SEC (ref 24.4–36.8)
AST SERPL-CCNC: 17 U/L (ref 0–40)
BASOPHILS ABSOLUTE: 0 K/UL (ref 0–0.2)
BASOPHILS RELATIVE PERCENT: 0.5 %
BILIRUB SERPL-MCNC: 0.6 MG/DL (ref 0.2–0.7)
BUN BLDV-MCNC: 34 MG/DL (ref 8–23)
CALCIUM SERPL-MCNC: 9.4 MG/DL (ref 8.5–9.9)
CHLORIDE BLD-SCNC: 100 MEQ/L (ref 95–107)
CO2: 27 MEQ/L (ref 20–31)
CREAT SERPL-MCNC: 1.81 MG/DL (ref 0.7–1.2)
EOSINOPHILS ABSOLUTE: 0 K/UL (ref 0–0.7)
EOSINOPHILS RELATIVE PERCENT: 0.1 %
ETHANOL PERCENT: NORMAL G/DL
ETHANOL: <10 MG/DL (ref 0–0.08)
GFR SERPL CREATININE-BSD FRML MDRD: 38.6 ML/MIN/{1.73_M2}
GLOBULIN: 3.3 G/DL (ref 2.3–3.5)
GLUCOSE BLD-MCNC: 155 MG/DL (ref 70–99)
HCT VFR BLD CALC: 45.9 % (ref 42–52)
HEMOGLOBIN: 15.4 G/DL (ref 14–18)
INR BLD: 1.1
LYMPHOCYTES ABSOLUTE: 0.6 K/UL (ref 1–4.8)
LYMPHOCYTES RELATIVE PERCENT: 6.2 %
MCH RBC QN AUTO: 32.6 PG (ref 27–31.3)
MCHC RBC AUTO-ENTMCNC: 33.5 % (ref 33–37)
MCV RBC AUTO: 97.2 FL (ref 79–92.2)
MONOCYTES ABSOLUTE: 0.7 K/UL (ref 0.2–0.8)
MONOCYTES RELATIVE PERCENT: 7.2 %
NEUTROPHILS ABSOLUTE: 8.8 K/UL (ref 1.4–6.5)
NEUTROPHILS RELATIVE PERCENT: 86 %
PDW BLD-RTO: 14.6 % (ref 11.5–14.5)
PLATELET # BLD: 178 K/UL (ref 130–400)
POTASSIUM SERPL-SCNC: 4.1 MEQ/L (ref 3.4–4.9)
PROTHROMBIN TIME: 14.3 SEC (ref 12.3–14.9)
RBC # BLD: 4.73 M/UL (ref 4.7–6.1)
SODIUM BLD-SCNC: 142 MEQ/L (ref 135–144)
TOTAL PROTEIN: 7 G/DL (ref 6.3–8)
WBC # BLD: 10.2 K/UL (ref 4.8–10.8)

## 2022-12-17 PROCEDURE — 80053 COMPREHEN METABOLIC PANEL: CPT

## 2022-12-17 PROCEDURE — 87186 SC STD MICRODIL/AGAR DIL: CPT

## 2022-12-17 PROCEDURE — 85730 THROMBOPLASTIN TIME PARTIAL: CPT

## 2022-12-17 PROCEDURE — 99285 EMERGENCY DEPT VISIT HI MDM: CPT

## 2022-12-17 PROCEDURE — 70450 CT HEAD/BRAIN W/O DYE: CPT

## 2022-12-17 PROCEDURE — 72125 CT NECK SPINE W/O DYE: CPT

## 2022-12-17 PROCEDURE — 87086 URINE CULTURE/COLONY COUNT: CPT

## 2022-12-17 PROCEDURE — 36415 COLL VENOUS BLD VENIPUNCTURE: CPT

## 2022-12-17 PROCEDURE — 85610 PROTHROMBIN TIME: CPT

## 2022-12-17 PROCEDURE — 81001 URINALYSIS AUTO W/SCOPE: CPT

## 2022-12-17 PROCEDURE — 87088 URINE BACTERIA CULTURE: CPT

## 2022-12-17 PROCEDURE — 85025 COMPLETE CBC W/AUTO DIFF WBC: CPT

## 2022-12-17 PROCEDURE — 82077 ASSAY SPEC XCP UR&BREATH IA: CPT

## 2022-12-17 ASSESSMENT — ENCOUNTER SYMPTOMS
NAUSEA: 0
ANAL BLEEDING: 0
VOICE CHANGE: 0
VOMITING: 0
BACK PAIN: 0
ABDOMINAL DISTENTION: 0
EYE DISCHARGE: 0
COUGH: 0

## 2022-12-17 ASSESSMENT — PAIN - FUNCTIONAL ASSESSMENT: PAIN_FUNCTIONAL_ASSESSMENT: NONE - DENIES PAIN

## 2022-12-18 VITALS
HEIGHT: 66 IN | WEIGHT: 300 LBS | DIASTOLIC BLOOD PRESSURE: 70 MMHG | HEART RATE: 74 BPM | SYSTOLIC BLOOD PRESSURE: 154 MMHG | RESPIRATION RATE: 18 BRPM | OXYGEN SATURATION: 94 % | TEMPERATURE: 97.1 F | BODY MASS INDEX: 48.21 KG/M2

## 2022-12-18 LAB
BACTERIA: ABNORMAL /HPF
BILIRUBIN URINE: NEGATIVE
BLOOD, URINE: ABNORMAL
CLARITY: CLEAR
COLOR: YELLOW
EPITHELIAL CELLS, UA: ABNORMAL /HPF (ref 0–5)
GLUCOSE URINE: NEGATIVE MG/DL
HYALINE CASTS: ABNORMAL /HPF (ref 0–5)
KETONES, URINE: NEGATIVE MG/DL
LEUKOCYTE ESTERASE, URINE: ABNORMAL
NITRITE, URINE: NEGATIVE
PH UA: 5.5 (ref 5–9)
PROTEIN UA: 30 MG/DL
RBC UA: ABNORMAL /HPF (ref 0–2)
SPECIFIC GRAVITY UA: 1.01 (ref 1–1.03)
URINE REFLEX TO CULTURE: YES
UROBILINOGEN, URINE: 0.2 E.U./DL
WBC UA: ABNORMAL /HPF (ref 0–5)

## 2022-12-18 PROCEDURE — 6370000000 HC RX 637 (ALT 250 FOR IP): Performed by: PHYSICIAN ASSISTANT

## 2022-12-18 RX ORDER — SULFAMETHOXAZOLE AND TRIMETHOPRIM 800; 160 MG/1; MG/1
1 TABLET ORAL 2 TIMES DAILY
Qty: 20 TABLET | Refills: 0 | Status: SHIPPED | OUTPATIENT
Start: 2022-12-18 | End: 2022-12-28

## 2022-12-18 RX ORDER — SULFAMETHOXAZOLE AND TRIMETHOPRIM 800; 160 MG/1; MG/1
1 TABLET ORAL ONCE
Status: COMPLETED | OUTPATIENT
Start: 2022-12-18 | End: 2022-12-18

## 2022-12-18 RX ORDER — GENTAMICIN SULFATE 3 MG/ML
1 SOLUTION/ DROPS OPHTHALMIC 4 TIMES DAILY
Status: DISCONTINUED | OUTPATIENT
Start: 2022-12-18 | End: 2022-12-18 | Stop reason: HOSPADM

## 2022-12-18 RX ADMIN — GENTAMICIN SULFATE 1 DROP: 3 SOLUTION OPHTHALMIC at 02:38

## 2022-12-18 RX ADMIN — SULFAMETHOXAZOLE AND TRIMETHOPRIM 1 TABLET: 800; 160 TABLET ORAL at 02:38

## 2022-12-18 ASSESSMENT — PAIN - FUNCTIONAL ASSESSMENT
PAIN_FUNCTIONAL_ASSESSMENT: NONE - DENIES PAIN

## 2022-12-18 NOTE — ED TRIAGE NOTES
Patient arrived from home via [facility] with complaint of waking up on floor next to bed. Patient usually gets around with walker. Patient has no complaints other then painful urination for last 1 week. Patient A&OX4. Skin pink, warm, and dry. No distress noted.

## 2022-12-18 NOTE — DISCHARGE INSTRUCTIONS
Follow-up with your primary care physician. Return to if any symptoms worsen or new symptoms develop. Eyedrops 1 drop each eye 4 times a day for 7 days.

## 2022-12-18 NOTE — ED PROVIDER NOTES
[address] [facility] ED  eMERGENCY dEPARTMENT eNCOUnter      Pt Name: Gill Olszewski  MRN: [id number]  [place] 1950  Date of evaluation: 12/17/2022  Provider: Amber Shrestha PA-C    CHIEF COMPLAINT       Chief Complaint   Patient presents with    Fall     Woke up on floor next to bed. HISTORY OF PRESENT ILLNESS   (Location/Symptom, Timing/Onset,Context/Setting, Quality, Duration, Modifying Factors, Severity)  Note limiting factors. Gill Olszewski is a 74 y.o. male who presents to the emergency department patient states remembers going to bed around 4:00 woke up on the floor next to the denies headache difficulty seeing hearing speaking does have some neck pain does have some burning with urination denies chest pain back pain abdominal pain. Does have some drainage noted from his eyes purulent nature. Symptoms mild to moderate severity nothing proves worsen symptom       HPI    NursingNotes were reviewed. REVIEW OF SYSTEMS    (2-9 systems for level 4, 10 or more for level 5)     Review of Systems   Constitutional:  Negative for activity change, appetite change and unexpected weight change. HENT:  Negative for ear discharge, nosebleeds and voice change. Eyes:  Negative for discharge. Respiratory:  Negative for cough. Cardiovascular:  Negative for chest pain. Gastrointestinal:  Negative for abdominal distention, anal bleeding, nausea and vomiting. Genitourinary:  Positive for dysuria. Musculoskeletal:  Positive for neck pain. Negative for back pain, joint swelling and neck stiffness. Skin:  Negative for pallor. Neurological:  Negative for seizures and facial asymmetry. Hematological:  Bruises/bleeds easily. Psychiatric/Behavioral:  Negative for behavioral problems, self-injury and sleep disturbance. All other systems reviewed and are negative. Except as noted above the remainder of the review of systems was reviewed and negative.        PAST MEDICAL HISTORY Past Medical History:   Diagnosis Date    Chronic kidney disease     Diabetes mellitus ([facility] [address]     Hypertension          SURGICALHISTORY       Past Surgical History:   Procedure Laterality Date    APPENDECTOMY      age 13         CURRENT MEDICATIONS       Discharge Medication List as of 12/18/2022  3:04 AM        CONTINUE these medications which have NOT CHANGED    Details   torsemide (DEMADEX) 20 MG tablet 60 mgHistorical Med      carvedilol (COREG) 12.5 MG tablet 12.5 mgHistorical Med      insulin glargine (LANTUS) 100 UNIT/ML injection vial 60 units  Twice day, Disp-30 mL, R-2NO PRINT      ibuprofen (IBU) 600 MG tablet Take 1 tablet by mouth every 6 hours as needed for Pain, Disp-120 tablet, R-0Normal      fluticasone (FLONASE) 50 MCG/ACT nasal spray 2 sprays by Each Nostril route daily, Disp-16 g, R-0Normal      Chlorphen-Phenyleph-APAP (CORICIDIN D COLD/FLU/SINUS) 2-5-325 MG TABS Take 1 tablet by mouth every 6-8 hours as needed (congestion), Disp-168 tablet, R-0Normal      insulin aspart (NOVOLOG FLEXPEN) 100 UNIT/ML injection pen INJECT 35 UNITS SUBCUTANEOUSLY WITH BREAKFAST AND INJECT 35 UNITS WITH LUNCH AND INJECT 35 UNITS WITH DINNER SKIP IF NO MEAL/CARBOHYDRATE INTAKE, Disp-15 pen, R-3Normal      Continuous Blood Gluc Sensor (FREESTYLE ANEUDY 14 DAY SENSOR) MISC Every 2 weeks, Disp-2 each, R-3Normal      Continuous Blood Gluc  (FREESTYLE ANEUDY 14 DAY READER) MAKENZIE As directed, Disp-1 each, R-0Normal      rosuvastatin (CRESTOR) 20 MG tablet TAKE ONE TABLET BY MOUTH AT BEDTIMEHistorical Med      montelukast (SINGULAIR) 10 MG tablet TAKE ONE TABLET BY MOUTH AT BEDTIMEHistorical Med      erythromycin (ROMYCIN) 5 MG/GM ophthalmic ointment APPLY A QUARTER INCH RIBBON TO EACH EYE AT BEDTIME, Historical Med      Lancets Misc. (ACCU-CHEK SOFTCLIX LANCET DEV) KIT Historical Med      allopurinol (ZYLOPRIM) 100 MG tablet TAKE TWO TABLETS BY MOUTH EVERY DAY (WITH FOOD AND FLUIDS)Historical Med      ammonium lactate (LAC-HYDRIN) 12 % lotion APPLY A SUFFICIENT AMOUNT EXTERNALLY TWICE A DAY TO DRY SKIN ON LEGS, Historical Med      loratadine (CLARITIN) 10 MG tablet TAKE ONE TABLET BY MOUTH EVERY DAY (WITH FOOD)Historical Med      sildenafil (VIAGRA) 100 MG tablet TAKE ONE-HALF TABLET BY MOUTH AN HOUR_BEFORE SEX. (NO MORE THAN 1 DOSE PER 24 HOURS) NO NITRATESHistorical Med      albuterol sulfate  (90 Base) MCG/ACT inhaler INHALE 2 PUFFS BY MOUTH FOUR TIMES A DAY AS NEEDED FOR SHORTNESS OF BREATH OR WHEEZING. Historical Med      Dextrose, Diabetic Use, (GLUCOSE) 1 g CHEW Take by mouthHistorical Med      simvastatin (ZOCOR) 10 MG tablet Take 1 tablet by mouth nightly, Disp-30 tablet, R-3Print      amLODIPine (NORVASC) 5 MG tablet Take 5 mg by mouth dailyHistorical Med      aspirin 81 MG tablet Take 2 tablets by mouth dailyHistorical Med      vitamin D 1000 units CAPS Take 2 tablets by mouth dailyHistorical Med      furosemide (LASIX) 80 MG tablet Take 80 mg by mouth dailyHistorical Med      losartan (COZAAR) 50 MG tablet Take 50 mg by mouth dailyHistorical Med      tamsulosin (FLOMAX) 0.4 MG capsule Take 2 tablets by mouth nightlyHistorical Med      atenolol (TENORMIN) 50 MG tablet Take 50 mg by mouth 2 times dailyHistorical Med      finasteride (PROSCAR) 5 MG tablet Take 5 mg by mouth dailyHistorical Med      loperamide (IMODIUM) 2 MG capsule Take 2 mg by mouth 4 times daily as needed for DiarrheaHistorical Med                  Niacin and Fluorescein    FAMILY HISTORY     History reviewed. No pertinent family history.        SOCIAL HISTORY       Social History     Socioeconomic History    Marital status: Single     Spouse name: None    Number of children: None    Years of education: None    Highest education level: None   Tobacco Use    Smoking status: Never    Smokeless tobacco: Never   Substance and Sexual Activity    Alcohol use: Not Currently    Drug use: Never   Social History Narrative         Lives With: Alone Type of Home: House Two level, Able to Live on Main level with bedroom/bathroom    Home Access: Stairs to enter without rails - Number of Steps: 6    Bathroom Shower/Tub: Tub/Shower unit    Bathroom Toilet: Standard    Home Equipment: Barrios Diane, Rolling walker    ADL Assistance: [address]: Independent    Homemaking Responsibilities: Yes    Ambulation Assistance: Independent(Cane)    Transfer Assistance: Independent    Active : Yes     Social Determinants of Health     Financial Resource Strain: Low Risk     Difficulty of Paying Living Expenses: Not hard at all   Food Insecurity: No Food Insecurity    Worried About [facility] in the Last Year: Never true    [facility] in the Last Year: Never true       SCREENINGS   [place] Coma Scale  Eye Opening: Spontaneous  Best Verbal Response: Oriented  Best Motor Response: Obeys commands  [place]imy Coma Scale Score: 15  Ebola Virus Disease (EVD) Screening   Temp: 97.1 °F (36.2 °C)  CIWA Assessment  BP: (!) 154/70  Heart Rate: 74    PHYSICAL EXAM    (up to 7 for level 4, 8 or more for level 5)     ED Triage Vitals [12/17/22 2126]   BP Temp Temp Source Heart Rate Resp SpO2 Height Weight   (!) 152/65 97.1 °F (36.2 °C) Oral 60 18 93 % 5' 6\" (1.676 m) 300 lb (136.1 kg)       Physical Exam  Vitals and nursing note reviewed. Constitutional:       General: He is not in acute distress. Appearance: He is well-developed. HENT:      Head: Normocephalic and atraumatic. Right Ear: Tympanic membrane and external ear normal.      Left Ear: Tympanic membrane and external ear normal.      Nose: Nose normal.      Mouth/Throat:      Mouth: Mucous membranes are moist.   Eyes:      General:         Right eye: Discharge present. Left eye: Discharge present. Conjunctiva/sclera: Conjunctivae normal.      Pupils: Pupils are equal, round, and reactive to light.    Neck:     Cardiovascular:      Rate and Rhythm: Normal rate and regular rhythm. Pulses: Normal pulses. Heart sounds: Normal heart sounds. Pulmonary:      Effort: Pulmonary effort is normal. No respiratory distress. Breath sounds: Normal breath sounds. No stridor. Abdominal:      General: Bowel sounds are normal. There is no distension. Palpations: Abdomen is soft. Tenderness: There is no abdominal tenderness. There is no right CVA tenderness or left CVA tenderness. Musculoskeletal:         General: Normal range of motion. Cervical back: Normal range of motion and neck supple. Tenderness present. Right lower leg: Edema present. Left lower leg: Edema present. Skin:     General: Skin is warm. Findings: No erythema. Neurological:      Mental Status: He is alert and oriented to person, place, and time. Psychiatric:         Mood and Affect: Mood normal.       RESULTS     EKG: All EKG's are interpreted by the Emergency Department Physician who either signs or Co-signsthis chart in the absence of a cardiologist.         RADIOLOGY:   Berna Jacobo such as CT, Ultrasound and MRI are read by the radiologist. Mehdi Hernandez radiographic images are visualized and preliminarily interpreted by the emergency physician with the below findings:         Interpretation per the Radiologist below, if available at the time ofthis note:    CT Head W/O Contrast   Final Result   Generalized atrophy and chronic changes seen within the brain with no acute   intracranial abnormality. CT CERVICAL SPINE WO CONTRAST   Final Result   Multilevel degenerative changes seen throughout the cervical spine with no   acute abnormality of the cervical spine.                ED BEDSIDE ULTRASOUND:   Performed by ED Physician - none    LABS:  Labs Reviewed   COMPREHENSIVE METABOLIC PANEL - Abnormal; Notable for the following components:       Result Value    Glucose 155 (*)     BUN 34 (*)     Creatinine 1.81 (*)     Est, Glom Filt Rate 38.6 (*)     Alkaline Phosphatase 125 (*)     All other components within normal limits   CBC WITH AUTO DIFFERENTIAL - Abnormal; Notable for the following components:    MCV 97.2 (*)     MCH 32.6 (*)     RDW 14.6 (*)     Neutrophils Absolute 8.8 (*)     Lymphocytes Absolute 0.6 (*)     All other components within normal limits   URINALYSIS WITH REFLEX TO CULTURE - Abnormal; Notable for the following components:    Blood, Urine SMALL (*)     Protein, UA 30 (*)     Leukocyte Esterase, Urine MODERATE (*)     All other components within normal limits   MICROSCOPIC URINALYSIS - Abnormal; Notable for the following components:    RBC, UA 3-5 (*)     Bacteria, UA RARE (*)     WBC, UA  (*)     All other components within normal limits   CULTURE, URINE   ETHANOL   PROTIME-INR   APTT       All other labs were within normal range or not returned as of this dictation. EMERGENCY DEPARTMENT COURSE and DIFFERENTIAL DIAGNOSIS/MDM:   Vitals:    Vitals:    12/17/22 2126 12/18/22 0100 12/18/22 0230   BP: (!) 152/65 (!) 148/76 (!) 154/70   Pulse: 60 68 74   Resp: 18 20 18   Temp: 97.1 °F (36.2 °C)     TempSrc: Oral     SpO2: 93% 96% 94%   Weight: 300 lb (136.1 kg)     Height: 5' 6\" (1.676 m)              MDM  Number of Diagnoses or Management Options  Conjunctivitis of both eyes, unspecified conjunctivitis type  Fall, initial encounter  Injury of head, initial encounter  Neck pain  Urinary tract infection without hematuria, site unspecified  Diagnosis management comments: Patient fell out of bed denies any head injury has neck pain he has diabetes notes his sugars been elevated. We will add labs CT head neck he does have slight tenderness of neck. Retains full range of motion lower and upper extremities bilaterally. We will treat for conjunctivitis with purulent discharge gentamicin patient noted that he had dysuria with leukocyte Estrace and blood on urinalysis we will add Bactrim patient follow-up with primary care physician. Waiting CT reports. Patient resting comfortably in emergency room. Awaiting urine specimen will sign out to Oscar Hernandez for appropriate disposition. CT head negative for bleeds, CT cervical negative for acute fractures. Patient reevaluated and reports resolution of pain. Discharged with previous providers instructions and prescriptions. Patient safe for outpatient follow-up. Amount and/or Complexity of Data Reviewed  Clinical lab tests: ordered and reviewed  Tests in the radiology section of CPT®: ordered        CRITICAL CARE TIME   T    CONSULTS:  None    PROCEDURES:  Unless otherwise noted below, none     Procedures    FINAL IMPRESSION      1. Injury of head, initial encounter    2. Neck pain    3. Fall, initial encounter    4. Conjunctivitis of both eyes, unspecified conjunctivitis type    5.  Urinary tract infection without hematuria, site unspecified          DISPOSITION/PLAN   DISPOSITION Decision To Discharge 12/18/2022 03:04:33 AM      PATIENT REFERRED TO:  [address] [address]  [phone]    Call in 1 day      [facility]) ED  [address] [address]  [phone]  Go to   If symptoms worsen    DISCHARGE MEDICATIONS:  Discharge Medication List as of 12/18/2022  3:04 AM        START taking these medications    Details   sulfamethoxazole-trimethoprim (BACTRIM DS) 800-160 MG per tablet Take 1 tablet by mouth 2 times daily for 10 days, Disp-20 tablet, R-0Normal                (Please note that portions of this note were completed with a voice recognition program.  Efforts were made to edit the dictations but occasionally words are mis-transcribed.)    Milady Campuzano PA-C (electronically signed)  Attending Emergency Physician        Milady Campuzano PA-C  12/18/22 [id number]

## 2022-12-18 NOTE — ED NOTES
Patient placed on 2L nasal cannula due to destat of 85% while asleep. Increased to 95%.       Franco Lomeli RN  12/17/22 8233

## 2022-12-19 ENCOUNTER — CARE COORDINATION (OUTPATIENT)
Dept: CARE COORDINATION | Age: 72
End: 2022-12-19

## 2022-12-19 NOTE — CARE COORDINATION
Attempted to contact patient for care coordination discussion. Unable to reach patient by phone.   Voice mailbox is not set up and I am unable to leave a message  I will follow up in one week as per protocol

## 2022-12-20 LAB
ORGANISM: ABNORMAL
URINE CULTURE, ROUTINE: ABNORMAL
URINE CULTURE, ROUTINE: ABNORMAL

## 2023-01-03 ENCOUNTER — CARE COORDINATION (OUTPATIENT)
Dept: CARE COORDINATION | Age: 73
End: 2023-01-03

## 2023-01-03 NOTE — CARE COORDINATION
I called to discuss care coordination with Trudy Holm  I discussed my role along with the process of care coordination. Trudy Holm tells me that his diabetes has always been out of control and he is not interested in doing anything to help his diabetes. I did discuss the need to take his medications including his insulin as prescribed. He verbalizes understanding. I have provided him with my contact information and I have asked him to call me with any questions or concerns.

## 2023-01-06 ENCOUNTER — OFFICE VISIT (OUTPATIENT)
Dept: FAMILY MEDICINE CLINIC | Age: 73
End: 2023-01-06

## 2023-01-06 VITALS
TEMPERATURE: 98 F | WEIGHT: 294.8 LBS | OXYGEN SATURATION: 96 % | HEART RATE: 56 BPM | SYSTOLIC BLOOD PRESSURE: 140 MMHG | DIASTOLIC BLOOD PRESSURE: 80 MMHG | BODY MASS INDEX: 47.38 KG/M2 | HEIGHT: 66 IN

## 2023-01-06 DIAGNOSIS — N30.90 CYSTITIS: ICD-10-CM

## 2023-01-06 DIAGNOSIS — Z00.00 MEDICARE ANNUAL WELLNESS VISIT, SUBSEQUENT: Primary | ICD-10-CM

## 2023-01-06 DIAGNOSIS — E11.22 TYPE 2 DIABETES MELLITUS WITH STAGE 4 CHRONIC KIDNEY DISEASE, WITH LONG-TERM CURRENT USE OF INSULIN (HCC): ICD-10-CM

## 2023-01-06 DIAGNOSIS — G47.33 OBSTRUCTIVE SLEEP APNEA OF ADULT: Chronic | ICD-10-CM

## 2023-01-06 DIAGNOSIS — C61 PRIMARY MALIGNANT NEOPLASM OF PROSTATE (HCC): ICD-10-CM

## 2023-01-06 DIAGNOSIS — N18.4 STAGE 4 CHRONIC KIDNEY DISEASE (HCC): ICD-10-CM

## 2023-01-06 DIAGNOSIS — N18.4 TYPE 2 DIABETES MELLITUS WITH STAGE 4 CHRONIC KIDNEY DISEASE, WITH LONG-TERM CURRENT USE OF INSULIN (HCC): ICD-10-CM

## 2023-01-06 DIAGNOSIS — Z79.4 TYPE 2 DIABETES MELLITUS WITH STAGE 4 CHRONIC KIDNEY DISEASE, WITH LONG-TERM CURRENT USE OF INSULIN (HCC): ICD-10-CM

## 2023-01-06 PROBLEM — E11.65 TYPE 2 DIABETES MELLITUS WITH HYPERGLYCEMIA (HCC): Status: RESOLVED | Noted: 2023-01-06 | Resolved: 2023-01-06

## 2023-01-06 PROBLEM — E11.9 TYPE 2 DIABETES MELLITUS WITHOUT COMPLICATION, WITHOUT LONG-TERM CURRENT USE OF INSULIN (HCC): Status: RESOLVED | Noted: 2019-03-19 | Resolved: 2023-01-06

## 2023-01-06 PROBLEM — E11.65 TYPE 2 DIABETES MELLITUS WITH HYPERGLYCEMIA (HCC): Status: ACTIVE | Noted: 2023-01-06

## 2023-01-06 LAB
BILIRUBIN, POC: NORMAL
BLOOD URINE, POC: NORMAL
CLARITY, POC: CLEAR
COLOR, POC: YELLOW
GLUCOSE URINE, POC: NORMAL
KETONES, POC: NORMAL
LEUKOCYTE EST, POC: NORMAL
NITRITE, POC: NORMAL
PH, POC: 6
PROTEIN, POC: NORMAL
SPECIFIC GRAVITY, POC: 1.02
UROBILINOGEN, POC: 3.5

## 2023-01-06 ASSESSMENT — PATIENT HEALTH QUESTIONNAIRE - PHQ9
SUM OF ALL RESPONSES TO PHQ QUESTIONS 1-9: 0
1. LITTLE INTEREST OR PLEASURE IN DOING THINGS: 0
SUM OF ALL RESPONSES TO PHQ QUESTIONS 1-9: 0
SUM OF ALL RESPONSES TO PHQ9 QUESTIONS 1 & 2: 0
2. FEELING DOWN, DEPRESSED OR HOPELESS: 0

## 2023-01-06 ASSESSMENT — LIFESTYLE VARIABLES
HOW MANY STANDARD DRINKS CONTAINING ALCOHOL DO YOU HAVE ON A TYPICAL DAY: PATIENT DOES NOT DRINK
HOW OFTEN DO YOU HAVE A DRINK CONTAINING ALCOHOL: MONTHLY OR LESS

## 2023-01-06 NOTE — PROGRESS NOTES
Medicare Annual Wellness Visit    Lauro Gutierrez is here for Medicare AWV (Recent pink eye and uti not sure if uti is gone pt states he did finish all abx)    Assessment & Plan   Medicare annual wellness visit, subsequent  Type 2 diabetes mellitus with stage 4 chronic kidney disease not on chronic dialysis, with long-term current use of insulin ([facility]  Follows with Dr. Sherice Starks  Stage 4 chronic kidney disease ([facility]  Body mass index (BMI) 45.0-49.9, adult ([facility] [address]  Primary malignant neoplasm of prostate ([facility] [address]  Obstructive sleep apnea of adult  Cystitis  Repeat UA not suggestive of infection  Follow up if symptoms return  -     POCT Urinalysis no Micro  -     Culture, Urine; Future      Colonscopy scheduled for the end of the month    Recommendations for Preventive Services Due: see orders and patient instructions/AVS.  Recommended screening schedule for the next 5-10 years is provided to the patient in written form: see Patient Instructions/AVS.     Return for Medicare Annual Wellness Visit in 1 year. Subjective   The following acute and/or chronic problems were also addressed today:  Cystitis   Recently treated for a UTI  Would like urine rechecked today    Patient's complete Health Risk Assessment and screening values have been reviewed and are found in Flowsheets. The following problems were reviewed today and where indicated follow up appointments were made and/or referrals ordered.     Positive Risk Factor Screenings with Interventions:    Fall Risk:  Do you feel unsteady or are you worried about falling? : (!) yes  2 or more falls in past year?: (!) yes  Fall with injury in past year?: no     Interventions:    Patient comments: low blood sugar, felt weak, sweating, shaking; sugars have been better controlled recently            General HRA Questions:  Select all that apply: (!) New or Increased Pain    Pain Interventions:  Patient comments: arthritis       Weight and Activity:  Physical Activity: Inactive    Days of Exercise per Week: 0 days    Minutes of Exercise per Session: 0 min     On average, how many days per week do you engage in moderate to strenuous exercise (like a brisk walk)?: 0 days  Have you lost any weight without trying in the past 3 months?: (!) Yes (20 lbs)  Body mass index: (!) 47.58      Inactivity Interventions:  Patient declined any further interventions or treatment    Unintentional Weight Loss Interventions:  Patient declined any further interventions or treatment  Obesity Interventions:  Patient declines any further evaluation or treatment          Dentist Screen:  Have you seen the dentist within the past year?: (!) No    Intervention:  Patient declines any further evaluation or treatment    Hearing Screen:  Do you or your family notice any trouble with your hearing that hasn't been managed with hearing aids?: (!) Yes (20 years with hearing issues)    Interventions:  Patient declines any further evaluation or treatment     Safety:  Do all of your stairways have a railing or banister?: (!) No (added a lift chair 2 years ago)  Interventions:  See above                     Objective   Vitals:    01/06/23 1231 01/06/23 1244   BP: (!) 140/80 (!) 140/80   Site: Left Upper Arm Right Upper Arm   Position: Sitting Sitting   Cuff Size: Medium Adult Medium Adult   Pulse: 56    Temp: 98 °F (36.7 °C)    SpO2: 96%    Weight: 294 lb 12.8 oz (133.7 kg)    Height: 5' 6\" (1.676 m)       Body mass index is 47.58 kg/m². Allergies   Allergen Reactions    Niacin Other (See Comments)    Fluorescein Diarrhea, Nausea And Vomiting and Other (See Comments)     Prior to Visit Medications    Medication Sig Taking?  Authorizing Provider   torsemide (DEMADEX) 20 MG tablet 60 mg Yes Historical Provider, MD   carvedilol (COREG) 12.5 MG tablet 12.5 mg Yes Historical Provider, MD   insulin glargine (LANTUS) 100 UNIT/ML injection vial 60 units  Twice day Yes Kanwal Sutton MD   ibuprofen (IBU) 600 MG tablet Take 1 tablet by mouth every 6 hours as needed for Pain Yes Kimberly Arzola PA-C   fluticasone (FLONASE) 50 MCG/ACT nasal spray 2 sprays by Each Nostril route daily Yes Kimberly Arzola PA-C   Chlorphen-Phenyleph-APAP (CORICIDIN D COLD/FLU/SINUS) 2-5-325 MG TABS Take 1 tablet by mouth every 6-8 hours as needed (congestion) Yes Kimberly Arzola PA-C   insulin aspart (NOVOLOG FLEXPEN) 100 UNIT/ML injection pen INJECT 35 UNITS SUBCUTANEOUSLY WITH BREAKFAST AND INJECT 35 UNITS WITH LUNCH AND INJECT 35 UNITS WITH DINNER SKIP IF NO MEAL/CARBOHYDRATE INTAKE Yes Danie Bonner MD   Continuous Blood Gluc Sensor (FREESTYLE ANEUDY 14 DAY SENSOR) MISC Every 2 weeks Yes Danie Bonner MD   Continuous Blood Gluc  (FREESTYLE ANEUDY 14 DAY READER) MAKENZIE As directed Yes Danie Bonner MD   rosuvastatin (CRESTOR) 20 MG tablet TAKE ONE TABLET BY MOUTH AT BEDTIME Yes Historical Provider, MD   montelukast (SINGULAIR) 10 MG tablet TAKE ONE TABLET BY MOUTH AT BEDTIME Yes Historical Provider, MD   erythromycin (ROMYCIN) 5 MG/GM ophthalmic ointment APPLY A QUARTER INCH RIBBON TO EACH EYE AT BEDTIME Yes Historical Provider, MD   Lancets Mis. (ACCU-CHEK SOFTCLIX LANCET DEV) KIT USE LANCET(S) TESTING AS DIRECTED FOR BLOOD SUGAR Yes Historical Provider, MD   allopurinol (ZYLOPRIM) 100 MG tablet TAKE TWO TABLETS BY MOUTH EVERY DAY (WITH FOOD AND FLUIDS) Yes Historical Provider, MD   ammonium lactate (LAC-HYDRIN) 12 % lotion APPLY A SUFFICIENT AMOUNT EXTERNALLY TWICE A DAY TO DRY SKIN ON LEGS Yes Historical Provider, MD   loratadine (CLARITIN) 10 MG tablet TAKE ONE TABLET BY MOUTH EVERY DAY (WITH FOOD) Yes Historical Provider, MD   sildenafil (VIAGRA) 100 MG tablet TAKE ONE-HALF TABLET BY MOUTH AN HOUR_BEFORE SEX. (NO MORE THAN 1 DOSE PER 24 HOURS) NO NITRATES Yes Historical Provider, MD   albuterol sulfate  (90 Base) MCG/ACT inhaler INHALE 2 PUFFS BY MOUTH FOUR TIMES A DAY AS NEEDED FOR SHORTNESS OF BREATH OR WHEEZING.  Yes Historical Provider, MD Dextrose, Diabetic Use, (GLUCOSE) 1 g CHEW Take by mouth Yes Historical Provider, MD   simvastatin (ZOCOR) 10 MG tablet Take 1 tablet by mouth nightly Yes Nuno Tomlinson MD   amLODIPine (NORVASC) 5 MG tablet Take 5 mg by mouth daily Yes Historical Provider, MD   aspirin 81 MG tablet Take 2 tablets by mouth daily Yes Historical Provider, MD   vitamin D 1000 units CAPS Take 2 tablets by mouth daily Yes Historical Provider, MD   furosemide (LASIX) 80 MG tablet Take 80 mg by mouth daily Yes Historical Provider, MD   losartan (COZAAR) 50 MG tablet Take 50 mg by mouth daily Yes Historical Provider, MD   tamsulosin (FLOMAX) 0.4 MG capsule Take 2 tablets by mouth nightly Yes Historical Provider, MD   atenolol (TENORMIN) 50 MG tablet Take 50 mg by mouth 2 times daily Yes Historical Provider, MD   finasteride (PROSCAR) 5 MG tablet Take 5 mg by mouth daily Yes Historical Provider, MD   loperamide (IMODIUM) 2 MG capsule Take 2 mg by mouth 4 times daily as needed for Diarrhea Yes Historical Provider, MD Jordan (Including outside providers/suppliers regularly involved in providing care):   Patient Care Team:  [facility] as PCP - [address] [place] [address],Third Floor,  as PCP - [facility] Empaneled Provider     Reviewed and updated this visit:  Tobacco  Allergies  Meds  Med Hx  Surg Hx  Soc Hx  Fam Hx

## 2023-01-06 NOTE — PATIENT INSTRUCTIONS
Preventing Falls: Care Instructions  Overview     Getting around your home safely can be a challenge if you have injuries or health problems that make it easy for you to fall. Loose rugs and furniture in walkways are among the dangers for many older people who have problems walking or who have poor eyesight. People who have conditions such as arthritis, osteoporosis, or dementia also have to be careful not to fall. You can make your home safer with a few simple measures. Follow-up care is a key part of your treatment and safety. Be sure to make and go to all appointments, and call your doctor if you are having problems. It's also a good idea to know your test results and keep a list of the medicines you take. How can you care for yourself at home? Taking care of yourself  Exercise regularly to improve your strength, muscle tone, and balance. Walk if you can. Swimming may be a good choice if you cannot walk easily. Have your vision and hearing checked each year or any time you notice a change. If you have trouble seeing and hearing, you might not be able to avoid objects and could lose your balance. Know the side effects of the medicines you take. Ask your doctor or pharmacist whether the medicines you take can affect your balance. Sleeping pills or sedatives can affect your balance. Limit the amount of alcohol you drink. Alcohol can impair your balance and other senses. Ask your doctor whether calluses or corns on your feet need to be removed. If you wear loose-fitting shoes because of calluses or corns, you can lose your balance and fall. Talk to your doctor if you have numbness in your feet. You may get dizzy if you do not drink enough water. To prevent dehydration, drink plenty of fluids. Choose water and other clear liquids. If you have kidney, heart, or liver disease and have to limit fluids, talk with your doctor before you increase the amount of fluids you drink.   Preventing falls at home  Remove raised doorway thresholds, throw rugs, and clutter. Repair loose carpet or raised areas in the floor. Move furniture and electrical cords to keep them out of walking paths. Use nonskid floor wax, and wipe up spills right away, especially on ceramic tile floors. If you use a walker or cane, put rubber tips on it. If you use crutches, clean the bottoms of them regularly with an abrasive pad, such as steel wool. Keep your house well lit, especially stairways, porches, and outside walkways. Use night-lights in areas such as hallways and bathrooms. Add extra light switches or use remote switches (such as switches that go on or off when you clap your hands) to make it easier to turn lights on if you have to get up during the night. Install sturdy handrails on stairways. Move items in your cabinets so that the things you use a lot are on the lower shelves (about waist level). Keep a cordless phone and a flashlight with new batteries by your bed. If possible, put a phone in each of the main rooms of your house, or carry a cell phone in case you fall and cannot reach a phone. Or, you can wear a device around your neck or wrist. You push a button that sends a signal for help. Wear low-heeled shoes that fit well and give your feet good support. Use footwear with nonskid soles. Check the heels and soles of your shoes for wear. Repair or replace worn heels or soles. Do not wear socks without shoes on smooth floors, such as wood. Walk on the grass when the sidewalks are slippery. If you live in an area that gets snow and ice in the winter, sprinkle salt on slippery steps and sidewalks. Or ask a family member or friend to do this for you. Preventing falls in the bath  Install grab bars and nonskid mats inside and outside your shower or tub and near the toilet and sinks. Use shower chairs and bath benches. Use a hand-held shower head that will allow you to sit while showering.   Get into a tub or shower by putting the weaker leg in first. Get out of a tub or shower with your strong side first.  Repair loose toilet seats and consider installing a raised toilet seat to make getting on and off the toilet easier. Keep your bathroom door unlocked while you are in the shower. Where can you learn more? Go to http://www.mishra.com/ and enter G117 to learn more about \"Preventing Falls: Care Instructions. \"  Current as of: May 4, 2022               Content Version: 13.5  © 2733-0095 Healthwise, [employer]. Care instructions adapted under license by [facility] ([facility]). If you have questions about a medical condition or this instruction, always ask your healthcare professional. [address] any warranty or liability for your use of this information. Learning About Being Active as an Older Adult  Why is being active important as you get older? Being active is one of the best things you can do for your health. And it's never too late to start. Being active--or getting active, if you aren't already--has definite benefits. It can:  Give you more energy,  Keep your mind sharp. Improve balance to reduce your risk of falls. Help you manage chronic illness with fewer medicines. No matter how old you are, how fit you are, or what health problems you have, there is a form of activity that will work for you. And the more physical activity you can do, the better your overall health will be. What kinds of activity can help you stay healthy? Being more active will make your daily activities easier. Physical activity includes planned exercise and things you do in daily life. There are four types of activity:  Aerobic. Doing aerobic activity makes your heart and lungs strong. Includes walking, dancing, and gardening. Aim for at least 2½ hours spread throughout the week. It improves your energy and can help you sleep better. Muscle-strengthening.   This type of activity can help maintain muscle and strengthen bones. Includes climbing stairs, using resistance bands, and lifting or carrying heavy loads. Aim for at least twice a week. It can help protect the knees and other joints. Stretching. Stretching gives you better range of motion in joints and muscles. Includes upper arm stretches, calf stretches, and gentle yoga. Aim for at least twice a week, preferably after your muscles are warmed up from other activities. It can help you function better in daily life. Balancing. This helps you stay coordinated and have good posture. Includes heel-to-toe walking, lilian chi, and certain types of yoga. Aim for at least 3 days a week. It can reduce your risk of falling. Even if you have a hard time meeting the recommendations, it's better to be more active than less active. All activity done in each category counts toward your weekly total. You'd be surprised how daily things like carrying groceries, keeping up with grandchildren, and taking the stairs can add up. What keeps you from being active? If you've had a hard time being more active, you're not alone. Maybe you remember being able to do more. Or maybe you've never thought of yourself as being active. It's frustrating when you can't do the things you want. Being more active can help. What's holding you back? Getting started. Have a goal, but break it into easy tasks. Small steps build into big accomplishments. Staying motivated. If you feel like skipping your activity, remember your goal. Maybe you want to move better and stay independent. Every activity gets you one step closer. Not feeling your best.  Start with 5 minutes of an activity you enjoy. Prove to yourself you can do it. As you get comfortable, increase your time. You may not be where you want to be. But you're in the process of getting there. Everyone starts somewhere. How can you find safe ways to stay active?   Talk with your doctor about any physical challenges you're facing. Make a plan with your doctor if you have a health problem or aren't sure how to get started with activity. If you're already active, ask your doctor if there is anything you should change to stay safe as your body and health change. If you tend to feel dizzy after you take medicine, avoid activity at that time. Try being active before you take your medicine. This will reduce your risk of falls. If you plan to be active at home, make sure to clear your space before you get started. Remove things like TV cords, coffee tables, and throw rugs. It's safest to have plenty of space to move freely. The key to getting more active is to take it slow and steady. Try to improve only a little bit at a time. Pick just one area to improve on at first. And if an activity hurts, stop and talk to your doctor. Where can you learn more? Go to http://www.mishra.com/ and enter P600 to learn more about \"Learning About Being Active as an Older Adult. \"  Current as of: October 10, 2022               Content Version: 13.5  © 6638-8188 Healthwise, [employer]. Care instructions adapted under license by [facility] ([place] [place]). If you have questions about a medical condition or this instruction, always ask your healthcare professional. [place] [address] any warranty or liability for your use of this information. Learning About Dental Care for Older Adults  Dental care for older adults: Overview  Dental care for older people is much the same as for younger adults. But older adults do have concerns that younger adults do not. Older adults may have problems with gum disease and decay on the roots of their teeth. They may need missing teeth replaced or broken fillings fixed. Or they may have dentures that need to be cared for. Some older adults may have trouble holding a toothbrush. You can help remind the person you are caring for to brush and floss their teeth or to clean their dentures.  In some cases, you may need to do the brushing and other dental care tasks. People who have trouble using their hands or who have dementia may need this extra help. How can you help with dental care? Normal dental care  To keep the teeth and gums healthy:  Brush the teeth with fluoride toothpaste twice a day--in the morning and at night--and floss at least once a day. Plaque can quickly build up on the teeth of older adults. Watch for the signs of gum disease. These signs include gums that bleed after brushing or after eating hard foods, such as apples. See a dentist regularly. Many experts recommend checkups every 6 months. Keep the dentist up to date on any new medications the person is taking. Encourage a balanced diet that includes whole grains, vegetables, and fruits, and that is low in saturated fat and sodium. Encourage the person you're caring for not to use tobacco products. They can affect dental and general health. Many older adults have a fixed income and feel that they can't afford dental care. But most [facility] and [facility] have programs in which dentists help older adults by lowering fees. Contact your area's public health offices or  for information about dental care in your area. Using a toothbrush  Older adults with arthritis sometimes have trouble brushing their teeth because they can't easily hold the toothbrush. Their hands and fingers may be stiff, painful, or weak. If this is the case, you can: Offer an electric toothbrush. Enlarge the handle of a non-electric toothbrush by wrapping a sponge, an elastic bandage, or adhesive tape around it. Push the toothbrush handle through a ball made of rubber or soft foam.  Make the handle longer and thicker by taping Popsicle sticks or tongue depressors to it. You may also be able to buy special toothbrushes, toothpaste dispensers, and floss holders.   Your doctor may recommend a soft-bristle toothbrush if the person you care for bleeds easily. Bleeding can happen because of a health problem or from certain medicines. A toothpaste for sensitive teeth may help if the person you care for has sensitive teeth. How do you brush and floss someone's teeth? If the person you are caring for has a hard time cleaning their teeth on their own, you may need to brush and floss their teeth for them. It may be easiest to have the person sit and face away from you, and to sit or stand behind them. That way you can steady their head against your arm as you reach around to floss and brush their teeth. Choose a place that has good lighting and is comfortable for both of you. Before you begin, gather your supplies. You will need gloves, floss, a toothbrush, and a container to hold water if you are not near a sink. Wash and dry your hands well and put on gloves. Start by flossing:  Gently work a piece of floss between each of the teeth toward the gums. A plastic flossing tool may make this easier, and they are available at most [facility]. Curve the floss around each tooth into a U-shape and gently slide it under the gum line. Move the floss firmly up and down several times to scrape off the plaque. After you've finished flossing, throw away the used floss and begin brushing:  Wet the brush and apply toothpaste. Place the brush at a 45-degree angle where the teeth meet the gums. Press firmly, and move the brush in small circles over the surface of the teeth. Be careful not to brush too hard. Vigorous brushing can make the gums pull away from the teeth and can scratch the tooth enamel. Brush all surfaces of the teeth, on the tongue side and on the cheek side. Pay special attention to the front teeth and all surfaces of the back teeth. Brush chewing surfaces with short back-and-forth strokes. After you've finished, help the person rinse the remaining toothpaste from their mouth. Where can you learn more?   Go to http://www.[employer] Backpack.com/ and enter F944 to learn more about \"Learning About Dental Care for Older Adults. \"  Current as of: June 16, 2022               Content Version: 13.5  © 2006-2022 Healthwise, [employer]. Care instructions adapted under license by [facility] ([facility]). If you have questions about a medical condition or this instruction, always ask your healthcare professional. [address] any warranty or liability for your use of this information. Hearing Loss: Care Instructions  Overview     Hearing loss is a sudden or slow decrease in how well you hear. It can range from mild to severe. Permanent hearing loss can occur with aging. It also can happen when you are exposed long-term to loud noise. Examples include listening to loud music, riding motorcycles, or being around other loud machines. Hearing loss can affect your work and home life. It can make you feel lonely or depressed. You may feel that you have lost your independence. But hearing aids and other devices can help you hear better and feel connected to others. Follow-up care is a key part of your treatment and safety. Be sure to make and go to all appointments, and call your doctor if you are having problems. It's also a good idea to know your test results and keep a list of the medicines you take. How can you care for yourself at home? Avoid loud noises whenever possible. This helps keep your hearing from getting worse. Always wear hearing protection around loud noises. Wear a hearing aid as directed. See a professional who can help you pick a hearing aid that fits you. Have hearing tests as your doctor suggests. They can show whether your hearing has changed. Your hearing aid may need to be adjusted. Use other devices as needed. These may include:  Telephone amplifiers and hearing aids that can connect to a television, stereo, radio, or microphone. Devices that use lights or vibrations.  These alert you to the doorbell, a ringing telephone, or a baby monitor.  Television closed-captioning. This shows the words at the bottom of the screen. Most new TVs can do this.  TTY (text telephone). This lets you type messages back and forth on the telephone instead of talking or listening. These devices are also called TDD. When messages are typed on the keyboard, they are sent over the phone line to a receiving TTY. The message is shown on a monitor.  Use text messaging, social media, and email if it is hard for you to communicate by telephone.  Try to learn a listening technique called speechreading. It is not lipreading. You pay attention to people's gestures, expressions, posture, and tone of voice. These clues can help you understand what a person is saying. Face the person you are talking to, and have them face you. Make sure the lighting is good. You need to see the other person's face clearly.  Think about counseling if you need help to adjust to your hearing loss.  When should you call for help?  Watch closely for changes in your health, and be sure to contact your doctor if:    You think your hearing is getting worse.     You have new symptoms, such as dizziness or nausea.   Where can you learn more?  Go to https://www.[employer].net/patientEd and enter R798 to learn more about \"Hearing Loss: Care Instructions.\"  Current as of: May 4, 2022               Content Version: 13.5  © 3155-8741 [employer].   Care instructions adapted under license by [employer]. If you have questions about a medical condition or this instruction, always ask your healthcare professional. [employer] disclaims any warranty or liability for your use of this information.           Advance Directives: Care Instructions  Overview  An advance directive is a legal way to state your wishes at the end of your life. It tells your family and your doctor what to do if you can't say what you want.  There are two main types of advance directives. You can change them  any time your wishes change. Living will. This form tells your family and your doctor your wishes about life support and other treatment. The form is also called a declaration. Medical power of . This form lets you name a person to make treatment decisions for you when you can't speak for yourself. This person is called a health care agent (health care proxy, health care surrogate). The form is also called a durable power of  for health care. If you do not have an advance directive, decisions about your medical care may be made by a family member, or by a doctor or a  who doesn't know you. It may help to think of an advance directive as a gift to the people who care for you. If you have one, they won't have to make tough decisions by themselves. For more information, including forms for your state, see the [address] website (www.caringinfo.org/planning/advance-directives/). Follow-up care is a key part of your treatment and safety. Be sure to make and go to all appointments, and call your doctor if you are having problems. It's also a good idea to know your test results and keep a list of the medicines you take. What should you include in an advance directive? Many states have a unique advance directive form. (It may ask you to address specific issues.) Or you might use a universal form that's approved by many states. If your form doesn't tell you what to address, it may be hard to know what to include in your advance directive. Use the questions below to help you get started. Who do you want to make decisions about your medical care if you are not able to? What life-support measures do you want if you have a serious illness that gets worse over time or can't be cured? What are you most afraid of that might happen? (Maybe you're afraid of having pain, losing your independence, or being kept alive by machines.)  Where would you prefer to die? (Your home?  A hospital? A nursing home?)  Do you want to donate your organs when you die? Do you want certain Jainism practices performed before you die? When should you call for help? Be sure to contact your doctor if you have any questions. Where can you learn more? Go to http://www.mishra.com/ and enter R264 to learn more about \"Advance Directives: Care Instructions. \"  Current as of: June 16, 2022               Content Version: 13.5  © 2006-2022 Healthwise, Incorporated. Care instructions adapted under license by [facility] ([facility]). If you have questions about a medical condition or this instruction, always ask your healthcare professional. [address] any warranty or liability for your use of this information. Personalized Preventive Plan for Ester Rivera - 1/6/2023  Medicare offers a range of preventive health benefits. Some of the tests and screenings are paid in full while other may be subject to a deductible, co-insurance, and/or copay. Some of these benefits include a comprehensive review of your medical history including lifestyle, illnesses that may run in your family, and various assessments and screenings as appropriate. After reviewing your medical record and screening and assessments performed today your provider may have ordered immunizations, labs, imaging, and/or referrals for you. A list of these orders (if applicable) as well as your Preventive Care list are included within your After Visit Summary for your review. Other Preventive Recommendations:    A preventive eye exam performed by an eye specialist is recommended every 1-2 years to screen for glaucoma; cataracts, macular degeneration, and other eye disorders. A preventive dental visit is recommended every 6 months. Try to get at least 150 minutes of exercise per week or 10,000 steps per day on a pedometer .   Order or download the FREE \"Exercise & Physical Activity: Your Everyday Guide\" from [employer]. Call 0-734.307.5825 or search [employer] online. You need 2848-7481 mg of calcium and 2460-6211 IU of vitamin D per day. It is possible to meet your calcium requirement with diet alone, but a vitamin D supplement is usually necessary to meet this goal.  When exposed to the sun, use a sunscreen that protects against both UVA and UVB radiation with an SPF of 30 or greater. Reapply every 2 to 3 hours or after sweating, drying off with a towel, or swimming. Always wear a seat belt when traveling in a car. Always wear a helmet when riding a bicycle or motorcycle.

## 2023-01-19 ENCOUNTER — OFFICE VISIT (OUTPATIENT)
Dept: ENDOCRINOLOGY | Age: 73
End: 2023-01-19

## 2023-01-19 VITALS
HEIGHT: 66 IN | BODY MASS INDEX: 44.68 KG/M2 | HEART RATE: 78 BPM | SYSTOLIC BLOOD PRESSURE: 100 MMHG | OXYGEN SATURATION: 90 % | DIASTOLIC BLOOD PRESSURE: 60 MMHG | WEIGHT: 278 LBS

## 2023-01-19 DIAGNOSIS — E66.01 MORBID OBESITY (HCC): ICD-10-CM

## 2023-01-19 DIAGNOSIS — E11.65 UNCONTROLLED TYPE 2 DIABETES MELLITUS WITH HYPERGLYCEMIA (HCC): Primary | ICD-10-CM

## 2023-01-19 LAB
CHP ED QC CHECK: NORMAL
GLUCOSE BLD-MCNC: 242 MG/DL
HBA1C MFR BLD: 8 %

## 2023-01-19 NOTE — PROGRESS NOTES
1/19/2023    Assessment:       Diagnosis Orders   1. Uncontrolled type 2 diabetes mellitus with hyperglycemia (HCC)  POCT Glucose    POCT glycosylated hemoglobin (Hb A1C)    Hemoglobin F5L    Basic Metabolic Panel    Lipid Panel      2. Morbid obesity ([address]              PLAN:     Orders Placed This Encounter   Procedures    Hemoglobin A1C     Standing Status:   Future     Standing Expiration Date:   6/44/0785    Basic Metabolic Panel     Standing Status:   Future     Standing Expiration Date:   1/19/2024    Lipid Panel     Standing Status:   Future     Standing Expiration Date:   1/19/2024    POCT Glucose    POCT glycosylated hemoglobin (Hb A1C)         Continue current medication regimen for diabetes follow-up in 3 to 6 months time    Orders Placed This Encounter   Procedures    POCT Glucose    POCT glycosylated hemoglobin (Hb A1C)     No orders of the defined types were placed in this encounter. No follow-ups on file. Subjective:     Chief Complaint   Patient presents with    Diabetes     Vitals:    01/19/23 1301   BP: 100/60   Pulse: 78   SpO2: 90%   Weight: 278 lb (126.1 kg)   Height: 5' 6\" (1.676 m)     Wt Readings from Last 3 Encounters:   01/19/23 278 lb (126.1 kg)   01/06/23 294 lb 12.8 oz (133.7 kg)   12/17/22 300 lb (136.1 kg)     BP Readings from Last 3 Encounters:   01/19/23 100/60   01/06/23 (!) 140/80   12/18/22 (!) 154/70     Follow-up on type 2 diabetes patient on Lantus 60 units twice a day plus Humalog 35 units with meals hemoglobin A1c is down to 8.0 from 9.5 average blood sugars close to 180/190 denies any hypoglycemia patient also follows up at [facility] administration history of diabetic nephropathy morbid obesity BMI at 44    Diabetes  He presents for his follow-up diabetic visit. He has type 2 diabetes mellitus. Pertinent negatives for diabetes include no polyuria and no weight loss. Symptoms are improving. Diabetic complications include nephropathy.  Risk factors for coronary artery disease include obesity. Current diabetic treatment includes insulin injections. He is currently taking insulin pre-breakfast, pre-lunch, pre-dinner and at bedtime. His overall blood glucose range is 180-200 mg/dl. (Hemoglobin A1C       Date                     Value               Ref Range           Status                01/19/2023               8.0                 %                   Final            ----------  )   Past Medical History:   Diagnosis Date    Chronic kidney disease     Diabetes mellitus (HCC)     Hypertension     Type 2 diabetes mellitus with hyperglycemia 1/6/2023    Type 2 diabetes mellitus without complication, without long-term current use of insulin ([facility]) 3/19/2019    Uncontrolled type 2 diabetes mellitus with hyperglycemia ([facility])      Past Surgical History:   Procedure Laterality Date    APPENDECTOMY      age 10     Social History     Socioeconomic History    Marital status: Single     Spouse name: Not on file    Number of children: Not on file    Years of education: Not on file    Highest education level: Not on file   Occupational History    Not on file   Tobacco Use    Smoking status: Never    Smokeless tobacco: Never   Substance and Sexual Activity    Alcohol use: Not Currently    Drug use: Never    Sexual activity: Not on file   Other Topics Concern    Not on file   Social History Narrative         Lives With: Alone    Type of Home: House Two level, Able to Live on Main level with bedroom/bathroom    Home Access: Stairs to enter without rails - Number of Steps: 6    Bathroom Shower/Tub: Tub/Shower unit    Bathroom Toilet: Standard    Home Equipment: Cane, Rolling walker    ADL Assistance: Independent    Homemaking Assistance: Independent    Homemaking Responsibilities: Yes    Ambulation Assistance: Independent(Cane)    Transfer Assistance: Independent    Active : Yes     Social Determinants of Health     Financial Resource Strain: Not on file   Food Insecurity: Not on file  Transportation Needs: Not on file   Physical Activity: Inactive    Days of Exercise per Week: 0 days    Minutes of Exercise per Session: 0 min   Stress: Not on file   Social Connections: Not on file   Intimate Partner Violence: Not on file   Housing Stability: Not on file     No family history on file.   Allergies   Allergen Reactions    Niacin Other (See Comments)    Fluorescein Diarrhea, Nausea And Vomiting and Other (See Comments)       Current Outpatient Medications:     torsemide (DEMADEX) 20 MG tablet, 60 mg, Disp: , Rfl:     carvedilol (COREG) 12.5 MG tablet, 12.5 mg, Disp: , Rfl:     insulin glargine (LANTUS) 100 UNIT/ML injection vial, 60 units  Twice day, Disp: 30 mL, Rfl: 2    ibuprofen (IBU) 600 MG tablet, Take 1 tablet by mouth every 6 hours as needed for Pain, Disp: 120 tablet, Rfl: 0    fluticasone (FLONASE) 50 MCG/ACT nasal spray, 2 sprays by Each Nostril route daily, Disp: 16 g, Rfl: 0    Chlorphen-Phenyleph-APAP (CORICIDIN D COLD/FLU/SINUS) 2-5-325 MG TABS, Take 1 tablet by mouth every 6-8 hours as needed (congestion), Disp: 168 tablet, Rfl: 0    insulin aspart (NOVOLOG FLEXPEN) 100 UNIT/ML injection pen, INJECT 35 UNITS SUBCUTANEOUSLY WITH BREAKFAST AND INJECT 35 UNITS WITH LUNCH AND INJECT 35 UNITS WITH DINNER SKIP IF NO MEAL/CARBOHYDRATE INTAKE, Disp: 15 pen, Rfl: 3    rosuvastatin (CRESTOR) 20 MG tablet, TAKE ONE TABLET BY MOUTH AT BEDTIME, Disp: , Rfl:     montelukast (SINGULAIR) 10 MG tablet, TAKE ONE TABLET BY MOUTH AT BEDTIME, Disp: , Rfl:     erythromycin (ROMYCIN) 5 MG/GM ophthalmic ointment, APPLY A QUARTER INCH RIBBON TO EACH EYE AT BEDTIME, Disp: , Rfl:     Lancets Misc. (ACCU-CHEK SOFTCLIX LANCET DEV) KIT, USE LANCET(S) TESTING AS DIRECTED FOR BLOOD SUGAR, Disp: , Rfl:     allopurinol (ZYLOPRIM) 100 MG tablet, TAKE TWO TABLETS BY MOUTH EVERY DAY (WITH FOOD AND FLUIDS), Disp: , Rfl:     ammonium lactate (LAC-HYDRIN) 12 % lotion, APPLY A SUFFICIENT AMOUNT EXTERNALLY TWICE A DAY TO DRY SKIN ON LEGS, Disp: , Rfl:     loratadine (CLARITIN) 10 MG tablet, TAKE ONE TABLET BY MOUTH EVERY DAY (WITH FOOD), Disp: , Rfl:     sildenafil (VIAGRA) 100 MG tablet, TAKE ONE-HALF TABLET BY MOUTH AN HOUR_BEFORE SEX.  (NO MORE THAN 1 DOSE PER 24 HOURS) NO NITRATES, Disp: , Rfl:     albuterol sulfate  (90 Base) MCG/ACT inhaler, INHALE 2 PUFFS BY MOUTH FOUR TIMES A DAY AS NEEDED FOR SHORTNESS OF BREATH OR WHEEZING., Disp: , Rfl:     Dextrose, Diabetic Use, (GLUCOSE) 1 g CHEW, Take by mouth, Disp: , Rfl:     simvastatin (ZOCOR) 10 MG tablet, Take 1 tablet by mouth nightly, Disp: 30 tablet, Rfl: 3    amLODIPine (NORVASC) 5 MG tablet, Take 5 mg by mouth daily, Disp: , Rfl:     aspirin 81 MG tablet, Take 2 tablets by mouth daily, Disp: , Rfl:     vitamin D 1000 units CAPS, Take 2 tablets by mouth daily, Disp: , Rfl:     furosemide (LASIX) 80 MG tablet, Take 80 mg by mouth daily, Disp: , Rfl:     losartan (COZAAR) 50 MG tablet, Take 50 mg by mouth daily, Disp: , Rfl:     tamsulosin (FLOMAX) 0.4 MG capsule, Take 2 tablets by mouth nightly, Disp: , Rfl:     atenolol (TENORMIN) 50 MG tablet, Take 50 mg by mouth 2 times daily, Disp: , Rfl:     finasteride (PROSCAR) 5 MG tablet, Take 5 mg by mouth daily, Disp: , Rfl:     loperamide (IMODIUM) 2 MG capsule, Take 2 mg by mouth 4 times daily as needed for Diarrhea, Disp: , Rfl:   Lab Results   Component Value Date     12/17/2022    K 4.1 12/17/2022     12/17/2022    CO2 27 12/17/2022    BUN 34 (H) 12/17/2022    CREATININE 1.81 (H) 12/17/2022    GLUCOSE 242 01/19/2023    CALCIUM 9.4 12/17/2022    PROT 7.0 12/17/2022    LABALBU 3.7 12/17/2022    BILITOT 0.6 12/17/2022    ALKPHOS 125 (H) 12/17/2022    AST 17 12/17/2022    ALT 13 12/17/2022    LABGLOM 38.6 (L) 12/17/2022    GFRAA 33.2 (L) 10/12/2022    GLOB 3.3 12/17/2022     Lab Results   Component Value Date    WBC 10.2 12/17/2022    HGB 15.4 12/17/2022    HCT 45.9 12/17/2022    MCV 97.2 (H) 12/17/2022     12/17/2022     Lab Results   Component Value Date    LABA1C 8.0 01/19/2023    LABA1C 9.6 (H) 10/12/2022    LABA1C 8.5 (H) 07/28/2022     Lab Results   Component Value Date    HDL 31 (L) 11/23/2021    HDL 29 (L) 08/01/2013    LDLCALC 58 11/23/2021    LDLCALC 40 08/01/2013    CHOL 105 11/23/2021    CHOL 127 08/01/2013    TRIG 80 11/23/2021    TRIG 288 (H) 08/01/2013     No results found for: TESTM  No results found for: TSH, TSHREFLEX, FT3, T4FREE  No results found for: TPOABS    Review of Systems   Constitutional:  Negative for weight loss. Eyes: Negative. Cardiovascular: Negative. Endocrine: Negative for polyuria. Psychiatric/Behavioral:  Positive for dysphoric mood. All other systems reviewed and are negative. Objective:   Physical Exam  Vitals reviewed. Constitutional:       General: He is not in acute distress. Appearance: Normal appearance. He is obese. HENT:      Head: Normocephalic and atraumatic. Right Ear: External ear normal.      Left Ear: External ear normal.      Nose: Nose normal.   Eyes:      General: No scleral icterus. Right eye: No discharge. Left eye: No discharge. Extraocular Movements: Extraocular movements intact. Conjunctiva/sclera: Conjunctivae normal.   Cardiovascular:      Rate and Rhythm: Normal rate. Pulmonary:      Effort: Pulmonary effort is normal.   Musculoskeletal:         General: Normal range of motion. Cervical back: Normal range of motion and neck supple. Neurological:      General: No focal deficit present. Mental Status: He is alert.    Psychiatric:         Mood and Affect: Mood normal.         Behavior: Behavior normal.

## 2023-01-28 ASSESSMENT — ENCOUNTER SYMPTOMS: EYES NEGATIVE: 1

## 2023-02-14 ENCOUNTER — TELEPHONE (OUTPATIENT)
Dept: GASTROENTEROLOGY | Age: 73
End: 2023-02-14

## 2023-04-20 ENCOUNTER — OFFICE VISIT (OUTPATIENT)
Dept: ENDOCRINOLOGY | Age: 73
End: 2023-04-20
Payer: COMMERCIAL

## 2023-04-20 VITALS
OXYGEN SATURATION: 96 % | DIASTOLIC BLOOD PRESSURE: 60 MMHG | SYSTOLIC BLOOD PRESSURE: 108 MMHG | WEIGHT: 302 LBS | HEIGHT: 66 IN | HEART RATE: 64 BPM | BODY MASS INDEX: 48.53 KG/M2

## 2023-04-20 DIAGNOSIS — E11.65 UNCONTROLLED TYPE 2 DIABETES MELLITUS WITH HYPERGLYCEMIA (HCC): Primary | ICD-10-CM

## 2023-04-20 DIAGNOSIS — E66.01 MORBID OBESITY (HCC): ICD-10-CM

## 2023-04-20 DIAGNOSIS — I89.0 LYMPHEDEMA: ICD-10-CM

## 2023-04-20 LAB
CHP ED QC CHECK: NORMAL
GLUCOSE BLD-MCNC: 116 MG/DL
HBA1C MFR BLD: 8.6 %

## 2023-04-20 PROCEDURE — 99213 OFFICE O/P EST LOW 20 MIN: CPT | Performed by: INTERNAL MEDICINE

## 2023-04-20 PROCEDURE — 82962 GLUCOSE BLOOD TEST: CPT | Performed by: INTERNAL MEDICINE

## 2023-04-20 PROCEDURE — 1123F ACP DISCUSS/DSCN MKR DOCD: CPT | Performed by: INTERNAL MEDICINE

## 2023-04-20 PROCEDURE — 3078F DIAST BP <80 MM HG: CPT | Performed by: INTERNAL MEDICINE

## 2023-04-20 PROCEDURE — 83036 HEMOGLOBIN GLYCOSYLATED A1C: CPT | Performed by: INTERNAL MEDICINE

## 2023-04-20 PROCEDURE — 3074F SYST BP LT 130 MM HG: CPT | Performed by: INTERNAL MEDICINE

## 2023-04-20 PROCEDURE — 3052F HG A1C>EQUAL 8.0%<EQUAL 9.0%: CPT | Performed by: INTERNAL MEDICINE

## 2023-04-27 ASSESSMENT — ENCOUNTER SYMPTOMS: EYES NEGATIVE: 1

## 2023-05-28 ENCOUNTER — HOSPITAL ENCOUNTER (EMERGENCY)
Age: 73
Discharge: HOME | End: 2023-05-28
Payer: COMMERCIAL

## 2023-05-28 VITALS
SYSTOLIC BLOOD PRESSURE: 148 MMHG | RESPIRATION RATE: 14 BRPM | HEIGHT: 66 IN | DIASTOLIC BLOOD PRESSURE: 56 MMHG | TEMPERATURE: 98.3 F | WEIGHT: 300 LBS | BODY MASS INDEX: 48.21 KG/M2 | OXYGEN SATURATION: 94 % | HEART RATE: 64 BPM

## 2023-05-28 DIAGNOSIS — S91.311A LACERATION OF RIGHT FOOT, INITIAL ENCOUNTER: Primary | ICD-10-CM

## 2023-05-28 PROCEDURE — 6360000002 HC RX W HCPCS

## 2023-05-28 PROCEDURE — 12001 RPR S/N/AX/GEN/TRNK 2.5CM/<: CPT

## 2023-05-28 PROCEDURE — 99284 EMERGENCY DEPT VISIT MOD MDM: CPT

## 2023-05-28 PROCEDURE — 90471 IMMUNIZATION ADMIN: CPT

## 2023-05-28 PROCEDURE — 90715 TDAP VACCINE 7 YRS/> IM: CPT

## 2023-05-28 RX ADMIN — TETANUS TOXOID, REDUCED DIPHTHERIA TOXOID AND ACELLULAR PERTUSSIS VACCINE, ADSORBED 0.5 ML: 5; 2.5; 8; 8; 2.5 SUSPENSION INTRAMUSCULAR at 12:37

## 2023-05-28 ASSESSMENT — ENCOUNTER SYMPTOMS
VOMITING: 0
ABDOMINAL DISTENTION: 0
ABDOMINAL PAIN: 0
EYES NEGATIVE: 1
SORE THROAT: 0
COUGH: 0
SHORTNESS OF BREATH: 0
CHOKING: 0
RHINORRHEA: 0
NAUSEA: 0
DIARRHEA: 0

## 2023-05-28 ASSESSMENT — PAIN SCALES - GENERAL: PAINLEVEL_OUTOF10: 4

## 2023-05-28 ASSESSMENT — LIFESTYLE VARIABLES
HOW OFTEN DO YOU HAVE A DRINK CONTAINING ALCOHOL: NEVER
HOW MANY STANDARD DRINKS CONTAINING ALCOHOL DO YOU HAVE ON A TYPICAL DAY: PATIENT DOES NOT DRINK

## 2023-05-28 ASSESSMENT — PAIN DESCRIPTION - ORIENTATION: ORIENTATION: RIGHT

## 2023-05-28 ASSESSMENT — PAIN DESCRIPTION - LOCATION: LOCATION: FOOT

## 2023-05-28 ASSESSMENT — PAIN - FUNCTIONAL ASSESSMENT: PAIN_FUNCTIONAL_ASSESSMENT: 0-10

## 2023-09-14 DIAGNOSIS — E11.65 UNCONTROLLED TYPE 2 DIABETES MELLITUS WITH HYPERGLYCEMIA (HCC): ICD-10-CM

## 2023-09-14 LAB
ANION GAP SERPL CALCULATED.3IONS-SCNC: 10 MEQ/L (ref 9–15)
BUN SERPL-MCNC: 35 MG/DL (ref 8–23)
CALCIUM SERPL-MCNC: 8.8 MG/DL (ref 8.5–9.9)
CHLORIDE SERPL-SCNC: 96 MEQ/L (ref 95–107)
CHOLEST SERPL-MCNC: 75 MG/DL (ref 0–199)
CO2 SERPL-SCNC: 33 MEQ/L (ref 20–31)
CREAT SERPL-MCNC: 2.15 MG/DL (ref 0.7–1.2)
GLUCOSE SERPL-MCNC: 176 MG/DL (ref 70–99)
HBA1C MFR BLD: 9.7 % (ref 4.8–5.9)
HDLC SERPL-MCNC: 30 MG/DL (ref 40–59)
LDLC SERPL CALC-MCNC: 30 MG/DL (ref 0–129)
POTASSIUM SERPL-SCNC: 4.1 MEQ/L (ref 3.4–4.9)
SODIUM SERPL-SCNC: 139 MEQ/L (ref 135–144)
TRIGL SERPL-MCNC: 76 MG/DL (ref 0–150)

## 2023-09-21 ENCOUNTER — OFFICE VISIT (OUTPATIENT)
Dept: ENDOCRINOLOGY | Age: 73
End: 2023-09-21

## 2023-09-21 VITALS
OXYGEN SATURATION: 96 % | SYSTOLIC BLOOD PRESSURE: 146 MMHG | DIASTOLIC BLOOD PRESSURE: 66 MMHG | BODY MASS INDEX: 47.89 KG/M2 | WEIGHT: 298 LBS | HEART RATE: 76 BPM | HEIGHT: 66 IN

## 2023-09-21 DIAGNOSIS — E11.65 UNCONTROLLED TYPE 2 DIABETES MELLITUS WITH HYPERGLYCEMIA (HCC): Primary | ICD-10-CM

## 2023-09-21 DIAGNOSIS — K04.7 DENTAL INFECTION: ICD-10-CM

## 2023-09-21 LAB
CHP ED QC CHECK: ABNORMAL
GLUCOSE BLD-MCNC: 282 MG/DL

## 2023-09-21 RX ORDER — CEPHALEXIN 500 MG/1
500 CAPSULE ORAL 4 TIMES DAILY
Qty: 40 CAPSULE | Refills: 1 | Status: SHIPPED | OUTPATIENT
Start: 2023-09-21

## 2023-09-21 RX ORDER — INSULIN GLARGINE 100 [IU]/ML
INJECTION, SOLUTION SUBCUTANEOUS
Qty: 30 ML | Refills: 2 | Status: SHIPPED | OUTPATIENT
Start: 2023-09-21

## 2023-09-21 NOTE — PROGRESS NOTES
2023    Assessment:       Diagnosis Orders   1. Uncontrolled type 2 diabetes mellitus with hyperglycemia (HCC)  POCT Glucose    Basic Metabolic Panel    Hemoglobin A1C    Continuous Blood Gluc Sensor (FREESTYLE ANEUDY 2 SENSOR) MISC    Continuous Blood Gluc  (FREESTYLE ANEUDY 2 READER) MAKENZIE    insulin glargine (LANTUS) 100 UNIT/ML injection vial      2. Dental infection              PLAN:     Orders Placed This Encounter   Procedures    Basic Metabolic Panel     Standing Status:   Future     Standing Expiration Date:   2024    Hemoglobin A1C     Standing Status:   Future     Standing Expiration Date:   2024    POCT Glucose     Orders Placed This Encounter   Medications    Continuous Blood Gluc Sensor (FREESTYLE ANEUDY 2 SENSOR) MISC     Sig: Change sensor every 14 days     Dispense:  2 each     Refill:  3    Continuous Blood Gluc  (FREESTYLE ANEUDY 2 READER) MAKENZIE     Sig: Continuous glucose reader     Dispense:  1 each     Refill:  0    insulin glargine (LANTUS) 100 UNIT/ML injection vial     Si units  Twice day     Dispense:  30 mL     Refill:  2    cephALEXin (KEFLEX) 500 MG capsule     Sig: Take 1 capsule by mouth 4 times daily     Dispense:  40 capsule     Refill:  1     Increase dose of Lantus continue current dose of NovoLog follow-up in 3 months A1c goal of 8 or lower      Orders Placed This Encounter   Procedures    POCT Glucose     No orders of the defined types were placed in this encounter. No follow-ups on file.   Subjective:     Chief Complaint   Patient presents with    Diabetes    Obesity     Vitals:    23 1331 23 1336   BP: (!) 146/66 (!) 146/66   Pulse: 76    SpO2: 96%    Weight: 298 lb (135.2 kg)    Height: 5' 6\" (1.676 m)      Wt Readings from Last 3 Encounters:   23 298 lb (135.2 kg)   23 300 lb (136.1 kg)   23 (!) 302 lb (137 kg)     BP Readings from Last 3 Encounters:   23 (!) 146/66   23 (!) 148/56   23 108/60

## 2023-11-20 ENCOUNTER — HOSPITAL ENCOUNTER (INPATIENT)
Age: 73
LOS: 15 days | Discharge: NURSING, REHAB OR LONG-TERM CARE | DRG: 189 | End: 2023-12-05
Attending: INTERNAL MEDICINE | Admitting: INTERNAL MEDICINE
Payer: COMMERCIAL

## 2023-11-20 ENCOUNTER — APPOINTMENT (OUTPATIENT)
Dept: CT IMAGING | Age: 73
DRG: 189 | End: 2023-11-20
Payer: COMMERCIAL

## 2023-11-20 ENCOUNTER — APPOINTMENT (OUTPATIENT)
Dept: GENERAL RADIOLOGY | Age: 73
DRG: 189 | End: 2023-11-20
Payer: COMMERCIAL

## 2023-11-20 DIAGNOSIS — R09.02 HYPOXIA: Primary | ICD-10-CM

## 2023-11-20 DIAGNOSIS — B88.8 INFESTATION BY BED BUG: ICD-10-CM

## 2023-11-20 DIAGNOSIS — M54.50 ACUTE EXACERBATION OF CHRONIC LOW BACK PAIN: ICD-10-CM

## 2023-11-20 DIAGNOSIS — G89.29 ACUTE EXACERBATION OF CHRONIC LOW BACK PAIN: ICD-10-CM

## 2023-11-20 DIAGNOSIS — R60.0 BILATERAL LEG EDEMA: ICD-10-CM

## 2023-11-20 LAB
ALBUMIN SERPL-MCNC: 3.8 G/DL (ref 3.5–4.6)
ALP SERPL-CCNC: 110 U/L (ref 35–104)
ALT SERPL-CCNC: 13 U/L (ref 0–41)
ANION GAP SERPL CALCULATED.3IONS-SCNC: 11 MEQ/L (ref 9–15)
ANISOCYTOSIS BLD QL SMEAR: ABNORMAL
AST SERPL-CCNC: 31 U/L (ref 0–40)
B PARAP IS1001 DNA NPH QL NAA+NON-PROBE: NOT DETECTED
B PERT.PT PRMT NPH QL NAA+NON-PROBE: NOT DETECTED
BACTERIA URNS QL MICRO: NEGATIVE /HPF
BASOPHILS # BLD: 0.1 K/UL (ref 0–0.2)
BASOPHILS NFR BLD: 1 %
BILIRUB SERPL-MCNC: 0.5 MG/DL (ref 0.2–0.7)
BILIRUB UR QL STRIP: NEGATIVE
BNP BLD-MCNC: 564 PG/ML
BUN SERPL-MCNC: 30 MG/DL (ref 8–23)
C PNEUM DNA NPH QL NAA+NON-PROBE: NOT DETECTED
CALCIUM SERPL-MCNC: 8.9 MG/DL (ref 8.5–9.9)
CHLORIDE SERPL-SCNC: 96 MEQ/L (ref 95–107)
CLARITY UR: ABNORMAL
CO2 SERPL-SCNC: 31 MEQ/L (ref 20–31)
COLOR UR: YELLOW
CREAT SERPL-MCNC: 2.39 MG/DL (ref 0.7–1.2)
EOSINOPHIL # BLD: 0.1 K/UL (ref 0–0.7)
EOSINOPHIL NFR BLD: 1 %
EPI CELLS #/AREA URNS AUTO: ABNORMAL /HPF (ref 0–5)
ERYTHROCYTE [DISTWIDTH] IN BLOOD BY AUTOMATED COUNT: 17.7 % (ref 11.5–14.5)
FLUAV RNA NPH QL NAA+NON-PROBE: NOT DETECTED
FLUBV RNA NPH QL NAA+NON-PROBE: NOT DETECTED
GLOBULIN SER CALC-MCNC: 3.5 G/DL (ref 2.3–3.5)
GLUCOSE SERPL-MCNC: 290 MG/DL (ref 70–99)
GLUCOSE UR STRIP-MCNC: NEGATIVE MG/DL
HADV DNA NPH QL NAA+NON-PROBE: NOT DETECTED
HCOV 229E RNA NPH QL NAA+NON-PROBE: NOT DETECTED
HCOV HKU1 RNA NPH QL NAA+NON-PROBE: NOT DETECTED
HCOV NL63 RNA NPH QL NAA+NON-PROBE: NOT DETECTED
HCOV OC43 RNA NPH QL NAA+NON-PROBE: NOT DETECTED
HCT VFR BLD AUTO: 46.8 % (ref 42–52)
HGB BLD-MCNC: 12.7 G/DL (ref 14–18)
HGB UR QL STRIP: ABNORMAL
HMPV RNA NPH QL NAA+NON-PROBE: NOT DETECTED
HPIV1 RNA NPH QL NAA+NON-PROBE: NOT DETECTED
HPIV2 RNA NPH QL NAA+NON-PROBE: NOT DETECTED
HPIV3 RNA NPH QL NAA+NON-PROBE: NOT DETECTED
HPIV4 RNA NPH QL NAA+NON-PROBE: NOT DETECTED
HYALINE CASTS #/AREA URNS AUTO: ABNORMAL /HPF (ref 0–5)
INFLUENZA A BY PCR: NEGATIVE
INFLUENZA B BY PCR: NEGATIVE
KETONES UR STRIP-MCNC: NEGATIVE MG/DL
LACTATE BLDV-SCNC: 1.8 MMOL/L (ref 0.5–2.2)
LEUKOCYTE ESTERASE UR QL STRIP: ABNORMAL
LIPASE SERPL-CCNC: 11 U/L (ref 12–95)
LYMPHOCYTES # BLD: 0.8 K/UL (ref 1–4.8)
LYMPHOCYTES NFR BLD: 10 %
M PNEUMO DNA NPH QL NAA+NON-PROBE: NOT DETECTED
MCH RBC QN AUTO: 23.6 PG (ref 27–31.3)
MCHC RBC AUTO-ENTMCNC: 27.1 % (ref 33–37)
MCV RBC AUTO: 86.8 FL (ref 79–92.2)
MONOCYTES # BLD: 1.2 K/UL (ref 0.2–0.8)
MONOCYTES NFR BLD: 19 %
NEUTROPHILS # BLD: 4.4 K/UL (ref 1.4–6.5)
NEUTS SEG NFR BLD: 67 %
NITRITE UR QL STRIP: NEGATIVE
PH UR STRIP: 6 [PH] (ref 5–9)
PLATELET # BLD AUTO: 261 K/UL (ref 130–400)
PLATELET BLD QL SMEAR: ADEQUATE
POTASSIUM SERPL-SCNC: 4.6 MEQ/L (ref 3.4–4.9)
PROT SERPL-MCNC: 7.3 G/DL (ref 6.3–8)
PROT UR STRIP-MCNC: ABNORMAL MG/DL
RBC # BLD AUTO: 5.39 M/UL (ref 4.7–6.1)
RBC #/AREA URNS AUTO: ABNORMAL /HPF (ref 0–5)
RSV RNA NPH QL NAA+NON-PROBE: NOT DETECTED
RV+EV RNA NPH QL NAA+NON-PROBE: NOT DETECTED
SARS-COV-2 RDRP RESP QL NAA+PROBE: NOT DETECTED
SARS-COV-2 RNA NPH QL NAA+NON-PROBE: NOT DETECTED
SODIUM SERPL-SCNC: 138 MEQ/L (ref 135–144)
SP GR UR STRIP: 1.01 (ref 1–1.03)
TROPONIN, HIGH SENSITIVITY: 84 NG/L (ref 0–19)
TROPONIN, HIGH SENSITIVITY: 90 NG/L (ref 0–19)
TROPONIN, HIGH SENSITIVITY: 99 NG/L (ref 0–19)
URINE REFLEX TO CULTURE: YES
UROBILINOGEN UR STRIP-ACNC: 0.2 E.U./DL
VARIANT LYMPHS NFR BLD: 3 %
WBC # BLD AUTO: 6.5 K/UL (ref 4.8–10.8)
WBC #/AREA URNS AUTO: >100 /HPF (ref 0–5)

## 2023-11-20 PROCEDURE — 87086 URINE CULTURE/COLONY COUNT: CPT

## 2023-11-20 PROCEDURE — 87077 CULTURE AEROBIC IDENTIFY: CPT

## 2023-11-20 PROCEDURE — 71045 X-RAY EXAM CHEST 1 VIEW: CPT

## 2023-11-20 PROCEDURE — 93005 ELECTROCARDIOGRAM TRACING: CPT | Performed by: PHYSICIAN ASSISTANT

## 2023-11-20 PROCEDURE — 85025 COMPLETE CBC W/AUTO DIFF WBC: CPT

## 2023-11-20 PROCEDURE — 80053 COMPREHEN METABOLIC PANEL: CPT

## 2023-11-20 PROCEDURE — 96372 THER/PROPH/DIAG INJ SC/IM: CPT

## 2023-11-20 PROCEDURE — 87502 INFLUENZA DNA AMP PROBE: CPT

## 2023-11-20 PROCEDURE — 96374 THER/PROPH/DIAG INJ IV PUSH: CPT

## 2023-11-20 PROCEDURE — 72131 CT LUMBAR SPINE W/O DYE: CPT

## 2023-11-20 PROCEDURE — 87186 SC STD MICRODIL/AGAR DIL: CPT

## 2023-11-20 PROCEDURE — 36415 COLL VENOUS BLD VENIPUNCTURE: CPT

## 2023-11-20 PROCEDURE — 6360000002 HC RX W HCPCS

## 2023-11-20 PROCEDURE — 83880 ASSAY OF NATRIURETIC PEPTIDE: CPT

## 2023-11-20 PROCEDURE — 1210000000 HC MED SURG R&B

## 2023-11-20 PROCEDURE — 87635 SARS-COV-2 COVID-19 AMP PRB: CPT

## 2023-11-20 PROCEDURE — 83605 ASSAY OF LACTIC ACID: CPT

## 2023-11-20 PROCEDURE — 6370000000 HC RX 637 (ALT 250 FOR IP)

## 2023-11-20 PROCEDURE — 99285 EMERGENCY DEPT VISIT HI MDM: CPT

## 2023-11-20 PROCEDURE — 6360000002 HC RX W HCPCS: Performed by: PHYSICIAN ASSISTANT

## 2023-11-20 PROCEDURE — 84484 ASSAY OF TROPONIN QUANT: CPT

## 2023-11-20 PROCEDURE — 83690 ASSAY OF LIPASE: CPT

## 2023-11-20 PROCEDURE — 0202U NFCT DS 22 TRGT SARS-COV-2: CPT

## 2023-11-20 PROCEDURE — 81001 URINALYSIS AUTO W/SCOPE: CPT

## 2023-11-20 RX ORDER — SODIUM CHLORIDE 9 MG/ML
INJECTION, SOLUTION INTRAVENOUS PRN
Status: DISCONTINUED | OUTPATIENT
Start: 2023-11-20 | End: 2023-12-06 | Stop reason: HOSPADM

## 2023-11-20 RX ORDER — ENOXAPARIN SODIUM 100 MG/ML
30 INJECTION SUBCUTANEOUS 2 TIMES DAILY
Status: DISCONTINUED | OUTPATIENT
Start: 2023-11-20 | End: 2023-11-23

## 2023-11-20 RX ORDER — ONDANSETRON 4 MG/1
4 TABLET, ORALLY DISINTEGRATING ORAL EVERY 8 HOURS PRN
Status: DISCONTINUED | OUTPATIENT
Start: 2023-11-20 | End: 2023-12-06 | Stop reason: HOSPADM

## 2023-11-20 RX ORDER — GLUCAGON 1 MG/ML
1 KIT INJECTION PRN
Status: DISCONTINUED | OUTPATIENT
Start: 2023-11-20 | End: 2023-12-06 | Stop reason: HOSPADM

## 2023-11-20 RX ORDER — INSULIN LISPRO 100 [IU]/ML
0-8 INJECTION, SOLUTION INTRAVENOUS; SUBCUTANEOUS
Status: DISCONTINUED | OUTPATIENT
Start: 2023-11-21 | End: 2023-11-29

## 2023-11-20 RX ORDER — INSULIN LISPRO 100 [IU]/ML
0-4 INJECTION, SOLUTION INTRAVENOUS; SUBCUTANEOUS NIGHTLY
Status: DISCONTINUED | OUTPATIENT
Start: 2023-11-20 | End: 2023-11-29

## 2023-11-20 RX ORDER — POTASSIUM CHLORIDE 20 MEQ/1
40 TABLET, EXTENDED RELEASE ORAL PRN
Status: DISCONTINUED | OUTPATIENT
Start: 2023-11-20 | End: 2023-12-06 | Stop reason: HOSPADM

## 2023-11-20 RX ORDER — SODIUM CHLORIDE 0.9 % (FLUSH) 0.9 %
5-40 SYRINGE (ML) INJECTION EVERY 12 HOURS SCHEDULED
Status: DISCONTINUED | OUTPATIENT
Start: 2023-11-20 | End: 2023-12-06 | Stop reason: HOSPADM

## 2023-11-20 RX ORDER — ACETAMINOPHEN 500 MG
1000 TABLET ORAL ONCE
Status: COMPLETED | OUTPATIENT
Start: 2023-11-20 | End: 2023-11-20

## 2023-11-20 RX ORDER — ONDANSETRON 2 MG/ML
4 INJECTION INTRAMUSCULAR; INTRAVENOUS EVERY 6 HOURS PRN
Status: DISCONTINUED | OUTPATIENT
Start: 2023-11-20 | End: 2023-12-06 | Stop reason: HOSPADM

## 2023-11-20 RX ORDER — DEXTROSE MONOHYDRATE 100 MG/ML
INJECTION, SOLUTION INTRAVENOUS CONTINUOUS PRN
Status: DISCONTINUED | OUTPATIENT
Start: 2023-11-20 | End: 2023-11-29 | Stop reason: SDUPTHER

## 2023-11-20 RX ORDER — SODIUM CHLORIDE 0.9 % (FLUSH) 0.9 %
5-40 SYRINGE (ML) INJECTION PRN
Status: DISCONTINUED | OUTPATIENT
Start: 2023-11-20 | End: 2023-12-06 | Stop reason: HOSPADM

## 2023-11-20 RX ORDER — POLYETHYLENE GLYCOL 3350 17 G/17G
17 POWDER, FOR SOLUTION ORAL DAILY PRN
Status: DISCONTINUED | OUTPATIENT
Start: 2023-11-20 | End: 2023-12-06 | Stop reason: HOSPADM

## 2023-11-20 RX ORDER — ACETAMINOPHEN 650 MG/1
650 SUPPOSITORY RECTAL EVERY 6 HOURS PRN
Status: DISCONTINUED | OUTPATIENT
Start: 2023-11-20 | End: 2023-12-06 | Stop reason: HOSPADM

## 2023-11-20 RX ORDER — POTASSIUM CHLORIDE 7.45 MG/ML
10 INJECTION INTRAVENOUS PRN
Status: DISCONTINUED | OUTPATIENT
Start: 2023-11-20 | End: 2023-12-06 | Stop reason: HOSPADM

## 2023-11-20 RX ORDER — ORPHENADRINE CITRATE 30 MG/ML
60 INJECTION INTRAMUSCULAR; INTRAVENOUS ONCE
Status: COMPLETED | OUTPATIENT
Start: 2023-11-20 | End: 2023-11-20

## 2023-11-20 RX ORDER — MAGNESIUM SULFATE IN WATER 40 MG/ML
2000 INJECTION, SOLUTION INTRAVENOUS PRN
Status: DISCONTINUED | OUTPATIENT
Start: 2023-11-20 | End: 2023-12-06 | Stop reason: HOSPADM

## 2023-11-20 RX ORDER — ACETAMINOPHEN 325 MG/1
650 TABLET ORAL EVERY 6 HOURS PRN
Status: DISCONTINUED | OUTPATIENT
Start: 2023-11-20 | End: 2023-12-06 | Stop reason: HOSPADM

## 2023-11-20 RX ADMIN — METHYLPREDNISOLONE SODIUM SUCCINATE 125 MG: 125 INJECTION, POWDER, FOR SOLUTION INTRAMUSCULAR; INTRAVENOUS at 17:20

## 2023-11-20 RX ADMIN — ORPHENADRINE CITRATE 60 MG: 60 INJECTION INTRAMUSCULAR; INTRAVENOUS at 20:13

## 2023-11-20 RX ADMIN — ACETAMINOPHEN 1000 MG: 500 TABLET ORAL at 20:12

## 2023-11-20 ASSESSMENT — ENCOUNTER SYMPTOMS
SHORTNESS OF BREATH: 0
ABDOMINAL DISTENTION: 0
RHINORRHEA: 0
ABDOMINAL PAIN: 0
COLOR CHANGE: 0
SORE THROAT: 0
CONSTIPATION: 0
EYE DISCHARGE: 0
BACK PAIN: 1

## 2023-11-20 ASSESSMENT — PAIN DESCRIPTION - ORIENTATION: ORIENTATION: RIGHT;MID;ANTERIOR

## 2023-11-20 ASSESSMENT — PAIN DESCRIPTION - FREQUENCY: FREQUENCY: CONTINUOUS

## 2023-11-20 ASSESSMENT — PAIN DESCRIPTION - ONSET: ONSET: ON-GOING

## 2023-11-20 ASSESSMENT — LIFESTYLE VARIABLES
HOW OFTEN DO YOU HAVE A DRINK CONTAINING ALCOHOL: MONTHLY OR LESS
HOW MANY STANDARD DRINKS CONTAINING ALCOHOL DO YOU HAVE ON A TYPICAL DAY: 1 OR 2

## 2023-11-20 ASSESSMENT — PAIN SCALES - GENERAL
PAINLEVEL_OUTOF10: 3
PAINLEVEL_OUTOF10: 3

## 2023-11-20 ASSESSMENT — PAIN - FUNCTIONAL ASSESSMENT: PAIN_FUNCTIONAL_ASSESSMENT: ACTIVITIES ARE NOT PREVENTED

## 2023-11-20 ASSESSMENT — PAIN DESCRIPTION - PAIN TYPE: TYPE: CHRONIC PAIN

## 2023-11-20 ASSESSMENT — PAIN DESCRIPTION - LOCATION
LOCATION: BACK
LOCATION: BACK

## 2023-11-20 NOTE — ED NOTES
Pt cleaned up at this time   Belongings placed in bags with pt labels and placed out in ambulance [facility], [address], RN  11/20/23 6110

## 2023-11-20 NOTE — ED PROVIDER NOTES
Basic Information   Time Seen: 4:25 PM   [facility]   Patient presents with    Back Pain     Chronic, denies new injury or trauma, worse for 2-3 days      HPI   Grey Robledo is a 74 yrs male who presents with mid back pain for the last 3 days. Recent exposure to covid. Pain is worse with movement. Took tylenol today for pain. Has h/o chronic back pain. O2 saturation 88% in triage. Physical Exam     BP      Temp      Pulse     Resp      SpO2         General: Awake and Alert, no acute distress   CV: RRR, S1, S2   Resp: LCTAB, even and non labored   Other: mid thoracic point ttp. Impression and Plan   Labs Reviewed - No data to display     No orders to display      Final Impression   I have performed a medical screening exam on Grey Robledo.  Based on this patient's chief complaint/symptoms of   Chief Complaint   Patient presents with    Back Pain     Chronic, denies new injury or trauma, worse for 2-3 days    and my focused exam, their care will be started and transitioned to provider when room is available       Nimisha Guevara PA-C  11/20/23 5428
rule out possibility of a PE at this time. Patient cannot receive a CTA in the ED secondary to his baseline kidney dysfunction, patient evaluated in the ED by attending Dr. David Dent and presentation reviewed with hospitalist Dr. Crystal Rather, we will admit for V/Q scan, further evaluation and management. Medical Decision Making  Amount and/or Complexity of Data Reviewed  Labs: ordered. Radiology: ordered. ECG/medicine tests: ordered. Risk  OTC drugs. Prescription drug management. Decision regarding hospitalization. REASSESSMENT     ED Course as of 11/20/23 2315   Mon Nov 20, 2023   1935 Patient reevaluated. States continued back pain. He has received Solu-Medrol by signout provider. Denies any shortness of breath. Denies chest pain, palpitations, nausea, sweating, lightheadedness. Patient noted to cough while I am talking to him. States this is not a new cough. Chronic problem. Denies fevers, denies feeling sick all over. Patient states Tylenol is helpful for his back pain. We will add on this as well as Norflex. [CA]      ED Course User Index  [CA] Claire Mayo         CRITICAL CARE TIME   Total Critical Care time was 0 minutes, excluding separately reportable procedures. There was a high probability of clinically significant/life threatening deterioration in the patient's condition which required my urgent intervention. CONSULTS:  IP CONSULT TO PULMONOLOGY    PROCEDURES:  Unless otherwise noted below, none     Procedures      FINAL IMPRESSION      1. Hypoxia    2. Acute exacerbation of chronic low back pain    3. Infestation by bed bug          DISPOSITION/PLAN   DISPOSITION Admitted 11/20/2023 09:56:17 PM      PATIENT REFERRED TO:  No follow-up provider specified.     DISCHARGE MEDICATIONS:  Current Discharge Medication List        Controlled Substances Monitoring:          No data to display                (Please note that portions of this note were completed with a voice

## 2023-11-20 NOTE — ED TRIAGE NOTES
Pt to ed from home via triage with c/o acute on chronic back pain  Pt SPO2 88% after exertion  Pt denies injury or trauma  PT denies CP or shortness of breath  Pt states he was seen at PCP today for check up   Pt states he was told to come to ed for his pain  PT uses rotator and appears to transfer with difficulty  SOB on exertion noted

## 2023-11-21 ENCOUNTER — APPOINTMENT (OUTPATIENT)
Dept: NUCLEAR MEDICINE | Age: 73
DRG: 189 | End: 2023-11-21
Payer: COMMERCIAL

## 2023-11-21 ENCOUNTER — APPOINTMENT (OUTPATIENT)
Age: 73
DRG: 189 | End: 2023-11-21
Attending: INTERNAL MEDICINE
Payer: COMMERCIAL

## 2023-11-21 ENCOUNTER — APPOINTMENT (OUTPATIENT)
Dept: ULTRASOUND IMAGING | Age: 73
DRG: 189 | End: 2023-11-21
Payer: COMMERCIAL

## 2023-11-21 LAB
ALBUMIN SERPL-MCNC: 3.3 G/DL (ref 3.5–4.6)
ALP SERPL-CCNC: 100 U/L (ref 35–104)
ALT SERPL-CCNC: 12 U/L (ref 0–41)
ANION GAP SERPL CALCULATED.3IONS-SCNC: 12 MEQ/L (ref 9–15)
AST SERPL-CCNC: 18 U/L (ref 0–40)
BASOPHILS # BLD: 0 K/UL (ref 0–0.2)
BASOPHILS NFR BLD: 0.2 %
BILIRUB SERPL-MCNC: 0.5 MG/DL (ref 0.2–0.7)
BUN SERPL-MCNC: 31 MG/DL (ref 8–23)
CALCIUM SERPL-MCNC: 8.6 MG/DL (ref 8.5–9.9)
CHLORIDE SERPL-SCNC: 100 MEQ/L (ref 95–107)
CO2 SERPL-SCNC: 30 MEQ/L (ref 20–31)
CREAT SERPL-MCNC: 2.22 MG/DL (ref 0.7–1.2)
ECHO AV AREA PEAK VELOCITY: 2 CM2
ECHO AV AREA VTI: 2.6 CM2
ECHO AV AREA/BSA PEAK VELOCITY: 0.9 CM2/M2
ECHO AV AREA/BSA VTI: 1.1 CM2/M2
ECHO AV CUSP MM: 2.2 CM
ECHO AV MEAN GRADIENT: 7 MMHG
ECHO AV MEAN VELOCITY: 1.2 M/S
ECHO AV PEAK GRADIENT: 14 MMHG
ECHO AV PEAK VELOCITY: 1.9 M/S
ECHO AV VELOCITY RATIO: 0.68
ECHO AV VTI: 41.2 CM
ECHO BSA: 2.47 M2
ECHO LA DIAMETER INDEX: 1.88 CM/M2
ECHO LA DIAMETER: 4.4 CM
ECHO LA VOL A-L A2C: 37 ML (ref 18–58)
ECHO LA VOL A-L A4C: 73 ML (ref 18–58)
ECHO LA VOL MOD A2C: 35 ML (ref 18–58)
ECHO LA VOL MOD A4C: 69 ML (ref 18–58)
ECHO LA VOLUME AREA LENGTH: 55 ML
ECHO LA VOLUME INDEX A-L A2C: 16 ML/M2 (ref 16–34)
ECHO LA VOLUME INDEX A-L A4C: 31 ML/M2 (ref 16–34)
ECHO LA VOLUME INDEX AREA LENGTH: 24 ML/M2 (ref 16–34)
ECHO LA VOLUME INDEX MOD A2C: 15 ML/M2 (ref 16–34)
ECHO LA VOLUME INDEX MOD A4C: 29 ML/M2 (ref 16–34)
ECHO LV E' LATERAL VELOCITY: 8 CM/S
ECHO LV E' SEPTAL VELOCITY: 7 CM/S
ECHO LV EDV A2C: 97 ML
ECHO LV EDV A4C: 83 ML
ECHO LV EDV BP: 90 ML (ref 67–155)
ECHO LV EDV INDEX A4C: 35 ML/M2
ECHO LV EDV INDEX BP: 38 ML/M2
ECHO LV EDV NDEX A2C: 41 ML/M2
ECHO LV EJECTION FRACTION A2C: 68 %
ECHO LV EJECTION FRACTION A4C: 72 %
ECHO LV EJECTION FRACTION BIPLANE: 70 % (ref 55–100)
ECHO LV ESV A2C: 31 ML
ECHO LV ESV A4C: 23 ML
ECHO LV ESV BP: 27 ML (ref 22–58)
ECHO LV ESV INDEX A2C: 13 ML/M2
ECHO LV ESV INDEX A4C: 10 ML/M2
ECHO LV ESV INDEX BP: 12 ML/M2
ECHO LV FRACTIONAL SHORTENING: 37 % (ref 28–44)
ECHO LV INTERNAL DIMENSION DIASTOLE INDEX: 2.18 CM/M2
ECHO LV INTERNAL DIMENSION DIASTOLIC: 5.1 CM (ref 4.2–5.9)
ECHO LV INTERNAL DIMENSION SYSTOLIC INDEX: 1.37 CM/M2
ECHO LV INTERNAL DIMENSION SYSTOLIC: 3.2 CM
ECHO LV IVSD: 1.2 CM (ref 0.6–1)
ECHO LV IVSS: 1.7 CM
ECHO LV MASS 2D: 255.5 G (ref 88–224)
ECHO LV MASS INDEX 2D: 109.2 G/M2 (ref 49–115)
ECHO LV POSTERIOR WALL DIASTOLIC: 1.3 CM (ref 0.6–1)
ECHO LV POSTERIOR WALL SYSTOLIC: 1.8 CM
ECHO LV RELATIVE WALL THICKNESS RATIO: 0.51
ECHO LVOT AREA: 3.1 CM2
ECHO LVOT AV VTI INDEX: 0.86
ECHO LVOT DIAM: 2 CM
ECHO LVOT MEAN GRADIENT: 2 MMHG
ECHO LVOT PEAK GRADIENT: 6 MMHG
ECHO LVOT PEAK VELOCITY: 1.3 M/S
ECHO LVOT STROKE VOLUME INDEX: 47.4 ML/M2
ECHO LVOT SV: 110.8 ML
ECHO LVOT VTI: 35.3 CM
ECHO MV A VELOCITY: 1.13 M/S
ECHO MV AREA VTI: 2.9 CM2
ECHO MV E DECELERATION TIME (DT): 226 MS
ECHO MV E VELOCITY: 1.02 M/S
ECHO MV E/A RATIO: 0.9
ECHO MV E/E' LATERAL: 12.75
ECHO MV E/E' RATIO (AVERAGED): 13.66
ECHO MV LVOT VTI INDEX: 1.08
ECHO MV MAX VELOCITY: 1.4 M/S
ECHO MV MEAN GRADIENT: 3 MMHG
ECHO MV MEAN VELOCITY: 0.8 M/S
ECHO MV PEAK GRADIENT: 8 MMHG
ECHO MV VTI: 38.2 CM
ECHO PV MAX VELOCITY: 1.1 M/S
ECHO PV PEAK GRADIENT: 5 MMHG
ECHO RV INTERNAL DIMENSION: 3.3 CM
ECHO RV TAPSE: 2.1 CM (ref 1.7–?)
ECHO TV REGURGITANT MAX VELOCITY: 1.75 M/S
ECHO TV REGURGITANT PEAK GRADIENT: 12 MMHG
EKG ATRIAL RATE: 69 BPM
EKG ATRIAL RATE: 86 BPM
EKG P AXIS: -17 DEGREES
EKG P AXIS: 2 DEGREES
EKG P-R INTERVAL: 144 MS
EKG P-R INTERVAL: 168 MS
EKG Q-T INTERVAL: 424 MS
EKG Q-T INTERVAL: 458 MS
EKG QRS DURATION: 136 MS
EKG QRS DURATION: 140 MS
EKG QTC CALCULATION (BAZETT): 490 MS
EKG QTC CALCULATION (BAZETT): 507 MS
EKG R AXIS: -59 DEGREES
EKG R AXIS: -7 DEGREES
EKG T AXIS: -3 DEGREES
EKG T AXIS: 0 DEGREES
EKG VENTRICULAR RATE: 69 BPM
EKG VENTRICULAR RATE: 86 BPM
EOSINOPHIL # BLD: 0 K/UL (ref 0–0.7)
EOSINOPHIL NFR BLD: 0 %
ERYTHROCYTE [DISTWIDTH] IN BLOOD BY AUTOMATED COUNT: 17 % (ref 11.5–14.5)
GLOBULIN SER CALC-MCNC: 3.3 G/DL (ref 2.3–3.5)
GLUCOSE BLD-MCNC: 208 MG/DL (ref 70–99)
GLUCOSE BLD-MCNC: 269 MG/DL (ref 70–99)
GLUCOSE BLD-MCNC: 282 MG/DL (ref 70–99)
GLUCOSE BLD-MCNC: 393 MG/DL (ref 70–99)
GLUCOSE BLD-MCNC: 420 MG/DL (ref 70–99)
GLUCOSE SERPL-MCNC: 267 MG/DL (ref 70–99)
HCT VFR BLD AUTO: 39.8 % (ref 42–52)
HGB BLD-MCNC: 11.1 G/DL (ref 14–18)
LYMPHOCYTES # BLD: 0.6 K/UL (ref 1–4.8)
LYMPHOCYTES NFR BLD: 9.8 %
MCH RBC QN AUTO: 23.6 PG (ref 27–31.3)
MCHC RBC AUTO-ENTMCNC: 27.9 % (ref 33–37)
MCV RBC AUTO: 84.7 FL (ref 79–92.2)
MONOCYTES # BLD: 0 K/UL (ref 0.2–0.8)
MONOCYTES NFR BLD: 0.7 %
NEUTROPHILS # BLD: 5.2 K/UL (ref 1.4–6.5)
NEUTS SEG NFR BLD: 88.8 %
PERFORMED ON: ABNORMAL
PLATELET # BLD AUTO: 237 K/UL (ref 130–400)
POTASSIUM SERPL-SCNC: 4.6 MEQ/L (ref 3.4–4.9)
PROT SERPL-MCNC: 6.6 G/DL (ref 6.3–8)
RBC # BLD AUTO: 4.7 M/UL (ref 4.7–6.1)
SODIUM SERPL-SCNC: 142 MEQ/L (ref 135–144)
WBC # BLD AUTO: 5.8 K/UL (ref 4.8–10.8)

## 2023-11-21 PROCEDURE — 85025 COMPLETE CBC W/AUTO DIFF WBC: CPT

## 2023-11-21 PROCEDURE — 6370000000 HC RX 637 (ALT 250 FOR IP): Performed by: INTERNAL MEDICINE

## 2023-11-21 PROCEDURE — 3430000000 HC RX DIAGNOSTIC RADIOPHARMACEUTICAL: Performed by: INTERNAL MEDICINE

## 2023-11-21 PROCEDURE — A9539 TC99M PENTETATE: HCPCS | Performed by: INTERNAL MEDICINE

## 2023-11-21 PROCEDURE — A9540 TC99M MAA: HCPCS | Performed by: INTERNAL MEDICINE

## 2023-11-21 PROCEDURE — 93306 TTE W/DOPPLER COMPLETE: CPT | Performed by: INTERNAL MEDICINE

## 2023-11-21 PROCEDURE — 99223 1ST HOSP IP/OBS HIGH 75: CPT | Performed by: INTERNAL MEDICINE

## 2023-11-21 PROCEDURE — 1210000000 HC MED SURG R&B

## 2023-11-21 PROCEDURE — 80053 COMPREHEN METABOLIC PANEL: CPT

## 2023-11-21 PROCEDURE — 36415 COLL VENOUS BLD VENIPUNCTURE: CPT

## 2023-11-21 PROCEDURE — 78582 LUNG VENTILAT&PERFUS IMAGING: CPT

## 2023-11-21 PROCEDURE — 2700000000 HC OXYGEN THERAPY PER DAY

## 2023-11-21 PROCEDURE — 6360000002 HC RX W HCPCS: Performed by: INTERNAL MEDICINE

## 2023-11-21 PROCEDURE — 2580000003 HC RX 258: Performed by: INTERNAL MEDICINE

## 2023-11-21 PROCEDURE — 93005 ELECTROCARDIOGRAM TRACING: CPT | Performed by: INTERNAL MEDICINE

## 2023-11-21 PROCEDURE — 97166 OT EVAL MOD COMPLEX 45 MIN: CPT

## 2023-11-21 PROCEDURE — 93306 TTE W/DOPPLER COMPLETE: CPT

## 2023-11-21 PROCEDURE — 93970 EXTREMITY STUDY: CPT

## 2023-11-21 RX ORDER — ATENOLOL 50 MG/1
50 TABLET ORAL 2 TIMES DAILY
Status: DISCONTINUED | OUTPATIENT
Start: 2023-11-21 | End: 2023-12-06 | Stop reason: HOSPADM

## 2023-11-21 RX ORDER — LOSARTAN POTASSIUM 50 MG/1
50 TABLET ORAL DAILY
Status: DISCONTINUED | OUTPATIENT
Start: 2023-11-21 | End: 2023-12-06 | Stop reason: HOSPADM

## 2023-11-21 RX ORDER — FUROSEMIDE 80 MG
80 TABLET ORAL DAILY
Status: DISCONTINUED | OUTPATIENT
Start: 2023-11-21 | End: 2023-12-06 | Stop reason: HOSPADM

## 2023-11-21 RX ORDER — INSULIN LISPRO 100 [IU]/ML
10 INJECTION, SOLUTION INTRAVENOUS; SUBCUTANEOUS
Status: DISCONTINUED | OUTPATIENT
Start: 2023-11-21 | End: 2023-11-21

## 2023-11-21 RX ORDER — MONTELUKAST SODIUM 10 MG/1
10 TABLET ORAL NIGHTLY
Status: DISCONTINUED | OUTPATIENT
Start: 2023-11-21 | End: 2023-12-06 | Stop reason: HOSPADM

## 2023-11-21 RX ORDER — ROSUVASTATIN CALCIUM 20 MG/1
20 TABLET, COATED ORAL NIGHTLY
Status: DISCONTINUED | OUTPATIENT
Start: 2023-11-21 | End: 2023-12-04 | Stop reason: DRUGHIGH

## 2023-11-21 RX ORDER — INSULIN GLARGINE 100 [IU]/ML
30 INJECTION, SOLUTION SUBCUTANEOUS 2 TIMES DAILY
Status: DISCONTINUED | OUTPATIENT
Start: 2023-11-21 | End: 2023-11-22

## 2023-11-21 RX ORDER — FINASTERIDE 5 MG/1
5 TABLET, FILM COATED ORAL DAILY
Status: DISCONTINUED | OUTPATIENT
Start: 2023-11-21 | End: 2023-12-06 | Stop reason: HOSPADM

## 2023-11-21 RX ORDER — FLUTICASONE PROPIONATE 50 MCG
2 SPRAY, SUSPENSION (ML) NASAL DAILY
Status: DISCONTINUED | OUTPATIENT
Start: 2023-11-21 | End: 2023-12-06 | Stop reason: HOSPADM

## 2023-11-21 RX ORDER — CETIRIZINE HYDROCHLORIDE 10 MG/1
10 TABLET ORAL DAILY
Status: DISCONTINUED | OUTPATIENT
Start: 2023-11-21 | End: 2023-12-04

## 2023-11-21 RX ORDER — INSULIN LISPRO 100 [IU]/ML
20 INJECTION, SOLUTION INTRAVENOUS; SUBCUTANEOUS
Status: DISCONTINUED | OUTPATIENT
Start: 2023-11-22 | End: 2023-11-22

## 2023-11-21 RX ORDER — ALBUTEROL SULFATE 90 UG/1
2 AEROSOL, METERED RESPIRATORY (INHALATION) EVERY 6 HOURS PRN
Status: DISCONTINUED | OUTPATIENT
Start: 2023-11-21 | End: 2023-12-06 | Stop reason: HOSPADM

## 2023-11-21 RX ORDER — TAMSULOSIN HYDROCHLORIDE 0.4 MG/1
0.8 CAPSULE ORAL NIGHTLY
Status: DISCONTINUED | OUTPATIENT
Start: 2023-11-21 | End: 2023-12-06 | Stop reason: HOSPADM

## 2023-11-21 RX ORDER — ASPIRIN 81 MG/1
162 TABLET, CHEWABLE ORAL DAILY
Status: DISCONTINUED | OUTPATIENT
Start: 2023-11-21 | End: 2023-12-06 | Stop reason: HOSPADM

## 2023-11-21 RX ORDER — AMLODIPINE BESYLATE 5 MG/1
5 TABLET ORAL DAILY
Status: DISCONTINUED | OUTPATIENT
Start: 2023-11-21 | End: 2023-11-26

## 2023-11-21 RX ORDER — KIT FOR THE PREPARATION OF TECHNETIUM TC 99M PENTETATE 20 MG/1
1 INJECTION, POWDER, LYOPHILIZED, FOR SOLUTION INTRAVENOUS; RESPIRATORY (INHALATION)
Status: COMPLETED | OUTPATIENT
Start: 2023-11-21 | End: 2023-11-21

## 2023-11-21 RX ORDER — SODIUM CHLORIDE 0.9 % (FLUSH) 0.9 %
10 SYRINGE (ML) INJECTION PRN
Status: DISCONTINUED | OUTPATIENT
Start: 2023-11-21 | End: 2023-12-06 | Stop reason: HOSPADM

## 2023-11-21 RX ADMIN — INSULIN LISPRO 4 UNITS: 100 INJECTION, SOLUTION INTRAVENOUS; SUBCUTANEOUS at 11:31

## 2023-11-21 RX ADMIN — ENOXAPARIN SODIUM 30 MG: 100 INJECTION SUBCUTANEOUS at 11:31

## 2023-11-21 RX ADMIN — INSULIN GLARGINE 30 UNITS: 100 INJECTION, SOLUTION SUBCUTANEOUS at 20:30

## 2023-11-21 RX ADMIN — ASPIRIN 81 MG 162 MG: 81 TABLET ORAL at 15:49

## 2023-11-21 RX ADMIN — Medication 10 ML: at 00:06

## 2023-11-21 RX ADMIN — ENOXAPARIN SODIUM 30 MG: 100 INJECTION SUBCUTANEOUS at 20:29

## 2023-11-21 RX ADMIN — ATENOLOL 50 MG: 50 TABLET ORAL at 15:50

## 2023-11-21 RX ADMIN — ACETAMINOPHEN 650 MG: 325 TABLET ORAL at 20:29

## 2023-11-21 RX ADMIN — Medication 5.4 MILLICURIE: at 19:00

## 2023-11-21 RX ADMIN — INSULIN LISPRO 8 UNITS: 100 INJECTION, SOLUTION INTRAVENOUS; SUBCUTANEOUS at 17:11

## 2023-11-21 RX ADMIN — TAMSULOSIN HYDROCHLORIDE 0.8 MG: 0.4 CAPSULE ORAL at 20:29

## 2023-11-21 RX ADMIN — INSULIN LISPRO 4 UNITS: 100 INJECTION, SOLUTION INTRAVENOUS; SUBCUTANEOUS at 20:30

## 2023-11-21 RX ADMIN — CETIRIZINE HYDROCHLORIDE 10 MG: 10 TABLET, FILM COATED ORAL at 15:49

## 2023-11-21 RX ADMIN — LOSARTAN POTASSIUM 50 MG: 50 TABLET, FILM COATED ORAL at 15:49

## 2023-11-21 RX ADMIN — INSULIN LISPRO 10 UNITS: 100 INJECTION, SOLUTION INTRAVENOUS; SUBCUTANEOUS at 17:12

## 2023-11-21 RX ADMIN — FUROSEMIDE 80 MG: 80 TABLET ORAL at 15:50

## 2023-11-21 RX ADMIN — FINASTERIDE 5 MG: 5 TABLET, FILM COATED ORAL at 15:49

## 2023-11-21 RX ADMIN — Medication 10 ML: at 19:00

## 2023-11-21 RX ADMIN — ROSUVASTATIN CALCIUM 20 MG: 20 TABLET, FILM COATED ORAL at 20:29

## 2023-11-21 RX ADMIN — ENOXAPARIN SODIUM 30 MG: 100 INJECTION SUBCUTANEOUS at 00:06

## 2023-11-21 RX ADMIN — Medication 10 ML: at 11:32

## 2023-11-21 RX ADMIN — INSULIN GLARGINE 30 UNITS: 100 INJECTION, SOLUTION SUBCUTANEOUS at 15:50

## 2023-11-21 RX ADMIN — MONTELUKAST 10 MG: 10 TABLET, FILM COATED ORAL at 20:29

## 2023-11-21 RX ADMIN — KIT FOR THE PREPARATION OF TECHNETIUM TC 99M PENTETATE 1 MILLICURIE: 20 INJECTION, POWDER, LYOPHILIZED, FOR SOLUTION INTRAVENOUS; RESPIRATORY (INHALATION) at 18:35

## 2023-11-21 RX ADMIN — Medication 5 ML: at 20:31

## 2023-11-21 RX ADMIN — ATENOLOL 50 MG: 50 TABLET ORAL at 20:29

## 2023-11-21 RX ADMIN — AMLODIPINE BESYLATE 5 MG: 5 TABLET ORAL at 15:49

## 2023-11-21 NOTE — H&P
Hospitalist Group   History and Physical      CHIEF COMPLAINT: Back pain    History of Present Illness:  74 y.o. male with a history of hypertension, diabetes, CKD, morbid obesity, sleep apnea, presents with back pain. For the past 3 days has been having continuous back pain. Pain is in the mid back and feels like when he had gallbladder issues in the 70s. He denied cholecystectomy. He says been taking Tylenol for the pain. Patient did mention that he has history of chronic back pain. Patient also said for the past 6 months has been having shortness of breath has been worsening. He has a cough as well but denies any worsening lately. He said that he was exposed to [employer] a few days ago and had a test done and it was negative. Patient mentioned that his oxygen was checked before and it was 90% and he did not qualify for oxygen. He mentioned that he uses CPAP at home. He currently denies chest pain, fever, nausea, vomiting, abdominal pain, urinary symptoms, bowel movement changes. No sore throat but he has postnasal drip that is chronic. REVIEW OF SYSTEMS:  no fevers, chills, cp, has sob, no n/v, ha, vision/hearing changes, wt changes, hot/cold flashes, other open skin lesions, diarrhea, constipation, dysuria/hematuria unless noted in HPI. Complete ROS performed with the patient and is otherwise negative. PMH:  Past Medical History:   Diagnosis Date    Chronic kidney disease     Diabetes mellitus ([address] [facility])     Hypertension     Type 2 diabetes mellitus with hyperglycemia 1/6/2023    Type 2 diabetes mellitus without complication, without long-term current use of insulin ([address] [facility]) 3/19/2019    Uncontrolled type 2 diabetes mellitus with hyperglycemia [facility])        Surgical History:  Past Surgical History:   Procedure Laterality Date    APPENDECTOMY      age 13       Medications Prior to Admission:    Prior to Admission medications    Medication Sig Start Date End Date Taking?  Authorizing Provider

## 2023-11-21 NOTE — PLAN OF CARE
Problem: Discharge Planning  Goal: Discharge to home or other facility with appropriate resources  Outcome: Progressing  Flowsheets  Taken 11/20/2023 2335  Discharge to home or other facility with appropriate resources:   Identify barriers to discharge with patient and caregiver   Arrange for needed discharge resources and transportation as appropriate   Identify discharge learning needs (meds, wound care, etc)   Refer to discharge planning if patient needs post-hospital services based on physician order or complex needs related to functional status, cognitive ability or social support system  Taken 11/20/2023 2331  Discharge to home or other facility with appropriate resources:   Identify barriers to discharge with patient and caregiver   Identify discharge learning needs (meds, wound care, etc)   Refer to discharge planning if patient needs post-hospital services based on physician order or complex needs related to functional status, cognitive ability or social support system   Arrange for needed discharge resources and transportation as appropriate     Problem: Pain  Goal: Verbalizes/displays adequate comfort level or baseline comfort level  Outcome: Progressing     Problem: Safety - Adult  Goal: Free from fall injury  Outcome: Progressing     Problem: Skin/Tissue Integrity  Goal: Absence of new skin breakdown  Description: 1. Monitor for areas of redness and/or skin breakdown  2. Assess vascular access sites hourly  3. Every 4-6 hours minimum:  Change oxygen saturation probe site  4. Every 4-6 hours:  If on nasal continuous positive airway pressure, respiratory therapy assess nares and determine need for appliance change or resting period.   Outcome: Progressing     Problem: Respiratory - Adult  Goal: Achieves optimal ventilation and oxygenation  Outcome: Progressing     Problem: Cardiovascular - Adult  Goal: Maintains optimal cardiac output and hemodynamic stability  Outcome: Progressing  Goal: Absence of

## 2023-11-21 NOTE — ACP (ADVANCE CARE PLANNING)
Advance Care Planning   Healthcare Decision Maker:    Primary Decision Maker: christopher fregoso - Other - [phone]    Supplemental (Other) Decision Maker: william García - Other - [phone]    Click here to complete Healthcare Decision Makers including selection of the Healthcare Decision Maker Relationship (ie \"Primary\").

## 2023-11-21 NOTE — ED NOTES
PT does not wear O2 at home, denies SOB or chest pain  Pt was placed on room air for assessment of baseline O2 sat . Pt was at 83% laying down. PT setting up and standing O2 sat 88% on room air. Pt is placed back on 2 L O2 at this time.   Dr. Cj Weeks notified     Barbara Ruiz RN  11/20/23 5460

## 2023-11-21 NOTE — ED NOTES
Pt belongings were placed in bags with pt labels in the ambulance bay of ED.      Mague Albright RN  11/20/23 5230

## 2023-11-22 ENCOUNTER — APPOINTMENT (OUTPATIENT)
Dept: CT IMAGING | Age: 73
DRG: 189 | End: 2023-11-22
Payer: COMMERCIAL

## 2023-11-22 LAB
ALBUMIN SERPL-MCNC: 3.2 G/DL (ref 3.5–4.6)
ALP SERPL-CCNC: 90 U/L (ref 35–104)
ALT SERPL-CCNC: 9 U/L (ref 0–41)
ANION GAP SERPL CALCULATED.3IONS-SCNC: 6 MEQ/L (ref 9–15)
AST SERPL-CCNC: 10 U/L (ref 0–40)
BASE EXCESS ARTERIAL: 7 (ref -3–3)
BASOPHILS # BLD: 0 K/UL (ref 0–0.2)
BASOPHILS NFR BLD: 0.1 %
BILIRUB SERPL-MCNC: 0.4 MG/DL (ref 0.2–0.7)
BUN SERPL-MCNC: 47 MG/DL (ref 8–23)
CALCIUM IONIZED: 1.11 MMOL/L (ref 1.12–1.32)
CALCIUM SERPL-MCNC: 8.5 MG/DL (ref 8.5–9.9)
CHLORIDE SERPL-SCNC: 100 MEQ/L (ref 95–107)
CO2 SERPL-SCNC: 34 MEQ/L (ref 20–31)
CREAT SERPL-MCNC: 2.59 MG/DL (ref 0.7–1.2)
CRP SERPL HS-MCNC: 10.5 MG/L (ref 0–5)
EOSINOPHIL # BLD: 0 K/UL (ref 0–0.7)
EOSINOPHIL NFR BLD: 0 %
ERYTHROCYTE [DISTWIDTH] IN BLOOD BY AUTOMATED COUNT: 17.2 % (ref 11.5–14.5)
GLOBULIN SER CALC-MCNC: 3 G/DL (ref 2.3–3.5)
GLUCOSE BLD-MCNC: 124 MG/DL (ref 70–99)
GLUCOSE BLD-MCNC: 129 MG/DL (ref 70–99)
GLUCOSE BLD-MCNC: 249 MG/DL (ref 70–99)
GLUCOSE BLD-MCNC: 300 MG/DL (ref 70–99)
GLUCOSE BLD-MCNC: 55 MG/DL (ref 70–99)
GLUCOSE BLD-MCNC: 73 MG/DL (ref 70–99)
GLUCOSE BLD-MCNC: 79 MG/DL (ref 70–99)
GLUCOSE SERPL-MCNC: 276 MG/DL (ref 70–99)
HCO3 ARTERIAL: 33.6 MMOL/L (ref 21–29)
HCT VFR BLD AUTO: 39.5 % (ref 42–52)
HCT VFR BLD AUTO: 40 % (ref 41–53)
HGB BLD CALC-MCNC: 13.7 GM/DL (ref 13.5–17.5)
HGB BLD-MCNC: 10.7 G/DL (ref 14–18)
LACTATE: 0.76 MMOL/L (ref 0.4–2)
LYMPHOCYTES # BLD: 1.1 K/UL (ref 1–4.8)
LYMPHOCYTES NFR BLD: 10.8 %
MCH RBC QN AUTO: 23.4 PG (ref 27–31.3)
MCHC RBC AUTO-ENTMCNC: 27.1 % (ref 33–37)
MCV RBC AUTO: 86.2 FL (ref 79–92.2)
MONOCYTES # BLD: 0.9 K/UL (ref 0.2–0.8)
MONOCYTES NFR BLD: 9.1 %
NEUTROPHILS # BLD: 8.2 K/UL (ref 1.4–6.5)
NEUTS SEG NFR BLD: 79.3 %
O2 SAT, ARTERIAL: 89 % (ref 93–100)
PCO2 ARTERIAL: 68 MM HG (ref 35–45)
PERFORMED ON: ABNORMAL
PERFORMED ON: NORMAL
PERFORMED ON: NORMAL
PH ARTERIAL: 7.3 (ref 7.35–7.45)
PLATELET # BLD AUTO: 217 K/UL (ref 130–400)
PO2 ARTERIAL: 64 MM HG (ref 75–108)
POC CHLORIDE: 104 MEQ/L (ref 99–110)
POC CREATININE: 3.1 MG/DL (ref 0.8–1.3)
POC FIO2: 5
POC SAMPLE TYPE: ABNORMAL
POTASSIUM SERPL-SCNC: 4.8 MEQ/L (ref 3.5–5.1)
POTASSIUM SERPL-SCNC: 5.1 MEQ/L (ref 3.4–4.9)
PROCALCITONIN SERPL IA-MCNC: 0.2 NG/ML (ref 0–0.15)
PROT SERPL-MCNC: 6.2 G/DL (ref 6.3–8)
RBC # BLD AUTO: 4.58 M/UL (ref 4.7–6.1)
SODIUM BLD-SCNC: 138 MEQ/L (ref 136–145)
SODIUM SERPL-SCNC: 140 MEQ/L (ref 135–144)
TCO2 ARTERIAL: 36 MMOL/L (ref 21–32)
WBC # BLD AUTO: 10.3 K/UL (ref 4.8–10.8)

## 2023-11-22 PROCEDURE — 99232 SBSQ HOSP IP/OBS MODERATE 35: CPT | Performed by: INTERNAL MEDICINE

## 2023-11-22 PROCEDURE — 6360000002 HC RX W HCPCS: Performed by: INTERNAL MEDICINE

## 2023-11-22 PROCEDURE — 84295 ASSAY OF SERUM SODIUM: CPT

## 2023-11-22 PROCEDURE — 82330 ASSAY OF CALCIUM: CPT

## 2023-11-22 PROCEDURE — 82435 ASSAY OF BLOOD CHLORIDE: CPT

## 2023-11-22 PROCEDURE — 80053 COMPREHEN METABOLIC PANEL: CPT

## 2023-11-22 PROCEDURE — 6370000000 HC RX 637 (ALT 250 FOR IP)

## 2023-11-22 PROCEDURE — 6370000000 HC RX 637 (ALT 250 FOR IP): Performed by: INTERNAL MEDICINE

## 2023-11-22 PROCEDURE — 36600 WITHDRAWAL OF ARTERIAL BLOOD: CPT

## 2023-11-22 PROCEDURE — 84145 PROCALCITONIN (PCT): CPT

## 2023-11-22 PROCEDURE — 82948 REAGENT STRIP/BLOOD GLUCOSE: CPT

## 2023-11-22 PROCEDURE — 1210000000 HC MED SURG R&B

## 2023-11-22 PROCEDURE — 82803 BLOOD GASES ANY COMBINATION: CPT

## 2023-11-22 PROCEDURE — 74176 CT ABD & PELVIS W/O CONTRAST: CPT

## 2023-11-22 PROCEDURE — 83605 ASSAY OF LACTIC ACID: CPT

## 2023-11-22 PROCEDURE — 97162 PT EVAL MOD COMPLEX 30 MIN: CPT

## 2023-11-22 PROCEDURE — 36415 COLL VENOUS BLD VENIPUNCTURE: CPT

## 2023-11-22 PROCEDURE — 82565 ASSAY OF CREATININE: CPT

## 2023-11-22 PROCEDURE — 84132 ASSAY OF SERUM POTASSIUM: CPT

## 2023-11-22 PROCEDURE — 85014 HEMATOCRIT: CPT

## 2023-11-22 PROCEDURE — 86140 C-REACTIVE PROTEIN: CPT

## 2023-11-22 PROCEDURE — 2580000003 HC RX 258: Performed by: INTERNAL MEDICINE

## 2023-11-22 PROCEDURE — 85025 COMPLETE CBC W/AUTO DIFF WBC: CPT

## 2023-11-22 PROCEDURE — 99221 1ST HOSP IP/OBS SF/LOW 40: CPT | Performed by: INTERNAL MEDICINE

## 2023-11-22 PROCEDURE — 2700000000 HC OXYGEN THERAPY PER DAY

## 2023-11-22 RX ORDER — ROSUVASTATIN CALCIUM 20 MG/1
TABLET, COATED ORAL
Status: COMPLETED
Start: 2023-11-22 | End: 2023-11-22

## 2023-11-22 RX ORDER — INSULIN GLARGINE 100 [IU]/ML
20 INJECTION, SOLUTION SUBCUTANEOUS 2 TIMES DAILY
Status: DISCONTINUED | OUTPATIENT
Start: 2023-11-22 | End: 2023-11-25

## 2023-11-22 RX ORDER — TAMSULOSIN HYDROCHLORIDE 0.4 MG/1
CAPSULE ORAL
Status: COMPLETED
Start: 2023-11-22 | End: 2023-11-22

## 2023-11-22 RX ORDER — MONTELUKAST SODIUM 10 MG/1
TABLET ORAL
Status: COMPLETED
Start: 2023-11-22 | End: 2023-11-22

## 2023-11-22 RX ORDER — 0.9 % SODIUM CHLORIDE 0.9 %
1000 INTRAVENOUS SOLUTION INTRAVENOUS ONCE
Status: COMPLETED | OUTPATIENT
Start: 2023-11-22 | End: 2023-11-22

## 2023-11-22 RX ORDER — INSULIN LISPRO 100 [IU]/ML
10 INJECTION, SOLUTION INTRAVENOUS; SUBCUTANEOUS
Status: DISCONTINUED | OUTPATIENT
Start: 2023-11-22 | End: 2023-11-25

## 2023-11-22 RX ADMIN — ROSUVASTATIN CALCIUM 20 MG: 20 TABLET, FILM COATED ORAL at 21:37

## 2023-11-22 RX ADMIN — TAMSULOSIN HYDROCHLORIDE 0.8 MG: 0.4 CAPSULE ORAL at 21:35

## 2023-11-22 RX ADMIN — MONTELUKAST SODIUM: 10 TABLET, FILM COATED ORAL at 21:39

## 2023-11-22 RX ADMIN — INSULIN LISPRO 2 UNITS: 100 INJECTION, SOLUTION INTRAVENOUS; SUBCUTANEOUS at 11:44

## 2023-11-22 RX ADMIN — INSULIN LISPRO 20 UNITS: 100 INJECTION, SOLUTION INTRAVENOUS; SUBCUTANEOUS at 10:09

## 2023-11-22 RX ADMIN — TAMSULOSIN HYDROCHLORIDE: 0.4 CAPSULE ORAL at 21:41

## 2023-11-22 RX ADMIN — ENOXAPARIN SODIUM 30 MG: 100 INJECTION SUBCUTANEOUS at 21:37

## 2023-11-22 RX ADMIN — Medication 16 G: at 16:25

## 2023-11-22 RX ADMIN — MONTELUKAST 10 MG: 10 TABLET, FILM COATED ORAL at 21:36

## 2023-11-22 RX ADMIN — SODIUM CHLORIDE 1000 ML: 9 INJECTION, SOLUTION INTRAVENOUS at 10:07

## 2023-11-22 RX ADMIN — CEFTRIAXONE SODIUM 1000 MG: 1 INJECTION, POWDER, FOR SOLUTION INTRAMUSCULAR; INTRAVENOUS at 13:30

## 2023-11-22 RX ADMIN — INSULIN LISPRO 6 UNITS: 100 INJECTION, SOLUTION INTRAVENOUS; SUBCUTANEOUS at 10:08

## 2023-11-22 RX ADMIN — ROSUVASTATIN CALCIUM: 20 TABLET, FILM COATED ORAL at 21:40

## 2023-11-22 RX ADMIN — ASPIRIN 81 MG 162 MG: 81 TABLET ORAL at 10:07

## 2023-11-22 RX ADMIN — INSULIN GLARGINE 30 UNITS: 100 INJECTION, SOLUTION SUBCUTANEOUS at 10:08

## 2023-11-22 RX ADMIN — Medication 10 ML: at 21:40

## 2023-11-22 RX ADMIN — ENOXAPARIN SODIUM 30 MG: 100 INJECTION SUBCUTANEOUS at 10:10

## 2023-11-22 RX ADMIN — INSULIN LISPRO 20 UNITS: 100 INJECTION, SOLUTION INTRAVENOUS; SUBCUTANEOUS at 11:44

## 2023-11-22 RX ADMIN — CETIRIZINE HYDROCHLORIDE 10 MG: 10 TABLET, FILM COATED ORAL at 16:57

## 2023-11-22 RX ADMIN — FINASTERIDE 5 MG: 5 TABLET, FILM COATED ORAL at 10:07

## 2023-11-22 ASSESSMENT — ENCOUNTER SYMPTOMS
COUGH: 1
GASTROINTESTINAL NEGATIVE: 1
CHEST TIGHTNESS: 0
SHORTNESS OF BREATH: 0
WHEEZING: 0

## 2023-11-22 ASSESSMENT — PAIN SCALES - GENERAL: PAINLEVEL_OUTOF10: 0

## 2023-11-22 NOTE — PLAN OF CARE
Problem: Discharge Planning  Goal: Discharge to home or other facility with appropriate resources  Outcome: Progressing     Problem: Pain  Goal: Verbalizes/displays adequate comfort level or baseline comfort level  Outcome: Progressing  Flowsheets (Taken 11/22/2023 6315)  Verbalizes/displays adequate comfort level or baseline comfort level:   Encourage patient to monitor pain and request assistance   Assess pain using appropriate pain scale   Administer analgesics based on type and severity of pain and evaluate response   Implement non-pharmacological measures as appropriate and evaluate response   Consider cultural and social influences on pain and pain management   Notify Licensed Independent Practitioner if interventions unsuccessful or patient reports new pain     Problem: Safety - Adult  Goal: Free from fall injury  Outcome: Progressing  Flowsheets (Taken 11/22/2023 1347)  Free From Fall Injury:   Instruct family/caregiver on patient safety   Based on caregiver fall risk screen, instruct family/caregiver to ask for assistance with transferring infant if caregiver noted to have fall risk factors     Problem: Skin/Tissue Integrity  Goal: Absence of new skin breakdown  Description: 1. Monitor for areas of redness and/or skin breakdown  2. Assess vascular access sites hourly  3. Every 4-6 hours minimum:  Change oxygen saturation probe site  4. Every 4-6 hours:  If on nasal continuous positive airway pressure, respiratory therapy assess nares and determine need for appliance change or resting period.   Outcome: Progressing     Problem: Respiratory - Adult  Goal: Achieves optimal ventilation and oxygenation  Outcome: Progressing     Problem: Cardiovascular - Adult  Goal: Maintains optimal cardiac output and hemodynamic stability  Outcome: Progressing  Goal: Absence of cardiac dysrhythmias or at baseline  Outcome: Progressing     Problem: Skin/Tissue Integrity - Adult  Goal: Incisions, wounds, or drain sites

## 2023-11-23 LAB
ALBUMIN SERPL-MCNC: 3.2 G/DL (ref 3.5–4.6)
ALP SERPL-CCNC: 80 U/L (ref 35–104)
ALT SERPL-CCNC: 10 U/L (ref 0–41)
ANION GAP SERPL CALCULATED.3IONS-SCNC: 8 MEQ/L (ref 9–15)
AST SERPL-CCNC: 11 U/L (ref 0–40)
BACTERIA UR CULT: ABNORMAL
BACTERIA UR CULT: ABNORMAL
BASOPHILS # BLD: 0 K/UL (ref 0–0.2)
BASOPHILS NFR BLD: 0.3 %
BILIRUB SERPL-MCNC: 0.3 MG/DL (ref 0.2–0.7)
BUN SERPL-MCNC: 60 MG/DL (ref 8–23)
CALCIUM SERPL-MCNC: 8.2 MG/DL (ref 8.5–9.9)
CHLORIDE SERPL-SCNC: 100 MEQ/L (ref 95–107)
CO2 SERPL-SCNC: 31 MEQ/L (ref 20–31)
CREAT SERPL-MCNC: 3.03 MG/DL (ref 0.7–1.2)
EOSINOPHIL # BLD: 0.2 K/UL (ref 0–0.7)
EOSINOPHIL NFR BLD: 2.7 %
ERYTHROCYTE [DISTWIDTH] IN BLOOD BY AUTOMATED COUNT: 16.9 % (ref 11.5–14.5)
GLOBULIN SER CALC-MCNC: 2.8 G/DL (ref 2.3–3.5)
GLUCOSE BLD-MCNC: 135 MG/DL (ref 70–99)
GLUCOSE BLD-MCNC: 221 MG/DL (ref 70–99)
GLUCOSE BLD-MCNC: 292 MG/DL (ref 70–99)
GLUCOSE BLD-MCNC: 306 MG/DL (ref 70–99)
GLUCOSE BLD-MCNC: 307 MG/DL (ref 70–99)
GLUCOSE SERPL-MCNC: 155 MG/DL (ref 70–99)
HCT VFR BLD AUTO: 38.9 % (ref 42–52)
HGB BLD-MCNC: 10.5 G/DL (ref 14–18)
LYMPHOCYTES # BLD: 1.5 K/UL (ref 1–4.8)
LYMPHOCYTES NFR BLD: 19.7 %
MCH RBC QN AUTO: 23.5 PG (ref 27–31.3)
MCHC RBC AUTO-ENTMCNC: 27 % (ref 33–37)
MCV RBC AUTO: 87.2 FL (ref 79–92.2)
MONOCYTES # BLD: 0.9 K/UL (ref 0.2–0.8)
MONOCYTES NFR BLD: 11.2 %
NEUTROPHILS # BLD: 5.1 K/UL (ref 1.4–6.5)
NEUTS SEG NFR BLD: 65.7 %
ORGANISM: ABNORMAL
PERFORMED ON: ABNORMAL
PLATELET # BLD AUTO: 205 K/UL (ref 130–400)
POTASSIUM SERPL-SCNC: 5 MEQ/L (ref 3.4–4.9)
PROT SERPL-MCNC: 6 G/DL (ref 6.3–8)
RBC # BLD AUTO: 4.46 M/UL (ref 4.7–6.1)
SODIUM SERPL-SCNC: 139 MEQ/L (ref 135–144)
WBC # BLD AUTO: 7.8 K/UL (ref 4.8–10.8)

## 2023-11-23 PROCEDURE — 85025 COMPLETE CBC W/AUTO DIFF WBC: CPT

## 2023-11-23 PROCEDURE — 36415 COLL VENOUS BLD VENIPUNCTURE: CPT

## 2023-11-23 PROCEDURE — 6370000000 HC RX 637 (ALT 250 FOR IP): Performed by: INTERNAL MEDICINE

## 2023-11-23 PROCEDURE — 1210000000 HC MED SURG R&B

## 2023-11-23 PROCEDURE — 2580000003 HC RX 258: Performed by: INTERNAL MEDICINE

## 2023-11-23 PROCEDURE — 94761 N-INVAS EAR/PLS OXIMETRY MLT: CPT

## 2023-11-23 PROCEDURE — 94150 VITAL CAPACITY TEST: CPT

## 2023-11-23 PROCEDURE — 6370000000 HC RX 637 (ALT 250 FOR IP)

## 2023-11-23 PROCEDURE — 99233 SBSQ HOSP IP/OBS HIGH 50: CPT | Performed by: INTERNAL MEDICINE

## 2023-11-23 PROCEDURE — 80053 COMPREHEN METABOLIC PANEL: CPT

## 2023-11-23 PROCEDURE — 2700000000 HC OXYGEN THERAPY PER DAY

## 2023-11-23 PROCEDURE — 99232 SBSQ HOSP IP/OBS MODERATE 35: CPT | Performed by: INTERNAL MEDICINE

## 2023-11-23 PROCEDURE — 6360000002 HC RX W HCPCS: Performed by: INTERNAL MEDICINE

## 2023-11-23 RX ORDER — ENOXAPARIN SODIUM 100 MG/ML
30 INJECTION SUBCUTANEOUS DAILY
Status: DISCONTINUED | OUTPATIENT
Start: 2023-11-24 | End: 2023-11-24

## 2023-11-23 RX ORDER — SODIUM CHLORIDE, SODIUM LACTATE, POTASSIUM CHLORIDE, CALCIUM CHLORIDE 600; 310; 30; 20 MG/100ML; MG/100ML; MG/100ML; MG/100ML
INJECTION, SOLUTION INTRAVENOUS CONTINUOUS
Status: DISPENSED | OUTPATIENT
Start: 2023-11-23 | End: 2023-11-24

## 2023-11-23 RX ORDER — FINASTERIDE 5 MG/1
TABLET, FILM COATED ORAL
Status: COMPLETED
Start: 2023-11-23 | End: 2023-11-23

## 2023-11-23 RX ORDER — INSULIN GLARGINE 100 [IU]/ML
INJECTION, SOLUTION SUBCUTANEOUS
Status: COMPLETED
Start: 2023-11-23 | End: 2023-11-23

## 2023-11-23 RX ORDER — INSULIN LISPRO 100 [IU]/ML
INJECTION, SOLUTION INTRAVENOUS; SUBCUTANEOUS
Status: COMPLETED
Start: 2023-11-23 | End: 2023-11-23

## 2023-11-23 RX ADMIN — INSULIN GLARGINE 20 UNITS: 100 INJECTION, SOLUTION SUBCUTANEOUS at 20:34

## 2023-11-23 RX ADMIN — SODIUM CHLORIDE, POTASSIUM CHLORIDE, SODIUM LACTATE AND CALCIUM CHLORIDE: 600; 310; 30; 20 INJECTION, SOLUTION INTRAVENOUS at 16:24

## 2023-11-23 RX ADMIN — CETIRIZINE HYDROCHLORIDE 10 MG: 10 TABLET, FILM COATED ORAL at 10:40

## 2023-11-23 RX ADMIN — INSULIN LISPRO 2 UNITS: 100 INJECTION, SOLUTION INTRAVENOUS; SUBCUTANEOUS at 15:21

## 2023-11-23 RX ADMIN — INSULIN LISPRO 10 UNITS: 100 INJECTION, SOLUTION INTRAVENOUS; SUBCUTANEOUS at 15:21

## 2023-11-23 RX ADMIN — INSULIN GLARGINE 20 UNITS: 100 INJECTION, SOLUTION SUBCUTANEOUS at 10:42

## 2023-11-23 RX ADMIN — CEFTRIAXONE SODIUM 1000 MG: 1 INJECTION, POWDER, FOR SOLUTION INTRAMUSCULAR; INTRAVENOUS at 15:08

## 2023-11-23 RX ADMIN — INSULIN LISPRO 6 UNITS: 100 INJECTION, SOLUTION INTRAVENOUS; SUBCUTANEOUS at 17:45

## 2023-11-23 RX ADMIN — ASPIRIN 81 MG 162 MG: 81 TABLET ORAL at 10:39

## 2023-11-23 RX ADMIN — ROSUVASTATIN CALCIUM 20 MG: 20 TABLET, FILM COATED ORAL at 20:33

## 2023-11-23 RX ADMIN — FINASTERIDE 5 MG: 5 TABLET, FILM COATED ORAL at 10:41

## 2023-11-23 RX ADMIN — MONTELUKAST 10 MG: 10 TABLET, FILM COATED ORAL at 20:32

## 2023-11-23 RX ADMIN — Medication 10 ML: at 20:29

## 2023-11-23 RX ADMIN — INSULIN LISPRO 10 UNITS: 100 INJECTION, SOLUTION INTRAVENOUS; SUBCUTANEOUS at 17:46

## 2023-11-23 RX ADMIN — Medication 10 ML: at 10:43

## 2023-11-23 RX ADMIN — TAMSULOSIN HYDROCHLORIDE 0.8 MG: 0.4 CAPSULE ORAL at 20:31

## 2023-11-23 ASSESSMENT — ENCOUNTER SYMPTOMS
GASTROINTESTINAL NEGATIVE: 1
CHEST TIGHTNESS: 0
COUGH: 1
WHEEZING: 0
SHORTNESS OF BREATH: 0

## 2023-11-23 ASSESSMENT — PAIN SCALES - GENERAL
PAINLEVEL_OUTOF10: 0
PAINLEVEL_OUTOF10: 0

## 2023-11-23 ASSESSMENT — PAIN DESCRIPTION - LOCATION
LOCATION: BACK
LOCATION: BACK

## 2023-11-24 ENCOUNTER — APPOINTMENT (OUTPATIENT)
Dept: GENERAL RADIOLOGY | Age: 73
DRG: 189 | End: 2023-11-24
Payer: COMMERCIAL

## 2023-11-24 LAB
ALBUMIN SERPL-MCNC: 3.6 G/DL (ref 3.5–4.6)
ANION GAP SERPL CALCULATED.3IONS-SCNC: 10 MEQ/L (ref 9–15)
BUN SERPL-MCNC: 64 MG/DL (ref 8–23)
CALCIUM SERPL-MCNC: 8.5 MG/DL (ref 8.5–9.9)
CHLORIDE SERPL-SCNC: 101 MEQ/L (ref 95–107)
CO2 SERPL-SCNC: 27 MEQ/L (ref 20–31)
CREAT SERPL-MCNC: 2.55 MG/DL (ref 0.7–1.2)
CRP SERPL HS-MCNC: 8.2 MG/L (ref 0–5)
ERYTHROCYTE [DISTWIDTH] IN BLOOD BY AUTOMATED COUNT: 16.8 % (ref 11.5–14.5)
GLUCOSE BLD-MCNC: 193 MG/DL (ref 70–99)
GLUCOSE BLD-MCNC: 213 MG/DL (ref 70–99)
GLUCOSE BLD-MCNC: 229 MG/DL (ref 70–99)
GLUCOSE BLD-MCNC: 258 MG/DL (ref 70–99)
GLUCOSE SERPL-MCNC: 199 MG/DL (ref 70–99)
HCT VFR BLD AUTO: 40.3 % (ref 42–52)
HGB BLD-MCNC: 11 G/DL (ref 14–18)
MAGNESIUM SERPL-MCNC: 2.9 MG/DL (ref 1.7–2.4)
MCH RBC QN AUTO: 23.6 PG (ref 27–31.3)
MCHC RBC AUTO-ENTMCNC: 27.3 % (ref 33–37)
MCV RBC AUTO: 86.3 FL (ref 79–92.2)
PERFORMED ON: ABNORMAL
PHOSPHATE SERPL-MCNC: 4.5 MG/DL (ref 2.3–4.8)
PLATELET # BLD AUTO: 201 K/UL (ref 130–400)
POTASSIUM SERPL-SCNC: 5.4 MEQ/L (ref 3.4–4.9)
PROCALCITONIN SERPL IA-MCNC: 0.15 NG/ML (ref 0–0.15)
RBC # BLD AUTO: 4.67 M/UL (ref 4.7–6.1)
SODIUM SERPL-SCNC: 138 MEQ/L (ref 135–144)
WBC # BLD AUTO: 8.5 K/UL (ref 4.8–10.8)

## 2023-11-24 PROCEDURE — 97535 SELF CARE MNGMENT TRAINING: CPT

## 2023-11-24 PROCEDURE — 6370000000 HC RX 637 (ALT 250 FOR IP): Performed by: INTERNAL MEDICINE

## 2023-11-24 PROCEDURE — 36415 COLL VENOUS BLD VENIPUNCTURE: CPT

## 2023-11-24 PROCEDURE — 6360000002 HC RX W HCPCS: Performed by: INTERNAL MEDICINE

## 2023-11-24 PROCEDURE — 2700000000 HC OXYGEN THERAPY PER DAY

## 2023-11-24 PROCEDURE — 83735 ASSAY OF MAGNESIUM: CPT

## 2023-11-24 PROCEDURE — 94761 N-INVAS EAR/PLS OXIMETRY MLT: CPT

## 2023-11-24 PROCEDURE — 84145 PROCALCITONIN (PCT): CPT

## 2023-11-24 PROCEDURE — 80069 RENAL FUNCTION PANEL: CPT

## 2023-11-24 PROCEDURE — 85027 COMPLETE CBC AUTOMATED: CPT

## 2023-11-24 PROCEDURE — 2580000003 HC RX 258: Performed by: INTERNAL MEDICINE

## 2023-11-24 PROCEDURE — 1210000000 HC MED SURG R&B

## 2023-11-24 PROCEDURE — 86140 C-REACTIVE PROTEIN: CPT

## 2023-11-24 PROCEDURE — 99232 SBSQ HOSP IP/OBS MODERATE 35: CPT | Performed by: INTERNAL MEDICINE

## 2023-11-24 PROCEDURE — 71045 X-RAY EXAM CHEST 1 VIEW: CPT

## 2023-11-24 PROCEDURE — 97116 GAIT TRAINING THERAPY: CPT

## 2023-11-24 RX ORDER — ENOXAPARIN SODIUM 100 MG/ML
30 INJECTION SUBCUTANEOUS 2 TIMES DAILY
Status: DISCONTINUED | OUTPATIENT
Start: 2023-11-24 | End: 2023-12-04

## 2023-11-24 RX ADMIN — ENOXAPARIN SODIUM 30 MG: 100 INJECTION SUBCUTANEOUS at 20:14

## 2023-11-24 RX ADMIN — INSULIN LISPRO 2 UNITS: 100 INJECTION, SOLUTION INTRAVENOUS; SUBCUTANEOUS at 17:51

## 2023-11-24 RX ADMIN — SODIUM CHLORIDE, POTASSIUM CHLORIDE, SODIUM LACTATE AND CALCIUM CHLORIDE: 600; 310; 30; 20 INJECTION, SOLUTION INTRAVENOUS at 02:34

## 2023-11-24 RX ADMIN — ROSUVASTATIN CALCIUM 20 MG: 20 TABLET, FILM COATED ORAL at 20:14

## 2023-11-24 RX ADMIN — INSULIN GLARGINE 20 UNITS: 100 INJECTION, SOLUTION SUBCUTANEOUS at 20:14

## 2023-11-24 RX ADMIN — CEFTRIAXONE SODIUM 1000 MG: 1 INJECTION, POWDER, FOR SOLUTION INTRAMUSCULAR; INTRAVENOUS at 12:54

## 2023-11-24 RX ADMIN — CETIRIZINE HYDROCHLORIDE 10 MG: 10 TABLET, FILM COATED ORAL at 10:26

## 2023-11-24 RX ADMIN — Medication 10 ML: at 21:00

## 2023-11-24 RX ADMIN — INSULIN LISPRO 4 UNITS: 100 INJECTION, SOLUTION INTRAVENOUS; SUBCUTANEOUS at 12:51

## 2023-11-24 RX ADMIN — INSULIN LISPRO 10 UNITS: 100 INJECTION, SOLUTION INTRAVENOUS; SUBCUTANEOUS at 17:51

## 2023-11-24 RX ADMIN — INSULIN LISPRO 10 UNITS: 100 INJECTION, SOLUTION INTRAVENOUS; SUBCUTANEOUS at 12:51

## 2023-11-24 RX ADMIN — ENOXAPARIN SODIUM 30 MG: 100 INJECTION SUBCUTANEOUS at 10:25

## 2023-11-24 RX ADMIN — ASPIRIN 81 MG 162 MG: 81 TABLET ORAL at 10:26

## 2023-11-24 RX ADMIN — SODIUM ZIRCONIUM CYCLOSILICATE 10 G: 10 POWDER, FOR SUSPENSION ORAL at 10:40

## 2023-11-24 RX ADMIN — MONTELUKAST 10 MG: 10 TABLET, FILM COATED ORAL at 20:14

## 2023-11-24 RX ADMIN — TAMSULOSIN HYDROCHLORIDE 0.8 MG: 0.4 CAPSULE ORAL at 20:14

## 2023-11-24 RX ADMIN — FINASTERIDE 5 MG: 5 TABLET, FILM COATED ORAL at 10:26

## 2023-11-24 RX ADMIN — INSULIN LISPRO 10 UNITS: 100 INJECTION, SOLUTION INTRAVENOUS; SUBCUTANEOUS at 10:26

## 2023-11-24 RX ADMIN — INSULIN GLARGINE 20 UNITS: 100 INJECTION, SOLUTION SUBCUTANEOUS at 10:26

## 2023-11-24 ASSESSMENT — PAIN SCALES - GENERAL
PAINLEVEL_OUTOF10: 0
PAINLEVEL_OUTOF10: 0

## 2023-11-24 ASSESSMENT — ENCOUNTER SYMPTOMS
WHEEZING: 0
CHEST TIGHTNESS: 0
GASTROINTESTINAL NEGATIVE: 1
COUGH: 1
SHORTNESS OF BREATH: 0

## 2023-11-25 LAB
ALBUMIN SERPL-MCNC: 3.4 G/DL (ref 3.5–4.6)
ANION GAP SERPL CALCULATED.3IONS-SCNC: 7 MEQ/L (ref 9–15)
BUN SERPL-MCNC: 56 MG/DL (ref 8–23)
CALCIUM SERPL-MCNC: 8.4 MG/DL (ref 8.5–9.9)
CHLORIDE SERPL-SCNC: 105 MEQ/L (ref 95–107)
CO2 SERPL-SCNC: 30 MEQ/L (ref 20–31)
CREAT SERPL-MCNC: 1.98 MG/DL (ref 0.7–1.2)
ERYTHROCYTE [DISTWIDTH] IN BLOOD BY AUTOMATED COUNT: 16.6 % (ref 11.5–14.5)
GLUCOSE BLD-MCNC: 115 MG/DL (ref 70–99)
GLUCOSE BLD-MCNC: 158 MG/DL (ref 70–99)
GLUCOSE BLD-MCNC: 185 MG/DL (ref 70–99)
GLUCOSE BLD-MCNC: 296 MG/DL (ref 70–99)
GLUCOSE SERPL-MCNC: 102 MG/DL (ref 70–99)
HCT VFR BLD AUTO: 39.2 % (ref 42–52)
HGB BLD-MCNC: 10.6 G/DL (ref 14–18)
MAGNESIUM SERPL-MCNC: 2.8 MG/DL (ref 1.7–2.4)
MCH RBC QN AUTO: 23.1 PG (ref 27–31.3)
MCHC RBC AUTO-ENTMCNC: 27 % (ref 33–37)
MCV RBC AUTO: 85.6 FL (ref 79–92.2)
PERFORMED ON: ABNORMAL
PHOSPHATE SERPL-MCNC: 3.8 MG/DL (ref 2.3–4.8)
PLATELET # BLD AUTO: 157 K/UL (ref 130–400)
POTASSIUM SERPL-SCNC: 5.2 MEQ/L (ref 3.4–4.9)
RBC # BLD AUTO: 4.58 M/UL (ref 4.7–6.1)
SODIUM SERPL-SCNC: 142 MEQ/L (ref 135–144)
WBC # BLD AUTO: 6.9 K/UL (ref 4.8–10.8)

## 2023-11-25 PROCEDURE — 80069 RENAL FUNCTION PANEL: CPT

## 2023-11-25 PROCEDURE — 2580000003 HC RX 258: Performed by: INTERNAL MEDICINE

## 2023-11-25 PROCEDURE — 6370000000 HC RX 637 (ALT 250 FOR IP): Performed by: INTERNAL MEDICINE

## 2023-11-25 PROCEDURE — 36415 COLL VENOUS BLD VENIPUNCTURE: CPT

## 2023-11-25 PROCEDURE — 1210000000 HC MED SURG R&B

## 2023-11-25 PROCEDURE — 85027 COMPLETE CBC AUTOMATED: CPT

## 2023-11-25 PROCEDURE — 2700000000 HC OXYGEN THERAPY PER DAY

## 2023-11-25 PROCEDURE — 83735 ASSAY OF MAGNESIUM: CPT

## 2023-11-25 PROCEDURE — 99232 SBSQ HOSP IP/OBS MODERATE 35: CPT | Performed by: INTERNAL MEDICINE

## 2023-11-25 PROCEDURE — 6360000002 HC RX W HCPCS: Performed by: INTERNAL MEDICINE

## 2023-11-25 RX ORDER — INSULIN LISPRO 100 [IU]/ML
5 INJECTION, SOLUTION INTRAVENOUS; SUBCUTANEOUS
Status: DISCONTINUED | OUTPATIENT
Start: 2023-11-25 | End: 2023-12-06 | Stop reason: HOSPADM

## 2023-11-25 RX ORDER — INSULIN GLARGINE 100 [IU]/ML
10 INJECTION, SOLUTION SUBCUTANEOUS 2 TIMES DAILY
Status: DISCONTINUED | OUTPATIENT
Start: 2023-11-25 | End: 2023-12-06 | Stop reason: HOSPADM

## 2023-11-25 RX ADMIN — INSULIN LISPRO 5 UNITS: 100 INJECTION, SOLUTION INTRAVENOUS; SUBCUTANEOUS at 18:05

## 2023-11-25 RX ADMIN — ROSUVASTATIN CALCIUM 20 MG: 20 TABLET, FILM COATED ORAL at 20:39

## 2023-11-25 RX ADMIN — Medication 10 ML: at 10:50

## 2023-11-25 RX ADMIN — ENOXAPARIN SODIUM 30 MG: 100 INJECTION SUBCUTANEOUS at 20:38

## 2023-11-25 RX ADMIN — ENOXAPARIN SODIUM 30 MG: 100 INJECTION SUBCUTANEOUS at 10:50

## 2023-11-25 RX ADMIN — FINASTERIDE 5 MG: 5 TABLET, FILM COATED ORAL at 10:50

## 2023-11-25 RX ADMIN — MONTELUKAST 10 MG: 10 TABLET, FILM COATED ORAL at 20:39

## 2023-11-25 RX ADMIN — INSULIN GLARGINE 10 UNITS: 100 INJECTION, SOLUTION SUBCUTANEOUS at 20:39

## 2023-11-25 RX ADMIN — CEFTRIAXONE SODIUM 1000 MG: 1 INJECTION, POWDER, FOR SOLUTION INTRAMUSCULAR; INTRAVENOUS at 12:22

## 2023-11-25 RX ADMIN — CETIRIZINE HYDROCHLORIDE 10 MG: 10 TABLET, FILM COATED ORAL at 10:50

## 2023-11-25 RX ADMIN — TAMSULOSIN HYDROCHLORIDE 0.8 MG: 0.4 CAPSULE ORAL at 20:39

## 2023-11-25 RX ADMIN — ASPIRIN 81 MG 162 MG: 81 TABLET ORAL at 10:50

## 2023-11-25 RX ADMIN — INSULIN LISPRO 10 UNITS: 100 INJECTION, SOLUTION INTRAVENOUS; SUBCUTANEOUS at 12:18

## 2023-11-25 RX ADMIN — Medication 10 ML: at 20:39

## 2023-11-25 ASSESSMENT — PAIN SCALES - GENERAL: PAINLEVEL_OUTOF10: 3

## 2023-11-25 ASSESSMENT — PAIN DESCRIPTION - LOCATION: LOCATION: BACK

## 2023-11-26 ENCOUNTER — APPOINTMENT (OUTPATIENT)
Dept: CT IMAGING | Age: 73
DRG: 189 | End: 2023-11-26
Payer: COMMERCIAL

## 2023-11-26 LAB
ALBUMIN SERPL-MCNC: 3.3 G/DL (ref 3.5–4.6)
ANION GAP SERPL CALCULATED.3IONS-SCNC: 8 MEQ/L (ref 9–15)
BUN SERPL-MCNC: 43 MG/DL (ref 8–23)
CALCIUM SERPL-MCNC: 8.4 MG/DL (ref 8.5–9.9)
CHLORIDE SERPL-SCNC: 104 MEQ/L (ref 95–107)
CO2 SERPL-SCNC: 29 MEQ/L (ref 20–31)
CREAT SERPL-MCNC: 1.48 MG/DL (ref 0.7–1.2)
ERYTHROCYTE [DISTWIDTH] IN BLOOD BY AUTOMATED COUNT: 16.6 % (ref 11.5–14.5)
GLUCOSE BLD-MCNC: 150 MG/DL (ref 70–99)
GLUCOSE BLD-MCNC: 169 MG/DL (ref 70–99)
GLUCOSE BLD-MCNC: 181 MG/DL (ref 70–99)
GLUCOSE BLD-MCNC: 198 MG/DL (ref 70–99)
GLUCOSE SERPL-MCNC: 161 MG/DL (ref 70–99)
HCT VFR BLD AUTO: 37.6 % (ref 42–52)
HGB BLD-MCNC: 10.4 G/DL (ref 14–18)
MAGNESIUM SERPL-MCNC: 2.6 MG/DL (ref 1.7–2.4)
MCH RBC QN AUTO: 23.5 PG (ref 27–31.3)
MCHC RBC AUTO-ENTMCNC: 27.7 % (ref 33–37)
MCV RBC AUTO: 85.1 FL (ref 79–92.2)
PERFORMED ON: ABNORMAL
PHOSPHATE SERPL-MCNC: 3 MG/DL (ref 2.3–4.8)
PLATELET # BLD AUTO: 153 K/UL (ref 130–400)
POTASSIUM SERPL-SCNC: 5 MEQ/L (ref 3.4–4.9)
RBC # BLD AUTO: 4.42 M/UL (ref 4.7–6.1)
SODIUM SERPL-SCNC: 141 MEQ/L (ref 135–144)
WBC # BLD AUTO: 7.6 K/UL (ref 4.8–10.8)

## 2023-11-26 PROCEDURE — 80069 RENAL FUNCTION PANEL: CPT

## 2023-11-26 PROCEDURE — 6370000000 HC RX 637 (ALT 250 FOR IP): Performed by: INTERNAL MEDICINE

## 2023-11-26 PROCEDURE — 6360000002 HC RX W HCPCS: Performed by: INTERNAL MEDICINE

## 2023-11-26 PROCEDURE — 85027 COMPLETE CBC AUTOMATED: CPT

## 2023-11-26 PROCEDURE — 94761 N-INVAS EAR/PLS OXIMETRY MLT: CPT

## 2023-11-26 PROCEDURE — 2580000003 HC RX 258: Performed by: INTERNAL MEDICINE

## 2023-11-26 PROCEDURE — 36415 COLL VENOUS BLD VENIPUNCTURE: CPT

## 2023-11-26 PROCEDURE — 94640 AIRWAY INHALATION TREATMENT: CPT

## 2023-11-26 PROCEDURE — 1210000000 HC MED SURG R&B

## 2023-11-26 PROCEDURE — 2700000000 HC OXYGEN THERAPY PER DAY

## 2023-11-26 PROCEDURE — 83735 ASSAY OF MAGNESIUM: CPT

## 2023-11-26 PROCEDURE — 71250 CT THORAX DX C-: CPT

## 2023-11-26 PROCEDURE — 99232 SBSQ HOSP IP/OBS MODERATE 35: CPT | Performed by: INTERNAL MEDICINE

## 2023-11-26 RX ORDER — IPRATROPIUM BROMIDE AND ALBUTEROL SULFATE 2.5; .5 MG/3ML; MG/3ML
1 SOLUTION RESPIRATORY (INHALATION) 2 TIMES DAILY
Status: DISCONTINUED | OUTPATIENT
Start: 2023-11-26 | End: 2023-12-06 | Stop reason: HOSPADM

## 2023-11-26 RX ORDER — AMLODIPINE BESYLATE 5 MG/1
5 TABLET ORAL DAILY
Status: DISCONTINUED | OUTPATIENT
Start: 2023-11-26 | End: 2023-12-06 | Stop reason: HOSPADM

## 2023-11-26 RX ORDER — BUDESONIDE 0.5 MG/2ML
0.5 INHALANT ORAL
Status: DISCONTINUED | OUTPATIENT
Start: 2023-11-26 | End: 2023-12-06 | Stop reason: HOSPADM

## 2023-11-26 RX ORDER — IPRATROPIUM BROMIDE AND ALBUTEROL SULFATE 2.5; .5 MG/3ML; MG/3ML
1 SOLUTION RESPIRATORY (INHALATION) EVERY 4 HOURS PRN
Status: DISCONTINUED | OUTPATIENT
Start: 2023-11-26 | End: 2023-12-06 | Stop reason: HOSPADM

## 2023-11-26 RX ORDER — IPRATROPIUM BROMIDE AND ALBUTEROL SULFATE 2.5; .5 MG/3ML; MG/3ML
1 SOLUTION RESPIRATORY (INHALATION)
Status: DISCONTINUED | OUTPATIENT
Start: 2023-11-26 | End: 2023-11-26

## 2023-11-26 RX ADMIN — ENOXAPARIN SODIUM 30 MG: 100 INJECTION SUBCUTANEOUS at 08:32

## 2023-11-26 RX ADMIN — Medication 10 ML: at 08:31

## 2023-11-26 RX ADMIN — ALBUTEROL SULFATE 2 PUFF: 90 AEROSOL, METERED RESPIRATORY (INHALATION) at 08:44

## 2023-11-26 RX ADMIN — BUDESONIDE INHALATION 500 MCG: 0.5 SUSPENSION RESPIRATORY (INHALATION) at 08:49

## 2023-11-26 RX ADMIN — INSULIN GLARGINE 10 UNITS: 100 INJECTION, SOLUTION SUBCUTANEOUS at 21:36

## 2023-11-26 RX ADMIN — INSULIN LISPRO 5 UNITS: 100 INJECTION, SOLUTION INTRAVENOUS; SUBCUTANEOUS at 17:13

## 2023-11-26 RX ADMIN — MONTELUKAST 10 MG: 10 TABLET, FILM COATED ORAL at 21:36

## 2023-11-26 RX ADMIN — IPRATROPIUM BROMIDE AND ALBUTEROL SULFATE 1 DOSE: 2.5; .5 SOLUTION RESPIRATORY (INHALATION) at 20:17

## 2023-11-26 RX ADMIN — AMLODIPINE BESYLATE 5 MG: 5 TABLET ORAL at 14:34

## 2023-11-26 RX ADMIN — INSULIN LISPRO 5 UNITS: 100 INJECTION, SOLUTION INTRAVENOUS; SUBCUTANEOUS at 12:38

## 2023-11-26 RX ADMIN — METHYLPREDNISOLONE SODIUM SUCCINATE 40 MG: 40 INJECTION, POWDER, FOR SOLUTION INTRAMUSCULAR; INTRAVENOUS at 08:41

## 2023-11-26 RX ADMIN — ROSUVASTATIN CALCIUM 20 MG: 20 TABLET, FILM COATED ORAL at 21:36

## 2023-11-26 RX ADMIN — CETIRIZINE HYDROCHLORIDE 10 MG: 10 TABLET, FILM COATED ORAL at 08:31

## 2023-11-26 RX ADMIN — BUDESONIDE INHALATION 500 MCG: 0.5 SUSPENSION RESPIRATORY (INHALATION) at 20:17

## 2023-11-26 RX ADMIN — Medication 10 ML: at 21:39

## 2023-11-26 RX ADMIN — TAMSULOSIN HYDROCHLORIDE 0.8 MG: 0.4 CAPSULE ORAL at 21:36

## 2023-11-26 RX ADMIN — ACETAMINOPHEN 650 MG: 325 TABLET ORAL at 04:19

## 2023-11-26 RX ADMIN — FINASTERIDE 5 MG: 5 TABLET, FILM COATED ORAL at 08:31

## 2023-11-26 RX ADMIN — CEFTRIAXONE SODIUM 1000 MG: 1 INJECTION, POWDER, FOR SOLUTION INTRAMUSCULAR; INTRAVENOUS at 12:37

## 2023-11-26 RX ADMIN — ASPIRIN 81 MG 162 MG: 81 TABLET ORAL at 08:31

## 2023-11-26 RX ADMIN — INSULIN GLARGINE 10 UNITS: 100 INJECTION, SOLUTION SUBCUTANEOUS at 08:31

## 2023-11-26 RX ADMIN — ENOXAPARIN SODIUM 30 MG: 100 INJECTION SUBCUTANEOUS at 21:36

## 2023-11-26 RX ADMIN — INSULIN LISPRO 5 UNITS: 100 INJECTION, SOLUTION INTRAVENOUS; SUBCUTANEOUS at 08:31

## 2023-11-26 ASSESSMENT — PAIN SCALES - GENERAL: PAINLEVEL_OUTOF10: 5

## 2023-11-26 ASSESSMENT — PAIN DESCRIPTION - LOCATION: LOCATION: BACK

## 2023-11-27 LAB
ALBUMIN SERPL-MCNC: 3.3 G/DL (ref 3.5–4.6)
ANION GAP SERPL CALCULATED.3IONS-SCNC: 10 MEQ/L (ref 9–15)
BNP BLD-MCNC: 2304 PG/ML
BUN SERPL-MCNC: 44 MG/DL (ref 8–23)
CALCIUM SERPL-MCNC: 8.7 MG/DL (ref 8.5–9.9)
CHLORIDE SERPL-SCNC: 104 MEQ/L (ref 95–107)
CO2 SERPL-SCNC: 28 MEQ/L (ref 20–31)
CREAT SERPL-MCNC: 1.53 MG/DL (ref 0.7–1.2)
ERYTHROCYTE [DISTWIDTH] IN BLOOD BY AUTOMATED COUNT: 16.3 % (ref 11.5–14.5)
GLUCOSE BLD-MCNC: 175 MG/DL (ref 70–99)
GLUCOSE BLD-MCNC: 185 MG/DL (ref 70–99)
GLUCOSE BLD-MCNC: 295 MG/DL (ref 70–99)
GLUCOSE BLD-MCNC: 307 MG/DL (ref 70–99)
GLUCOSE SERPL-MCNC: 172 MG/DL (ref 70–99)
HCT VFR BLD AUTO: 37.5 % (ref 42–52)
HGB BLD-MCNC: 10.4 G/DL (ref 14–18)
MAGNESIUM SERPL-MCNC: 2.9 MG/DL (ref 1.7–2.4)
MCH RBC QN AUTO: 23.5 PG (ref 27–31.3)
MCHC RBC AUTO-ENTMCNC: 27.7 % (ref 33–37)
MCV RBC AUTO: 84.7 FL (ref 79–92.2)
PERFORMED ON: ABNORMAL
PHOSPHATE SERPL-MCNC: 3.1 MG/DL (ref 2.3–4.8)
PLATELET # BLD AUTO: 163 K/UL (ref 130–400)
POTASSIUM SERPL-SCNC: 5.3 MEQ/L (ref 3.4–4.9)
RBC # BLD AUTO: 4.43 M/UL (ref 4.7–6.1)
SODIUM SERPL-SCNC: 142 MEQ/L (ref 135–144)
WBC # BLD AUTO: 6.8 K/UL (ref 4.8–10.8)

## 2023-11-27 PROCEDURE — 6370000000 HC RX 637 (ALT 250 FOR IP): Performed by: INTERNAL MEDICINE

## 2023-11-27 PROCEDURE — 83735 ASSAY OF MAGNESIUM: CPT

## 2023-11-27 PROCEDURE — 97116 GAIT TRAINING THERAPY: CPT

## 2023-11-27 PROCEDURE — 6360000002 HC RX W HCPCS: Performed by: INTERNAL MEDICINE

## 2023-11-27 PROCEDURE — 2580000003 HC RX 258: Performed by: INTERNAL MEDICINE

## 2023-11-27 PROCEDURE — 2700000000 HC OXYGEN THERAPY PER DAY

## 2023-11-27 PROCEDURE — 85027 COMPLETE CBC AUTOMATED: CPT

## 2023-11-27 PROCEDURE — 36415 COLL VENOUS BLD VENIPUNCTURE: CPT

## 2023-11-27 PROCEDURE — 97535 SELF CARE MNGMENT TRAINING: CPT

## 2023-11-27 PROCEDURE — 6360000002 HC RX W HCPCS: Performed by: STUDENT IN AN ORGANIZED HEALTH CARE EDUCATION/TRAINING PROGRAM

## 2023-11-27 PROCEDURE — 83880 ASSAY OF NATRIURETIC PEPTIDE: CPT

## 2023-11-27 PROCEDURE — 99233 SBSQ HOSP IP/OBS HIGH 50: CPT | Performed by: INTERNAL MEDICINE

## 2023-11-27 PROCEDURE — 1210000000 HC MED SURG R&B

## 2023-11-27 PROCEDURE — 94761 N-INVAS EAR/PLS OXIMETRY MLT: CPT

## 2023-11-27 PROCEDURE — 94640 AIRWAY INHALATION TREATMENT: CPT

## 2023-11-27 PROCEDURE — 80069 RENAL FUNCTION PANEL: CPT

## 2023-11-27 RX ORDER — FUROSEMIDE 10 MG/ML
80 INJECTION INTRAMUSCULAR; INTRAVENOUS ONCE
Status: COMPLETED | OUTPATIENT
Start: 2023-11-27 | End: 2023-11-27

## 2023-11-27 RX ADMIN — Medication 10 ML: at 21:30

## 2023-11-27 RX ADMIN — CEFTRIAXONE SODIUM 1000 MG: 1 INJECTION, POWDER, FOR SOLUTION INTRAMUSCULAR; INTRAVENOUS at 12:49

## 2023-11-27 RX ADMIN — INSULIN LISPRO 5 UNITS: 100 INJECTION, SOLUTION INTRAVENOUS; SUBCUTANEOUS at 11:33

## 2023-11-27 RX ADMIN — TAMSULOSIN HYDROCHLORIDE 0.8 MG: 0.4 CAPSULE ORAL at 21:29

## 2023-11-27 RX ADMIN — INSULIN GLARGINE 10 UNITS: 100 INJECTION, SOLUTION SUBCUTANEOUS at 21:30

## 2023-11-27 RX ADMIN — FUROSEMIDE 80 MG: 10 INJECTION, SOLUTION INTRAMUSCULAR; INTRAVENOUS at 11:34

## 2023-11-27 RX ADMIN — INSULIN LISPRO 4 UNITS: 100 INJECTION, SOLUTION INTRAVENOUS; SUBCUTANEOUS at 21:29

## 2023-11-27 RX ADMIN — INSULIN GLARGINE 10 UNITS: 100 INJECTION, SOLUTION SUBCUTANEOUS at 08:47

## 2023-11-27 RX ADMIN — Medication 10 ML: at 08:46

## 2023-11-27 RX ADMIN — INSULIN LISPRO 5 UNITS: 100 INJECTION, SOLUTION INTRAVENOUS; SUBCUTANEOUS at 08:47

## 2023-11-27 RX ADMIN — INSULIN LISPRO 4 UNITS: 100 INJECTION, SOLUTION INTRAVENOUS; SUBCUTANEOUS at 16:42

## 2023-11-27 RX ADMIN — IPRATROPIUM BROMIDE AND ALBUTEROL SULFATE 1 DOSE: 2.5; .5 SOLUTION RESPIRATORY (INHALATION) at 07:12

## 2023-11-27 RX ADMIN — IPRATROPIUM BROMIDE AND ALBUTEROL SULFATE 1 DOSE: 2.5; .5 SOLUTION RESPIRATORY (INHALATION) at 19:11

## 2023-11-27 RX ADMIN — INSULIN LISPRO 5 UNITS: 100 INJECTION, SOLUTION INTRAVENOUS; SUBCUTANEOUS at 16:43

## 2023-11-27 RX ADMIN — ENOXAPARIN SODIUM 30 MG: 100 INJECTION SUBCUTANEOUS at 08:46

## 2023-11-27 RX ADMIN — CETIRIZINE HYDROCHLORIDE 10 MG: 10 TABLET, FILM COATED ORAL at 08:46

## 2023-11-27 RX ADMIN — MONTELUKAST 10 MG: 10 TABLET, FILM COATED ORAL at 21:29

## 2023-11-27 RX ADMIN — BUDESONIDE INHALATION 500 MCG: 0.5 SUSPENSION RESPIRATORY (INHALATION) at 19:05

## 2023-11-27 RX ADMIN — ENOXAPARIN SODIUM 30 MG: 100 INJECTION SUBCUTANEOUS at 21:29

## 2023-11-27 RX ADMIN — ROSUVASTATIN CALCIUM 20 MG: 20 TABLET, FILM COATED ORAL at 21:29

## 2023-11-27 RX ADMIN — METHYLPREDNISOLONE SODIUM SUCCINATE 40 MG: 40 INJECTION, POWDER, FOR SOLUTION INTRAMUSCULAR; INTRAVENOUS at 08:46

## 2023-11-27 RX ADMIN — BUDESONIDE INHALATION 500 MCG: 0.5 SUSPENSION RESPIRATORY (INHALATION) at 07:13

## 2023-11-27 RX ADMIN — FUROSEMIDE 80 MG: 10 INJECTION, SOLUTION INTRAMUSCULAR; INTRAVENOUS at 16:42

## 2023-11-27 RX ADMIN — ASPIRIN 81 MG 162 MG: 81 TABLET ORAL at 08:46

## 2023-11-27 RX ADMIN — FINASTERIDE 5 MG: 5 TABLET, FILM COATED ORAL at 08:46

## 2023-11-28 PROBLEM — E87.70 HYPERVOLEMIA: Status: ACTIVE | Noted: 2023-11-28

## 2023-11-28 PROBLEM — I27.20 SEVERE PULMONARY HYPERTENSION (HCC): Status: ACTIVE | Noted: 2023-11-28

## 2023-11-28 PROBLEM — J96.01 ACUTE RESPIRATORY FAILURE WITH HYPOXIA (HCC): Status: ACTIVE | Noted: 2023-11-28

## 2023-11-28 LAB
ALBUMIN SERPL-MCNC: 3.4 G/DL (ref 3.5–4.6)
ANION GAP SERPL CALCULATED.3IONS-SCNC: 10 MEQ/L (ref 9–15)
BUN SERPL-MCNC: 54 MG/DL (ref 8–23)
CALCIUM SERPL-MCNC: 8.9 MG/DL (ref 8.5–9.9)
CHLORIDE SERPL-SCNC: 101 MEQ/L (ref 95–107)
CK SERPL-CCNC: 35 U/L (ref 0–190)
CO2 SERPL-SCNC: 33 MEQ/L (ref 20–31)
CREAT SERPL-MCNC: 1.91 MG/DL (ref 0.7–1.2)
ERYTHROCYTE [DISTWIDTH] IN BLOOD BY AUTOMATED COUNT: 16.5 % (ref 11.5–14.5)
GLUCOSE BLD-MCNC: 208 MG/DL (ref 70–99)
GLUCOSE BLD-MCNC: 379 MG/DL (ref 70–99)
GLUCOSE BLD-MCNC: 381 MG/DL (ref 70–99)
GLUCOSE SERPL-MCNC: 237 MG/DL (ref 70–99)
HCT VFR BLD AUTO: 38.6 % (ref 42–52)
HGB BLD-MCNC: 10.8 G/DL (ref 14–18)
MAGNESIUM SERPL-MCNC: 2.6 MG/DL (ref 1.7–2.4)
MCH RBC QN AUTO: 23.6 PG (ref 27–31.3)
MCHC RBC AUTO-ENTMCNC: 28 % (ref 33–37)
MCV RBC AUTO: 84.3 FL (ref 79–92.2)
PERFORMED ON: ABNORMAL
PHOSPHATE SERPL-MCNC: 3.5 MG/DL (ref 2.3–4.8)
PLATELET # BLD AUTO: 184 K/UL (ref 130–400)
POTASSIUM SERPL-SCNC: 5 MEQ/L (ref 3.4–4.9)
RBC # BLD AUTO: 4.58 M/UL (ref 4.7–6.1)
SODIUM SERPL-SCNC: 144 MEQ/L (ref 135–144)
WBC # BLD AUTO: 6.9 K/UL (ref 4.8–10.8)

## 2023-11-28 PROCEDURE — 97116 GAIT TRAINING THERAPY: CPT

## 2023-11-28 PROCEDURE — 94640 AIRWAY INHALATION TREATMENT: CPT

## 2023-11-28 PROCEDURE — 83735 ASSAY OF MAGNESIUM: CPT

## 2023-11-28 PROCEDURE — 6360000002 HC RX W HCPCS: Performed by: INTERNAL MEDICINE

## 2023-11-28 PROCEDURE — 2700000000 HC OXYGEN THERAPY PER DAY

## 2023-11-28 PROCEDURE — 94761 N-INVAS EAR/PLS OXIMETRY MLT: CPT

## 2023-11-28 PROCEDURE — 6370000000 HC RX 637 (ALT 250 FOR IP): Performed by: INTERNAL MEDICINE

## 2023-11-28 PROCEDURE — 2580000003 HC RX 258: Performed by: INTERNAL MEDICINE

## 2023-11-28 PROCEDURE — 85027 COMPLETE CBC AUTOMATED: CPT

## 2023-11-28 PROCEDURE — 36415 COLL VENOUS BLD VENIPUNCTURE: CPT

## 2023-11-28 PROCEDURE — 99232 SBSQ HOSP IP/OBS MODERATE 35: CPT | Performed by: INTERNAL MEDICINE

## 2023-11-28 PROCEDURE — 99233 SBSQ HOSP IP/OBS HIGH 50: CPT | Performed by: INTERNAL MEDICINE

## 2023-11-28 PROCEDURE — 80069 RENAL FUNCTION PANEL: CPT

## 2023-11-28 PROCEDURE — 94760 N-INVAS EAR/PLS OXIMETRY 1: CPT

## 2023-11-28 PROCEDURE — 6360000002 HC RX W HCPCS: Performed by: PHYSICIAN ASSISTANT

## 2023-11-28 PROCEDURE — 82550 ASSAY OF CK (CPK): CPT

## 2023-11-28 PROCEDURE — 1210000000 HC MED SURG R&B

## 2023-11-28 PROCEDURE — APPSS30 APP SPLIT SHARED TIME 16-30 MINUTES: Performed by: PHYSICIAN ASSISTANT

## 2023-11-28 RX ORDER — FUROSEMIDE 10 MG/ML
20 INJECTION INTRAMUSCULAR; INTRAVENOUS DAILY
Status: DISCONTINUED | OUTPATIENT
Start: 2023-11-28 | End: 2023-11-30

## 2023-11-28 RX ADMIN — ENOXAPARIN SODIUM 30 MG: 100 INJECTION SUBCUTANEOUS at 07:53

## 2023-11-28 RX ADMIN — Medication 10 ML: at 21:15

## 2023-11-28 RX ADMIN — BUDESONIDE INHALATION 500 MCG: 0.5 SUSPENSION RESPIRATORY (INHALATION) at 07:25

## 2023-11-28 RX ADMIN — ASPIRIN 81 MG 162 MG: 81 TABLET ORAL at 07:53

## 2023-11-28 RX ADMIN — IPRATROPIUM BROMIDE AND ALBUTEROL SULFATE 1 DOSE: 2.5; .5 SOLUTION RESPIRATORY (INHALATION) at 07:25

## 2023-11-28 RX ADMIN — INSULIN LISPRO 5 UNITS: 100 INJECTION, SOLUTION INTRAVENOUS; SUBCUTANEOUS at 16:23

## 2023-11-28 RX ADMIN — ENOXAPARIN SODIUM 30 MG: 100 INJECTION SUBCUTANEOUS at 21:14

## 2023-11-28 RX ADMIN — INSULIN GLARGINE 10 UNITS: 100 INJECTION, SOLUTION SUBCUTANEOUS at 21:15

## 2023-11-28 RX ADMIN — IPRATROPIUM BROMIDE AND ALBUTEROL SULFATE 1 DOSE: 2.5; .5 SOLUTION RESPIRATORY (INHALATION) at 19:31

## 2023-11-28 RX ADMIN — INSULIN LISPRO 4 UNITS: 100 INJECTION, SOLUTION INTRAVENOUS; SUBCUTANEOUS at 21:15

## 2023-11-28 RX ADMIN — AMLODIPINE BESYLATE 5 MG: 5 TABLET ORAL at 07:56

## 2023-11-28 RX ADMIN — INSULIN LISPRO 5 UNITS: 100 INJECTION, SOLUTION INTRAVENOUS; SUBCUTANEOUS at 12:08

## 2023-11-28 RX ADMIN — INSULIN LISPRO 5 UNITS: 100 INJECTION, SOLUTION INTRAVENOUS; SUBCUTANEOUS at 07:53

## 2023-11-28 RX ADMIN — TAMSULOSIN HYDROCHLORIDE 0.8 MG: 0.4 CAPSULE ORAL at 21:15

## 2023-11-28 RX ADMIN — INSULIN LISPRO 2 UNITS: 100 INJECTION, SOLUTION INTRAVENOUS; SUBCUTANEOUS at 07:54

## 2023-11-28 RX ADMIN — FUROSEMIDE 20 MG: 10 INJECTION, SOLUTION INTRAMUSCULAR; INTRAVENOUS at 16:23

## 2023-11-28 RX ADMIN — FINASTERIDE 5 MG: 5 TABLET, FILM COATED ORAL at 07:53

## 2023-11-28 RX ADMIN — CETIRIZINE HYDROCHLORIDE 10 MG: 10 TABLET, FILM COATED ORAL at 08:19

## 2023-11-28 RX ADMIN — MONTELUKAST 10 MG: 10 TABLET, FILM COATED ORAL at 21:15

## 2023-11-28 RX ADMIN — Medication 10 ML: at 07:54

## 2023-11-28 RX ADMIN — METHYLPREDNISOLONE SODIUM SUCCINATE 40 MG: 40 INJECTION, POWDER, FOR SOLUTION INTRAMUSCULAR; INTRAVENOUS at 07:53

## 2023-11-28 RX ADMIN — ROSUVASTATIN CALCIUM 20 MG: 20 TABLET, FILM COATED ORAL at 21:15

## 2023-11-28 RX ADMIN — CEFTRIAXONE SODIUM 1000 MG: 1 INJECTION, POWDER, FOR SOLUTION INTRAMUSCULAR; INTRAVENOUS at 12:43

## 2023-11-28 RX ADMIN — BUDESONIDE INHALATION 500 MCG: 0.5 SUSPENSION RESPIRATORY (INHALATION) at 19:31

## 2023-11-28 RX ADMIN — FLUTICASONE PROPIONATE 2 SPRAY: 50 SPRAY, METERED NASAL at 09:32

## 2023-11-28 RX ADMIN — INSULIN LISPRO 6 UNITS: 100 INJECTION, SOLUTION INTRAVENOUS; SUBCUTANEOUS at 16:23

## 2023-11-28 RX ADMIN — INSULIN GLARGINE 10 UNITS: 100 INJECTION, SOLUTION SUBCUTANEOUS at 07:54

## 2023-11-28 ASSESSMENT — ENCOUNTER SYMPTOMS
SHORTNESS OF BREATH: 0
CHEST TIGHTNESS: 0
ABDOMINAL PAIN: 0
COLOR CHANGE: 0

## 2023-11-29 ENCOUNTER — APPOINTMENT (OUTPATIENT)
Dept: GENERAL RADIOLOGY | Age: 73
DRG: 189 | End: 2023-11-29
Payer: COMMERCIAL

## 2023-11-29 PROBLEM — I25.10 ARTERIOSCLEROSIS OF CORONARY ARTERY: Status: ACTIVE | Noted: 2023-11-29

## 2023-11-29 PROBLEM — Z74.09 IMPAIRED MOBILITY AND ACTIVITIES OF DAILY LIVING: Status: ACTIVE | Noted: 2023-11-29

## 2023-11-29 PROBLEM — Z78.9 IMPAIRED MOBILITY AND ACTIVITIES OF DAILY LIVING: Status: ACTIVE | Noted: 2023-11-29

## 2023-11-29 LAB
ALBUMIN SERPL-MCNC: 3.4 G/DL (ref 3.5–4.6)
ANION GAP SERPL CALCULATED.3IONS-SCNC: 5 MEQ/L (ref 9–15)
BUN SERPL-MCNC: 55 MG/DL (ref 8–23)
CALCIUM SERPL-MCNC: 9 MG/DL (ref 8.5–9.9)
CHLORIDE SERPL-SCNC: 99 MEQ/L (ref 95–107)
CO2 SERPL-SCNC: 35 MEQ/L (ref 20–31)
CREAT SERPL-MCNC: 1.77 MG/DL (ref 0.7–1.2)
ERYTHROCYTE [DISTWIDTH] IN BLOOD BY AUTOMATED COUNT: 16.4 % (ref 11.5–14.5)
GLUCOSE BLD-MCNC: 201 MG/DL (ref 70–99)
GLUCOSE BLD-MCNC: 227 MG/DL (ref 70–99)
GLUCOSE BLD-MCNC: 342 MG/DL (ref 70–99)
GLUCOSE BLD-MCNC: 446 MG/DL (ref 70–99)
GLUCOSE SERPL-MCNC: 275 MG/DL (ref 70–99)
HCT VFR BLD AUTO: 38.6 % (ref 42–52)
HGB BLD-MCNC: 10.8 G/DL (ref 14–18)
MAGNESIUM SERPL-MCNC: 2.6 MG/DL (ref 1.7–2.4)
MCH RBC QN AUTO: 23.6 PG (ref 27–31.3)
MCHC RBC AUTO-ENTMCNC: 28 % (ref 33–37)
MCV RBC AUTO: 84.5 FL (ref 79–92.2)
PERFORMED ON: ABNORMAL
PHOSPHATE SERPL-MCNC: 3.6 MG/DL (ref 2.3–4.8)
PLATELET # BLD AUTO: 201 K/UL (ref 130–400)
POTASSIUM SERPL-SCNC: 4.8 MEQ/L (ref 3.4–4.9)
RBC # BLD AUTO: 4.57 M/UL (ref 4.7–6.1)
SODIUM SERPL-SCNC: 139 MEQ/L (ref 135–144)
WBC # BLD AUTO: 7.4 K/UL (ref 4.8–10.8)

## 2023-11-29 PROCEDURE — 83735 ASSAY OF MAGNESIUM: CPT

## 2023-11-29 PROCEDURE — 94640 AIRWAY INHALATION TREATMENT: CPT

## 2023-11-29 PROCEDURE — 99232 SBSQ HOSP IP/OBS MODERATE 35: CPT | Performed by: PHYSICAL MEDICINE & REHABILITATION

## 2023-11-29 PROCEDURE — 6370000000 HC RX 637 (ALT 250 FOR IP): Performed by: INTERNAL MEDICINE

## 2023-11-29 PROCEDURE — 36415 COLL VENOUS BLD VENIPUNCTURE: CPT

## 2023-11-29 PROCEDURE — 80069 RENAL FUNCTION PANEL: CPT

## 2023-11-29 PROCEDURE — 99232 SBSQ HOSP IP/OBS MODERATE 35: CPT | Performed by: INTERNAL MEDICINE

## 2023-11-29 PROCEDURE — 6360000002 HC RX W HCPCS: Performed by: INTERNAL MEDICINE

## 2023-11-29 PROCEDURE — 1210000000 HC MED SURG R&B

## 2023-11-29 PROCEDURE — 6360000002 HC RX W HCPCS: Performed by: PHYSICIAN ASSISTANT

## 2023-11-29 PROCEDURE — 2580000003 HC RX 258: Performed by: INTERNAL MEDICINE

## 2023-11-29 PROCEDURE — 97116 GAIT TRAINING THERAPY: CPT

## 2023-11-29 PROCEDURE — 85027 COMPLETE CBC AUTOMATED: CPT

## 2023-11-29 PROCEDURE — 94761 N-INVAS EAR/PLS OXIMETRY MLT: CPT

## 2023-11-29 PROCEDURE — 6370000000 HC RX 637 (ALT 250 FOR IP): Performed by: STUDENT IN AN ORGANIZED HEALTH CARE EDUCATION/TRAINING PROGRAM

## 2023-11-29 PROCEDURE — 71046 X-RAY EXAM CHEST 2 VIEWS: CPT

## 2023-11-29 PROCEDURE — 2700000000 HC OXYGEN THERAPY PER DAY

## 2023-11-29 RX ORDER — DEXTROSE MONOHYDRATE 100 MG/ML
INJECTION, SOLUTION INTRAVENOUS CONTINUOUS PRN
Status: DISCONTINUED | OUTPATIENT
Start: 2023-11-29 | End: 2023-12-06 | Stop reason: HOSPADM

## 2023-11-29 RX ORDER — INSULIN LISPRO 100 [IU]/ML
2 INJECTION, SOLUTION INTRAVENOUS; SUBCUTANEOUS ONCE
Status: COMPLETED | OUTPATIENT
Start: 2023-11-29 | End: 2023-11-29

## 2023-11-29 RX ORDER — INSULIN LISPRO 100 [IU]/ML
0-4 INJECTION, SOLUTION INTRAVENOUS; SUBCUTANEOUS NIGHTLY
Status: DISCONTINUED | OUTPATIENT
Start: 2023-11-29 | End: 2023-12-06 | Stop reason: HOSPADM

## 2023-11-29 RX ORDER — INSULIN LISPRO 100 [IU]/ML
0-16 INJECTION, SOLUTION INTRAVENOUS; SUBCUTANEOUS
Status: DISCONTINUED | OUTPATIENT
Start: 2023-11-29 | End: 2023-12-06 | Stop reason: HOSPADM

## 2023-11-29 RX ORDER — PREDNISONE 20 MG/1
40 TABLET ORAL DAILY
Status: DISCONTINUED | OUTPATIENT
Start: 2023-11-30 | End: 2023-12-06 | Stop reason: HOSPADM

## 2023-11-29 RX ADMIN — AMLODIPINE BESYLATE 5 MG: 5 TABLET ORAL at 08:10

## 2023-11-29 RX ADMIN — BUDESONIDE INHALATION 500 MCG: 0.5 SUSPENSION RESPIRATORY (INHALATION) at 20:01

## 2023-11-29 RX ADMIN — INSULIN LISPRO 4 UNITS: 100 INJECTION, SOLUTION INTRAVENOUS; SUBCUTANEOUS at 21:10

## 2023-11-29 RX ADMIN — INSULIN GLARGINE 10 UNITS: 100 INJECTION, SOLUTION SUBCUTANEOUS at 21:10

## 2023-11-29 RX ADMIN — INSULIN LISPRO 5 UNITS: 100 INJECTION, SOLUTION INTRAVENOUS; SUBCUTANEOUS at 16:17

## 2023-11-29 RX ADMIN — METHYLPREDNISOLONE SODIUM SUCCINATE 40 MG: 40 INJECTION, POWDER, FOR SOLUTION INTRAMUSCULAR; INTRAVENOUS at 08:10

## 2023-11-29 RX ADMIN — ASPIRIN 81 MG 162 MG: 81 TABLET ORAL at 08:10

## 2023-11-29 RX ADMIN — MONTELUKAST 10 MG: 10 TABLET, FILM COATED ORAL at 21:09

## 2023-11-29 RX ADMIN — FINASTERIDE 5 MG: 5 TABLET, FILM COATED ORAL at 08:10

## 2023-11-29 RX ADMIN — INSULIN LISPRO 2 UNITS: 100 INJECTION, SOLUTION INTRAVENOUS; SUBCUTANEOUS at 22:55

## 2023-11-29 RX ADMIN — FUROSEMIDE 20 MG: 10 INJECTION, SOLUTION INTRAMUSCULAR; INTRAVENOUS at 08:09

## 2023-11-29 RX ADMIN — IPRATROPIUM BROMIDE AND ALBUTEROL SULFATE 1 DOSE: 2.5; .5 SOLUTION RESPIRATORY (INHALATION) at 20:01

## 2023-11-29 RX ADMIN — TAMSULOSIN HYDROCHLORIDE 0.8 MG: 0.4 CAPSULE ORAL at 21:09

## 2023-11-29 RX ADMIN — CETIRIZINE HYDROCHLORIDE 10 MG: 10 TABLET, FILM COATED ORAL at 08:29

## 2023-11-29 RX ADMIN — ENOXAPARIN SODIUM 30 MG: 100 INJECTION SUBCUTANEOUS at 21:09

## 2023-11-29 RX ADMIN — ENOXAPARIN SODIUM 30 MG: 100 INJECTION SUBCUTANEOUS at 08:09

## 2023-11-29 RX ADMIN — BUDESONIDE INHALATION 500 MCG: 0.5 SUSPENSION RESPIRATORY (INHALATION) at 11:26

## 2023-11-29 RX ADMIN — INSULIN LISPRO 5 UNITS: 100 INJECTION, SOLUTION INTRAVENOUS; SUBCUTANEOUS at 08:04

## 2023-11-29 RX ADMIN — IPRATROPIUM BROMIDE AND ALBUTEROL SULFATE 1 DOSE: 2.5; .5 SOLUTION RESPIRATORY (INHALATION) at 11:25

## 2023-11-29 RX ADMIN — Medication 10 ML: at 08:10

## 2023-11-29 RX ADMIN — ROSUVASTATIN CALCIUM 20 MG: 20 TABLET, FILM COATED ORAL at 21:09

## 2023-11-29 RX ADMIN — Medication 10 ML: at 22:55

## 2023-11-29 RX ADMIN — INSULIN LISPRO 12 UNITS: 100 INJECTION, SOLUTION INTRAVENOUS; SUBCUTANEOUS at 16:17

## 2023-11-29 RX ADMIN — INSULIN GLARGINE 10 UNITS: 100 INJECTION, SOLUTION SUBCUTANEOUS at 08:10

## 2023-11-29 RX ADMIN — INSULIN LISPRO 5 UNITS: 100 INJECTION, SOLUTION INTRAVENOUS; SUBCUTANEOUS at 12:21

## 2023-11-29 RX ADMIN — INSULIN LISPRO 2 UNITS: 100 INJECTION, SOLUTION INTRAVENOUS; SUBCUTANEOUS at 08:03

## 2023-11-29 ASSESSMENT — ENCOUNTER SYMPTOMS
CHEST TIGHTNESS: 0
ABDOMINAL PAIN: 0
COLOR CHANGE: 0
SHORTNESS OF BREATH: 0

## 2023-11-30 ENCOUNTER — APPOINTMENT (OUTPATIENT)
Dept: CT IMAGING | Age: 73
DRG: 189 | End: 2023-11-30
Payer: COMMERCIAL

## 2023-11-30 LAB
ALBUMIN SERPL-MCNC: 3.4 G/DL (ref 3.5–4.6)
ANION GAP SERPL CALCULATED.3IONS-SCNC: 5 MEQ/L (ref 9–15)
BUN SERPL-MCNC: 53 MG/DL (ref 8–23)
CALCIUM SERPL-MCNC: 9 MG/DL (ref 8.5–9.9)
CHLORIDE SERPL-SCNC: 100 MEQ/L (ref 95–107)
CO2 SERPL-SCNC: 36 MEQ/L (ref 20–31)
CREAT SERPL-MCNC: 1.61 MG/DL (ref 0.7–1.2)
ERYTHROCYTE [DISTWIDTH] IN BLOOD BY AUTOMATED COUNT: 16.3 % (ref 11.5–14.5)
GLUCOSE BLD-MCNC: 176 MG/DL (ref 70–99)
GLUCOSE BLD-MCNC: 258 MG/DL (ref 70–99)
GLUCOSE BLD-MCNC: 286 MG/DL (ref 70–99)
GLUCOSE BLD-MCNC: 299 MG/DL (ref 70–99)
GLUCOSE SERPL-MCNC: 217 MG/DL (ref 70–99)
HCT VFR BLD AUTO: 37.7 % (ref 42–52)
HGB BLD-MCNC: 10.6 G/DL (ref 14–18)
MCH RBC QN AUTO: 23.6 PG (ref 27–31.3)
MCHC RBC AUTO-ENTMCNC: 28.1 % (ref 33–37)
MCV RBC AUTO: 84 FL (ref 79–92.2)
PERFORMED ON: ABNORMAL
PHOSPHATE SERPL-MCNC: 2.9 MG/DL (ref 2.3–4.8)
PLATELET # BLD AUTO: 193 K/UL (ref 130–400)
POTASSIUM SERPL-SCNC: 4.7 MEQ/L (ref 3.4–4.9)
RBC # BLD AUTO: 4.49 M/UL (ref 4.7–6.1)
SODIUM SERPL-SCNC: 141 MEQ/L (ref 135–144)
WBC # BLD AUTO: 7.4 K/UL (ref 4.8–10.8)

## 2023-11-30 PROCEDURE — 6360000002 HC RX W HCPCS: Performed by: PHYSICIAN ASSISTANT

## 2023-11-30 PROCEDURE — 6360000002 HC RX W HCPCS: Performed by: INTERNAL MEDICINE

## 2023-11-30 PROCEDURE — 99231 SBSQ HOSP IP/OBS SF/LOW 25: CPT | Performed by: PHYSICAL MEDICINE & REHABILITATION

## 2023-11-30 PROCEDURE — 85027 COMPLETE CBC AUTOMATED: CPT

## 2023-11-30 PROCEDURE — 6370000000 HC RX 637 (ALT 250 FOR IP): Performed by: STUDENT IN AN ORGANIZED HEALTH CARE EDUCATION/TRAINING PROGRAM

## 2023-11-30 PROCEDURE — 2580000003 HC RX 258: Performed by: INTERNAL MEDICINE

## 2023-11-30 PROCEDURE — 99232 SBSQ HOSP IP/OBS MODERATE 35: CPT | Performed by: INTERNAL MEDICINE

## 2023-11-30 PROCEDURE — 6360000002 HC RX W HCPCS: Performed by: STUDENT IN AN ORGANIZED HEALTH CARE EDUCATION/TRAINING PROGRAM

## 2023-11-30 PROCEDURE — 97535 SELF CARE MNGMENT TRAINING: CPT

## 2023-11-30 PROCEDURE — 80069 RENAL FUNCTION PANEL: CPT

## 2023-11-30 PROCEDURE — 74176 CT ABD & PELVIS W/O CONTRAST: CPT

## 2023-11-30 PROCEDURE — 2700000000 HC OXYGEN THERAPY PER DAY

## 2023-11-30 PROCEDURE — 94640 AIRWAY INHALATION TREATMENT: CPT

## 2023-11-30 PROCEDURE — 94761 N-INVAS EAR/PLS OXIMETRY MLT: CPT

## 2023-11-30 PROCEDURE — 6370000000 HC RX 637 (ALT 250 FOR IP): Performed by: INTERNAL MEDICINE

## 2023-11-30 PROCEDURE — 36415 COLL VENOUS BLD VENIPUNCTURE: CPT

## 2023-11-30 PROCEDURE — 97116 GAIT TRAINING THERAPY: CPT

## 2023-11-30 PROCEDURE — 1210000000 HC MED SURG R&B

## 2023-11-30 RX ORDER — FUROSEMIDE 10 MG/ML
20 INJECTION INTRAMUSCULAR; INTRAVENOUS 2 TIMES DAILY
Status: DISCONTINUED | OUTPATIENT
Start: 2023-11-30 | End: 2023-12-01

## 2023-11-30 RX ADMIN — INSULIN LISPRO 5 UNITS: 100 INJECTION, SOLUTION INTRAVENOUS; SUBCUTANEOUS at 17:05

## 2023-11-30 RX ADMIN — ASPIRIN 81 MG 162 MG: 81 TABLET ORAL at 08:48

## 2023-11-30 RX ADMIN — INSULIN GLARGINE 10 UNITS: 100 INJECTION, SOLUTION SUBCUTANEOUS at 20:31

## 2023-11-30 RX ADMIN — INSULIN LISPRO 5 UNITS: 100 INJECTION, SOLUTION INTRAVENOUS; SUBCUTANEOUS at 11:54

## 2023-11-30 RX ADMIN — INSULIN LISPRO 8 UNITS: 100 INJECTION, SOLUTION INTRAVENOUS; SUBCUTANEOUS at 11:54

## 2023-11-30 RX ADMIN — ACETAMINOPHEN 650 MG: 325 TABLET ORAL at 15:51

## 2023-11-30 RX ADMIN — Medication 10 ML: at 08:50

## 2023-11-30 RX ADMIN — PREDNISONE 40 MG: 20 TABLET ORAL at 08:48

## 2023-11-30 RX ADMIN — CETIRIZINE HYDROCHLORIDE 10 MG: 10 TABLET, FILM COATED ORAL at 08:48

## 2023-11-30 RX ADMIN — BUDESONIDE INHALATION 500 MCG: 0.5 SUSPENSION RESPIRATORY (INHALATION) at 07:41

## 2023-11-30 RX ADMIN — ONDANSETRON 4 MG: 2 INJECTION INTRAMUSCULAR; INTRAVENOUS at 20:31

## 2023-11-30 RX ADMIN — FLUTICASONE PROPIONATE 2 SPRAY: 50 SPRAY, METERED NASAL at 08:50

## 2023-11-30 RX ADMIN — ENOXAPARIN SODIUM 30 MG: 100 INJECTION SUBCUTANEOUS at 20:30

## 2023-11-30 RX ADMIN — FINASTERIDE 5 MG: 5 TABLET, FILM COATED ORAL at 08:48

## 2023-11-30 RX ADMIN — INSULIN LISPRO 5 UNITS: 100 INJECTION, SOLUTION INTRAVENOUS; SUBCUTANEOUS at 08:48

## 2023-11-30 RX ADMIN — Medication 10 ML: at 20:31

## 2023-11-30 RX ADMIN — INSULIN LISPRO 8 UNITS: 100 INJECTION, SOLUTION INTRAVENOUS; SUBCUTANEOUS at 17:04

## 2023-11-30 RX ADMIN — INSULIN GLARGINE 10 UNITS: 100 INJECTION, SOLUTION SUBCUTANEOUS at 08:48

## 2023-11-30 RX ADMIN — ENOXAPARIN SODIUM 30 MG: 100 INJECTION SUBCUTANEOUS at 08:49

## 2023-11-30 RX ADMIN — AMLODIPINE BESYLATE 5 MG: 5 TABLET ORAL at 08:48

## 2023-11-30 RX ADMIN — FUROSEMIDE 20 MG: 10 INJECTION, SOLUTION INTRAMUSCULAR; INTRAVENOUS at 18:38

## 2023-11-30 RX ADMIN — FUROSEMIDE 20 MG: 10 INJECTION, SOLUTION INTRAMUSCULAR; INTRAVENOUS at 08:50

## 2023-11-30 RX ADMIN — IPRATROPIUM BROMIDE AND ALBUTEROL SULFATE 1 DOSE: 2.5; .5 SOLUTION RESPIRATORY (INHALATION) at 07:41

## 2023-11-30 ASSESSMENT — ENCOUNTER SYMPTOMS
COLOR CHANGE: 0
ABDOMINAL PAIN: 0
CHEST TIGHTNESS: 0
SHORTNESS OF BREATH: 0

## 2023-11-30 ASSESSMENT — PAIN SCALES - GENERAL: PAINLEVEL_OUTOF10: 0

## 2023-11-30 NOTE — PLAN OF CARE
Notified by RN of acute onset R flank pain and vomiting from patient. I evaluated patient who said pain was severe and came on abruptly. Patient was actively vomiting during my evaluation. He denied any dizziness or lightheadedness. R flank nontender to palpation. Given significant change in patient's status, will order repeat CT A/P for further workup.

## 2023-12-01 PROBLEM — N20.1 RIGHT URETERAL STONE: Status: ACTIVE | Noted: 2023-12-01

## 2023-12-01 LAB
ALBUMIN SERPL-MCNC: 3.6 G/DL (ref 3.5–4.6)
ANION GAP SERPL CALCULATED.3IONS-SCNC: 8 MEQ/L (ref 9–15)
BUN SERPL-MCNC: 54 MG/DL (ref 8–23)
CALCIUM SERPL-MCNC: 9 MG/DL (ref 8.5–9.9)
CHLORIDE SERPL-SCNC: 94 MEQ/L (ref 95–107)
CO2 SERPL-SCNC: 38 MEQ/L (ref 20–31)
CREAT SERPL-MCNC: 1.94 MG/DL (ref 0.7–1.2)
ERYTHROCYTE [DISTWIDTH] IN BLOOD BY AUTOMATED COUNT: 16.2 % (ref 11.5–14.5)
GLUCOSE BLD-MCNC: 190 MG/DL (ref 70–99)
GLUCOSE BLD-MCNC: 197 MG/DL (ref 70–99)
GLUCOSE BLD-MCNC: 241 MG/DL (ref 70–99)
GLUCOSE BLD-MCNC: 298 MG/DL (ref 70–99)
GLUCOSE SERPL-MCNC: 207 MG/DL (ref 70–99)
HCT VFR BLD AUTO: 41.1 % (ref 42–52)
HGB BLD-MCNC: 11.6 G/DL (ref 14–18)
MAGNESIUM SERPL-MCNC: 2.3 MG/DL (ref 1.7–2.4)
MCH RBC QN AUTO: 23.7 PG (ref 27–31.3)
MCHC RBC AUTO-ENTMCNC: 28.2 % (ref 33–37)
MCV RBC AUTO: 84 FL (ref 79–92.2)
PERFORMED ON: ABNORMAL
PHOSPHATE SERPL-MCNC: 3.6 MG/DL (ref 2.3–4.8)
PLATELET # BLD AUTO: 190 K/UL (ref 130–400)
POTASSIUM SERPL-SCNC: 4.5 MEQ/L (ref 3.4–4.9)
RBC # BLD AUTO: 4.89 M/UL (ref 4.7–6.1)
SODIUM SERPL-SCNC: 140 MEQ/L (ref 135–144)
WBC # BLD AUTO: 10.1 K/UL (ref 4.8–10.8)

## 2023-12-01 PROCEDURE — 6370000000 HC RX 637 (ALT 250 FOR IP): Performed by: INTERNAL MEDICINE

## 2023-12-01 PROCEDURE — 83735 ASSAY OF MAGNESIUM: CPT

## 2023-12-01 PROCEDURE — 99223 1ST HOSP IP/OBS HIGH 75: CPT | Performed by: UROLOGY

## 2023-12-01 PROCEDURE — 94760 N-INVAS EAR/PLS OXIMETRY 1: CPT

## 2023-12-01 PROCEDURE — 97110 THERAPEUTIC EXERCISES: CPT

## 2023-12-01 PROCEDURE — 1210000000 HC MED SURG R&B

## 2023-12-01 PROCEDURE — 80069 RENAL FUNCTION PANEL: CPT

## 2023-12-01 PROCEDURE — 6370000000 HC RX 637 (ALT 250 FOR IP): Performed by: STUDENT IN AN ORGANIZED HEALTH CARE EDUCATION/TRAINING PROGRAM

## 2023-12-01 PROCEDURE — 6360000002 HC RX W HCPCS: Performed by: INTERNAL MEDICINE

## 2023-12-01 PROCEDURE — 99232 SBSQ HOSP IP/OBS MODERATE 35: CPT | Performed by: INTERNAL MEDICINE

## 2023-12-01 PROCEDURE — 94640 AIRWAY INHALATION TREATMENT: CPT

## 2023-12-01 PROCEDURE — 85027 COMPLETE CBC AUTOMATED: CPT

## 2023-12-01 PROCEDURE — 6360000002 HC RX W HCPCS: Performed by: STUDENT IN AN ORGANIZED HEALTH CARE EDUCATION/TRAINING PROGRAM

## 2023-12-01 PROCEDURE — 2580000003 HC RX 258: Performed by: INTERNAL MEDICINE

## 2023-12-01 PROCEDURE — 36415 COLL VENOUS BLD VENIPUNCTURE: CPT

## 2023-12-01 PROCEDURE — 2700000000 HC OXYGEN THERAPY PER DAY

## 2023-12-01 PROCEDURE — 94761 N-INVAS EAR/PLS OXIMETRY MLT: CPT

## 2023-12-01 RX ORDER — FUROSEMIDE 20 MG/1
20 TABLET ORAL 2 TIMES DAILY
Status: DISCONTINUED | OUTPATIENT
Start: 2023-12-01 | End: 2023-12-06 | Stop reason: HOSPADM

## 2023-12-01 RX ADMIN — ASPIRIN 81 MG 162 MG: 81 TABLET ORAL at 08:42

## 2023-12-01 RX ADMIN — IPRATROPIUM BROMIDE AND ALBUTEROL SULFATE 1 DOSE: 2.5; .5 SOLUTION RESPIRATORY (INHALATION) at 07:26

## 2023-12-01 RX ADMIN — FLUTICASONE PROPIONATE 2 SPRAY: 50 SPRAY, METERED NASAL at 08:42

## 2023-12-01 RX ADMIN — FUROSEMIDE 20 MG: 20 TABLET ORAL at 21:21

## 2023-12-01 RX ADMIN — MONTELUKAST 10 MG: 10 TABLET, FILM COATED ORAL at 21:22

## 2023-12-01 RX ADMIN — AMLODIPINE BESYLATE 5 MG: 5 TABLET ORAL at 08:42

## 2023-12-01 RX ADMIN — PREDNISONE 40 MG: 20 TABLET ORAL at 08:42

## 2023-12-01 RX ADMIN — FUROSEMIDE 20 MG: 10 INJECTION, SOLUTION INTRAMUSCULAR; INTRAVENOUS at 08:42

## 2023-12-01 RX ADMIN — INSULIN LISPRO 5 UNITS: 100 INJECTION, SOLUTION INTRAVENOUS; SUBCUTANEOUS at 17:56

## 2023-12-01 RX ADMIN — BUDESONIDE INHALATION 500 MCG: 0.5 SUSPENSION RESPIRATORY (INHALATION) at 07:26

## 2023-12-01 RX ADMIN — Medication 10 ML: at 08:42

## 2023-12-01 RX ADMIN — FINASTERIDE 5 MG: 5 TABLET, FILM COATED ORAL at 08:43

## 2023-12-01 RX ADMIN — CETIRIZINE HYDROCHLORIDE 10 MG: 10 TABLET, FILM COATED ORAL at 08:43

## 2023-12-01 RX ADMIN — INSULIN LISPRO 4 UNITS: 100 INJECTION, SOLUTION INTRAVENOUS; SUBCUTANEOUS at 17:56

## 2023-12-01 RX ADMIN — ENOXAPARIN SODIUM 30 MG: 100 INJECTION SUBCUTANEOUS at 08:43

## 2023-12-01 RX ADMIN — IPRATROPIUM BROMIDE AND ALBUTEROL SULFATE 1 DOSE: 2.5; .5 SOLUTION RESPIRATORY (INHALATION) at 19:43

## 2023-12-01 RX ADMIN — TAMSULOSIN HYDROCHLORIDE 0.8 MG: 0.4 CAPSULE ORAL at 21:22

## 2023-12-01 RX ADMIN — ACETAMINOPHEN 650 MG: 325 TABLET ORAL at 06:11

## 2023-12-01 RX ADMIN — BUDESONIDE INHALATION 500 MCG: 0.5 SUSPENSION RESPIRATORY (INHALATION) at 19:43

## 2023-12-01 RX ADMIN — ACETAMINOPHEN 650 MG: 325 TABLET ORAL at 17:53

## 2023-12-01 RX ADMIN — INSULIN GLARGINE 10 UNITS: 100 INJECTION, SOLUTION SUBCUTANEOUS at 08:43

## 2023-12-01 RX ADMIN — INSULIN GLARGINE 10 UNITS: 100 INJECTION, SOLUTION SUBCUTANEOUS at 21:24

## 2023-12-01 RX ADMIN — INSULIN LISPRO 5 UNITS: 100 INJECTION, SOLUTION INTRAVENOUS; SUBCUTANEOUS at 12:31

## 2023-12-01 RX ADMIN — ENOXAPARIN SODIUM 30 MG: 100 INJECTION SUBCUTANEOUS at 21:22

## 2023-12-01 RX ADMIN — ROSUVASTATIN CALCIUM 20 MG: 20 TABLET, FILM COATED ORAL at 21:22

## 2023-12-01 RX ADMIN — FUROSEMIDE 20 MG: 10 INJECTION, SOLUTION INTRAMUSCULAR; INTRAVENOUS at 17:53

## 2023-12-01 RX ADMIN — INSULIN LISPRO 5 UNITS: 100 INJECTION, SOLUTION INTRAVENOUS; SUBCUTANEOUS at 08:43

## 2023-12-01 RX ADMIN — ONDANSETRON 4 MG: 2 INJECTION INTRAMUSCULAR; INTRAVENOUS at 17:53

## 2023-12-01 RX ADMIN — Medication 10 ML: at 21:26

## 2023-12-01 ASSESSMENT — PAIN SCALES - GENERAL: PAINLEVEL_OUTOF10: 6

## 2023-12-01 ASSESSMENT — ENCOUNTER SYMPTOMS
ABDOMINAL PAIN: 0
SHORTNESS OF BREATH: 0
COLOR CHANGE: 0
CHEST TIGHTNESS: 0

## 2023-12-01 NOTE — CONSULTS
Pulmonary Medicine  Consult Note      Reason for consultation: Hypoxia    Consulting physician: Dr. Tucker Manifold:      This is a 52-year-old male with past medical history significant for diabetes mellitus, CKD, hypertension, morbid obesity, sleep apnea was presented with back pain. He has been having 3 days of continuous back pain. He has been taking Tylenol for pain. He has been having increased shortness of breath for last 6 months which has been worsening. He also and some cough. He has been exposed to [place] few days ago but is tested negative for COVID. He has sleep apnea and has been using CPAP at home. He is not using oxygen. He denies having any chest pain or pleuritic pain no fever or chills. No nausea vomiting diarrhea patient has swelling in both lower extremity more on right side. He also has complaint of sore throat with postnasal drip which is chronic. His O2 saturation on room air was 82% and he required a 3 L O2 via nasal cannula to keep saturation above 90%. Past Medical History:        Diagnosis Date    Chronic kidney disease     Diabetes mellitus ([address])     Hypertension     Type 2 diabetes mellitus with hyperglycemia 1/6/2023    Type 2 diabetes mellitus without complication, without long-term current use of insulin ([address]) 3/19/2019    Uncontrolled type 2 diabetes mellitus with hyperglycemia [facility])        Past Surgical History:        Procedure Laterality Date    APPENDECTOMY      age 13       Social History:     reports that he has never smoked. He has never used smokeless tobacco. He reports that he does not currently use alcohol. He reports that he does not use drugs. Family History:   History reviewed. No pertinent family history.     Allergies:  Niacin and Fluorescein        MEDICATIONS during current hospitalization:    Continuous Infusions:   sodium chloride      dextrose         Scheduled Meds:   amLODIPine  5 mg Oral Daily    aspirin  162 mg Oral Daily
Renal consult dictated    CKD-4  Pulmonary hypertension  Hyperkalemia  Chronic hypoxemia  DM type-2  Hypertension  Obese    Plan  Maintain euvolemia  Keep )2 >92%  Follow bmp daily  check urine albumin  Lokelma one dose
[place] [address] [facility]                                  CONSULTATION    PATIENT NAME: Marilee Burr                     :        1950  MED REC NO:   [id number]                            ROOM:       [id number]  ACCOUNT NO:   [de-identified]                           ADMIT DATE: 2023  PROVIDER:     Akshat Larsen DO    CONSULT DATE:  2023    RENAL CONSULTATION    HISTORY OF PRESENT ILLNESS:  A 66-year-old  male admitted to  the hospital with difficulty breathing. The patient relates that his O2  saturations were less than 70% and he was brought to the hospital and  being evaluated at home. The patient has a known history of sleep apnea  in addition to diabetes and hypertension. He has known CKD IV and is  followed by Dr. Lluvia Ngo. The patient appears to be in no distress at  this time. He denies any dysuria or gross hematuria. No nonsteroidal  anti-inflammatory medication use. On admission, the patient had a serum  creatinine of 2.4 and GFR of 27 mL per minute, which is his baseline. On evaluation today, the patient has relatively stable, however, his  potassium was 5.4 mEq. PAST MEDICAL HISTORY:  CKD IV, diabetes, hypertension, and ILIANA. PAST SURGICAL HISTORY:  Appendectomy. HABITS:  Never smoked. No history of alcohol use. MEDICATIONS:  At the time of his admission; Lantus, Keflex, Demadex,  Coreg, Flonase, Crestor, Viagra, albuterol, Norvasc, Cozaar, Lasix,  Tenormin, and Proscar. ALLERGIES TO MEDICATIONS:  NIACIN and FLUORESCIN. REVIEW OF SYSTEMS:  Negative. PHYSICAL EXAMINATION:  VITAL SIGNS:  Height 5 feet and 6 inches, 289 pounds. Blood pressure is  130/60, heart rate 60, respirations 16, and afebrile. The patient was  noted to be hypotensive earlier on this admission. HEENT:  Normocephalic. Pupils equal and reactive to light. Extraocular  muscles intact. NECK:  Supple.
[place] Hashimoto, [address] [facility]                                  CONSULTATION    PATIENT NAME: Lubna Negrete                     :        1950  MED REC NO:   [id number]                            ROOM:       [id number]  ACCOUNT NO:   [de-identified]                           ADMIT DATE: 2023  PROVIDER:     Aron Lombardo MD    CONSULT DATE:  2023    INPATIENT CONSULTATION    REASON FOR CONSULTATION:  Right 1-mm proximal ureteral stone with renal  colic. HISTORY OF PRESENT ILLNESS:  This is a 64-year-old male who was admitted  to the hospital on 2023 with a history of hypertension, diabetes,  chronic kidney disease, morbid obesity, sleep apnea. He was having back  pain for few days. It was in his midback and in the flank at that time. He was found to have a urinary tract infection of Proteus and was  treated with a course of antibiotics. He was also felt to have acute  versus chronic respiratory failure with hypoxia. He yesterday developed  right flank pain, which was acute with some nausea and vomiting. No  fevers. No chills. A CT scan done showed some mild right  hydronephrosis due to a punctate 1 mm to 2 mm proximal ureteral stone. Urologic consultation was requested. The patient denies prior history  of stones. His pain is mild currently. He has no current nausea or  vomiting. He has no fevers or chills. He has no dysuria, hematuria, or  urgency. He is on Flomax as well. He has no other current urologic  complaints. PAST MEDICAL HISTORY:  Chronic kidney disease, diabetes, hypertension,  and obstructive sleep apnea. PAST SURGICAL HISTORY:  Includes appendectomy. FAMILY HISTORY:  No history of kidney stones in the family. SOCIAL HISTORY:  He denied cigarette smoking.     HOME MEDICATIONS:  Include Lantus, Keflex, Coreg, Demadex, Flonase,  insulin, Crestor, Zyloprim, albuterol, Claritin,
mg  0.8 mg Oral Nightly Doris Souza MD   0.8 mg at 11/21/23 2029    insulin lispro (HUMALOG) injection vial 20 Units  20 Units SubCUTAneous TID LEONARDO Souza MD   20 Units at 11/22/23 1144    sodium chloride flush 0.9 % injection 10 mL  10 mL IntraVENous PRN Doris Souza MD   10 mL at 11/21/23 1900    sodium chloride flush 0.9 % injection 5-40 mL  5-40 mL IntraVENous 2 times per day Tato Wang MD   5 mL at 11/21/23 2031    sodium chloride flush 0.9 % injection 5-40 mL  5-40 mL IntraVENous PRN Tato Wang MD        0.9 % sodium chloride infusion   IntraVENous PRN Tato Wang MD        potassium chloride (KLOR-CON M) extended release tablet 40 mEq  40 mEq Oral PRN Tato Wang MD        Or    potassium bicarb-citric acid (EFFER-K) effervescent tablet 40 mEq  40 mEq Oral PRN Tato Wang MD        Or    potassium chloride 10 mEq/100 mL IVPB (Peripheral Line)  10 mEq IntraVENous PRN Tato Wang MD        magnesium sulfate 2000 mg in 50 mL IVPB premix  2,000 mg IntraVENous PRN Tato Wang MD        enoxaparin Sodium (LOVENOX) injection 30 mg  30 mg SubCUTAneous BID Tato Wang MD   30 mg at 11/22/23 1010    ondansetron (ZOFRAN-ODT) disintegrating tablet 4 mg  4 mg Oral Q8H PRN Tato Wang MD        Or    ondansetron (ZOFRAN) injection 4 mg  4 mg IntraVENous Q6H PRN Tato Wang MD        polyethylene glycol (GLYCOLAX) packet 17 g  17 g Oral Daily PRN Tato Wang MD        acetaminophen (TYLENOL) tablet 650 mg  650 mg Oral Q6H PRN Tato Wang MD   650 mg at 11/21/23 2029    Or    acetaminophen (TYLENOL) suppository 650 mg  650 mg Rectal Q6H PRN Tato Wang MD        insulin lispro (HUMALOG) injection vial 0-8 Units  0-8 Units SubCUTAneous TID LEONARDO Wang MD   2 Units at 11/22/23 1144    insulin lispro (HUMALOG) injection vial 0-4 Units  0-4 Units SubCUTAneous Nightly Tato Wang MD   4 Units at 11/21/23 2030    glucose chewable tablet 16 g  4 tablet
multidisciplinary rehabilitation treatment, including daily care and management from a PM&R physician, 24-hour rehabilitation nursing, Physical Therapy, Occupational Therapy, rehabilitation psychology, consideration of speech and language pathology, recreational therapy, nutritional services, and a rehabilitation . I feel that it is reasonable to plan for a discharge to home setting after acute rehab. Specialized nursing care to focus on: Bowel and bladder issues-Monitor for urinary retention-check PVRs, bladder scan--cath if no void. Wound risk and management   -pressure relief protocols-side to side turns  IVF medication administration      Monitor endurance and if necessary spread therapy out over a 7-day window-adding scheduled rest breaks when needed. Focus on energy conservation. Monitor heart rate and   cardiac medications effects on heart rate and blood pressure before, during and after therapy. Progress toward endurance training with pulse ox monitoring for saturation and heart rate. continue to monitor closely for dehydration-- Improve hydration and nutrition by adding Vitamin B12 shot times one, adding Protein supplements and push PO fluids. Treat and monitor for higher level cognitive deficits, focus on difficulty with sequencing and problem-solving. Focus on higher-level balance and falls risk issues focusing on balance training and monitoring for orthostasis. Above recommendations are indicated to address medical complexity and need for appropriate rehab services. Will tailor individual care and rehab plan per individuals needs re  .     Focus of today's plan-  role of high flow oxygen  to be addressed prior to rehab admission, bipap role      Required Certification Data (potential inpatient rehabilitation facility patient's only)    Deficits:weakness, nutrition, mobility, impaired gait, high risk for falls, frequent falls, decreased endurance, deconditioning ,

## 2023-12-02 LAB
ALBUMIN SERPL-MCNC: 3.2 G/DL (ref 3.5–4.6)
ANION GAP SERPL CALCULATED.3IONS-SCNC: 9 MEQ/L (ref 9–15)
BUN SERPL-MCNC: 60 MG/DL (ref 8–23)
CALCIUM SERPL-MCNC: 8.6 MG/DL (ref 8.5–9.9)
CHLORIDE SERPL-SCNC: 97 MEQ/L (ref 95–107)
CO2 SERPL-SCNC: 35 MEQ/L (ref 20–31)
CREAT SERPL-MCNC: 2.43 MG/DL (ref 0.7–1.2)
ERYTHROCYTE [DISTWIDTH] IN BLOOD BY AUTOMATED COUNT: 16.1 % (ref 11.5–14.5)
GLUCOSE BLD-MCNC: 181 MG/DL (ref 70–99)
GLUCOSE BLD-MCNC: 182 MG/DL (ref 70–99)
GLUCOSE BLD-MCNC: 264 MG/DL (ref 70–99)
GLUCOSE BLD-MCNC: 330 MG/DL (ref 70–99)
GLUCOSE SERPL-MCNC: 266 MG/DL (ref 70–99)
HCT VFR BLD AUTO: 41 % (ref 42–52)
HGB BLD-MCNC: 11.6 G/DL (ref 14–18)
MCH RBC QN AUTO: 23.5 PG (ref 27–31.3)
MCHC RBC AUTO-ENTMCNC: 28.3 % (ref 33–37)
MCV RBC AUTO: 83 FL (ref 79–92.2)
PERFORMED ON: ABNORMAL
PHOSPHATE SERPL-MCNC: 4.3 MG/DL (ref 2.3–4.8)
PLATELET # BLD AUTO: 167 K/UL (ref 130–400)
POTASSIUM SERPL-SCNC: 4.8 MEQ/L (ref 3.4–4.9)
RBC # BLD AUTO: 4.94 M/UL (ref 4.7–6.1)
SODIUM SERPL-SCNC: 141 MEQ/L (ref 135–144)
WBC # BLD AUTO: 12.7 K/UL (ref 4.8–10.8)

## 2023-12-02 PROCEDURE — 1210000000 HC MED SURG R&B

## 2023-12-02 PROCEDURE — 99232 SBSQ HOSP IP/OBS MODERATE 35: CPT | Performed by: INTERNAL MEDICINE

## 2023-12-02 PROCEDURE — 6360000002 HC RX W HCPCS: Performed by: INTERNAL MEDICINE

## 2023-12-02 PROCEDURE — 6370000000 HC RX 637 (ALT 250 FOR IP): Performed by: INTERNAL MEDICINE

## 2023-12-02 PROCEDURE — 36415 COLL VENOUS BLD VENIPUNCTURE: CPT

## 2023-12-02 PROCEDURE — 85027 COMPLETE CBC AUTOMATED: CPT

## 2023-12-02 PROCEDURE — 94640 AIRWAY INHALATION TREATMENT: CPT

## 2023-12-02 PROCEDURE — 2700000000 HC OXYGEN THERAPY PER DAY

## 2023-12-02 PROCEDURE — 80069 RENAL FUNCTION PANEL: CPT

## 2023-12-02 PROCEDURE — 6370000000 HC RX 637 (ALT 250 FOR IP): Performed by: STUDENT IN AN ORGANIZED HEALTH CARE EDUCATION/TRAINING PROGRAM

## 2023-12-02 PROCEDURE — 94761 N-INVAS EAR/PLS OXIMETRY MLT: CPT

## 2023-12-02 PROCEDURE — 2580000003 HC RX 258: Performed by: INTERNAL MEDICINE

## 2023-12-02 RX ADMIN — AMLODIPINE BESYLATE 5 MG: 5 TABLET ORAL at 09:12

## 2023-12-02 RX ADMIN — BUDESONIDE INHALATION 500 MCG: 0.5 SUSPENSION RESPIRATORY (INHALATION) at 07:23

## 2023-12-02 RX ADMIN — INSULIN LISPRO 5 UNITS: 100 INJECTION, SOLUTION INTRAVENOUS; SUBCUTANEOUS at 09:13

## 2023-12-02 RX ADMIN — INSULIN LISPRO 12 UNITS: 100 INJECTION, SOLUTION INTRAVENOUS; SUBCUTANEOUS at 12:30

## 2023-12-02 RX ADMIN — MONTELUKAST 10 MG: 10 TABLET, FILM COATED ORAL at 21:01

## 2023-12-02 RX ADMIN — INSULIN GLARGINE 10 UNITS: 100 INJECTION, SOLUTION SUBCUTANEOUS at 09:13

## 2023-12-02 RX ADMIN — ENOXAPARIN SODIUM 30 MG: 100 INJECTION SUBCUTANEOUS at 09:12

## 2023-12-02 RX ADMIN — INSULIN GLARGINE 10 UNITS: 100 INJECTION, SOLUTION SUBCUTANEOUS at 21:01

## 2023-12-02 RX ADMIN — FINASTERIDE 5 MG: 5 TABLET, FILM COATED ORAL at 09:12

## 2023-12-02 RX ADMIN — ROSUVASTATIN CALCIUM 20 MG: 20 TABLET, FILM COATED ORAL at 21:01

## 2023-12-02 RX ADMIN — IPRATROPIUM BROMIDE AND ALBUTEROL SULFATE 1 DOSE: 2.5; .5 SOLUTION RESPIRATORY (INHALATION) at 20:01

## 2023-12-02 RX ADMIN — ASPIRIN 81 MG 162 MG: 81 TABLET ORAL at 09:12

## 2023-12-02 RX ADMIN — BUDESONIDE INHALATION 500 MCG: 0.5 SUSPENSION RESPIRATORY (INHALATION) at 20:00

## 2023-12-02 RX ADMIN — PREDNISONE 40 MG: 20 TABLET ORAL at 09:12

## 2023-12-02 RX ADMIN — Medication 10 ML: at 09:12

## 2023-12-02 RX ADMIN — ENOXAPARIN SODIUM 30 MG: 100 INJECTION SUBCUTANEOUS at 21:02

## 2023-12-02 RX ADMIN — INSULIN LISPRO 8 UNITS: 100 INJECTION, SOLUTION INTRAVENOUS; SUBCUTANEOUS at 09:12

## 2023-12-02 RX ADMIN — INSULIN LISPRO 5 UNITS: 100 INJECTION, SOLUTION INTRAVENOUS; SUBCUTANEOUS at 12:30

## 2023-12-02 RX ADMIN — IPRATROPIUM BROMIDE AND ALBUTEROL SULFATE 1 DOSE: 2.5; .5 SOLUTION RESPIRATORY (INHALATION) at 07:23

## 2023-12-02 RX ADMIN — CETIRIZINE HYDROCHLORIDE 10 MG: 10 TABLET, FILM COATED ORAL at 09:12

## 2023-12-02 RX ADMIN — Medication 5 ML: at 21:02

## 2023-12-02 RX ADMIN — TAMSULOSIN HYDROCHLORIDE 0.8 MG: 0.4 CAPSULE ORAL at 21:01

## 2023-12-02 RX ADMIN — FLUTICASONE PROPIONATE 2 SPRAY: 50 SPRAY, METERED NASAL at 09:13

## 2023-12-02 ASSESSMENT — ENCOUNTER SYMPTOMS
ABDOMINAL PAIN: 0
COLOR CHANGE: 0
CHEST TIGHTNESS: 0
SHORTNESS OF BREATH: 0

## 2023-12-03 ENCOUNTER — APPOINTMENT (OUTPATIENT)
Dept: GENERAL RADIOLOGY | Age: 73
DRG: 189 | End: 2023-12-03
Payer: COMMERCIAL

## 2023-12-03 LAB
ALBUMIN SERPL-MCNC: 3.3 G/DL (ref 3.5–4.6)
ANION GAP SERPL CALCULATED.3IONS-SCNC: 10 MEQ/L (ref 9–15)
BUN SERPL-MCNC: 66 MG/DL (ref 8–23)
CALCIUM SERPL-MCNC: 8.7 MG/DL (ref 8.5–9.9)
CHLORIDE SERPL-SCNC: 95 MEQ/L (ref 95–107)
CO2 SERPL-SCNC: 35 MEQ/L (ref 20–31)
CREAT SERPL-MCNC: 2.88 MG/DL (ref 0.7–1.2)
GLUCOSE BLD-MCNC: 189 MG/DL (ref 70–99)
GLUCOSE BLD-MCNC: 302 MG/DL (ref 70–99)
GLUCOSE BLD-MCNC: 345 MG/DL (ref 70–99)
GLUCOSE BLD-MCNC: 368 MG/DL (ref 70–99)
GLUCOSE SERPL-MCNC: 212 MG/DL (ref 70–99)
PERFORMED ON: ABNORMAL
PHOSPHATE SERPL-MCNC: 3.8 MG/DL (ref 2.3–4.8)
POTASSIUM SERPL-SCNC: 4.7 MEQ/L (ref 3.4–4.9)
SODIUM SERPL-SCNC: 140 MEQ/L (ref 135–144)

## 2023-12-03 PROCEDURE — 6370000000 HC RX 637 (ALT 250 FOR IP): Performed by: STUDENT IN AN ORGANIZED HEALTH CARE EDUCATION/TRAINING PROGRAM

## 2023-12-03 PROCEDURE — 6370000000 HC RX 637 (ALT 250 FOR IP): Performed by: INTERNAL MEDICINE

## 2023-12-03 PROCEDURE — 80069 RENAL FUNCTION PANEL: CPT

## 2023-12-03 PROCEDURE — 71045 X-RAY EXAM CHEST 1 VIEW: CPT

## 2023-12-03 PROCEDURE — 94761 N-INVAS EAR/PLS OXIMETRY MLT: CPT

## 2023-12-03 PROCEDURE — 99232 SBSQ HOSP IP/OBS MODERATE 35: CPT | Performed by: INTERNAL MEDICINE

## 2023-12-03 PROCEDURE — 6360000002 HC RX W HCPCS: Performed by: INTERNAL MEDICINE

## 2023-12-03 PROCEDURE — 36415 COLL VENOUS BLD VENIPUNCTURE: CPT

## 2023-12-03 PROCEDURE — 2580000003 HC RX 258: Performed by: STUDENT IN AN ORGANIZED HEALTH CARE EDUCATION/TRAINING PROGRAM

## 2023-12-03 PROCEDURE — 1210000000 HC MED SURG R&B

## 2023-12-03 PROCEDURE — 94640 AIRWAY INHALATION TREATMENT: CPT

## 2023-12-03 PROCEDURE — 2700000000 HC OXYGEN THERAPY PER DAY

## 2023-12-03 PROCEDURE — 2580000003 HC RX 258: Performed by: INTERNAL MEDICINE

## 2023-12-03 RX ORDER — 0.9 % SODIUM CHLORIDE 0.9 %
500 INTRAVENOUS SOLUTION INTRAVENOUS ONCE
Status: COMPLETED | OUTPATIENT
Start: 2023-12-03 | End: 2023-12-03

## 2023-12-03 RX ORDER — HYDRALAZINE HYDROCHLORIDE 25 MG/1
25 TABLET, FILM COATED ORAL 2 TIMES DAILY
Status: DISCONTINUED | OUTPATIENT
Start: 2023-12-03 | End: 2023-12-06 | Stop reason: HOSPADM

## 2023-12-03 RX ADMIN — HYDRALAZINE HYDROCHLORIDE 25 MG: 25 TABLET, FILM COATED ORAL at 21:45

## 2023-12-03 RX ADMIN — Medication 10 ML: at 21:42

## 2023-12-03 RX ADMIN — INSULIN GLARGINE 10 UNITS: 100 INJECTION, SOLUTION SUBCUTANEOUS at 10:46

## 2023-12-03 RX ADMIN — IPRATROPIUM BROMIDE AND ALBUTEROL SULFATE 1 DOSE: 2.5; .5 SOLUTION RESPIRATORY (INHALATION) at 20:06

## 2023-12-03 RX ADMIN — INSULIN LISPRO 16 UNITS: 100 INJECTION, SOLUTION INTRAVENOUS; SUBCUTANEOUS at 13:51

## 2023-12-03 RX ADMIN — CETIRIZINE HYDROCHLORIDE 10 MG: 10 TABLET, FILM COATED ORAL at 10:45

## 2023-12-03 RX ADMIN — AMLODIPINE BESYLATE 5 MG: 5 TABLET ORAL at 10:45

## 2023-12-03 RX ADMIN — SODIUM CHLORIDE 500 ML: 9 INJECTION, SOLUTION INTRAVENOUS at 14:43

## 2023-12-03 RX ADMIN — INSULIN GLARGINE 10 UNITS: 100 INJECTION, SOLUTION SUBCUTANEOUS at 21:41

## 2023-12-03 RX ADMIN — TAMSULOSIN HYDROCHLORIDE 0.8 MG: 0.4 CAPSULE ORAL at 21:42

## 2023-12-03 RX ADMIN — INSULIN LISPRO 5 UNITS: 100 INJECTION, SOLUTION INTRAVENOUS; SUBCUTANEOUS at 13:51

## 2023-12-03 RX ADMIN — ENOXAPARIN SODIUM 30 MG: 100 INJECTION SUBCUTANEOUS at 10:46

## 2023-12-03 RX ADMIN — Medication 10 ML: at 10:45

## 2023-12-03 RX ADMIN — INSULIN LISPRO 5 UNITS: 100 INJECTION, SOLUTION INTRAVENOUS; SUBCUTANEOUS at 10:46

## 2023-12-03 RX ADMIN — MONTELUKAST 10 MG: 10 TABLET, FILM COATED ORAL at 21:42

## 2023-12-03 RX ADMIN — FINASTERIDE 5 MG: 5 TABLET, FILM COATED ORAL at 10:45

## 2023-12-03 RX ADMIN — IPRATROPIUM BROMIDE AND ALBUTEROL SULFATE 1 DOSE: 2.5; .5 SOLUTION RESPIRATORY (INHALATION) at 07:07

## 2023-12-03 RX ADMIN — INSULIN LISPRO 12 UNITS: 100 INJECTION, SOLUTION INTRAVENOUS; SUBCUTANEOUS at 18:32

## 2023-12-03 RX ADMIN — BUDESONIDE INHALATION 500 MCG: 0.5 SUSPENSION RESPIRATORY (INHALATION) at 07:07

## 2023-12-03 RX ADMIN — PREDNISONE 40 MG: 20 TABLET ORAL at 10:45

## 2023-12-03 RX ADMIN — FLUTICASONE PROPIONATE 2 SPRAY: 50 SPRAY, METERED NASAL at 10:45

## 2023-12-03 RX ADMIN — BUDESONIDE INHALATION 500 MCG: 0.5 SUSPENSION RESPIRATORY (INHALATION) at 20:07

## 2023-12-03 RX ADMIN — ENOXAPARIN SODIUM 30 MG: 100 INJECTION SUBCUTANEOUS at 21:41

## 2023-12-03 RX ADMIN — INSULIN LISPRO 4 UNITS: 100 INJECTION, SOLUTION INTRAVENOUS; SUBCUTANEOUS at 21:41

## 2023-12-03 RX ADMIN — INSULIN LISPRO 5 UNITS: 100 INJECTION, SOLUTION INTRAVENOUS; SUBCUTANEOUS at 18:32

## 2023-12-03 RX ADMIN — ROSUVASTATIN CALCIUM 20 MG: 20 TABLET, FILM COATED ORAL at 21:42

## 2023-12-03 RX ADMIN — HYDRALAZINE HYDROCHLORIDE 25 MG: 25 TABLET, FILM COATED ORAL at 14:43

## 2023-12-03 RX ADMIN — ASPIRIN 81 MG 162 MG: 81 TABLET ORAL at 10:45

## 2023-12-03 ASSESSMENT — ENCOUNTER SYMPTOMS
SHORTNESS OF BREATH: 0
COLOR CHANGE: 0
ABDOMINAL PAIN: 0
CHEST TIGHTNESS: 0

## 2023-12-04 PROBLEM — R94.31 ABNORMAL EKG: Status: ACTIVE | Noted: 2023-12-04

## 2023-12-04 LAB
ALBUMIN SERPL-MCNC: 3 G/DL (ref 3.5–4.6)
ANION GAP SERPL CALCULATED.3IONS-SCNC: 8 MEQ/L (ref 9–15)
BUN SERPL-MCNC: 73 MG/DL (ref 8–23)
CALCIUM SERPL-MCNC: 8.5 MG/DL (ref 8.5–9.9)
CHLORIDE SERPL-SCNC: 95 MEQ/L (ref 95–107)
CO2 SERPL-SCNC: 33 MEQ/L (ref 20–31)
CREAT SERPL-MCNC: 2.9 MG/DL (ref 0.7–1.2)
ERYTHROCYTE [DISTWIDTH] IN BLOOD BY AUTOMATED COUNT: 16.2 % (ref 11.5–14.5)
GLUCOSE BLD-MCNC: 242 MG/DL (ref 70–99)
GLUCOSE BLD-MCNC: 300 MG/DL (ref 70–99)
GLUCOSE BLD-MCNC: 347 MG/DL (ref 70–99)
GLUCOSE BLD-MCNC: 351 MG/DL (ref 70–99)
GLUCOSE SERPL-MCNC: 256 MG/DL (ref 70–99)
HCT VFR BLD AUTO: 37.1 % (ref 42–52)
HGB BLD-MCNC: 10.8 G/DL (ref 14–18)
MCH RBC QN AUTO: 23.8 PG (ref 27–31.3)
MCHC RBC AUTO-ENTMCNC: 29.1 % (ref 33–37)
MCV RBC AUTO: 81.9 FL (ref 79–92.2)
PERFORMED ON: ABNORMAL
PHOSPHATE SERPL-MCNC: 3.7 MG/DL (ref 2.3–4.8)
PLATELET # BLD AUTO: 136 K/UL (ref 130–400)
POTASSIUM SERPL-SCNC: 4.6 MEQ/L (ref 3.4–4.9)
RBC # BLD AUTO: 4.53 M/UL (ref 4.7–6.1)
SODIUM SERPL-SCNC: 136 MEQ/L (ref 135–144)
WBC # BLD AUTO: 9.4 K/UL (ref 4.8–10.8)

## 2023-12-04 PROCEDURE — 1210000000 HC MED SURG R&B

## 2023-12-04 PROCEDURE — 94761 N-INVAS EAR/PLS OXIMETRY MLT: CPT

## 2023-12-04 PROCEDURE — 36415 COLL VENOUS BLD VENIPUNCTURE: CPT

## 2023-12-04 PROCEDURE — 99232 SBSQ HOSP IP/OBS MODERATE 35: CPT | Performed by: INTERNAL MEDICINE

## 2023-12-04 PROCEDURE — 6370000000 HC RX 637 (ALT 250 FOR IP): Performed by: INTERNAL MEDICINE

## 2023-12-04 PROCEDURE — 6370000000 HC RX 637 (ALT 250 FOR IP): Performed by: STUDENT IN AN ORGANIZED HEALTH CARE EDUCATION/TRAINING PROGRAM

## 2023-12-04 PROCEDURE — 2700000000 HC OXYGEN THERAPY PER DAY

## 2023-12-04 PROCEDURE — 99231 SBSQ HOSP IP/OBS SF/LOW 25: CPT | Performed by: PHYSICIAN ASSISTANT

## 2023-12-04 PROCEDURE — 85027 COMPLETE CBC AUTOMATED: CPT

## 2023-12-04 PROCEDURE — 6360000002 HC RX W HCPCS: Performed by: INTERNAL MEDICINE

## 2023-12-04 PROCEDURE — APPSS15 APP SPLIT SHARED TIME 0-15 MINUTES: Performed by: PHYSICIAN ASSISTANT

## 2023-12-04 PROCEDURE — 2580000003 HC RX 258: Performed by: INTERNAL MEDICINE

## 2023-12-04 PROCEDURE — 80069 RENAL FUNCTION PANEL: CPT

## 2023-12-04 PROCEDURE — 94640 AIRWAY INHALATION TREATMENT: CPT

## 2023-12-04 RX ORDER — ROSUVASTATIN CALCIUM 10 MG/1
10 TABLET, COATED ORAL NIGHTLY
Status: DISCONTINUED | OUTPATIENT
Start: 2023-12-04 | End: 2023-12-06 | Stop reason: HOSPADM

## 2023-12-04 RX ORDER — ENOXAPARIN SODIUM 100 MG/ML
30 INJECTION SUBCUTANEOUS DAILY
Status: DISCONTINUED | OUTPATIENT
Start: 2023-12-05 | End: 2023-12-06 | Stop reason: HOSPADM

## 2023-12-04 RX ORDER — CETIRIZINE HYDROCHLORIDE 10 MG/1
5 TABLET ORAL DAILY
Status: DISCONTINUED | OUTPATIENT
Start: 2023-12-05 | End: 2023-12-06 | Stop reason: HOSPADM

## 2023-12-04 RX ORDER — SODIUM CHLORIDE 9 MG/ML
INJECTION, SOLUTION INTRAVENOUS CONTINUOUS
Status: DISPENSED | OUTPATIENT
Start: 2023-12-04 | End: 2023-12-04

## 2023-12-04 RX ADMIN — INSULIN LISPRO 12 UNITS: 100 INJECTION, SOLUTION INTRAVENOUS; SUBCUTANEOUS at 12:59

## 2023-12-04 RX ADMIN — AMLODIPINE BESYLATE 5 MG: 5 TABLET ORAL at 11:04

## 2023-12-04 RX ADMIN — ROSUVASTATIN CALCIUM 10 MG: 10 TABLET, FILM COATED ORAL at 21:05

## 2023-12-04 RX ADMIN — Medication 10 ML: at 21:08

## 2023-12-04 RX ADMIN — INSULIN GLARGINE 10 UNITS: 100 INJECTION, SOLUTION SUBCUTANEOUS at 10:56

## 2023-12-04 RX ADMIN — INSULIN GLARGINE 10 UNITS: 100 INJECTION, SOLUTION SUBCUTANEOUS at 21:07

## 2023-12-04 RX ADMIN — INSULIN LISPRO 16 UNITS: 100 INJECTION, SOLUTION INTRAVENOUS; SUBCUTANEOUS at 17:41

## 2023-12-04 RX ADMIN — INSULIN LISPRO 5 UNITS: 100 INJECTION, SOLUTION INTRAVENOUS; SUBCUTANEOUS at 13:00

## 2023-12-04 RX ADMIN — TAMSULOSIN HYDROCHLORIDE 0.8 MG: 0.4 CAPSULE ORAL at 21:07

## 2023-12-04 RX ADMIN — INSULIN LISPRO 5 UNITS: 100 INJECTION, SOLUTION INTRAVENOUS; SUBCUTANEOUS at 11:04

## 2023-12-04 RX ADMIN — INSULIN LISPRO 4 UNITS: 100 INJECTION, SOLUTION INTRAVENOUS; SUBCUTANEOUS at 10:56

## 2023-12-04 RX ADMIN — HYDRALAZINE HYDROCHLORIDE 25 MG: 25 TABLET, FILM COATED ORAL at 21:05

## 2023-12-04 RX ADMIN — FINASTERIDE 5 MG: 5 TABLET, FILM COATED ORAL at 11:04

## 2023-12-04 RX ADMIN — BUDESONIDE INHALATION 500 MCG: 0.5 SUSPENSION RESPIRATORY (INHALATION) at 19:47

## 2023-12-04 RX ADMIN — PREDNISONE 40 MG: 20 TABLET ORAL at 10:56

## 2023-12-04 RX ADMIN — BUDESONIDE INHALATION 500 MCG: 0.5 SUSPENSION RESPIRATORY (INHALATION) at 07:32

## 2023-12-04 RX ADMIN — INSULIN LISPRO 5 UNITS: 100 INJECTION, SOLUTION INTRAVENOUS; SUBCUTANEOUS at 17:42

## 2023-12-04 RX ADMIN — SODIUM CHLORIDE: 9 INJECTION, SOLUTION INTRAVENOUS at 13:08

## 2023-12-04 RX ADMIN — MONTELUKAST 10 MG: 10 TABLET, FILM COATED ORAL at 21:07

## 2023-12-04 RX ADMIN — HYDRALAZINE HYDROCHLORIDE 25 MG: 25 TABLET, FILM COATED ORAL at 11:04

## 2023-12-04 RX ADMIN — Medication 10 ML: at 11:04

## 2023-12-04 RX ADMIN — IPRATROPIUM BROMIDE AND ALBUTEROL SULFATE 1 DOSE: 2.5; .5 SOLUTION RESPIRATORY (INHALATION) at 07:32

## 2023-12-04 RX ADMIN — FLUTICASONE PROPIONATE 2 SPRAY: 50 SPRAY, METERED NASAL at 11:04

## 2023-12-04 RX ADMIN — INSULIN LISPRO 4 UNITS: 100 INJECTION, SOLUTION INTRAVENOUS; SUBCUTANEOUS at 21:07

## 2023-12-04 RX ADMIN — ASPIRIN 81 MG 162 MG: 81 TABLET ORAL at 10:56

## 2023-12-04 RX ADMIN — IPRATROPIUM BROMIDE AND ALBUTEROL SULFATE 1 DOSE: 2.5; .5 SOLUTION RESPIRATORY (INHALATION) at 19:47

## 2023-12-04 ASSESSMENT — ENCOUNTER SYMPTOMS
SHORTNESS OF BREATH: 0
CHEST TIGHTNESS: 0
APNEA: 0
ABDOMINAL PAIN: 0
COLOR CHANGE: 0

## 2023-12-04 ASSESSMENT — PULMONARY FUNCTION TESTS: PEFR_L/MIN: 3

## 2023-12-04 NOTE — PLAN OF CARE
Problem: Discharge Planning  Goal: Discharge to home or other facility with appropriate resources  Outcome: Progressing     Problem: Pain  Goal: Verbalizes/displays adequate comfort level or baseline comfort level  Outcome: Progressing     Problem: Safety - Adult  Goal: Free from fall injury  Outcome: Progressing     Problem: Skin/Tissue Integrity  Goal: Absence of new skin breakdown  Description: 1. Monitor for areas of redness and/or skin breakdown  2. Assess vascular access sites hourly  3. Every 4-6 hours minimum:  Change oxygen saturation probe site  4. Every 4-6 hours:  If on nasal continuous positive airway pressure, respiratory therapy assess nares and determine need for appliance change or resting period.   Outcome: Progressing     Problem: Respiratory - Adult  Goal: Achieves optimal ventilation and oxygenation  Outcome: Progressing     Problem: Cardiovascular - Adult  Goal: Maintains optimal cardiac output and hemodynamic stability  Outcome: Progressing  Goal: Absence of cardiac dysrhythmias or at baseline  Outcome: Progressing     Problem: Skin/Tissue Integrity - Adult  Goal: Incisions, wounds, or drain sites healing without S/S of infection  Outcome: Progressing     Problem: Musculoskeletal - Adult  Goal: Return mobility to safest level of function  Outcome: Progressing     Problem: Infection - Adult  Goal: Absence of infection during hospitalization  Outcome: Progressing

## 2023-12-04 NOTE — PLAN OF CARE
Problem: Discharge Planning  Goal: Discharge to home or other facility with appropriate resources  12/4/2023 0320 by Es Zamudio RN  Outcome: Progressing     Problem: Pain  Goal: Verbalizes/displays adequate comfort level or baseline comfort level  12/4/2023 0320 by Es Zamudio RN  Outcome: Progressing     Problem: Safety - Adult  Goal: Free from fall injury  12/4/2023 1426 by Tiarra Rome RN  Outcome: Progressing     Problem: Skin/Tissue Integrity  Goal: Absence of new skin breakdown  Description: 1. Monitor for areas of redness and/or skin breakdown  2. Assess vascular access sites hourly  3. Every 4-6 hours minimum:  Change oxygen saturation probe site  4. Every 4-6 hours:  If on nasal continuous positive airway pressure, respiratory therapy assess nares and determine need for appliance change or resting period.   12/4/2023 0320 by Es Zamudio RN  Outcome: Progressing

## 2023-12-04 NOTE — DISCHARGE INSTR - COC
Concerns:944589305}    Impairments/Disabilities:      [address] [place] [address] Impairments/Disabilities:608362817}    Nutrition Therapy:  Current Nutrition Therapy:   [address] [place] [address] Diet List:560125542}    Routes of Feeding: {CHP DME Other Feedings:060276647}  Liquids: {Slp liquid thickness:33064}  Daily Fluid Restriction: {CHP DME Yes amt example:439833971}  Last Modified Barium Swallow with Video (Video Swallowing Test): {Done Not Done ABNJ:509048241}    Treatments at the Time of Hospital Discharge:   Respiratory Treatments: ***  Oxygen Therapy:  {Therapy; copd oxygen:17898}  Ventilator:    {MH CC Vent KGSY:116924334}    Rehab Therapies: {THERAPEUTIC INTERVENTION:0832074801}  Weight Bearing Status/Restrictions: [address] [place] [address] Weight Bearin}  Other Medical Equipment (for information only, NOT a DME order):  {EQUIPMENT:141303712}  Other Treatments: ***    Patient's personal belongings (please select all that are sent with patient):  {CHP DME Belongings:632560253}    RN SIGNATURE:  {Esignature:721650881}    CASE MANAGEMENT/SOCIAL WORK SECTION    Inpatient Status Date: ***    Readmission Risk Assessment Score:  Readmission Risk              Risk of Unplanned Readmission:  36           Discharging to Facility/ Agency   Name: Emilie Suh   Address:  Phone: [phone]  Fax:    / signature: Electronically signed by OSCAR Hardy, NÉSTOR on 23 at 10:28 AM EST    PHYSICIAN SECTION    Prognosis: Good    Condition at Discharge: Stable    Orders: Monitor BMP M W F. Send results to Dr Abhijit Ricardo. Restart diuretics as needed. Physician Certification: I certify the above information and transfer of Ann Edwards  is necessary for the continuing treatment of the diagnosis listed and that he requires [address] for less 30 days.      Update Admission H&P: No change in H&P    PHYSICIAN SIGNATURE:  Electronically signed by Kandi Trevizo MD on 23 at 12:32 PM EST

## 2023-12-05 VITALS
HEART RATE: 73 BPM | SYSTOLIC BLOOD PRESSURE: 154 MMHG | OXYGEN SATURATION: 94 % | BODY MASS INDEX: 45.95 KG/M2 | HEIGHT: 66 IN | RESPIRATION RATE: 19 BRPM | TEMPERATURE: 98.4 F | WEIGHT: 285.9 LBS | DIASTOLIC BLOOD PRESSURE: 54 MMHG

## 2023-12-05 LAB
ALBUMIN SERPL-MCNC: 3.2 G/DL (ref 3.5–4.6)
ANION GAP SERPL CALCULATED.3IONS-SCNC: 11 MEQ/L (ref 9–15)
BUN SERPL-MCNC: 71 MG/DL (ref 8–23)
CALCIUM SERPL-MCNC: 8.5 MG/DL (ref 8.5–9.9)
CHLORIDE SERPL-SCNC: 97 MEQ/L (ref 95–107)
CO2 SERPL-SCNC: 31 MEQ/L (ref 20–31)
CREAT SERPL-MCNC: 3 MG/DL (ref 0.7–1.2)
ERYTHROCYTE [DISTWIDTH] IN BLOOD BY AUTOMATED COUNT: 16.4 % (ref 11.5–14.5)
GLUCOSE BLD-MCNC: 284 MG/DL (ref 70–99)
GLUCOSE BLD-MCNC: 335 MG/DL (ref 70–99)
GLUCOSE BLD-MCNC: 375 MG/DL (ref 70–99)
GLUCOSE BLD-MCNC: 389 MG/DL (ref 70–99)
GLUCOSE SERPL-MCNC: 310 MG/DL (ref 70–99)
HCT VFR BLD AUTO: 39.8 % (ref 42–52)
HGB BLD-MCNC: 11.4 G/DL (ref 14–18)
MCH RBC QN AUTO: 23.5 PG (ref 27–31.3)
MCHC RBC AUTO-ENTMCNC: 28.6 % (ref 33–37)
MCV RBC AUTO: 82.1 FL (ref 79–92.2)
PERFORMED ON: ABNORMAL
PHOSPHATE SERPL-MCNC: 4 MG/DL (ref 2.3–4.8)
PLATELET # BLD AUTO: 136 K/UL (ref 130–400)
POTASSIUM SERPL-SCNC: 4.5 MEQ/L (ref 3.4–4.9)
RBC # BLD AUTO: 4.85 M/UL (ref 4.7–6.1)
SODIUM SERPL-SCNC: 139 MEQ/L (ref 135–144)
WBC # BLD AUTO: 9.2 K/UL (ref 4.8–10.8)

## 2023-12-05 PROCEDURE — 99231 SBSQ HOSP IP/OBS SF/LOW 25: CPT | Performed by: PHYSICIAN ASSISTANT

## 2023-12-05 PROCEDURE — 6360000002 HC RX W HCPCS: Performed by: INTERNAL MEDICINE

## 2023-12-05 PROCEDURE — 6370000000 HC RX 637 (ALT 250 FOR IP): Performed by: STUDENT IN AN ORGANIZED HEALTH CARE EDUCATION/TRAINING PROGRAM

## 2023-12-05 PROCEDURE — 36415 COLL VENOUS BLD VENIPUNCTURE: CPT

## 2023-12-05 PROCEDURE — 1210000000 HC MED SURG R&B

## 2023-12-05 PROCEDURE — 97110 THERAPEUTIC EXERCISES: CPT

## 2023-12-05 PROCEDURE — 6370000000 HC RX 637 (ALT 250 FOR IP): Performed by: INTERNAL MEDICINE

## 2023-12-05 PROCEDURE — 94760 N-INVAS EAR/PLS OXIMETRY 1: CPT

## 2023-12-05 PROCEDURE — 99232 SBSQ HOSP IP/OBS MODERATE 35: CPT | Performed by: INTERNAL MEDICINE

## 2023-12-05 PROCEDURE — 2700000000 HC OXYGEN THERAPY PER DAY

## 2023-12-05 PROCEDURE — 94761 N-INVAS EAR/PLS OXIMETRY MLT: CPT

## 2023-12-05 PROCEDURE — 94640 AIRWAY INHALATION TREATMENT: CPT

## 2023-12-05 PROCEDURE — 80069 RENAL FUNCTION PANEL: CPT

## 2023-12-05 PROCEDURE — 85027 COMPLETE CBC AUTOMATED: CPT

## 2023-12-05 PROCEDURE — 2580000003 HC RX 258: Performed by: INTERNAL MEDICINE

## 2023-12-05 RX ADMIN — INSULIN LISPRO 5 UNITS: 100 INJECTION, SOLUTION INTRAVENOUS; SUBCUTANEOUS at 17:13

## 2023-12-05 RX ADMIN — ASPIRIN 81 MG 162 MG: 81 TABLET ORAL at 08:32

## 2023-12-05 RX ADMIN — PREDNISONE 40 MG: 20 TABLET ORAL at 08:32

## 2023-12-05 RX ADMIN — IPRATROPIUM BROMIDE AND ALBUTEROL SULFATE 1 DOSE: 2.5; .5 SOLUTION RESPIRATORY (INHALATION) at 19:43

## 2023-12-05 RX ADMIN — FLUTICASONE PROPIONATE 2 SPRAY: 50 SPRAY, METERED NASAL at 08:34

## 2023-12-05 RX ADMIN — TAMSULOSIN HYDROCHLORIDE 0.8 MG: 0.4 CAPSULE ORAL at 20:39

## 2023-12-05 RX ADMIN — INSULIN LISPRO 4 UNITS: 100 INJECTION, SOLUTION INTRAVENOUS; SUBCUTANEOUS at 20:40

## 2023-12-05 RX ADMIN — AMLODIPINE BESYLATE 5 MG: 5 TABLET ORAL at 08:32

## 2023-12-05 RX ADMIN — INSULIN GLARGINE 10 UNITS: 100 INJECTION, SOLUTION SUBCUTANEOUS at 20:40

## 2023-12-05 RX ADMIN — Medication 10 ML: at 20:41

## 2023-12-05 RX ADMIN — MONTELUKAST 10 MG: 10 TABLET, FILM COATED ORAL at 20:39

## 2023-12-05 RX ADMIN — ENOXAPARIN SODIUM 30 MG: 100 INJECTION SUBCUTANEOUS at 08:32

## 2023-12-05 RX ADMIN — BUDESONIDE INHALATION 500 MCG: 0.5 SUSPENSION RESPIRATORY (INHALATION) at 19:43

## 2023-12-05 RX ADMIN — CETIRIZINE HYDROCHLORIDE 5 MG: 10 TABLET, FILM COATED ORAL at 08:36

## 2023-12-05 RX ADMIN — BUDESONIDE INHALATION 500 MCG: 0.5 SUSPENSION RESPIRATORY (INHALATION) at 07:21

## 2023-12-05 RX ADMIN — INSULIN LISPRO 5 UNITS: 100 INJECTION, SOLUTION INTRAVENOUS; SUBCUTANEOUS at 08:32

## 2023-12-05 RX ADMIN — INSULIN LISPRO 16 UNITS: 100 INJECTION, SOLUTION INTRAVENOUS; SUBCUTANEOUS at 17:13

## 2023-12-05 RX ADMIN — Medication 10 ML: at 08:37

## 2023-12-05 RX ADMIN — INSULIN LISPRO 5 UNITS: 100 INJECTION, SOLUTION INTRAVENOUS; SUBCUTANEOUS at 11:52

## 2023-12-05 RX ADMIN — FINASTERIDE 5 MG: 5 TABLET, FILM COATED ORAL at 08:32

## 2023-12-05 RX ADMIN — IPRATROPIUM BROMIDE AND ALBUTEROL SULFATE 1 DOSE: 2.5; .5 SOLUTION RESPIRATORY (INHALATION) at 07:21

## 2023-12-05 RX ADMIN — ROSUVASTATIN CALCIUM 10 MG: 10 TABLET, FILM COATED ORAL at 20:39

## 2023-12-05 RX ADMIN — HYDRALAZINE HYDROCHLORIDE 25 MG: 25 TABLET, FILM COATED ORAL at 08:32

## 2023-12-05 RX ADMIN — INSULIN LISPRO 12 UNITS: 100 INJECTION, SOLUTION INTRAVENOUS; SUBCUTANEOUS at 11:52

## 2023-12-05 RX ADMIN — INSULIN GLARGINE 10 UNITS: 100 INJECTION, SOLUTION SUBCUTANEOUS at 08:32

## 2023-12-05 RX ADMIN — HYDRALAZINE HYDROCHLORIDE 25 MG: 25 TABLET, FILM COATED ORAL at 20:39

## 2023-12-05 RX ADMIN — INSULIN LISPRO 4 UNITS: 100 INJECTION, SOLUTION INTRAVENOUS; SUBCUTANEOUS at 08:34

## 2023-12-05 ASSESSMENT — ENCOUNTER SYMPTOMS
ABDOMINAL PAIN: 0
APNEA: 0
SHORTNESS OF BREATH: 0
CHEST TIGHTNESS: 0
COLOR CHANGE: 0

## 2023-12-05 NOTE — CARE COORDINATION
ATTEMPTED TO CALL EMERGENCY CONTACT GENE NO WORKING NUMBER, UPDATED PT HE STATES HE DOES NOT HAVE HIS NUMBER
Attempted to meet with patient to discuss possibility of acute inpatient rehab. Patient soundly sleeping at time of arrival, x2 attempts to wake patient up. Patient noted to be on 10L of HF oxygen. Will meet back with patient at a later time.   Electronically signed by Annie Phan on 11/28/2023 at 10:15 AM
Case Management Assessment  Initial Evaluation    Date/Time of Evaluation: 11/21/2023 3:28 PM  Assessment Completed by: Rebecca Reid RN    If patient is discharged prior to next notation, then this note serves as note for discharge by case management. Patient Name: Boom Adams                   YOB: 1950  Diagnosis: Infestation by bed bug [B88.8]  Hypoxia [R09.02]                   Date / Time: 11/20/2023  4:32 PM    Patient Admission Status: Inpatient   Readmission Risk (Low < 19, Mod (19-27), High > 27): Readmission Risk Score: 17.1    Current PCP: Home, [address]   [facility] by [facility]    Chart Reviewed: Yes      History Provided by: Patient  Patient Orientation: Alert and Oriented    Patient Cognition: Alert    Hospitalization in the last 30 days (Readmission):  No    If yes, Readmission Assessment in CM Navigator will be completed. Advance Directives:      Code Status: Full Code   Patient's Primary Decision Maker is: Legal Next of Kin    Primary Decision Maker: christopher fregoso - Other - [phone]    Primary Decision Maker: william Beard - Other - [phone]    Discharge Planning:    Patient lives with: Alone Type of Home: House  Primary Care Giver: Self  Patient Support Systems include: Friends/Neighbors   Current Financial resources: Medicare,  ([place])  [facility] resources:  [facility]  Current services prior to admission: C-pap, Other (Comment) (private pay to help clean house)            Current DME:  [employer] CPAP            Type of Home Care services:   NONE    ADLS  Prior functional level: Independent in ADLs/IADLs  Current functional level: Assistance with the following:MOBILITY     PT AM-PAC:   /24  OT AM-PAC: 20 /24    Family can provide assistance at DC: No  Would you like Case Management to discuss the discharge plan with any other family members/significant others, and if so, who?  No  Plans to Return to Present Housing: Unknown at
DC PLAN ACUTE REHAB  STILL NEED ACCEPTANCE AND WILL NEED PRECERT
Inpatient Rehab referral received. Met with patient and explained [facility] Acute Inpatient Rehab program and requirements, including 3 hours of intense therapy daily, anticipated length of stay and goal of discharge to home. Patient voiced no questions. Patient only provided simple, one word answers to anything he was asked. Patient said \"yep\" to still driving, living alone and being able to independently care for himself prior to hospitalization. Patient again said, \"yep\" when asked if he was feeling better than initially coming into the hospital. Asked patient if he felt he could tolerate three hrs of therapy daily, \"yep. \" Patient denies feeling SOB though he is on 10L of HF oxygen. Advised of needing PM&R physician acceptance as well as insurance approval if accepted.   Electronically signed by Coni Andrews on 11/28/2023 at 2:43 PM
LISA  DEVOTED CALLS TO OBTAIN CONTACT INFO FOR DR Mendez Cassidy TO [address] [facility] (DR ANTOINE AWARE)
MESSAGE TO STPEH MANNING [facility] TO CHECK ON PRECERT STATUS FOR ELINOR RALPH. WILL FOLLOW.
MET WITH PATIENT DUE TO DEC ENDURANCE  AND PT EVALS, PT WOULD LIKE TO TRY TO GO TO REHAB.  FOC GIVEN, DR ANTOINE UPDATED REFERRAL PLACED TO [facility] ACUTE REHAB Armaan Sample RN UPDATED
MET WITH PT TO DISCUSS DC  PLAN AND RECOMMENDATIONS PT AGREEABLE TO [facility] FOC GIVEN PT PROVIDED WITH [facility] LIST CM TO FOLLOW
MORNING DISCIPLINARY ROUNDS DONE RN CONCERNED W PT WEAKNESS PAGED PT TREATMENT TEAM TO EVAL PT ENDURANCE WILL MONITOR FOR ANY CHANGES AND POTENTIAL NEEDS
PRECERT OBTAINED FOR ELINOR RALPH. MESSAGE TO MD. RENAL MONITORING CREATININE. WILL FOLLOW.
Precert for acute inpatient rehab submitted to [employer]. Reference #[id number]. Will monitor for response.   Electronically signed by Garfield Hinds on 11/29/2023 at 10:54 AM
This LSW called and spoke with Avril at [address] program this am. Patient to be evaluated for [facility] Acute Rehab program.  FABBYW/CM to follow.   Electronically signed by OSCAR Segal, NÉSTOR on 11/28/23 at 11:46 AM EST
This LSW received discharge order for patient to be transferred to [facility]. I arranged transportation via [facility]. Transport is scheduled for: 8:00pm. Patient, tomasa Balderrama and LISBETH Guillen are all aware. [address] completed.   Electronically signed by OSCAR Yuen, LSW on 12/5/23 at 3:12 PM EST
This LSW received notification from Avril, [facility] that [facility] referral has been accepted. Patients insurance authorization was initiated today, 11/29/23- patients insurance company will likely request a Peer to Peer from attending  prior to giving authorization. LSW updated patient at bedside. LSW/CM to follow.   Electronically signed by OSCAR Rosario, NÉSTOR on 11/29/23 at 12:07 PM EST
This LSW visited patient at bedside this am . Patient and I discussed discharge plans. Patient is anticipating being transferred to [address] program upon discharge. Insurance authorization remains pending at this time. LSW/CM to follow. Electronically signed by Reena Hashimoto, MSW, FABBYW on 11/30/23 at 11:32 AM EST    This LSW was informed by Dr. Keshia Meléndez that patients insurance,[employer] is not approving [address] program as they find the patient appropriate for SNF. I spoke with patient and he requested to have his referral sent to [facility]. I called and gave Tara Allen at [facility] patient referral. Awaiting response at this time.   Electronically signed by Reena Hashimoto, MSW, NÉSTOR on 11/30/23 at 4:24 PM EST
This LSW was notified by Elvia Schulz liaison at [facility] that patient has been given insurance authorization to transfer to [facility]. Patient will be transferred to SNF once medical clearance is obtained. LSW/MAGDALENE to follow.   Electronically signed by OSCAR Nickerson, FABBYW on 12/4/23 at 10:33 AM EST
This LSW was notified that patients referral sent to [facility] has been denied d/t insurance not in network. I notified patient at bedside . Patient now requesting to have referral sent to: 1.) [facility], 2.) [facility]. I sent referral to [facility] for [facility]- awaiting response at this time. LSW/CM to follow. Electronically signed by OSCAR Wharton LSW on 12/1/23 at 10:21 AM EST     This LSW received notification that patients referral has been accepted to [facility]. Patients insurance authorization was initiated today, 12/1/2023. Patient will transfer to [facility] when medical clearance and insurance authorization has been obtained. LSW/CM to follow.   Electronically signed by OSCAR Wharton LSW on 12/1/23 at 2:58 PM EST
availability      Rehab evaluation plan: Recommend Acute inpatient rehab  Rehabilitation Impairment Group Code: [id number]  Rehab Impairment Group: Medical: Deconditioning  Estimated Length of Stay (days): 9  Rehab Diagnosis: Impaired mobility and ADL's due to cardiopulmonary debility  Reviewer's Signature: Electronically signed by Linda Loomis on 11/30/2023 at 12:30 PM      I have reviewed and concur with the above Preadmission Screening.    Rehab Admitting Doctor:  [id number] [facility]

## 2023-12-05 NOTE — DISCHARGE INSTR - DIET

## 2023-12-05 NOTE — PLAN OF CARE
Problem: Discharge Planning  Goal: Discharge to home or other facility with appropriate resources  12/4/2023 2307 by Candace Castorena RN  Outcome: Progressing  12/4/2023 1426 by Jaison Rand RN  Outcome: Progressing     Problem: Pain  Goal: Verbalizes/displays adequate comfort level or baseline comfort level  12/4/2023 2307 by Candace Castorena RN  Outcome: Progressing  12/4/2023 1426 by Jaison Rand RN  Outcome: Progressing     Problem: Safety - Adult  Goal: Free from fall injury  12/4/2023 2307 by Candace Castorena RN  Outcome: Progressing  12/4/2023 1426 by Jaison Rand RN  Outcome: Progressing     Problem: Skin/Tissue Integrity  Goal: Absence of new skin breakdown  Description: 1. Monitor for areas of redness and/or skin breakdown  2. Assess vascular access sites hourly  3. Every 4-6 hours minimum:  Change oxygen saturation probe site  4. Every 4-6 hours:  If on nasal continuous positive airway pressure, respiratory therapy assess nares and determine need for appliance change or resting period.   Outcome: Progressing     Problem: Respiratory - Adult  Goal: Achieves optimal ventilation and oxygenation  Outcome: Progressing     Problem: Cardiovascular - Adult  Goal: Maintains optimal cardiac output and hemodynamic stability  Outcome: Progressing  Goal: Absence of cardiac dysrhythmias or at baseline  Outcome: Progressing     Problem: Skin/Tissue Integrity - Adult  Goal: Incisions, wounds, or drain sites healing without S/S of infection  Outcome: Progressing     Problem: Musculoskeletal - Adult  Goal: Return mobility to safest level of function  Outcome: Progressing  Goal: Return ADL status to a safe level of function  Outcome: Progressing     Problem: Infection - Adult  Goal: Absence of infection at discharge  Outcome: Progressing  Goal: Absence of infection during hospitalization  Outcome: Progressing     Problem: Metabolic/Fluid and Electrolytes - Adult  Goal: Electrolytes maintained within

## 2023-12-06 ENCOUNTER — OFFICE VISIT (OUTPATIENT)
Dept: GERIATRIC MEDICINE | Age: 73
End: 2023-12-06

## 2023-12-06 DIAGNOSIS — G47.33 OSA (OBSTRUCTIVE SLEEP APNEA): ICD-10-CM

## 2023-12-06 DIAGNOSIS — E11.22 TYPE 2 DIABETES MELLITUS WITH STAGE 4 CHRONIC KIDNEY DISEASE, WITH LONG-TERM CURRENT USE OF INSULIN (HCC): ICD-10-CM

## 2023-12-06 DIAGNOSIS — N18.4 TYPE 2 DIABETES MELLITUS WITH STAGE 4 CHRONIC KIDNEY DISEASE, WITH LONG-TERM CURRENT USE OF INSULIN (HCC): ICD-10-CM

## 2023-12-06 DIAGNOSIS — C61 PRIMARY MALIGNANT NEOPLASM OF PROSTATE (HCC): Primary | Chronic | ICD-10-CM

## 2023-12-06 DIAGNOSIS — Z79.4 TYPE 2 DIABETES MELLITUS WITH STAGE 4 CHRONIC KIDNEY DISEASE, WITH LONG-TERM CURRENT USE OF INSULIN (HCC): ICD-10-CM

## 2023-12-06 PROCEDURE — 2700000000 HC OXYGEN THERAPY PER DAY

## 2023-12-06 RX ORDER — ACETAMINOPHEN 325 MG/1
650 TABLET ORAL EVERY 4 HOURS PRN
COMMUNITY

## 2023-12-06 NOTE — PLAN OF CARE
Problem: Discharge Planning  Goal: Discharge to home or other facility with appropriate resources  Outcome: Progressing  Flowsheets (Taken 12/5/2023 0839 by Jono Franco RN)  Discharge to home or other facility with appropriate resources: Identify barriers to discharge with patient and caregiver     Problem: Pain  Goal: Verbalizes/displays adequate comfort level or baseline comfort level  Outcome: Progressing     Problem: Safety - Adult  Goal: Free from fall injury  Outcome: Progressing  Flowsheets (Taken 12/5/2023 0839 by Jono Franco RN)  Free From Fall Injury: Instruct family/caregiver on patient safety     Problem: Skin/Tissue Integrity  Goal: Absence of new skin breakdown  Description: 1. Monitor for areas of redness and/or skin breakdown  2. Assess vascular access sites hourly  3. Every 4-6 hours minimum:  Change oxygen saturation probe site  4. Every 4-6 hours:  If on nasal continuous positive airway pressure, respiratory therapy assess nares and determine need for appliance change or resting period.   Outcome: Progressing     Problem: Respiratory - Adult  Goal: Achieves optimal ventilation and oxygenation  Outcome: Progressing     Problem: Cardiovascular - Adult  Goal: Maintains optimal cardiac output and hemodynamic stability  Outcome: Progressing  Goal: Absence of cardiac dysrhythmias or at baseline  Outcome: Progressing     Problem: Skin/Tissue Integrity - Adult  Goal: Incisions, wounds, or drain sites healing without S/S of infection  Outcome: Progressing     Problem: Musculoskeletal - Adult  Goal: Return mobility to safest level of function  Outcome: Progressing  Flowsheets (Taken 12/5/2023 0839 by Jono Franco RN)  Return Mobility to Safest Level of Function: Assess patient stability and activity tolerance for standing, transferring and ambulating with or without assistive devices  Goal: Return ADL status to a safe level of function  Outcome: Progressing  Flowsheets (Taken 12/5/2023

## 2023-12-08 ENCOUNTER — APPOINTMENT (OUTPATIENT)
Dept: GENERAL RADIOLOGY | Age: 73
End: 2023-12-08
Payer: COMMERCIAL

## 2023-12-08 ENCOUNTER — HOSPITAL ENCOUNTER (EMERGENCY)
Age: 73
Discharge: HOME | End: 2023-12-08
Attending: EMERGENCY MEDICINE
Payer: COMMERCIAL

## 2023-12-08 VITALS
TEMPERATURE: 97.9 F | HEART RATE: 99 BPM | DIASTOLIC BLOOD PRESSURE: 93 MMHG | OXYGEN SATURATION: 91 % | RESPIRATION RATE: 20 BRPM | SYSTOLIC BLOOD PRESSURE: 162 MMHG

## 2023-12-08 DIAGNOSIS — E11.649 HYPOGLYCEMIC EPISODE IN PATIENT WITH DIABETES MELLITUS (HCC): Primary | ICD-10-CM

## 2023-12-08 LAB
ALBUMIN SERPL-MCNC: 3.5 G/DL (ref 3.5–4.6)
ALP SERPL-CCNC: 89 U/L (ref 35–104)
ALT SERPL-CCNC: 17 U/L (ref 0–41)
ANION GAP SERPL CALCULATED.3IONS-SCNC: 7 MEQ/L (ref 9–15)
AST SERPL-CCNC: 15 U/L (ref 0–40)
BACTERIA URNS QL MICRO: NEGATIVE /HPF
BASOPHILS # BLD: 0 K/UL (ref 0–0.2)
BASOPHILS NFR BLD: 0.1 %
BILIRUB SERPL-MCNC: 0.8 MG/DL (ref 0.2–0.7)
BILIRUB UR QL STRIP: NEGATIVE
BUN SERPL-MCNC: 44 MG/DL (ref 8–23)
CALCIUM SERPL-MCNC: 8.9 MG/DL (ref 8.5–9.9)
CHLORIDE SERPL-SCNC: 102 MEQ/L (ref 95–107)
CLARITY UR: CLEAR
CO2 SERPL-SCNC: 37 MEQ/L (ref 20–31)
COLOR UR: YELLOW
CREAT SERPL-MCNC: 1.65 MG/DL (ref 0.7–1.2)
EOSINOPHIL # BLD: 0.1 K/UL (ref 0–0.7)
EOSINOPHIL NFR BLD: 1.6 %
EPI CELLS #/AREA URNS AUTO: ABNORMAL /HPF (ref 0–5)
ERYTHROCYTE [DISTWIDTH] IN BLOOD BY AUTOMATED COUNT: 16.4 % (ref 11.5–14.5)
GLOBULIN SER CALC-MCNC: 3 G/DL (ref 2.3–3.5)
GLUCOSE BLD-MCNC: 106 MG/DL (ref 70–99)
GLUCOSE BLD-MCNC: 147 MG/DL (ref 70–99)
GLUCOSE SERPL-MCNC: 118 MG/DL (ref 70–99)
GLUCOSE UR STRIP-MCNC: 100 MG/DL
HCT VFR BLD AUTO: 42.8 % (ref 42–52)
HGB BLD-MCNC: 11.9 G/DL (ref 14–18)
HGB UR QL STRIP: NEGATIVE
HYALINE CASTS #/AREA URNS AUTO: ABNORMAL /HPF (ref 0–5)
KETONES UR STRIP-MCNC: NEGATIVE MG/DL
LEUKOCYTE ESTERASE UR QL STRIP: ABNORMAL
LYMPHOCYTES # BLD: 0.7 K/UL (ref 1–4.8)
LYMPHOCYTES NFR BLD: 7.4 %
MCH RBC QN AUTO: 23.8 PG (ref 27–31.3)
MCHC RBC AUTO-ENTMCNC: 27.8 % (ref 33–37)
MCV RBC AUTO: 85.6 FL (ref 79–92.2)
MONOCYTES # BLD: 1.1 K/UL (ref 0.2–0.8)
MONOCYTES NFR BLD: 12.3 %
NEUTROPHILS # BLD: 6.9 K/UL (ref 1.4–6.5)
NEUTS SEG NFR BLD: 78.3 %
NITRITE UR QL STRIP: NEGATIVE
NRBC BLD-RTO: 0 /100 WBC
PERFORMED ON: ABNORMAL
PERFORMED ON: ABNORMAL
PH UR STRIP: 5 [PH] (ref 5–9)
PLATELET # BLD AUTO: 172 K/UL (ref 130–400)
POTASSIUM SERPL-SCNC: 4.3 MEQ/L (ref 3.4–4.9)
PROT SERPL-MCNC: 6.5 G/DL (ref 6.3–8)
PROT UR STRIP-MCNC: 30 MG/DL
RBC # BLD AUTO: 5 M/UL (ref 4.7–6.1)
RBC #/AREA URNS HPF: ABNORMAL /HPF (ref 0–2)
SODIUM SERPL-SCNC: 146 MEQ/L (ref 135–144)
SP GR UR STRIP: 1.02 (ref 1–1.03)
URINE REFLEX TO CULTURE: YES
UROBILINOGEN UR STRIP-ACNC: 1 E.U./DL
WBC # BLD AUTO: 8.8 K/UL (ref 4.8–10.8)
WBC #/AREA URNS AUTO: ABNORMAL /HPF (ref 0–5)

## 2023-12-08 PROCEDURE — 2700000000 HC OXYGEN THERAPY PER DAY

## 2023-12-08 PROCEDURE — 87086 URINE CULTURE/COLONY COUNT: CPT

## 2023-12-08 PROCEDURE — 99284 EMERGENCY DEPT VISIT MOD MDM: CPT

## 2023-12-08 PROCEDURE — 36415 COLL VENOUS BLD VENIPUNCTURE: CPT

## 2023-12-08 PROCEDURE — 94640 AIRWAY INHALATION TREATMENT: CPT

## 2023-12-08 PROCEDURE — 80053 COMPREHEN METABOLIC PANEL: CPT

## 2023-12-08 PROCEDURE — 6370000000 HC RX 637 (ALT 250 FOR IP): Performed by: EMERGENCY MEDICINE

## 2023-12-08 PROCEDURE — 85025 COMPLETE CBC W/AUTO DIFF WBC: CPT

## 2023-12-08 PROCEDURE — 81001 URINALYSIS AUTO W/SCOPE: CPT

## 2023-12-08 PROCEDURE — 71046 X-RAY EXAM CHEST 2 VIEWS: CPT

## 2023-12-08 PROCEDURE — 2580000003 HC RX 258: Performed by: EMERGENCY MEDICINE

## 2023-12-08 PROCEDURE — 94761 N-INVAS EAR/PLS OXIMETRY MLT: CPT

## 2023-12-08 RX ORDER — 0.9 % SODIUM CHLORIDE 0.9 %
1000 INTRAVENOUS SOLUTION INTRAVENOUS ONCE
Status: COMPLETED | OUTPATIENT
Start: 2023-12-08 | End: 2023-12-08

## 2023-12-08 RX ORDER — IPRATROPIUM BROMIDE AND ALBUTEROL SULFATE 2.5; .5 MG/3ML; MG/3ML
1 SOLUTION RESPIRATORY (INHALATION) CONTINUOUS PRN
Status: DISCONTINUED | OUTPATIENT
Start: 2023-12-08 | End: 2023-12-08 | Stop reason: HOSPADM

## 2023-12-08 RX ADMIN — IPRATROPIUM BROMIDE AND ALBUTEROL SULFATE 1 DOSE: 2.5; .5 SOLUTION RESPIRATORY (INHALATION) at 02:24

## 2023-12-08 RX ADMIN — SODIUM CHLORIDE 1000 ML: 9 INJECTION, SOLUTION INTRAVENOUS at 04:04

## 2023-12-08 ASSESSMENT — ENCOUNTER SYMPTOMS
CHEST TIGHTNESS: 0
EYE DISCHARGE: 0
SHORTNESS OF BREATH: 0
SORE THROAT: 0
ABDOMINAL DISTENTION: 0
VOMITING: 0
ABDOMINAL PAIN: 0
PHOTOPHOBIA: 0
WHEEZING: 1
COUGH: 0

## 2023-12-08 ASSESSMENT — PAIN - FUNCTIONAL ASSESSMENT: PAIN_FUNCTIONAL_ASSESSMENT: NONE - DENIES PAIN

## 2023-12-08 NOTE — ED NOTES
[facility] was given report and d/c instructions were given. Pt transferred from bed to cot.  Care of pt handed off to Niurka Levine  12/08/23 0005

## 2023-12-08 NOTE — DISCHARGE INSTRUCTIONS
The current insulin dosing for the patient needs to be discussed with his prescribing physician at the nursing home.   There should be a reduction in his evening insulin dose and that should be managed by his physician prior to this evening medication

## 2023-12-09 LAB — BACTERIA UR CULT: NORMAL

## 2023-12-15 ENCOUNTER — OFFICE VISIT (OUTPATIENT)
Dept: GERIATRIC MEDICINE | Age: 73
End: 2023-12-15

## 2023-12-15 DIAGNOSIS — R60.0 BILATERAL EDEMA OF LOWER EXTREMITY: Primary | ICD-10-CM

## 2023-12-15 DIAGNOSIS — N18.4 TYPE 2 DIABETES MELLITUS WITH STAGE 4 CHRONIC KIDNEY DISEASE, WITH LONG-TERM CURRENT USE OF INSULIN (HCC): ICD-10-CM

## 2023-12-15 DIAGNOSIS — Z87.01 HISTORY OF RECENT PNEUMONIA: ICD-10-CM

## 2023-12-15 DIAGNOSIS — E11.22 TYPE 2 DIABETES MELLITUS WITH STAGE 4 CHRONIC KIDNEY DISEASE, WITH LONG-TERM CURRENT USE OF INSULIN (HCC): ICD-10-CM

## 2023-12-15 DIAGNOSIS — Z79.4 TYPE 2 DIABETES MELLITUS WITH STAGE 4 CHRONIC KIDNEY DISEASE, WITH LONG-TERM CURRENT USE OF INSULIN (HCC): ICD-10-CM

## 2023-12-20 ENCOUNTER — OFFICE VISIT (OUTPATIENT)
Dept: GERIATRIC MEDICINE | Age: 73
End: 2023-12-20

## 2023-12-20 DIAGNOSIS — L03.115 BILATERAL CELLULITIS OF LOWER LEG: ICD-10-CM

## 2023-12-20 DIAGNOSIS — L03.116 BILATERAL CELLULITIS OF LOWER LEG: ICD-10-CM

## 2023-12-20 DIAGNOSIS — E11.22 TYPE 2 DIABETES MELLITUS WITH STAGE 4 CHRONIC KIDNEY DISEASE, WITH LONG-TERM CURRENT USE OF INSULIN (HCC): Primary | ICD-10-CM

## 2023-12-20 DIAGNOSIS — R60.0 BILATERAL EDEMA OF LOWER EXTREMITY: ICD-10-CM

## 2023-12-20 DIAGNOSIS — Z79.4 TYPE 2 DIABETES MELLITUS WITH STAGE 4 CHRONIC KIDNEY DISEASE, WITH LONG-TERM CURRENT USE OF INSULIN (HCC): Primary | ICD-10-CM

## 2023-12-20 DIAGNOSIS — N18.4 TYPE 2 DIABETES MELLITUS WITH STAGE 4 CHRONIC KIDNEY DISEASE, WITH LONG-TERM CURRENT USE OF INSULIN (HCC): Primary | ICD-10-CM

## 2023-12-20 LAB
BUN BLDV-MCNC: 21 MG/DL
CALCIUM SERPL-MCNC: 8.4 MG/DL
CHLORIDE BLD-SCNC: 101 MMOL/L
CO2: 35 MMOL/L
CREAT SERPL-MCNC: 1.4 MG/DL
EGFR: NORMAL
GLUCOSE BLD-MCNC: 98 MG/DL
POTASSIUM SERPL-SCNC: 4.6 MMOL/L
SODIUM BLD-SCNC: 142 MMOL/L

## 2023-12-22 ENCOUNTER — OFFICE VISIT (OUTPATIENT)
Dept: GERIATRIC MEDICINE | Age: 73
End: 2023-12-22

## 2023-12-22 DIAGNOSIS — Z79.4 TYPE 2 DIABETES MELLITUS WITH STAGE 4 CHRONIC KIDNEY DISEASE, WITH LONG-TERM CURRENT USE OF INSULIN (HCC): ICD-10-CM

## 2023-12-22 DIAGNOSIS — N18.4 TYPE 2 DIABETES MELLITUS WITH STAGE 4 CHRONIC KIDNEY DISEASE, WITH LONG-TERM CURRENT USE OF INSULIN (HCC): ICD-10-CM

## 2023-12-22 DIAGNOSIS — E11.22 TYPE 2 DIABETES MELLITUS WITH STAGE 4 CHRONIC KIDNEY DISEASE, WITH LONG-TERM CURRENT USE OF INSULIN (HCC): ICD-10-CM

## 2023-12-22 DIAGNOSIS — G47.33 OSA (OBSTRUCTIVE SLEEP APNEA): ICD-10-CM

## 2023-12-22 DIAGNOSIS — I10 PRIMARY HYPERTENSION: Primary | Chronic | ICD-10-CM

## 2023-12-26 ENCOUNTER — OFFICE VISIT (OUTPATIENT)
Dept: GERIATRIC MEDICINE | Age: 73
End: 2023-12-26
Payer: COMMERCIAL

## 2023-12-26 DIAGNOSIS — Z79.4 TYPE 2 DIABETES MELLITUS WITH STAGE 4 CHRONIC KIDNEY DISEASE, WITH LONG-TERM CURRENT USE OF INSULIN (HCC): ICD-10-CM

## 2023-12-26 DIAGNOSIS — E11.22 TYPE 2 DIABETES MELLITUS WITH STAGE 4 CHRONIC KIDNEY DISEASE, WITH LONG-TERM CURRENT USE OF INSULIN (HCC): ICD-10-CM

## 2023-12-26 DIAGNOSIS — I10 PRIMARY HYPERTENSION: Primary | Chronic | ICD-10-CM

## 2023-12-26 DIAGNOSIS — N18.4 TYPE 2 DIABETES MELLITUS WITH STAGE 4 CHRONIC KIDNEY DISEASE, WITH LONG-TERM CURRENT USE OF INSULIN (HCC): ICD-10-CM

## 2023-12-26 DIAGNOSIS — I27.20 SEVERE PULMONARY HYPERTENSION (HCC): ICD-10-CM

## 2023-12-26 PROCEDURE — 1123F ACP DISCUSS/DSCN MKR DOCD: CPT | Performed by: INTERNAL MEDICINE

## 2023-12-26 PROCEDURE — 99309 SBSQ NF CARE MODERATE MDM 30: CPT | Performed by: INTERNAL MEDICINE

## 2023-12-26 PROCEDURE — 3052F HG A1C>EQUAL 8.0%<EQUAL 9.0%: CPT | Performed by: INTERNAL MEDICINE

## 2024-01-02 ENCOUNTER — OFFICE VISIT (OUTPATIENT)
Dept: GERIATRIC MEDICINE | Age: 74
End: 2024-01-02

## 2024-01-02 DIAGNOSIS — E11.22 TYPE 2 DIABETES MELLITUS WITH STAGE 4 CHRONIC KIDNEY DISEASE, WITH LONG-TERM CURRENT USE OF INSULIN (HCC): ICD-10-CM

## 2024-01-02 DIAGNOSIS — N18.4 TYPE 2 DIABETES MELLITUS WITH STAGE 4 CHRONIC KIDNEY DISEASE, WITH LONG-TERM CURRENT USE OF INSULIN (HCC): ICD-10-CM

## 2024-01-02 DIAGNOSIS — C61 PRIMARY MALIGNANT NEOPLASM OF PROSTATE (HCC): Chronic | ICD-10-CM

## 2024-01-02 DIAGNOSIS — Z79.4 TYPE 2 DIABETES MELLITUS WITH STAGE 4 CHRONIC KIDNEY DISEASE, WITH LONG-TERM CURRENT USE OF INSULIN (HCC): ICD-10-CM

## 2024-01-02 DIAGNOSIS — I10 PRIMARY HYPERTENSION: Primary | Chronic | ICD-10-CM

## 2024-01-05 ASSESSMENT — ENCOUNTER SYMPTOMS
ABDOMINAL PAIN: 1
COUGH: 0
BACK PAIN: 1

## 2024-01-06 DIAGNOSIS — E11.65 UNCONTROLLED TYPE 2 DIABETES MELLITUS WITH HYPERGLYCEMIA (HCC): ICD-10-CM

## 2024-01-08 ENCOUNTER — CARE COORDINATION (OUTPATIENT)
Dept: CASE MANAGEMENT | Age: 74
End: 2024-01-08

## 2024-01-08 LAB
ALBUMIN SERPL-MCNC: 3.5 G/DL (ref 3.5–4.6)
ANION GAP SERPL CALCULATED.3IONS-SCNC: 14 MEQ/L (ref 9–15)
BUN SERPL-MCNC: 30 MG/DL (ref 8–23)
CALCIUM SERPL-MCNC: 8.7 MG/DL (ref 8.5–9.9)
CHLORIDE SERPL-SCNC: 97 MEQ/L (ref 95–107)
CO2 SERPL-SCNC: 28 MEQ/L (ref 20–31)
CREAT SERPL-MCNC: 1.65 MG/DL (ref 0.7–1.2)
GLUCOSE SERPL-MCNC: 323 MG/DL (ref 70–99)
PHOSPHATE SERPL-MCNC: 3.3 MG/DL (ref 2.3–4.8)
POTASSIUM SERPL-SCNC: 4.1 MEQ/L (ref 3.4–4.9)
SODIUM SERPL-SCNC: 139 MEQ/L (ref 135–144)

## 2024-01-08 NOTE — CARE COORDINATION
Care Transitions Outreach Attempt    Call within 2 business days of discharge: Yes   Attempted to reach patient for transitions of care follow up. Unable to reach patient or leave a vm. Per recording the number was no longer invalid. No contact information on ACP paperwork scanned in. Per review of chart [facility]. No further outreach.    Patient: Amrik Meraz Patient : 1950 MRN: <[id number]>    Last Discharge Facility       Date Complaint Diagnosis Description Type Department Provider    23 Hypoglycemia Hypoglycemic episode in patient with diabetes mellitus (HCC) ED (DISCHARGE) [facility] MD              Was this an external facility discharge? Yes, 24  Discharge Facility Name: [facility]     Noted following upcoming appointments from discharge chart review:   [facility] follow up appointment(s):   Future Appointments   Date Time Provider Department Center   3/20/2024 10:15 AM Chan, Rover[place], MD [facility]   3/21/2024  3:00 PM Rusty Arce MD [facility]

## 2024-01-10 ENCOUNTER — TELEPHONE (OUTPATIENT)
Dept: FAMILY MEDICINE CLINIC | Age: 74
End: 2024-01-10

## 2024-01-10 NOTE — TELEPHONE ENCOUNTER
[facility] calling to see if the office can give them a call back after patient appt 01/1/2024 to give a verbal follow order from home health since they were sent one but with a doctor he no longer sees       [phone] Ex 2021 Becca

## 2024-01-11 ENCOUNTER — OFFICE VISIT (OUTPATIENT)
Dept: FAMILY MEDICINE CLINIC | Age: 74
End: 2024-01-11
Payer: COMMERCIAL

## 2024-01-11 ENCOUNTER — TELEPHONE (OUTPATIENT)
Dept: FAMILY MEDICINE CLINIC | Age: 74
End: 2024-01-11

## 2024-01-11 VITALS
BODY MASS INDEX: 46.96 KG/M2 | DIASTOLIC BLOOD PRESSURE: 62 MMHG | HEART RATE: 58 BPM | WEIGHT: 292.2 LBS | SYSTOLIC BLOOD PRESSURE: 130 MMHG | HEIGHT: 66 IN | OXYGEN SATURATION: 93 %

## 2024-01-11 DIAGNOSIS — G47.33 OSA (OBSTRUCTIVE SLEEP APNEA): ICD-10-CM

## 2024-01-11 DIAGNOSIS — Z79.4 TYPE 2 DIABETES MELLITUS WITH STAGE 4 CHRONIC KIDNEY DISEASE, WITH LONG-TERM CURRENT USE OF INSULIN (HCC): Primary | ICD-10-CM

## 2024-01-11 DIAGNOSIS — E11.22 TYPE 2 DIABETES MELLITUS WITH STAGE 4 CHRONIC KIDNEY DISEASE, WITH LONG-TERM CURRENT USE OF INSULIN (HCC): Primary | ICD-10-CM

## 2024-01-11 DIAGNOSIS — I27.20 SEVERE PULMONARY HYPERTENSION (HCC): ICD-10-CM

## 2024-01-11 DIAGNOSIS — I25.10 ARTERIOSCLEROSIS OF CORONARY ARTERY: ICD-10-CM

## 2024-01-11 DIAGNOSIS — N18.4 TYPE 2 DIABETES MELLITUS WITH STAGE 4 CHRONIC KIDNEY DISEASE, WITH LONG-TERM CURRENT USE OF INSULIN (HCC): Primary | ICD-10-CM

## 2024-01-11 DIAGNOSIS — I10 PRIMARY HYPERTENSION: Chronic | ICD-10-CM

## 2024-01-11 DIAGNOSIS — N18.4 STAGE 4 CHRONIC KIDNEY DISEASE (HCC): ICD-10-CM

## 2024-01-11 PROCEDURE — 3052F HG A1C>EQUAL 8.0%<EQUAL 9.0%: CPT | Performed by: STUDENT IN AN ORGANIZED HEALTH CARE EDUCATION/TRAINING PROGRAM

## 2024-01-11 PROCEDURE — 99204 OFFICE O/P NEW MOD 45 MIN: CPT | Performed by: STUDENT IN AN ORGANIZED HEALTH CARE EDUCATION/TRAINING PROGRAM

## 2024-01-11 PROCEDURE — 3078F DIAST BP <80 MM HG: CPT | Performed by: STUDENT IN AN ORGANIZED HEALTH CARE EDUCATION/TRAINING PROGRAM

## 2024-01-11 PROCEDURE — 3075F SYST BP GE 130 - 139MM HG: CPT | Performed by: STUDENT IN AN ORGANIZED HEALTH CARE EDUCATION/TRAINING PROGRAM

## 2024-01-11 PROCEDURE — 1123F ACP DISCUSS/DSCN MKR DOCD: CPT | Performed by: STUDENT IN AN ORGANIZED HEALTH CARE EDUCATION/TRAINING PROGRAM

## 2024-01-11 RX ORDER — BISACODYL 10 MG
SUPPOSITORY, RECTAL RECTAL
COMMUNITY
Start: 2023-12-06

## 2024-01-11 SDOH — ECONOMIC STABILITY: INCOME INSECURITY: HOW HARD IS IT FOR YOU TO PAY FOR THE VERY BASICS LIKE FOOD, HOUSING, MEDICAL CARE, AND HEATING?: NOT HARD AT ALL

## 2024-01-11 SDOH — ECONOMIC STABILITY: FOOD INSECURITY: WITHIN THE PAST 12 MONTHS, THE FOOD YOU BOUGHT JUST DIDN'T LAST AND YOU DIDN'T HAVE MONEY TO GET MORE.: NEVER TRUE

## 2024-01-11 SDOH — ECONOMIC STABILITY: HOUSING INSECURITY
IN THE LAST 12 MONTHS, WAS THERE A TIME WHEN YOU DID NOT HAVE A STEADY PLACE TO SLEEP OR SLEPT IN A SHELTER (INCLUDING NOW)?: NO

## 2024-01-11 SDOH — ECONOMIC STABILITY: FOOD INSECURITY: WITHIN THE PAST 12 MONTHS, YOU WORRIED THAT YOUR FOOD WOULD RUN OUT BEFORE YOU GOT MONEY TO BUY MORE.: NEVER TRUE

## 2024-01-11 ASSESSMENT — PATIENT HEALTH QUESTIONNAIRE - PHQ9
SUM OF ALL RESPONSES TO PHQ QUESTIONS 1-9: 0
SUM OF ALL RESPONSES TO PHQ QUESTIONS 1-9: 0
2. FEELING DOWN, DEPRESSED OR HOPELESS: 0
SUM OF ALL RESPONSES TO PHQ QUESTIONS 1-9: 0
1. LITTLE INTEREST OR PLEASURE IN DOING THINGS: 0
SUM OF ALL RESPONSES TO PHQ9 QUESTIONS 1 & 2: 0
SUM OF ALL RESPONSES TO PHQ QUESTIONS 1-9: 0

## 2024-01-11 ASSESSMENT — ENCOUNTER SYMPTOMS
SORE THROAT: 0
ABDOMINAL PAIN: 0
COUGH: 0
SHORTNESS OF BREATH: 0
SINUS PRESSURE: 0
VOMITING: 0

## 2024-01-11 NOTE — TELEPHONE ENCOUNTER
[facility] home health calling asking for a verbal on starting home care will you follow. Please call with verbal to [phone].ext [phone]

## 2024-01-11 NOTE — TELEPHONE ENCOUNTER
Please advise if you will follow for home care orders and if it is okay to give the verbal order for pt.

## 2024-01-11 NOTE — PROGRESS NOTES
2024    Amrik Meraz (:  1950) is a 73 y.o. male, here for evaluation of the following medical concerns:  Chief Complaint   Patient presents with    Establish Care     Pt did not bring an updated med list and is unsure what he takes and does not take.     Follow-Up from Hospital     Pt was experiencing sob and went the er 23.    nursing home follow up     Follow up from nursing home, pt was discharged Friday.     HPI  Establishing care  Last PCP through [facility]  Past medical history: Type 2 diabetes, essential hypertension, coronary artery disease, sleep apnea, chronic kidney disease, gout, BPH, constipation, seasonal allergies  Specialist: Endocrinology (Dr. Arce), nephrology, cardiology    Hospital/nursing home follow-up  Patient recently hospitalized due to shortness of breath  Cardiology consulted for management of hypotension  Blood pressure medications were held  Patient noted to be bradycardic at night, likely related to ILIANA with hypoxemia  Patient was discharged home to [facility]    Today  SOB improved  Denied CP  Requesting home health    Review of Systems   Constitutional:  Negative for chills and fever.   HENT:  Negative for congestion, sinus pressure and sore throat.    Respiratory:  Negative for cough and shortness of breath.    Cardiovascular:  Negative for chest pain and palpitations.   Gastrointestinal:  Negative for abdominal pain and vomiting.   Musculoskeletal:  Negative for arthralgias and myalgias.   Skin:  Negative for rash and wound.   Neurological:  Negative for speech difficulty and light-headedness.   Psychiatric/Behavioral:  Negative for suicidal ideas. The patient is not nervous/anxious.        Prior to Visit Medications    Medication Sig Taking? Authorizing Provider   bisacodyl (DULCOLAX) 10 MG suppository Place rectally Yes Provider, MD Daniel   Continuous Blood Gluc  ([email or web address] ANEUDY 2 READER) MAKENZIE CONTINUOUS GLUCOSE

## 2024-01-15 NOTE — PROGRESS NOTES
Subjective:      Patient ID: Amrik Meraz is a pleasant 73 y.o. male who presents today for:  Chief Complaint   Patient presents with    Other       [facility]    Following up on patient pneumonia as well as DM2, and BLE edema.  Patient slowly has been weaning down to 2 L O2.  Stable vital signs so far.  SpO2 between 90 to 95% on 2 L.  Patient does continue to have some lower leg edema increasing.  Plan to increase Lasix for short burst.  Additionally patient's BG levels have been increasingly dynamic currently getting Humalog 35 units with meals and 50 units Lantus daily.  Averaging around 200 MGs/DL, with a few episodes of greater than 400 mg/DL.  Plan to recheck A1c in 4 weeks and increase Lantus today.  No further acute concerns at the moment.    Patient Active Problem List   Diagnosis    MAYKEL (acute kidney injury) (HCC)    Hydrocele    Hypertension    Acute gouty arthritis    Adenomatous polyp of colon    Anemia    Back pain    Infestation by bed bug    ILIANA (obstructive sleep apnea)    Other hyperlipidemia    Unspecified hyperplasia of prostate without urinary obstruction and other lower urinary tract symptoms (LUTS)    Primary malignant neoplasm of prostate (HCC)    Pain in joint involving ankle and foot    Class 3 severe obesity without serious comorbidity with body mass index (BMI) of 45.0 to 49.9 in adult (HCC)    Stage 4 chronic kidney disease (HCC)    Type 2 diabetes mellitus with chronic kidney disease    Hypoxia    Acute respiratory failure with hypoxia (HCC)    Hypervolemia    Severe pulmonary hypertension (HCC)    Arteriosclerosis of coronary artery    Impaired mobility and activities of daily living dt CP debility    Right ureteral stone    Abnormal EKG     Past Medical History:   Diagnosis Date    Chronic kidney disease     Diabetes mellitus (HCC)     Hypertension     Type 2 diabetes mellitus with hyperglycemia 1/6/2023    Type 2 diabetes mellitus without complication, without long-term

## 2024-01-19 ASSESSMENT — ENCOUNTER SYMPTOMS
DIARRHEA: 0
SHORTNESS OF BREATH: 0
CONSTIPATION: 0
COLOR CHANGE: 0
COUGH: 0
ABDOMINAL PAIN: 0
CHOKING: 0

## 2024-01-22 ASSESSMENT — ENCOUNTER SYMPTOMS
SHORTNESS OF BREATH: 0
CONSTIPATION: 0
COLOR CHANGE: 0
COUGH: 0
ABDOMINAL PAIN: 0
CHOKING: 0
DIARRHEA: 0

## 2024-01-25 NOTE — PROGRESS NOTES
Change sensor every 14 days 2 each 3    carvedilol (COREG) 12.5 MG tablet Take 1 tablet by mouth      fluticasone (FLONASE) 50 MCG/ACT nasal spray 2 sprays by Each Nostril route daily 16 g 0    Chlorphen-Phenyleph-APAP (CORICIDIN D COLD/FLU/SINUS) 2-5-325 MG TABS Take 1 tablet by mouth every 6-8 hours as needed (congestion) 168 tablet 0    rosuvastatin (CRESTOR) 20 MG tablet TAKE ONE TABLET BY MOUTH AT BEDTIME      montelukast (SINGULAIR) 10 MG tablet TAKE ONE TABLET BY MOUTH AT BEDTIME      erythromycin (ROMYCIN) 5 MG/GM ophthalmic ointment       Lancets Misc. (ACCU-CHEK SOFTCLIX LANCET DEV) KIT USE LANCET(S) TESTING AS DIRECTED FOR BLOOD SUGAR      allopurinol (ZYLOPRIM) 100 MG tablet       ammonium lactate (LAC-HYDRIN) 12 % lotion APPLY A SUFFICIENT AMOUNT EXTERNALLY TWICE A DAY TO DRY SKIN ON LEGS      loratadine (CLARITIN) 10 MG tablet TAKE ONE TABLET BY MOUTH EVERY DAY (WITH FOOD)      albuterol sulfate  (90 Base) MCG/ACT inhaler INHALE 2 PUFFS BY MOUTH FOUR TIMES A DAY AS NEEDED FOR SHORTNESS OF BREATH OR WHEEZING.      Dextrose, Diabetic Use, (GLUCOSE) 1 g CHEW Take by mouth      amLODIPine (NORVASC) 5 MG tablet Take 1 tablet by mouth daily      aspirin 81 MG tablet Take 2 tablets by mouth daily      tamsulosin (FLOMAX) 0.4 MG capsule Take 2 tablets by mouth nightly      finasteride (PROSCAR) 5 MG tablet Take 1 tablet by mouth daily      loperamide (IMODIUM) 2 MG capsule Take 1 capsule by mouth 4 times daily as needed for Diarrhea       No current facility-administered medications on file prior to visit.         No family history on file.    Social History     Socioeconomic History    Marital status: Single     Spouse name: Not on file    Number of children: Not on file    Years of education: Not on file    Highest education level: Not on file   Occupational History    Not on file   Tobacco Use    Smoking status: Never     Passive exposure: Never    Smokeless tobacco: Never   Vaping Use    Vaping Use:

## 2024-01-31 NOTE — PROGRESS NOTES
SUBJECTIVE:  73-year-old gentleman seen follow-up visit for diabetes hypertension prostate cancer patient has a primary obstruction function stable at this time without evidence acute bleeding diathesis      ROS: Denies chest palpitations  The rest of the 14 point ROS negative    PHYSICAL EXAM: VSS per facility record  Pupils are reactive oral mucosa moist chest showed no crackles no wheezing cardiovascular showed a regular rate abdomen soft nontender    ASSESSMENT & PLAN:   Diagnosis Orders   1. Primary hypertension        2. Primary malignant neoplasm of prostate (HCC)        3. Type 2 diabetes mellitus with stage 4 chronic kidney disease, with long-term current use of insulin (HCC)          Continue monitor systolic pressure follow-up BMP A1c neck 6 months: 6 medical status nutritional support skin bowel strain monitor for signs of urinary retention            Past Medical History:   Diagnosis Date    Chronic kidney disease     Diabetes mellitus (HCC)     Hypertension     Type 2 diabetes mellitus with hyperglycemia 1/6/2023    Type 2 diabetes mellitus without complication, without long-term current use of insulin (HCC) 3/19/2019    Uncontrolled type 2 diabetes mellitus with hyperglycemia ([facility])          Past Surgical History:   Procedure Laterality Date    APPENDECTOMY      age 10         Current Outpatient Medications on File Prior to Visit   Medication Sig Dispense Refill    insulin glargine (LANTUS) 100 UNIT/ML injection vial 70  units in am 30 mL 2    insulin aspart (NOVOLOG FLEXPEN) 100 UNIT/ML injection pen INJECT 40 UNITS SUBCUTANEOUSLY WITH BREAKFAST AND INJECT 40 UNITS WITH LUNCH AND INJECT 40 UNITS WITH DINNER SKIP IF NO MEAL/CARBOHYDRATE INTAKE 15 Adjustable Dose Pre-filled Pen Syringe 3    acetaminophen (TYLENOL) 325 MG tablet Take 2 tablets by mouth every 4 hours as needed for Pain or Fever      Continuous Blood Gluc Sensor (FREESTYLE ANEUDY 2 SENSOR) MISC Change sensor every 14 days 2 each 3

## 2024-02-14 ENCOUNTER — TELEPHONE (OUTPATIENT)
Dept: FAMILY MEDICINE CLINIC | Age: 74
End: 2024-02-14

## 2024-02-14 NOTE — TELEPHONE ENCOUNTER
Spoke with Felipa at [facility] they state that this has been taken care of by Jung Castaneda MD in January. Pt phone number no longer in service.

## 2024-02-14 NOTE — TELEPHONE ENCOUNTER
Pt called to say [facility] sent him a letter stating they need a script for the following items.    Are requesting 1 each     O2 conc 5Lpm  O2 home filled compressor  O2 regulator E    [phone]

## 2024-03-21 ENCOUNTER — OFFICE VISIT (OUTPATIENT)
Dept: ENDOCRINOLOGY | Age: 74
End: 2024-03-21
Payer: COMMERCIAL

## 2024-03-21 VITALS
DIASTOLIC BLOOD PRESSURE: 65 MMHG | BODY MASS INDEX: 43.71 KG/M2 | OXYGEN SATURATION: 96 % | WEIGHT: 272 LBS | SYSTOLIC BLOOD PRESSURE: 134 MMHG | HEIGHT: 66 IN | HEART RATE: 56 BPM

## 2024-03-21 DIAGNOSIS — E11.65 UNCONTROLLED TYPE 2 DIABETES MELLITUS WITH HYPERGLYCEMIA (HCC): Primary | ICD-10-CM

## 2024-03-21 DIAGNOSIS — E66.01 MORBID OBESITY (HCC): ICD-10-CM

## 2024-03-21 LAB
CHP ED QC CHECK: NORMAL
GLUCOSE BLD-MCNC: 203 MG/DL

## 2024-03-21 PROCEDURE — 3075F SYST BP GE 130 - 139MM HG: CPT | Performed by: INTERNAL MEDICINE

## 2024-03-21 PROCEDURE — 82962 GLUCOSE BLOOD TEST: CPT | Performed by: INTERNAL MEDICINE

## 2024-03-21 PROCEDURE — 1123F ACP DISCUSS/DSCN MKR DOCD: CPT | Performed by: INTERNAL MEDICINE

## 2024-03-21 PROCEDURE — 3078F DIAST BP <80 MM HG: CPT | Performed by: INTERNAL MEDICINE

## 2024-03-21 PROCEDURE — 3052F HG A1C>EQUAL 8.0%<EQUAL 9.0%: CPT | Performed by: INTERNAL MEDICINE

## 2024-03-21 PROCEDURE — 99213 OFFICE O/P EST LOW 20 MIN: CPT | Performed by: INTERNAL MEDICINE

## 2024-03-21 RX ORDER — BLOOD-GLUCOSE,RECEIVER,CONT
1 EACH MISCELLANEOUS DAILY
Qty: 1 EACH | Refills: 0 | Status: SHIPPED | OUTPATIENT
Start: 2024-03-21

## 2024-03-21 RX ORDER — BLOOD-GLUCOSE SENSOR
1 EACH MISCELLANEOUS
Qty: 2 EACH | Refills: 3 | Status: SHIPPED | OUTPATIENT
Start: 2024-03-21

## 2024-03-21 ASSESSMENT — ENCOUNTER SYMPTOMS: SHORTNESS OF BREATH: 1

## 2024-03-21 NOTE — PROGRESS NOTES
Syringe, Rfl: 3    acetaminophen (TYLENOL) 325 MG tablet, Take 2 tablets by mouth every 4 hours as needed for Pain or Fever, Disp: , Rfl:     Continuous Blood Gluc Sensor (FREESTYLE ANEUDY 2 SENSOR) MISC, Change sensor every 14 days, Disp: 2 each, Rfl: 3    carvedilol (COREG) 12.5 MG tablet, Take 1 tablet by mouth, Disp: , Rfl:     fluticasone (FLONASE) 50 MCG/ACT nasal spray, 2 sprays by Each Nostril route daily, Disp: 16 g, Rfl: 0    Chlorphen-Phenyleph-APAP (CORICIDIN D COLD/FLU/SINUS) 2-5-325 MG TABS, Take 1 tablet by mouth every 6-8 hours as needed (congestion), Disp: 168 tablet, Rfl: 0    rosuvastatin (CRESTOR) 20 MG tablet, TAKE ONE TABLET BY MOUTH AT BEDTIME, Disp: , Rfl:     montelukast (SINGULAIR) 10 MG tablet, TAKE ONE TABLET BY MOUTH AT BEDTIME, Disp: , Rfl:     erythromycin (ROMYCIN) 5 MG/GM ophthalmic ointment, , Disp: , Rfl:     Lancets Misc. (ACCU-CHEK SOFTCLIX LANCET DEV) KIT, USE LANCET(S) TESTING AS DIRECTED FOR BLOOD SUGAR, Disp: , Rfl:     allopurinol (ZYLOPRIM) 100 MG tablet, , Disp: , Rfl:     ammonium lactate (LAC-HYDRIN) 12 % lotion, APPLY A SUFFICIENT AMOUNT EXTERNALLY TWICE A DAY TO DRY SKIN ON LEGS, Disp: , Rfl:     loratadine (CLARITIN) 10 MG tablet, TAKE ONE TABLET BY MOUTH EVERY DAY (WITH FOOD), Disp: , Rfl:     albuterol sulfate  (90 Base) MCG/ACT inhaler, INHALE 2 PUFFS BY MOUTH FOUR TIMES A DAY AS NEEDED FOR SHORTNESS OF BREATH OR WHEEZING., Disp: , Rfl:     Dextrose, Diabetic Use, (GLUCOSE) 1 g CHEW, Take by mouth, Disp: , Rfl:     amLODIPine (NORVASC) 5 MG tablet, Take 1 tablet by mouth daily, Disp: , Rfl:     aspirin 81 MG tablet, Take 2 tablets by mouth daily, Disp: , Rfl:     tamsulosin (FLOMAX) 0.4 MG capsule, Take 2 tablets by mouth nightly, Disp: , Rfl:     finasteride (PROSCAR) 5 MG tablet, Take 1 tablet by mouth daily, Disp: , Rfl:     loperamide (IMODIUM) 2 MG capsule, Take 1 capsule by mouth 4 times daily as needed for Diarrhea, Disp: , Rfl:   Lab Results  98.2

## 2024-04-03 ENCOUNTER — TELEPHONE (OUTPATIENT)
Dept: ENDOCRINOLOGY | Age: 74
End: 2024-04-03

## 2024-04-10 ENCOUNTER — HOSPITAL ENCOUNTER (EMERGENCY)
Age: 74
Discharge: HOME | End: 2024-04-10
Attending: STUDENT IN AN ORGANIZED HEALTH CARE EDUCATION/TRAINING PROGRAM
Payer: MEDICARE

## 2024-04-10 ENCOUNTER — APPOINTMENT (OUTPATIENT)
Dept: GENERAL RADIOLOGY | Age: 74
End: 2024-04-10
Payer: MEDICARE

## 2024-04-10 VITALS
HEART RATE: 82 BPM | TEMPERATURE: 98 F | SYSTOLIC BLOOD PRESSURE: 131 MMHG | DIASTOLIC BLOOD PRESSURE: 75 MMHG | OXYGEN SATURATION: 99 % | RESPIRATION RATE: 19 BRPM

## 2024-04-10 DIAGNOSIS — M79.651 ACUTE THIGH PAIN, RIGHT: ICD-10-CM
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
## 2024-04-10 LAB
GLUCOSE BLD-MCNC: 75 MG/DL (ref 70–99)
PERFORMED ON: NORMAL

## 2024-04-10 PROCEDURE — 99285 EMERGENCY DEPT VISIT HI MDM: CPT

## 2024-04-10 PROCEDURE — 6370000000 HC RX 637 (ALT 250 FOR IP): Performed by: STUDENT IN AN ORGANIZED HEALTH CARE EDUCATION/TRAINING PROGRAM

## 2024-04-10 PROCEDURE — 73552 X-RAY EXAM OF FEMUR 2/>: CPT

## 2024-04-10 PROCEDURE — 73502 X-RAY EXAM HIP UNI 2-3 VIEWS: CPT

## 2024-04-10 RX ORDER — ACETAMINOPHEN 325 MG/1
650 TABLET ORAL ONCE
Status: COMPLETED | OUTPATIENT
Start: 2024-04-10 | End: 2024-04-10

## 2024-04-10 RX ADMIN — ACETAMINOPHEN 650 MG: 325 TABLET ORAL at 13:16

## 2024-04-10 ASSESSMENT — LIFESTYLE VARIABLES
HOW OFTEN DO YOU HAVE A DRINK CONTAINING ALCOHOL: MONTHLY OR LESS
HOW MANY STANDARD DRINKS CONTAINING ALCOHOL DO YOU HAVE ON A TYPICAL DAY: 1 OR 2
HOW MANY STANDARD DRINKS CONTAINING ALCOHOL DO YOU HAVE ON A TYPICAL DAY: PATIENT DOES NOT DRINK
HOW OFTEN DO YOU HAVE A DRINK CONTAINING ALCOHOL: NEVER

## 2024-04-10 ASSESSMENT — PAIN - FUNCTIONAL ASSESSMENT: PAIN_FUNCTIONAL_ASSESSMENT: 0-10

## 2024-04-10 ASSESSMENT — PAIN SCALES - GENERAL
PAINLEVEL_OUTOF10: 0
PAINLEVEL_OUTOF10: 4
PAINLEVEL_OUTOF10: 2

## 2024-04-10 ASSESSMENT — PAIN DESCRIPTION - LOCATION
LOCATION: LEG

## 2024-04-10 ASSESSMENT — PAIN DESCRIPTION - ORIENTATION
ORIENTATION: RIGHT

## 2024-04-10 NOTE — ED PROVIDER NOTES
(ACCU-CHEK SOFTCLIX LANCET DEV) KIT USE LANCET(S) TESTING AS DIRECTED FOR BLOOD SUGAR 7/20/20   Daniel Smith MD   allopurinol (ZYLOPRIM) 100 MG tablet  6/22/20   Daniel Smith MD   ammonium lactate (LAC-HYDRIN) 12 % lotion APPLY A SUFFICIENT AMOUNT EXTERNALLY TWICE A DAY TO DRY SKIN ON LEGS 2/28/20   Daniel Smith MD   loratadine (CLARITIN) 10 MG tablet TAKE ONE TABLET BY MOUTH EVERY DAY (WITH FOOD) 2/28/20   Daniel Smith MD   albuterol sulfate  (90 Base) MCG/ACT inhaler INHALE 2 PUFFS BY MOUTH FOUR TIMES A DAY AS NEEDED FOR SHORTNESS OF BREATH OR WHEEZING. 8/26/20   Daniel Smith MD   Dextrose, Diabetic Use, (GLUCOSE) 1 g CHEW Take by mouth    Daniel Smith MD   amLODIPine (NORVASC) 5 MG tablet Take 1 tablet by mouth daily    Daniel Smith MD   aspirin 81 MG tablet Take 2 tablets by mouth daily    Daniel Smith MD   tamsulosin (FLOMAX) 0.4 MG capsule Take 2 tablets by mouth nightly    Daniel Smith MD   finasteride (PROSCAR) 5 MG tablet Take 1 tablet by mouth daily    Daniel Smith MD   loperamide (IMODIUM) 2 MG capsule Take 1 capsule by mouth 4 times daily as needed for Diarrhea    Daniel Smith MD       REVIEW OF SYSTEMS    (2-9 systems for level 4, 10 ormore for level 5)      Review of Systems   All other systems reviewed and are negative.      PHYSICAL EXAM   (up to 7 for level 4, 8 or more for level 5)      INITIAL VITALS:   /66   Pulse 58   Temp 98.4 °F (36.9 °C) (Oral)   Resp 17   SpO2 100%     Physical Exam  Constitutional:       General: He is not in acute distress.     Appearance: He is well-developed. He is not ill-appearing or toxic-appearing.   HENT:      Head: Normocephalic and atraumatic.   Eyes:      General:         Right eye: No discharge.         Left eye: No discharge.      Conjunctiva/sclera: Conjunctivae normal.   Cardiovascular:      Rate and Rhythm: Normal rate and regular rhythm.

## 2024-04-10 NOTE — ED TRIAGE NOTES
Pt rolled out of his bed this am   Right leg pain (4/10)  Pt takes a daily aspirin   Pt is A&Ox4

## 2024-04-11 NOTE — ED NOTES
Patient transported by [facility] to home.    Patient alert and oriented at time of departure. Patient skin p,w,d at time of departure.    Patient transferred onto [facility] stretcher.     Discharge instructions reviewed with patient. Patient verbalized understanding and stated no questions at time of discharge.     Writer gave report to [facility] at time of departure.

## 2024-04-28 ENCOUNTER — APPOINTMENT (OUTPATIENT)
Dept: GENERAL RADIOLOGY | Age: 74
DRG: 562 | End: 2024-04-28
Payer: MEDICARE

## 2024-04-28 ENCOUNTER — HOSPITAL ENCOUNTER (INPATIENT)
Age: 74
LOS: 2 days | Discharge: HOME | DRG: 562 | End: 2024-04-30
Attending: EMERGENCY MEDICINE | Admitting: INTERNAL MEDICINE
Payer: MEDICARE

## 2024-04-28 ENCOUNTER — APPOINTMENT (OUTPATIENT)
Dept: CT IMAGING | Age: 74
DRG: 562 | End: 2024-04-28
Attending: EMERGENCY MEDICINE
Payer: MEDICARE

## 2024-04-28 DIAGNOSIS — S09.90XA CLOSED HEAD INJURY, INITIAL ENCOUNTER: ICD-10-CM

## 2024-04-28 DIAGNOSIS — N17.9 ACUTE RENAL INJURY (HCC): ICD-10-CM

## 2024-04-28 DIAGNOSIS — E11.65 UNCONTROLLED TYPE 2 DIABETES MELLITUS WITH HYPERGLYCEMIA (HCC): ICD-10-CM

## 2024-04-28 DIAGNOSIS — R55 SYNCOPE AND COLLAPSE: ICD-10-CM

## 2024-04-28 DIAGNOSIS — E16.2 HYPOGLYCEMIA: ICD-10-CM

## 2024-04-28 DIAGNOSIS — R40.4 TRANSIENT ALTERATION OF AWARENESS: Primary | ICD-10-CM

## 2024-04-28 LAB
ALBUMIN SERPL-MCNC: 2.9 G/DL (ref 3.5–4.6)
ALP SERPL-CCNC: 122 U/L (ref 35–104)
ALT SERPL-CCNC: 11 U/L (ref 0–41)
ANION GAP SERPL CALCULATED.3IONS-SCNC: 14 MEQ/L (ref 9–15)
APTT PPP: 38.5 SEC (ref 24.4–36.8)
AST SERPL-CCNC: 11 U/L (ref 0–40)
BASOPHILS # BLD: 0 K/UL (ref 0–0.2)
BASOPHILS NFR BLD: 0.4 %
BILIRUB SERPL-MCNC: 0.3 MG/DL (ref 0.2–0.7)
BUN SERPL-MCNC: 64 MG/DL (ref 8–23)
CALCIUM SERPL-MCNC: 9.1 MG/DL (ref 8.5–9.9)
CHLORIDE SERPL-SCNC: 99 MEQ/L (ref 95–107)
CK SERPL-CCNC: 36 U/L (ref 0–190)
CO2 SERPL-SCNC: 23 MEQ/L (ref 20–31)
CREAT SERPL-MCNC: 2.97 MG/DL (ref 0.7–1.2)
EOSINOPHIL # BLD: 0.1 K/UL (ref 0–0.7)
EOSINOPHIL NFR BLD: 0.8 %
ERYTHROCYTE [DISTWIDTH] IN BLOOD BY AUTOMATED COUNT: 14.9 % (ref 11.5–14.5)
ETHANOL PERCENT: NORMAL G/DL
ETHANOLAMINE SERPL-MCNC: <10 MG/DL (ref 0–0.08)
GLOBULIN SER CALC-MCNC: 4.4 G/DL (ref 2.3–3.5)
GLUCOSE BLD-MCNC: 128 MG/DL (ref 70–99)
GLUCOSE BLD-MCNC: 137 MG/DL (ref 70–99)
GLUCOSE SERPL-MCNC: 118 MG/DL (ref 70–99)
HCT VFR BLD AUTO: 36.3 % (ref 42–52)
HGB BLD-MCNC: 11.6 G/DL (ref 14–18)
INR PPP: 1.1
LACTATE BLDV-SCNC: 1.1 MMOL/L (ref 0.5–2.2)
LYMPHOCYTES # BLD: 1.3 K/UL (ref 1–4.8)
LYMPHOCYTES NFR BLD: 11.7 %
MCH RBC QN AUTO: 27.4 PG (ref 27–31.3)
MCHC RBC AUTO-ENTMCNC: 32 % (ref 33–37)
MCV RBC AUTO: 85.6 FL (ref 79–92.2)
MONOCYTES # BLD: 0.9 K/UL (ref 0.2–0.8)
MONOCYTES NFR BLD: 8.1 %
NEUTROPHILS # BLD: 8.8 K/UL (ref 1.4–6.5)
NEUTS SEG NFR BLD: 78.5 %
PERFORMED ON: ABNORMAL
PERFORMED ON: ABNORMAL
PLATELET # BLD AUTO: 315 K/UL (ref 130–400)
POTASSIUM SERPL-SCNC: 4.2 MEQ/L (ref 3.4–4.9)
PROT SERPL-MCNC: 7.3 G/DL (ref 6.3–8)
PROTHROMBIN TIME: 14.8 SEC (ref 12.3–14.9)
RBC # BLD AUTO: 4.24 M/UL (ref 4.7–6.1)
SODIUM SERPL-SCNC: 136 MEQ/L (ref 135–144)
TROPONIN, HIGH SENSITIVITY: 85 NG/L (ref 0–19)
TSH SERPL-MCNC: 5.63 UIU/ML (ref 0.44–3.86)
WBC # BLD AUTO: 11.2 K/UL (ref 4.8–10.8)

## 2024-04-28 PROCEDURE — 85025 COMPLETE CBC W/AUTO DIFF WBC: CPT

## 2024-04-28 PROCEDURE — 70450 CT HEAD/BRAIN W/O DYE: CPT

## 2024-04-28 PROCEDURE — 36415 COLL VENOUS BLD VENIPUNCTURE: CPT

## 2024-04-28 PROCEDURE — 93005 ELECTROCARDIOGRAM TRACING: CPT | Performed by: EMERGENCY MEDICINE

## 2024-04-28 PROCEDURE — 74176 CT ABD & PELVIS W/O CONTRAST: CPT

## 2024-04-28 PROCEDURE — 71250 CT THORAX DX C-: CPT

## 2024-04-28 PROCEDURE — 2580000003 HC RX 258: Performed by: INTERNAL MEDICINE

## 2024-04-28 PROCEDURE — 82550 ASSAY OF CK (CPK): CPT

## 2024-04-28 PROCEDURE — 84443 ASSAY THYROID STIM HORMONE: CPT

## 2024-04-28 PROCEDURE — 99222 1ST HOSP IP/OBS MODERATE 55: CPT | Performed by: STUDENT IN AN ORGANIZED HEALTH CARE EDUCATION/TRAINING PROGRAM

## 2024-04-28 PROCEDURE — 6360000002 HC RX W HCPCS: Performed by: EMERGENCY MEDICINE

## 2024-04-28 PROCEDURE — 80053 COMPREHEN METABOLIC PANEL: CPT

## 2024-04-28 PROCEDURE — 73080 X-RAY EXAM OF ELBOW: CPT

## 2024-04-28 PROCEDURE — 90471 IMMUNIZATION ADMIN: CPT | Performed by: EMERGENCY MEDICINE

## 2024-04-28 PROCEDURE — 85610 PROTHROMBIN TIME: CPT

## 2024-04-28 PROCEDURE — 99285 EMERGENCY DEPT VISIT HI MDM: CPT

## 2024-04-28 PROCEDURE — 82077 ASSAY SPEC XCP UR&BREATH IA: CPT

## 2024-04-28 PROCEDURE — 85730 THROMBOPLASTIN TIME PARTIAL: CPT

## 2024-04-28 PROCEDURE — 72128 CT CHEST SPINE W/O DYE: CPT

## 2024-04-28 PROCEDURE — 1210000000 HC MED SURG R&B

## 2024-04-28 PROCEDURE — 72125 CT NECK SPINE W/O DYE: CPT

## 2024-04-28 PROCEDURE — 84484 ASSAY OF TROPONIN QUANT: CPT

## 2024-04-28 PROCEDURE — 6830039001 HC L3 TRAUMA PRIORITY

## 2024-04-28 PROCEDURE — 2580000003 HC RX 258: Performed by: EMERGENCY MEDICINE

## 2024-04-28 PROCEDURE — 90715 TDAP VACCINE 7 YRS/> IM: CPT | Performed by: EMERGENCY MEDICINE

## 2024-04-28 PROCEDURE — 83605 ASSAY OF LACTIC ACID: CPT

## 2024-04-28 PROCEDURE — 72131 CT LUMBAR SPINE W/O DYE: CPT

## 2024-04-28 PROCEDURE — 71045 X-RAY EXAM CHEST 1 VIEW: CPT

## 2024-04-28 RX ORDER — INSULIN LISPRO 100 [IU]/ML
0-4 INJECTION, SOLUTION INTRAVENOUS; SUBCUTANEOUS NIGHTLY
Status: DISCONTINUED | OUTPATIENT
Start: 2024-04-28 | End: 2024-04-29

## 2024-04-28 RX ORDER — SODIUM CHLORIDE 0.9 % (FLUSH) 0.9 %
5-40 SYRINGE (ML) INJECTION EVERY 12 HOURS SCHEDULED
Status: DISCONTINUED | OUTPATIENT
Start: 2024-04-28 | End: 2024-04-30 | Stop reason: HOSPADM

## 2024-04-28 RX ORDER — SODIUM CHLORIDE 9 MG/ML
INJECTION, SOLUTION INTRAVENOUS PRN
Status: DISCONTINUED | OUTPATIENT
Start: 2024-04-28 | End: 2024-04-30 | Stop reason: HOSPADM

## 2024-04-28 RX ORDER — ACETAMINOPHEN 650 MG/1
650 SUPPOSITORY RECTAL EVERY 6 HOURS PRN
Status: DISCONTINUED | OUTPATIENT
Start: 2024-04-28 | End: 2024-04-30 | Stop reason: HOSPADM

## 2024-04-28 RX ORDER — SODIUM CHLORIDE 0.9 % (FLUSH) 0.9 %
5-40 SYRINGE (ML) INJECTION PRN
Status: DISCONTINUED | OUTPATIENT
Start: 2024-04-28 | End: 2024-04-30 | Stop reason: HOSPADM

## 2024-04-28 RX ORDER — ENOXAPARIN SODIUM 100 MG/ML
30 INJECTION SUBCUTANEOUS DAILY
Status: DISCONTINUED | OUTPATIENT
Start: 2024-04-29 | End: 2024-04-30 | Stop reason: HOSPADM

## 2024-04-28 RX ORDER — ONDANSETRON 4 MG/1
4 TABLET, ORALLY DISINTEGRATING ORAL EVERY 8 HOURS PRN
Status: DISCONTINUED | OUTPATIENT
Start: 2024-04-28 | End: 2024-04-30 | Stop reason: HOSPADM

## 2024-04-28 RX ORDER — DEXTROSE MONOHYDRATE 100 MG/ML
INJECTION, SOLUTION INTRAVENOUS CONTINUOUS PRN
Status: DISCONTINUED | OUTPATIENT
Start: 2024-04-28 | End: 2024-04-30 | Stop reason: HOSPADM

## 2024-04-28 RX ORDER — INSULIN LISPRO 100 [IU]/ML
0-4 INJECTION, SOLUTION INTRAVENOUS; SUBCUTANEOUS
Status: DISCONTINUED | OUTPATIENT
Start: 2024-04-28 | End: 2024-04-29

## 2024-04-28 RX ORDER — POLYETHYLENE GLYCOL 3350 17 G/17G
17 POWDER, FOR SOLUTION ORAL DAILY PRN
Status: DISCONTINUED | OUTPATIENT
Start: 2024-04-28 | End: 2024-04-30 | Stop reason: HOSPADM

## 2024-04-28 RX ORDER — SODIUM CHLORIDE, SODIUM LACTATE, POTASSIUM CHLORIDE, CALCIUM CHLORIDE 600; 310; 30; 20 MG/100ML; MG/100ML; MG/100ML; MG/100ML
INJECTION, SOLUTION INTRAVENOUS CONTINUOUS
Status: DISPENSED | OUTPATIENT
Start: 2024-04-28 | End: 2024-04-29

## 2024-04-28 RX ORDER — 0.9 % SODIUM CHLORIDE 0.9 %
1000 INTRAVENOUS SOLUTION INTRAVENOUS ONCE
Status: COMPLETED | OUTPATIENT
Start: 2024-04-28 | End: 2024-04-28

## 2024-04-28 RX ORDER — ONDANSETRON 2 MG/ML
4 INJECTION INTRAMUSCULAR; INTRAVENOUS EVERY 6 HOURS PRN
Status: DISCONTINUED | OUTPATIENT
Start: 2024-04-28 | End: 2024-04-30 | Stop reason: HOSPADM

## 2024-04-28 RX ORDER — ACETAMINOPHEN 325 MG/1
650 TABLET ORAL EVERY 6 HOURS PRN
Status: DISCONTINUED | OUTPATIENT
Start: 2024-04-28 | End: 2024-04-30 | Stop reason: HOSPADM

## 2024-04-28 RX ORDER — GLUCAGON 1 MG/ML
1 KIT INJECTION PRN
Status: DISCONTINUED | OUTPATIENT
Start: 2024-04-28 | End: 2024-04-30 | Stop reason: HOSPADM

## 2024-04-28 RX ADMIN — TETANUS TOXOID, REDUCED DIPHTHERIA TOXOID AND ACELLULAR PERTUSSIS VACCINE, ADSORBED 0.5 ML: 5; 2.5; 8; 8; 2.5 SUSPENSION INTRAMUSCULAR at 16:16

## 2024-04-28 RX ADMIN — SODIUM CHLORIDE, POTASSIUM CHLORIDE, SODIUM LACTATE AND CALCIUM CHLORIDE: 600; 310; 30; 20 INJECTION, SOLUTION INTRAVENOUS at 21:19

## 2024-04-28 RX ADMIN — SODIUM CHLORIDE 1000 ML: 9 INJECTION, SOLUTION INTRAVENOUS at 16:16

## 2024-04-28 ASSESSMENT — PAIN - FUNCTIONAL ASSESSMENT: PAIN_FUNCTIONAL_ASSESSMENT: NONE - DENIES PAIN

## 2024-04-28 NOTE — ED TRIAGE NOTES
Per EMS, called to house for life alert. Patient unresponsive, blood sugar per EMS was 51. Repeat blood glucose for EMS after glucagon was 163. Patient arrives alert and oriented. Skin pale, warm, and dry. Patient has unequal pupils, left pupil 1mm, right pupil 3mm. Patient's speech is clear.

## 2024-04-28 NOTE — ED NOTES
Verified patient name, date of birth, with the patient ID band with the patient and the monitor room tech on the phone. Verified rythm and rate with monitor tech.

## 2024-04-28 NOTE — CARE COORDINATION
I located Greg Templeton's phone number in the chart who provided pt's daughters phone number and I called her who reports she just re-united with her father (after 20 years of no contact) about a month ago had helped him with the bed bug situation and has been trying to help him.     She states he does not have a personal care giver and Chi goes over about once a week.    She was informed pt is admitted. She states he is out of test strips and his blood sugars have been fluctuating at home.     Electronically signed by Danisha Rios, RN, BSN on 4/28/2024 at 5:09 PM       TELEMETRY MED/SURG

## 2024-04-28 NOTE — CARE COORDINATION
I met with pt at bedside. I reported to him emergency contacts listed are not working. He states he has 2 children 1. Jessie Lawlerz his daughter but does not have her phone number. A \"caregiver\" called Lucas Francisco called [phone]. Pt states I could call her for information and can discuss care with her.    I called Lucas she reports she and her boyfriend Jayson Lane are his caregivers and they see him every other day. She is private hire helps clean and goes to grocery store with him, \"he drives his van, I cannot drive it.\"     She also reports pt's daughter \"lives in a trailer park and have no contact.\" \"His other dtr has unknown\" where about's. He does have a close friend Greg Templeton who just moved out of town and does not have his number.    She states pt can cook, and perform adl's independently. She is aware there are bed bugs in the home. She states he had his house bombed for them and spraying every other month. She was told he was being admitted.     Electronically signed by Danisha Rios, RN, BSN on 4/28/2024 at 4:37 PM

## 2024-04-28 NOTE — ED NOTES
Pt's left elbow wound was washed with soap and water then a dressing was applied.  Pt handled the procedure but it was somewhat painful for him.

## 2024-04-28 NOTE — CARE COORDINATION
Case Management Assessment  Initial Evaluation    Date/Time of Evaluation: 4/28/2024 5:31 PM  Assessment Completed by: Danisha Rios, RN, BSN    If patient is discharged prior to next notation, then this note serves as note for discharge by case management.    Patient Name: Amrik Meraz                   YOB: 1950  Diagnosis: MAYKEL (acute kidney injury) (HCC) [N17.9]                   Date / Time: 4/28/2024  1:30 PM    Patient Admission Status: Inpatient   Readmission Risk (Low < 19, Mod (19-27), High > 27): Readmission Risk Score: 18.7    Current PCP: Anita Figueroa MD  PCP verified by CM? Yes    Chart Reviewed: Yes      History Provided by: Patient  Patient Orientation: Alert and Oriented, Person, Place, Situation, Self    Patient Cognition: Alert    Hospitalization in the last 30 days (Readmission):  No    If yes, Readmission Assessment in CM Navigator will be completed.    Advance Directives:      Code Status: Prior   Patient's Primary Decision Maker is: Legal Next of Kin    Primary Decision Maker: MICHAEL MERAZ - Child - [phone]    Secondary Decision Maker: CHACONFATUMA - Friend - [phone]    Discharge Planning:    Patient lives with: Alone Type of Home: House  Primary Care Giver: Self  Patient Support Systems include: Friends/Neighbors, Other (Comment) (DAUGHTER MICHAEL)   Current Financial resources: [place] ([place]), [place]  Current community resources: None  Current services prior to admission: Oxygen Therapy, [facility], Durable Medical Equipment, Emergency Call  System            Current DME: Oxygen Therapy (Comment), Glucometer, Home Aerosol, Wheelchair, Walker, Other (Comment) (STAIR LIFT)            Type of Home Care services:  Skilled Therapy, Nursing Services    ADLS  Prior functional level: Shopping, Housework, Mobility, Other (see comment), Assistance with the following: (AMB W ROLATOR)  Current functional level: Other (see comment) (ARRIVED UNRESPONSIVE 2' TO LOW BS IN

## 2024-04-28 NOTE — ED PROVIDER NOTES
[facility] ED  eMERGENCY dEPARTMENT eNCOUnter      Pt Name: Amrik Meraz  MRN: [id number]  Birthdate 1950  Date of evaluation: 4/28/2024  Provider: Destin De La Cruz MD    CHIEF COMPLAINT       Chief Complaint   Patient presents with    Altered Mental Status     Low blood sugar         HISTORY OF PRESENT ILLNESS   (Location/Symptom, Timing/Onset,Context/Setting, Quality, Duration, Modifying Factors, Severity)  Note limiting factors.   Amrik Meraz is a 74 y.o. male who presents to the emergency department for complaint of unresponsive episode.  Patient reportedly became hypoglycemic and collapsed at home.  Patient from the floor reportedly activated his life alert alarm.  EMS arrived and found patient to be unresponsive.  Patient was reportedly hypoglycemic, blood sugar somewhere between 50 and 25 by report.  Patient was given 25 g of glucose, with significant improvement of blood sugar to approximately 163.  On arrival to the emergency department patient demonstrates significant and rapid reversal improvement of mental status to baseline.    The meantime the patient had collapsed and fallen injuring his head, and injuring his left elbow.  Trauma was alerted and attended the patient as well.    Upon patient's recovery further and secondary interview was obtained from patient, and he demonstrated no awareness of the event or current events otherwise.  He orients otherwise at this time but has absence of memory for current episode.    Patient admits soreness to his neck and shoulders.  He notes complaint of bruising or swelling over left eye.  He notes injury to left elbow.    This kind patient does live alone, and was afflicted with significant and copious amounts of bedbugs on arrival to the emergency department.    HPI    NursingNotes were reviewed.    REVIEW OF SYSTEMS    (2-9 systems for level 4, 10 or more for level 5)     Review of Systems   Unable to perform ROS: Mental status change   Review

## 2024-04-28 NOTE — ED NOTES
When the pt arrived to the ER it was found that he was infested with several bed bugs covering his entire body.  Once stabilized the pt was stripped of all his clothing and washed a total of three times as well as the linens and the pt's gown before taking the pt to CT and x-ray.  The pt is back in his room and I haven't seen anymore bed bugs.  The pt was aware of the bed bugs, he acknowledge them when it was mentioned.

## 2024-04-28 NOTE — H&P
Hospital Medicine  History and Physical    Patient:  Amrik Meraz  MRN: [id number]    CHIEF COMPLAINT:    Chief Complaint   Patient presents with    Altered Mental Status     Low blood sugar       History Obtained From:  Patient, EMR  Primary Care Physician: Anita Figueroa MD    HISTORY OF PRESENT ILLNESS:   The patient is a 74 y[facility]. [facility] with PMH of HTN, chronic diastolic HF, IDDM2, CKD3b, severe obesity, ILIANA, chronic hypoxic respiratory failure-3 liters of O2, nephrolithiasis who presented with the above CC. Patient was found unresponsive at home and covered in bed bugs per EMS report, glucose was 27, improved with Glucagon to 163, initial trauma w/u showed a nondisplaced fracture of the radial head, rest of w/u showed MAYKEL/CKD3b and elevated troponin, ECG was negative for acute ischemic changes, IVFs given, admitted for further management.    Past Medical History:      Diagnosis Date    Chronic kidney disease     Diabetes mellitus (HCC)     Hypertension     Type 2 diabetes mellitus with hyperglycemia 1/6/2023    Type 2 diabetes mellitus without complication, without long-term current use of insulin (HCC) 3/19/2019    Uncontrolled type 2 diabetes mellitus with hyperglycemia (HCC)        Past Surgical History:      Procedure Laterality Date    APPENDECTOMY      age 10       Medications Prior to Admission:    Prior to Admission medications    Medication Sig Start Date End Date Taking? Authorizing Provider   Continuous Blood Gluc Sensor (FREESTYLE ANEUDY 3 SENSOR) MISC 1 each by Does not apply route every 14 days E11.65 3/21/24   Rusty Arce MD   Continuous Blood Gluc  (FREESTYLE ANEUDY 3 READER) MAKENZIE 1 each by Does not apply route daily E11.65 3/21/24   Rusty Arce MD   bisacodyl (DULCOLAX) 10 MG suppository Place rectally 12/6/23   Provider, MD Daniel   Continuous Blood Gluc  (FREESTYLE ANEUDY 2 READER) MAKENZIE CONTINUOUS GLUCOSE READER 1/8/24   Rusty Arce MD   insulin glargine (LANTUS)

## 2024-04-28 NOTE — CARE COORDINATION
[facility]ed [facility]. Pt was updated I was able to locate phone numbers for Greg and his daughter Jessie and were informed of admission.    Electronically signed by Danisha Rios RN, BSN on 4/28/2024 at 5:46 PM

## 2024-04-28 NOTE — ACP (ADVANCE CARE PLANNING)
Advance Care Planning   Healthcare Decision Maker:    Primary Decision Maker: MICHAEL HARDIN - Kailyn - [phone]    Secondary Decision Maker: FATUMA CHACON - Yvonne - [phone]    Click here to complete Healthcare Decision Makers including selection of the Healthcare Decision Maker Relationship (ie \"Primary\").  Today we documented Decision Maker(s) consistent with Legal Next of Kin hierarchy.       Confirmed with patient & daughter    Electronically signed by Danisha Rios RN, BSN on 4/28/2024 at 5:02 PM

## 2024-04-29 PROBLEM — E11.649 HYPOGLYCEMIA ASSOCIATED WITH TYPE 2 DIABETES MELLITUS (HCC): Status: ACTIVE | Noted: 2024-04-29

## 2024-04-29 PROBLEM — R40.4 TRANSIENT ALTERATION OF AWARENESS: Status: ACTIVE | Noted: 2024-04-29

## 2024-04-29 LAB
ALBUMIN SERPL-MCNC: 2.7 G/DL (ref 3.5–4.6)
ANION GAP SERPL CALCULATED.3IONS-SCNC: 12 MEQ/L (ref 9–15)
BASOPHILS # BLD: 0 K/UL (ref 0–0.2)
BASOPHILS NFR BLD: 0.2 %
BUN SERPL-MCNC: 60 MG/DL (ref 8–23)
CALCIUM SERPL-MCNC: 8.8 MG/DL (ref 8.5–9.9)
CHLORIDE SERPL-SCNC: 102 MEQ/L (ref 95–107)
CO2 SERPL-SCNC: 25 MEQ/L (ref 20–31)
CREAT SERPL-MCNC: 2.78 MG/DL (ref 0.7–1.2)
EKG ATRIAL RATE: 59 BPM
EKG P AXIS: 26 DEGREES
EKG P-R INTERVAL: 190 MS
EKG Q-T INTERVAL: 488 MS
EKG QRS DURATION: 138 MS
EKG QTC CALCULATION (BAZETT): 483 MS
EKG R AXIS: -34 DEGREES
EKG T AXIS: 18 DEGREES
EKG VENTRICULAR RATE: 59 BPM
EOSINOPHIL # BLD: 0.2 K/UL (ref 0–0.7)
EOSINOPHIL NFR BLD: 1.8 %
ERYTHROCYTE [DISTWIDTH] IN BLOOD BY AUTOMATED COUNT: 15 % (ref 11.5–14.5)
ESTIMATED AVERAGE GLUCOSE: 209 MG/DL
GLUCOSE BLD-MCNC: 154 MG/DL (ref 70–99)
GLUCOSE BLD-MCNC: 296 MG/DL (ref 70–99)
GLUCOSE BLD-MCNC: 297 MG/DL (ref 70–99)
GLUCOSE BLD-MCNC: 97 MG/DL (ref 70–99)
GLUCOSE SERPL-MCNC: 89 MG/DL (ref 70–99)
HBA1C MFR BLD: 8.9 % (ref 4–6)
HCT VFR BLD AUTO: 31.7 % (ref 42–52)
HGB BLD-MCNC: 10 G/DL (ref 14–18)
LYMPHOCYTES # BLD: 1.3 K/UL (ref 1–4.8)
LYMPHOCYTES NFR BLD: 12.6 %
MAGNESIUM SERPL-MCNC: 2.6 MG/DL (ref 1.7–2.4)
MCH RBC QN AUTO: 27.2 PG (ref 27–31.3)
MCHC RBC AUTO-ENTMCNC: 31.5 % (ref 33–37)
MCV RBC AUTO: 86.4 FL (ref 79–92.2)
MONOCYTES # BLD: 0.8 K/UL (ref 0.2–0.8)
MONOCYTES NFR BLD: 7.9 %
NEUTROPHILS # BLD: 8.1 K/UL (ref 1.4–6.5)
NEUTS SEG NFR BLD: 77 %
PERFORMED ON: ABNORMAL
PERFORMED ON: NORMAL
PHOSPHATE SERPL-MCNC: 5.1 MG/DL (ref 2.3–4.8)
PLATELET # BLD AUTO: 322 K/UL (ref 130–400)
POTASSIUM SERPL-SCNC: 4.7 MEQ/L (ref 3.4–4.9)
RBC # BLD AUTO: 3.67 M/UL (ref 4.7–6.1)
SODIUM SERPL-SCNC: 139 MEQ/L (ref 135–144)
TROPONIN, HIGH SENSITIVITY: 73 NG/L (ref 0–19)
WBC # BLD AUTO: 10.5 K/UL (ref 4.8–10.8)

## 2024-04-29 PROCEDURE — 83036 HEMOGLOBIN GLYCOSYLATED A1C: CPT

## 2024-04-29 PROCEDURE — 83735 ASSAY OF MAGNESIUM: CPT

## 2024-04-29 PROCEDURE — 80069 RENAL FUNCTION PANEL: CPT

## 2024-04-29 PROCEDURE — 97162 PT EVAL MOD COMPLEX 30 MIN: CPT

## 2024-04-29 PROCEDURE — 93010 ELECTROCARDIOGRAM REPORT: CPT | Performed by: INTERNAL MEDICINE

## 2024-04-29 PROCEDURE — 84484 ASSAY OF TROPONIN QUANT: CPT

## 2024-04-29 PROCEDURE — 6370000000 HC RX 637 (ALT 250 FOR IP): Performed by: INTERNAL MEDICINE

## 2024-04-29 PROCEDURE — 99222 1ST HOSP IP/OBS MODERATE 55: CPT | Performed by: PSYCHIATRY & NEUROLOGY

## 2024-04-29 PROCEDURE — 2700000000 HC OXYGEN THERAPY PER DAY

## 2024-04-29 PROCEDURE — 99221 1ST HOSP IP/OBS SF/LOW 40: CPT | Performed by: PHYSICIAN ASSISTANT

## 2024-04-29 PROCEDURE — 36415 COLL VENOUS BLD VENIPUNCTURE: CPT

## 2024-04-29 PROCEDURE — 99222 1ST HOSP IP/OBS MODERATE 55: CPT | Performed by: INTERNAL MEDICINE

## 2024-04-29 PROCEDURE — 2580000003 HC RX 258: Performed by: INTERNAL MEDICINE

## 2024-04-29 PROCEDURE — 97166 OT EVAL MOD COMPLEX 45 MIN: CPT

## 2024-04-29 PROCEDURE — 51798 US URINE CAPACITY MEASURE: CPT

## 2024-04-29 PROCEDURE — 1210000000 HC MED SURG R&B

## 2024-04-29 PROCEDURE — 85025 COMPLETE CBC W/AUTO DIFF WBC: CPT

## 2024-04-29 PROCEDURE — APPSS45 APP SPLIT SHARED TIME 31-45 MINUTES: Performed by: NURSE PRACTITIONER

## 2024-04-29 PROCEDURE — 6360000002 HC RX W HCPCS: Performed by: INTERNAL MEDICINE

## 2024-04-29 RX ORDER — INSULIN LISPRO 100 [IU]/ML
0-4 INJECTION, SOLUTION INTRAVENOUS; SUBCUTANEOUS NIGHTLY
Status: DISCONTINUED | OUTPATIENT
Start: 2024-04-29 | End: 2024-04-30 | Stop reason: HOSPADM

## 2024-04-29 RX ORDER — TAMSULOSIN HYDROCHLORIDE 0.4 MG/1
0.4 CAPSULE ORAL NIGHTLY
Status: DISCONTINUED | OUTPATIENT
Start: 2024-04-29 | End: 2024-04-30 | Stop reason: HOSPADM

## 2024-04-29 RX ORDER — INSULIN LISPRO 100 [IU]/ML
6 INJECTION, SOLUTION INTRAVENOUS; SUBCUTANEOUS
Status: DISCONTINUED | OUTPATIENT
Start: 2024-04-30 | End: 2024-04-30 | Stop reason: HOSPADM

## 2024-04-29 RX ORDER — INSULIN GLARGINE 100 [IU]/ML
30 INJECTION, SOLUTION SUBCUTANEOUS NIGHTLY
Status: DISCONTINUED | OUTPATIENT
Start: 2024-04-30 | End: 2024-04-30 | Stop reason: HOSPADM

## 2024-04-29 RX ORDER — AMLODIPINE BESYLATE 5 MG/1
5 TABLET ORAL DAILY
Status: DISCONTINUED | OUTPATIENT
Start: 2024-04-30 | End: 2024-04-30 | Stop reason: HOSPADM

## 2024-04-29 RX ORDER — INSULIN GLARGINE 100 [IU]/ML
20 INJECTION, SOLUTION SUBCUTANEOUS NIGHTLY
Status: DISCONTINUED | OUTPATIENT
Start: 2024-04-29 | End: 2024-04-29

## 2024-04-29 RX ORDER — FLUTICASONE PROPIONATE 50 MCG
2 SPRAY, SUSPENSION (ML) NASAL DAILY
Status: DISCONTINUED | OUTPATIENT
Start: 2024-04-29 | End: 2024-04-30 | Stop reason: HOSPADM

## 2024-04-29 RX ORDER — ALLOPURINOL 100 MG/1
100 TABLET ORAL 2 TIMES DAILY
Status: DISCONTINUED | OUTPATIENT
Start: 2024-04-29 | End: 2024-04-30 | Stop reason: HOSPADM

## 2024-04-29 RX ORDER — TORSEMIDE 20 MG/1
20 TABLET ORAL 3 TIMES DAILY
Status: ON HOLD | COMMUNITY
End: 2024-04-30

## 2024-04-29 RX ORDER — MONTELUKAST SODIUM 10 MG/1
10 TABLET ORAL NIGHTLY
Status: DISCONTINUED | OUTPATIENT
Start: 2024-04-29 | End: 2024-04-30 | Stop reason: HOSPADM

## 2024-04-29 RX ORDER — CARVEDILOL 12.5 MG/1
12.5 TABLET ORAL 2 TIMES DAILY
Status: DISCONTINUED | OUTPATIENT
Start: 2024-04-29 | End: 2024-04-30 | Stop reason: HOSPADM

## 2024-04-29 RX ORDER — INSULIN LISPRO 100 [IU]/ML
0-8 INJECTION, SOLUTION INTRAVENOUS; SUBCUTANEOUS
Status: DISCONTINUED | OUTPATIENT
Start: 2024-04-29 | End: 2024-04-30 | Stop reason: HOSPADM

## 2024-04-29 RX ORDER — ROSUVASTATIN CALCIUM 20 MG/1
20 TABLET, COATED ORAL NIGHTLY
Status: DISCONTINUED | OUTPATIENT
Start: 2024-04-29 | End: 2024-04-30 | Stop reason: HOSPADM

## 2024-04-29 RX ORDER — ASPIRIN 81 MG/1
81 TABLET, CHEWABLE ORAL DAILY
Status: DISCONTINUED | OUTPATIENT
Start: 2024-04-29 | End: 2024-04-30 | Stop reason: HOSPADM

## 2024-04-29 RX ORDER — FINASTERIDE 5 MG/1
5 TABLET, FILM COATED ORAL DAILY
Status: DISCONTINUED | OUTPATIENT
Start: 2024-04-29 | End: 2024-04-30 | Stop reason: HOSPADM

## 2024-04-29 RX ADMIN — ENOXAPARIN SODIUM 30 MG: 100 INJECTION SUBCUTANEOUS at 08:45

## 2024-04-29 RX ADMIN — ALLOPURINOL 100 MG: 100 TABLET ORAL at 20:11

## 2024-04-29 RX ADMIN — ASPIRIN 81 MG 81 MG: 81 TABLET ORAL at 20:14

## 2024-04-29 RX ADMIN — ACETAMINOPHEN 650 MG: 325 TABLET ORAL at 14:01

## 2024-04-29 RX ADMIN — INSULIN GLARGINE 20 UNITS: 100 INJECTION, SOLUTION SUBCUTANEOUS at 20:10

## 2024-04-29 RX ADMIN — SODIUM CHLORIDE, PRESERVATIVE FREE 10 ML: 5 INJECTION INTRAVENOUS at 08:44

## 2024-04-29 RX ADMIN — FLUTICASONE PROPIONATE 2 SPRAY: 50 SPRAY, METERED NASAL at 20:14

## 2024-04-29 RX ADMIN — ROSUVASTATIN CALCIUM 20 MG: 20 TABLET, FILM COATED ORAL at 20:11

## 2024-04-29 RX ADMIN — CARVEDILOL 12.5 MG: 12.5 TABLET, FILM COATED ORAL at 20:11

## 2024-04-29 RX ADMIN — INSULIN LISPRO 4 UNITS: 100 INJECTION, SOLUTION INTRAVENOUS; SUBCUTANEOUS at 17:11

## 2024-04-29 RX ADMIN — MONTELUKAST 10 MG: 10 TABLET, FILM COATED ORAL at 20:11

## 2024-04-29 RX ADMIN — FINASTERIDE 5 MG: 5 TABLET, FILM COATED ORAL at 20:14

## 2024-04-29 RX ADMIN — TAMSULOSIN HYDROCHLORIDE 0.4 MG: 0.4 CAPSULE ORAL at 20:11

## 2024-04-29 ASSESSMENT — ENCOUNTER SYMPTOMS
VOMITING: 0
SHORTNESS OF BREATH: 0
NAUSEA: 0
TROUBLE SWALLOWING: 0
COUGH: 0
CHEST TIGHTNESS: 0
WHEEZING: 0
COLOR CHANGE: 0

## 2024-04-29 ASSESSMENT — PAIN DESCRIPTION - DESCRIPTORS: DESCRIPTORS: ACHING

## 2024-04-29 ASSESSMENT — PAIN DESCRIPTION - ORIENTATION: ORIENTATION: LOWER

## 2024-04-29 ASSESSMENT — PAIN DESCRIPTION - LOCATION: LOCATION: BACK

## 2024-04-29 ASSESSMENT — PAIN SCALES - GENERAL: PAINLEVEL_OUTOF10: 3

## 2024-04-29 NOTE — PLAN OF CARE
Problem: Skin/Tissue Integrity  Goal: Absence of new skin breakdown  Description: 1.  Monitor for areas of redness and/or skin breakdown  2.  Assess vascular access sites hourly  3.  Every 4-6 hours minimum:  Change oxygen saturation probe site  4.  Every 4-6 hours:  If on nasal continuous positive airway pressure, respiratory therapy assess nares and determine need for appliance change or resting period.  4/29/2024 0953 by Fredo Romero RN  Outcome: Progressing  4/29/2024 0033 by Hernesto Hunt RN  Outcome: Progressing     Problem: Safety - Adult  Goal: Free from fall injury  4/29/2024 0953 by Fredo Romero RN  Outcome: Progressing  4/29/2024 0033 by Hernesto Hunt RN  Outcome: Progressing     Problem: ABCDS Injury Assessment  Goal: Absence of physical injury  4/29/2024 0953 by Fredo Romero RN  Outcome: Progressing  4/29/2024 0033 by Hernesto Hunt RN  Outcome: Progressing     Problem: Chronic Conditions and Co-morbidities  Goal: Patient's chronic conditions and co-morbidity symptoms are monitored and maintained or improved  Outcome: Progressing

## 2024-04-29 NOTE — CARE COORDINATION
LSW MET WITH THE PT AT BEDSIDE TO DISCUSS HIS DC PLAN.  WE DISCUSSED THE CONDITION OF THE HOME AND PT ADMITTED THAT HE HAS A BED BUG INFESTATION.  HE SAYS THAT HE HAS SPENT A LOT OF MONEY ON EXTERMINATION BUT NONE OF IT HAS WORKER.  LSW OFFERED SNF BUT THE PT DECLINED.  PLAN IS HOME WHEN MEDICALLY CLEARED.      LSW CALLED [facility]PS AND THEY CONFIRMED WITH THERE IS NO ACTIVE APS CASE.  THEY WILL NOT TAKE A REFERRAL FOR THE PT IF THE ONLY ISSUE IS BED BUG INFESTATION.  NO REFERRAL MADE AT THIS TIME.

## 2024-04-29 NOTE — DISCHARGE INSTRUCTIONS
WBAT to the left arm   Ok to use for walker and ROM as tolerated   Sling for comfort otherwise if needed- but does not have to use if not helping

## 2024-04-30 VITALS
HEIGHT: 70 IN | RESPIRATION RATE: 18 BRPM | TEMPERATURE: 97.7 F | DIASTOLIC BLOOD PRESSURE: 38 MMHG | SYSTOLIC BLOOD PRESSURE: 101 MMHG | WEIGHT: 315 LBS | HEART RATE: 64 BPM | OXYGEN SATURATION: 98 % | BODY MASS INDEX: 45.1 KG/M2

## 2024-04-30 PROBLEM — N13.30 HYDRONEPHROSIS: Status: ACTIVE | Noted: 2024-04-30

## 2024-04-30 LAB
ALBUMIN SERPL-MCNC: 2.8 G/DL (ref 3.5–4.6)
ANION GAP SERPL CALCULATED.3IONS-SCNC: 10 MEQ/L (ref 9–15)
BASOPHILS # BLD: 0 K/UL (ref 0–0.2)
BASOPHILS NFR BLD: 0.5 %
BUN SERPL-MCNC: 53 MG/DL (ref 8–23)
CALCIUM SERPL-MCNC: 8.5 MG/DL (ref 8.5–9.9)
CHLORIDE SERPL-SCNC: 104 MEQ/L (ref 95–107)
CO2 SERPL-SCNC: 27 MEQ/L (ref 20–31)
CREAT SERPL-MCNC: 2.67 MG/DL (ref 0.7–1.2)
EOSINOPHIL # BLD: 0.3 K/UL (ref 0–0.7)
EOSINOPHIL NFR BLD: 3.7 %
ERYTHROCYTE [DISTWIDTH] IN BLOOD BY AUTOMATED COUNT: 15.1 % (ref 11.5–14.5)
GLUCOSE BLD-MCNC: 171 MG/DL (ref 70–99)
GLUCOSE BLD-MCNC: 175 MG/DL (ref 70–99)
GLUCOSE BLD-MCNC: 241 MG/DL (ref 70–99)
GLUCOSE SERPL-MCNC: 138 MG/DL (ref 70–99)
HCT VFR BLD AUTO: 30.2 % (ref 42–52)
HGB BLD-MCNC: 9.3 G/DL (ref 14–18)
LYMPHOCYTES # BLD: 1.9 K/UL (ref 1–4.8)
LYMPHOCYTES NFR BLD: 23.4 %
MAGNESIUM SERPL-MCNC: 2.6 MG/DL (ref 1.7–2.4)
MCH RBC QN AUTO: 27 PG (ref 27–31.3)
MCHC RBC AUTO-ENTMCNC: 30.8 % (ref 33–37)
MCV RBC AUTO: 87.5 FL (ref 79–92.2)
MONOCYTES # BLD: 0.7 K/UL (ref 0.2–0.8)
MONOCYTES NFR BLD: 8.8 %
NEUTROPHILS # BLD: 5.1 K/UL (ref 1.4–6.5)
NEUTS SEG NFR BLD: 63.2 %
PERFORMED ON: ABNORMAL
PHOSPHATE SERPL-MCNC: 4.5 MG/DL (ref 2.3–4.8)
PLATELET # BLD AUTO: 312 K/UL (ref 130–400)
POTASSIUM SERPL-SCNC: 4.5 MEQ/L (ref 3.4–4.9)
RBC # BLD AUTO: 3.45 M/UL (ref 4.7–6.1)
SODIUM SERPL-SCNC: 141 MEQ/L (ref 135–144)
WBC # BLD AUTO: 8.1 K/UL (ref 4.8–10.8)

## 2024-04-30 PROCEDURE — 85025 COMPLETE CBC W/AUTO DIFF WBC: CPT

## 2024-04-30 PROCEDURE — 6370000000 HC RX 637 (ALT 250 FOR IP): Performed by: INTERNAL MEDICINE

## 2024-04-30 PROCEDURE — 36415 COLL VENOUS BLD VENIPUNCTURE: CPT

## 2024-04-30 PROCEDURE — 99231 SBSQ HOSP IP/OBS SF/LOW 25: CPT | Performed by: PHYSICIAN ASSISTANT

## 2024-04-30 PROCEDURE — 99232 SBSQ HOSP IP/OBS MODERATE 35: CPT | Performed by: PSYCHIATRY & NEUROLOGY

## 2024-04-30 PROCEDURE — 83735 ASSAY OF MAGNESIUM: CPT

## 2024-04-30 PROCEDURE — 2700000000 HC OXYGEN THERAPY PER DAY

## 2024-04-30 PROCEDURE — APPSS15 APP SPLIT SHARED TIME 0-15 MINUTES: Performed by: NURSE PRACTITIONER

## 2024-04-30 PROCEDURE — 80069 RENAL FUNCTION PANEL: CPT

## 2024-04-30 PROCEDURE — 2580000003 HC RX 258: Performed by: INTERNAL MEDICINE

## 2024-04-30 PROCEDURE — 6360000002 HC RX W HCPCS: Performed by: INTERNAL MEDICINE

## 2024-04-30 RX ORDER — INSULIN GLARGINE 100 [IU]/ML
INJECTION, SOLUTION SUBCUTANEOUS
Qty: 30 ML | Refills: 2 | COMMUNITY
Start: 2024-04-30

## 2024-04-30 RX ORDER — INSULIN ASPART 100 [IU]/ML
INJECTION, SOLUTION INTRAVENOUS; SUBCUTANEOUS
Qty: 15 ADJUSTABLE DOSE PRE-FILLED PEN SYRINGE | Refills: 3 | COMMUNITY
Start: 2024-04-30

## 2024-04-30 RX ORDER — TORSEMIDE 20 MG/1
60 TABLET ORAL DAILY
Qty: 30 TABLET | Refills: 3 | COMMUNITY
Start: 2024-04-30

## 2024-04-30 RX ADMIN — CARVEDILOL 12.5 MG: 12.5 TABLET, FILM COATED ORAL at 09:07

## 2024-04-30 RX ADMIN — ASPIRIN 81 MG 81 MG: 81 TABLET ORAL at 09:07

## 2024-04-30 RX ADMIN — INSULIN LISPRO 6 UNITS: 100 INJECTION, SOLUTION INTRAVENOUS; SUBCUTANEOUS at 12:06

## 2024-04-30 RX ADMIN — INSULIN LISPRO 2 UNITS: 100 INJECTION, SOLUTION INTRAVENOUS; SUBCUTANEOUS at 12:07

## 2024-04-30 RX ADMIN — INSULIN LISPRO 6 UNITS: 100 INJECTION, SOLUTION INTRAVENOUS; SUBCUTANEOUS at 09:07

## 2024-04-30 RX ADMIN — AMLODIPINE BESYLATE 5 MG: 5 TABLET ORAL at 09:07

## 2024-04-30 RX ADMIN — FINASTERIDE 5 MG: 5 TABLET, FILM COATED ORAL at 09:07

## 2024-04-30 RX ADMIN — ENOXAPARIN SODIUM 30 MG: 100 INJECTION SUBCUTANEOUS at 09:07

## 2024-04-30 RX ADMIN — SODIUM CHLORIDE, PRESERVATIVE FREE 10 ML: 5 INJECTION INTRAVENOUS at 09:07

## 2024-04-30 RX ADMIN — ALLOPURINOL 100 MG: 100 TABLET ORAL at 09:07

## 2024-04-30 RX ADMIN — INSULIN LISPRO 6 UNITS: 100 INJECTION, SOLUTION INTRAVENOUS; SUBCUTANEOUS at 17:05

## 2024-04-30 ASSESSMENT — ENCOUNTER SYMPTOMS
COLOR CHANGE: 0
VOMITING: 0
TROUBLE SWALLOWING: 0
WHEEZING: 0
CHEST TIGHTNESS: 0
SHORTNESS OF BREATH: 0
NAUSEA: 0
COUGH: 0
APNEA: 0

## 2024-04-30 ASSESSMENT — PAIN SCALES - GENERAL: PAINLEVEL_OUTOF10: 0

## 2024-04-30 NOTE — PLAN OF CARE
Problem: Skin/Tissue Integrity  Goal: Absence of new skin breakdown  Description: 1.  Monitor for areas of redness and/or skin breakdown  2.  Assess vascular access sites hourly  3.  Every 4-6 hours minimum:  Change oxygen saturation probe site  4.  Every 4-6 hours:  If on nasal continuous positive airway pressure, respiratory therapy assess nares and determine need for appliance change or resting period.  Outcome: Progressing     Problem: Safety - Adult  Goal: Free from fall injury  Outcome: Progressing     Problem: ABCDS Injury Assessment  Goal: Absence of physical injury  Outcome: Progressing     Problem: Chronic Conditions and Co-morbidities  Goal: Patient's chronic conditions and co-morbidity symptoms are monitored and maintained or improved  Outcome: Progressing     Problem: Nutrition Deficit:  Goal: Optimize nutritional status  Outcome: Progressing     Problem: Pain  Goal: Verbalizes/displays adequate comfort level or baseline comfort level  Outcome: Progressing

## 2024-04-30 NOTE — DISCHARGE SUMMARY
units of short acting insulin with meals.  His insulin regimen was decreased significantly as recommended by endocrinology with dosages reflected below.  He was also followed by nephrology throughout the hospitalization who felt he was stable for discharge with follow-up in 1 week.  He may need reimaging as outpatient regarding the right-sided hydronephrosis.      Exam on Discharge:   BP (!) 101/38   Pulse 64   Temp 97.7 °F (36.5 °C) (Oral)   Resp 18   Ht 1.778 m (5' 10\")   Wt (!) 143.9 kg (317 lb 4.8 oz)   SpO2 98%   BMI 45.53 kg/m²   General appearance: Tired and chronically ill-appearing, obese, cooperative and no distress  Mental Status: oriented to person, place and time and normal affect  Lungs: clear to auscultation bilaterally, normal effort  Heart: regular rate and rhythm, no murmur  Abdomen: soft, nontender, nondistended, bowel sounds present, no masses  Extremities: no edema, redness, tenderness in the calves  Skin: no gross lesions, rashes    Discharge Medication List:     Medication List        CHANGE how you take these medications      Lantus 100 UNIT/ML injection vial  Generic drug: insulin glargine  What changed: additional instructions     NovoLOG FlexPen 100 UNIT/ML injection pen  Generic drug: insulin aspart  What changed: additional instructions            CONTINUE taking these medications      Accu-Chek Softclix Lancet Dev Kit     acetaminophen 325 MG tablet  Commonly known as: TYLENOL     albuterol sulfate  (90 Base) MCG/ACT inhaler  Commonly known as: PROVENTIL;VENTOLIN;PROAIR     allopurinol 100 MG tablet  Commonly known as: ZYLOPRIM     amLODIPine 5 MG tablet  Commonly known as: NORVASC     ammonium lactate 12 % lotion  Commonly known as: LAC-HYDRIN     aspirin 81 MG tablet     bisacodyl 10 MG suppository  Commonly known as: DULCOLAX     carvedilol 12.5 MG tablet  Commonly known as: COREG     Coricidin D Cold/Flu/Sinus 2-5-325 MG Tabs  Generic drug:

## 2024-04-30 NOTE — CONSULTS
Urology Consult      Amrik Meraz  1950  [id number]    Date of Admission:  4/28/2024  1:30 PM  Date of Consultation:  4/29/2024    Consultant: Malcolm Drummond PA-C  SupervisingPhysician: Dr. Sorensen  PCP:  Anita Figueroa MD       Reason for Consultation: right sided hydronephrosis and hydroureter with no renal stone detected  calculus detected in the distal ureter    C/C:   Chief Complaint   Patient presents with    Altered Mental Status     Low blood sugar       History of Present Illness: 74 year old male with an incidental finding of right hydronephrosis and hydroureter with no no renal stone detected in the distal ureter on CT scan.     Allergies:  Allergies   Allergen Reactions    Niacin Other (See Comments)    Fluorescein Diarrhea, Nausea And Vomiting and Other (See Comments)       PMH: Patient has a past medical history of Chronic kidney disease, Diabetes mellitus (HCC), Hypertension, Type 2 diabetes mellitus with hyperglycemia, Type 2 diabetes mellitus without complication, without long-term current use of insulin (HCC), and Uncontrolled type 2 diabetes mellitus with hyperglycemia (HCC).    PSH: Patient has a past surgical history that includes Appendectomy.    Social History: Patient reports that he has never smoked. He has never been exposed to tobacco smoke. He has never used smokeless tobacco. He reports that he does not currently use alcohol. He reports that he does not use drugs.    Family History: Patientsfamily history is not on file.    ROS:  Review of Systems   Unable to perform ROS: Mental status change       Current Meds: glucose chewable tablet 16 g, PRN  dextrose bolus 10% 125 mL, PRN   Or  dextrose bolus 10% 250 mL, PRN  glucagon injection 1 mg, PRN  dextrose 10 % infusion, Continuous PRN  sodium chloride flush 0.9 % injection 5-40 mL, 2 times per day  sodium chloride flush 0.9 % injection 5-40 mL, PRN  0.9 % sodium chloride infusion, PRN  enoxaparin Sodium (LOVENOX) injection 30 mg, 
  [facility] Consult   Note  Name: Amrik Meraz  Age: 74 y.o.  Gender: male  CodeStatus: Full Code  Allergies: Niacin  Fluorescein    Chief Complaint:Altered Mental Status (Low blood sugar)    Primary Care Provider: Anita Figueroa MD  InpatientTreatment Team: Treatment Team: Attending Provider: Max Poe DO; Consulting Physician: Mic Dias DO; Consulting Physician: Ilda Fry PA-C; Consulting Physician: Flores Gusman MD; Consulting Physician: Dewayne Cota MD; Registered Nurse: Hernesto Hunt RN; Patient Care Tech: Raul, Ro; Patient Care Tech: Evi Hare; Respiratory Therapist (Day): Cynthia Spicer RCP; Registered Nurse: Fredo Romero RN; Utilization Reviewer: Ro Ely RN  Admission Date: 4/28/2024      HPI   Consulting provider: Dr. Estevan Duvall for altered mental status and unequal pupils.  Pt seen and examined for neurology consult.  Patient is a 74-year-old male with past medical history of diabetes mellitus type 2, hypertension, CKD who presented to [facility] emergency room on 4/28/2024 via EMS.  EMS had responded to his home for life alert called.  Patient was found unresponsive and blood sugar was noted to be 51.  Patient was noted to have unequal pupils right larger than left.  It is reported the patient had fallen at home and hit his head.  Unsure of loss of consciousness.  Vital signs in the emergency room 120/104, 60, 20, 96.8 °F, 93%.  EKG showed sinus bradycardia.  CT of the head was negative for acute findings.  Laboratory testing remarkable for white blood cell count of 11.2, BUN of 64, creatinine of 2.97, high-sensitivity troponin of 85, TSH 5.6.     Patient is alert and oriented x 3, no acute distress, cooperative.  No confusion at this time.  Denies headache.  No vision changes.  No focal neurodeficits.  Pupillary changes are noted secondary to previous surgical procedures.  No seizure activity reported.  Afebrile.    Patient seen and 
Consult Note  Patient: Amrik Meraz  Unit/Bed: W280/W280-01  YOB: 1950  MRN: [id number]  Acct: [id number]   Admitting Diagnosis: Transient alteration of awareness [R40.4]  Syncope and collapse [R55]  Hypoglycemia [E16.2]  MAYKEL (acute kidney injury) (HCC) [N17.9]  Acute renal injury (HCC) [N17.9]  Closed head injury, initial encounter [S09.90XA]  Date:  4/28/2024  Hospital Day: 1    Complaint:  Altered mental status    Possible fall/ syncope    History of Present Illness:  The patient is a 74 y.o. male with PMH of HTN, chronic diastolic HF, IDDM2, CKD3b, severe obesity, ILIANA, chronic hypoxic respiratory failure-3 liters of O2, nephrolithiasis who presented with the above CC. Patient was found unresponsive at home and covered in bed bugs per EMS report, glucose was 27, improved with Glucagon to 163, initial trauma w/u showed a nondisplaced fracture of the radial head, rest of w/u showed MAYKEL/CKD3b and elevated troponin- pt admitted and ortho consult    PMHx:  Past Medical History:   Diagnosis Date    Chronic kidney disease     Diabetes mellitus (HCC)     Hypertension     Type 2 diabetes mellitus with hyperglycemia 1/6/2023    Type 2 diabetes mellitus without complication, without long-term current use of insulin (HCC) 3/19/2019    Uncontrolled type 2 diabetes mellitus with hyperglycemia (HCC)        PSHx:  Past Surgical History:   Procedure Laterality Date    APPENDECTOMY      age 10       Social Hx:  Social History     Socioeconomic History    Marital status: Single     Spouse name: None    Number of children: None    Years of education: None    Highest education level: None   Tobacco Use    Smoking status: Never     Passive exposure: Never    Smokeless tobacco: Never   Vaping Use    Vaping Use: Never used   Substance and Sexual Activity    Alcohol use: Not Currently    Drug use: Never   Social History Narrative    Lives With: Alone    Type of Home: House Two level, Able to Live on Main level with 
Spiritual Care Services     Summary of Visit:   visited patient for an advanced directive consult. Patient said he did not remember asking for a packet but that he would take one to look over with family.    Encounter Summary  Encounter Overview/Reason: Advance Care Planning  Service Provided For: Patient  Referral/Consult From: Nurse  Support System: Children, Friends/neighbors  Complexity of Encounter: Moderate  Begin Time: 1520  End Time : 1540  Total Time Calculated: 20 min                          Advance Care Planning  Type: ACP conversation    Spiritual Assessment/Intervention/Outcomes:                     Care Plan:         Assist with advanced directives as requested      Spiritual Care Services   Electronically signed by Chaplain Mary on 4/29/2024 at 3:59 PM.    To reach a  for emotional and spiritual support, place an EPIC consult request.   If a  is needed immediately, dial “0” and ask to page the on-call .   
Trauma Consult / H & P Note    Reason for Consult: Trauma  Consulting Provider: Destin De La Cruz MD      BASIC INJURY INFORMATION:  Level of activation: CAT 1  Mode of transport: EMS  Mechanism of injury: Fall from Standing  Complicating features: NA  Protective measures: NA    Dr. Perales arrival time 13:44    HISTORY OF PRESENT INJURY:   Amrik Meraz is a 74 y.o. male with a PMHx of T2DM, CKD, CAD, HTN presents as a CAT1 trauma due to altered mental status (+)Headstrike, (?)LOC, (+)ASA. Pt reports he thinks he was hypoglycemic which caused him to fall. He does not remember the fall but did hit his life alert. EMS reports that patient was covered in dirt and bed bugs. Pt was unresponsive on scene. Pt was hypoglycemic to 40s and given glucagon. His sugar appropriately responded to 163.  Pt denies any pain at this time.     Dr. Perales was present within 30 minutes of CAT activation.     PRIMARY SURVEY:  Airway: Intact  Breathing: Normal   Breath Sounds: Breath Sounds Equal Bilaterally  Circulation:    Pulses: Normal   Skin: Normal skin color, texture and turgor  Disability:   Pupils: Right pupil deformed and non reactive. Left pupil constricted and reactive to light.    GCS:    Best Eyes: 4    Best Verbal: 5    Best Motor: 6    Total: 15    Vitals:   Vitals:    04/28/24 1334 04/28/24 1341   BP: (!) 120/104 (!) 110/50   Pulse: 60 59   Resp: 20 18   Temp: 96.8 °F (36 °C)    TempSrc: Temporal    SpO2: 93% 90%   Weight:  124.7 kg (275 lb)         SECONDARY SURVEY:  Neurologic: Alert and Oriented, Appropriate, Moves all Extremities, Strength Symmetrical, and No Sensory Deficits   HEENT:   Head: No lacerations, bony step-offs. Superficial abrasion to left forehead and Midface stable to palpation   Eyes: EOM intact. Right pupil deformed and non reactive. Left pupil constricted and reactive to light.    Ears: Not Examined   Nose: Septum Midline, No crepitus with motion;   Throat: Oral cavity without trauma   Neck: No 
substance abuse.    ALLERGIES:  NIACIN AND FLUORESCEIN.      MEDICATIONS:  Lantus 20 units at night, Humalog coverage, allopurinol, Norvasc, Coreg, Lovenox, Proscar, Flonase, Lantus 20 at night, Singulair, Crestor, tamsulosin.    REVIEW OF SYSTEMS:  Other than hypoglycemia, fall, unresponsiveness,14-point review of systems negative.    PHYSICAL EXAMINATION:  GENERAL:  Patient is alert, awake, able to answer some questions.  Appears drowsy.  VITAL SIGNS:  Blood pressure was 123/56, pulse rate was 65, respiratory rate 18, temperature 97.9.  HEENT:  Normocephalic, atraumatic.  Pupils equal, reactive to light.  Oral mucosa was slightly dry.  NECK:  Supple.  Trachea midline.  LUNGS:  Clear to auscultation bilaterally.  No wheezing, crackles.  HEART:  Sounds were normal.  No murmurs or rubs were present.  ABDOMEN:  Soft.  Significantly obese.    MUSCULOSKELETAL:  Right hand covered in bandage.  Otherwise, no joint swelling.    EXTREMITIES:  Lower extremities revealed 2+ pitting edema.  Chronic changes.  MUSCULOSKELETAL:  Right arm in bandage.    SKIN chronic changes b/l legs   NEUROLOGIC:  Unable to complete.  PSYCHIATRIC:  Unable to complete.    LABORATORY DATA:  Sodium 139, potassium 4.7, chloride 107, CO2 of 25, BUN 60, creatinine 2.78.    ASSESSMENT:  Uncontrolled diabetes, status post hypoglycemia due to poor p.o. intake, worsened due to kidney disease, fall, acute kidney injury, history of obesity, congestive heart failure, sleep apnea.    PLAN:  Increase Lantus to 30 at night, Humalog 6 units with each meals.  Continue Humalog coverage.  Long-term A1c goal of 8 or lower.  Avoid hypoglycemia.  Continue other management as per other specialists.  Reviewed nursing consultant's note.    Total time spent 60 minutes.    Thank you for the consult.          JACQUE LALA MD      D:  04/29/2024 21:50:15     T:  04/30/2024 03:11:13     KAVITA/TERESA  Job #:  [id number]     Doc#:  [id number]     
No suspicious bony lesion.     1.  No fracture or acute osseous abnormality. 2.  Mild osteoarthritis bilateral hips. 3.  Atherosclerotic calcifications.       Assessment:  74 y.o. male with history s/f CKD stage III, T2DM, HFpEF, HTN, Ori, chronic hypoxic respiratory failure (on 3L O2 as outpatient), nephrolithiasis who presented for AMS.     MAYKEL on CKD stage III: most likely multifactorial from decreased intake +/- obstructive issues, baseline Scr ~1.3-1.6 w/ eGFR low/mid 40s, pt of mine, last seen 10/2023, p/w Scr 2.9, CKD in setting of T2DM/HTN  RT sided hydronephrosis/hydroureter to the level of the pelvis, no renal calculus detected within the distal RT ureter  HTN   Hypoalbuminemia  Elevated liver enzymes  Anemia     Plan:  - advised pt to increase PO intake  - ok to continue lasix w/o Ivfs but low threshold to resume   - urology managing stones     Thank you for the consultation. Will continue to follow  Please do not hesitate to call with questions.    Dony Worthington MD

## 2024-05-01 ENCOUNTER — OFFICE VISIT (OUTPATIENT)
Dept: ORTHOPEDIC SURGERY | Facility: CLINIC | Age: 74
End: 2024-05-01
Payer: MEDICARE

## 2024-05-01 DIAGNOSIS — S52.125A NONDISPLACED FRACTURE OF HEAD OF LEFT RADIUS, INITIAL ENCOUNTER FOR CLOSED FRACTURE: Primary | ICD-10-CM

## 2024-05-01 PROCEDURE — 99213 OFFICE O/P EST LOW 20 MIN: CPT | Mod: 57 | Performed by: INTERNAL MEDICINE

## 2024-05-01 PROCEDURE — 24650 CLTX RDL HEAD/NCK FX WO MNPJ: CPT | Performed by: INTERNAL MEDICINE

## 2024-05-01 PROCEDURE — 99204 OFFICE O/P NEW MOD 45 MIN: CPT | Performed by: INTERNAL MEDICINE

## 2024-05-01 NOTE — PROGRESS NOTES
[facility] Occupational Therapy      Date: 2024  Patient Name: Amrik Meraz        MRN: [id number]  Account: [id number]   : 1950  (74 y.o.)  Room: [place] [address]    Chart reviewed, attempted OT at 0725 for evaluation. Patient not seen 2° to:    Other: Hold awaiting ortho consult for elbow fracture     Will attempt again when able.    Electronically signed by JENNA Reich/L on 2024 at 7:28 AM    
[facility] OCCUPATIONAL THERAPY EVALUATION - ACUTE     NAME: Amrik Meraz  : 1950 (74 y.o.)  MRN: [id number]  CODE STATUS: Full Code  Room: W280/W280-01    Date of Service: 2024    Patient Diagnosis(es): Transient alteration of awareness [R40.4]  Syncope and collapse [R55]  Hypoglycemia [E16.2]  MAYKEL (acute kidney injury) (Formerly [facility]) [N17.9]  Acute renal injury (HCC) [N17.9]  Closed head injury, initial encounter [S09.90XA]   Patient Active Problem List    Diagnosis Date Noted    Stage 4 chronic kidney disease (Formerly [facility]) 2023    Type 2 diabetes mellitus with chronic kidney disease 2023    Transient alteration of awareness 2024    Abnormal EKG 2023    Right ureteral stone 2023    Arteriosclerosis of coronary artery 2023    Impaired mobility and activities of daily living dt CP debility 2023    Acute respiratory failure with hypoxia (Formerly [facility]) 2023    Hypervolemia 2023    Severe pulmonary hypertension (Formerly [facility]) 2023    Hypoxia 2023    Class 3 severe obesity without serious comorbidity with body mass index (BMI) of 45.0 to 49.9 in adult (Formerly [facility]) 2022    Hypertension 2019    Acute gouty arthritis 2019    Adenomatous polyp of colon 2019    Anemia 2019    Back pain 2019    Infestation by bed bug 2019    ILIANA (obstructive sleep apnea) 2019    Other hyperlipidemia 2019    Unspecified hyperplasia of prostate without urinary obstruction and other lower urinary tract symptoms (LUTS) 2019    Primary malignant neoplasm of prostate (Formerly [facility]) 2019    Pain in joint involving ankle and foot 2019    Hydrocele     MAYKEL (acute kidney injury) (Formerly [facility]) 03/15/2019        Past Medical History:   Diagnosis Date    Chronic kidney disease     Diabetes mellitus (Formerly [facility])     Hypertension     Type 2 diabetes mellitus with hyperglycemia 2023    Type 2 diabetes mellitus without complication, without long-term current use of 
Messaged Dr Arce to clear discharge, return message was ok for d/c   
Physical Therapy Med Surg Initial Assessment  Facility/Department: [address] ORTHO TELE  Room: [facility] [address]       NAME: Amrik Meraz  : 1950 (74 y.o.)  MRN: [id number]  CODE STATUS: Full Code    Date of Service: 2024    Patient Diagnosis(es): Transient alteration of awareness [R40.4]  Syncope and collapse [R55]  Hypoglycemia [E16.2]  MAYKEL (acute kidney injury) ([facility]) [N17.9]  Acute renal injury (HCC) [N17.9]  Closed head injury, initial encounter [S09.90XA]   Chief Complaint   Patient presents with    Altered Mental Status     Low blood sugar     Patient Active Problem List    Diagnosis Date Noted    Stage 4 chronic kidney disease ([facility]) 2023    Type 2 diabetes mellitus with chronic kidney disease 2023    Transient alteration of awareness 2024    Abnormal EKG 2023    Right ureteral stone 2023    Arteriosclerosis of coronary artery 2023    Impaired mobility and activities of daily living dt CP debility 2023    Acute respiratory failure with hypoxia ([facility]) 2023    Hypervolemia 2023    Severe pulmonary hypertension ([facility]) 2023    Hypoxia 2023    Class 3 severe obesity without serious comorbidity with body mass index (BMI) of 45.0 to 49.9 in adult ([facility]) 2022    Hypertension 2019    Acute gouty arthritis 2019    Adenomatous polyp of colon 2019    Anemia 2019    Back pain 2019    Infestation by bed bug 2019    ILIANA (obstructive sleep apnea) 2019    Other hyperlipidemia 2019    Unspecified hyperplasia of prostate without urinary obstruction and other lower urinary tract symptoms (LUTS) 2019    Primary malignant neoplasm of prostate ([facility]) 2019    Pain in joint involving ankle and foot 2019    Hydrocele     MAYKEL (acute kidney injury) ([facility]) 03/15/2019        Past Medical History:   Diagnosis Date    Chronic kidney disease     Diabetes mellitus ([facility])     Hypertension     Type 2 diabetes 
Physician Progress Note    4/29/2024   4:10 PM    Name:  Amrik Meraz  MRN:    [id number]      Day: 1     Admit Date: 4/28/2024  1:30 PM  PCP: Anita Figueroa MD    Code Status:  Full Code    Subjective:     Denies complaints at this time.    Current Facility-Administered Medications   Medication Dose Route Frequency Provider Last Rate Last Admin    insulin lispro (HUMALOG) injection vial 0-8 Units  0-8 Units SubCUTAneous TID WC Max Poe DO        insulin lispro (HUMALOG) injection vial 0-4 Units  0-4 Units SubCUTAneous Nightly Max Poe DO        glucose chewable tablet 16 g  4 tablet Oral PRN Estevan Duvall MD        dextrose bolus 10% 125 mL  125 mL IntraVENous PRN Estevan Duvall MD        Or    dextrose bolus 10% 250 mL  250 mL IntraVENous PRN Estevan Duvall MD        glucagon injection 1 mg  1 mg SubCUTAneous PRN Estevan Duvall MD        dextrose 10 % infusion   IntraVENous Continuous PRN Estevan Duvall MD        sodium chloride flush 0.9 % injection 5-40 mL  5-40 mL IntraVENous 2 times per day Estevan Duvall MD   10 mL at 04/29/24 0844    sodium chloride flush 0.9 % injection 5-40 mL  5-40 mL IntraVENous PRN Estevan Duvall MD        0.9 % sodium chloride infusion   IntraVENous PRN Estevan Duvall MD        enoxaparin Sodium (LOVENOX) injection 30 mg  30 mg SubCUTAneous Daily Estevan Duvall MD   30 mg at 04/29/24 0845    ondansetron (ZOFRAN-ODT) disintegrating tablet 4 mg  4 mg Oral Q8H PRN Estevan Duvall MD        Or    ondansetron (ZOFRAN) injection 4 mg  4 mg IntraVENous Q6H PRN Estevan Duvall MD        polyethylene glycol (GLYCOLAX) packet 17 g  17 g Oral Daily PRN Estevan Duvall MD        acetaminophen (TYLENOL) tablet 650 mg  650 mg Oral Q6H PRN Estevan Duvall MD   650 mg at 04/29/24 1401    Or    acetaminophen (TYLENOL) suppository 650 mg  650 mg Rectal Q6H PRN Estevan Duvall MD           Physical Examination:      Vitals:  BP (!) 134/49   Pulse 67   Temp 97.7 °F (36.5 
Progress Note  Date:2024       [facility]  Patient Name:Amrik Meraz     YOB: 1950     Age:74 y.o.    Chief complaint uncontrolled diabetes/hypoglycemia    Subjective    Subjective:  Symptoms:  Stable.  He reports weakness.    Diet:  Adequate intake.    Activity level: Impaired due to weakness.       Review of Systems   Constitutional:  Positive for fatigue.   Neurological:  Positive for weakness.     Objective         Vitals Last 24 Hours:  TEMPERATURE:  Temp  Av.6 °F (36.4 °C)  Min: 97.5 °F (36.4 °C)  Max: 97.7 °F (36.5 °C)  RESPIRATIONS RANGE: Resp  Av  Min: 16  Max: 18  PULSE OXIMETRY RANGE: SpO2  Av.7 %  Min: 95 %  Max: 100 %  PULSE RANGE: Pulse  Av.3  Min: 60  Max: 66  BLOOD PRESSURE RANGE: Systolic (24hrs), Av , Min:101 , Max:113   ; Diastolic (24hrs), Av, Min:38, Max:58    I/O (24Hr):    Intake/Output Summary (Last 24 hours) at 2024 2205  Last data filed at 2024 1629  Gross per 24 hour   Intake --   Output 1400 ml   Net -1400 ml     Objective:  General Appearance:  Comfortable.    Vital signs: (most recent): Blood pressure (!) 101/38, pulse 64, temperature 97.7 °F (36.5 °C), temperature source Oral, resp. rate 18, height 1.778 m (5' 10\"), weight (!) 143.9 kg (317 lb 4.8 oz), SpO2 98 %.  Vital signs are normal.    HEENT: Normal HEENT exam.    Lungs:  Normal effort and normal respiratory rate.    Heart: Normal rate.    Abdomen: Abdomen is soft.    Extremities: Normal range of motion.    Neurological: Patient is alert.    Skin:  No rash.     Labs/Imaging/Diagnostics    Labs:  CBC:  Recent Labs     24  1400 24  0517 24  0502   WBC 11.2* 10.5 8.1   RBC 4.24* 3.67* 3.45*   HGB 11.6* 10.0* 9.3*   HCT 36.3* 31.7* 30.2*   MCV 85.6 86.4 87.5   RDW 14.9* 15.0* 15.1*    322 312     CHEMISTRIES:  Recent Labs     24  1442 24  0517 24  0502    139 141   K 4.2 4.7 4.5   CL 99 102 104   CO2 23 25 27   BUN 64* 
Subjective:      Patient ID: Amrik Meraz is a 74 y.o. male    HPI 74 year old male who was found to have right sided hydronephrosis and hydroureter with no renal stone detected  calculus detected in the distal ureter. He voices no current urological complaints. He is voiding independently without intervention. PVR of 176 cc's of urine       Past Medical History:   Diagnosis Date    Chronic kidney disease     Diabetes mellitus (HCC)     Hypertension     Type 2 diabetes mellitus with hyperglycemia 1/6/2023    Type 2 diabetes mellitus without complication, without long-term current use of insulin (HCC) 3/19/2019    Uncontrolled type 2 diabetes mellitus with hyperglycemia (HCC)      Past Surgical History:   Procedure Laterality Date    APPENDECTOMY      age 10     Social History     Socioeconomic History    Marital status: Single     Spouse name: None    Number of children: None    Years of education: None    Highest education level: None   Tobacco Use    Smoking status: Never     Passive exposure: Never    Smokeless tobacco: Never   Vaping Use    Vaping Use: Never used   Substance and Sexual Activity    Alcohol use: Not Currently    Drug use: Never   Social History Narrative    Lives With: Alone    Type of Home: House Two level, Able to Live on Main level with bedroom/bathroom-[address] [place] [address]    Home Access: Stairs to enter without rails - Number of Steps: 6    Bathroom Shower/Tub: Tub/Shower unit    Bathroom Toilet: Standard    Home Equipment: Cane, Rolling walker    ADL Assistance: Independent    Homemaking Assistance: Independent    Homemaking Responsibilities: Yes    Ambulation Assistance: Independent(Cane)    Transfer Assistance: Independent    Active : Yes     Social Determinants of Health     Financial Resource Strain: Low Risk  (1/11/2024)    Overall Financial Resource Strain (CARDIA)     Difficulty of Paying Living Expenses: Not hard at all   Food Insecurity: No Food Insecurity 
  Appearance: He is not diaphoretic.   HENT:      Head: Normocephalic and atraumatic.   Eyes:      Extraocular Movements: Extraocular movements intact.      Pupils: Pupils are equal, round, and reactive to light.   Cardiovascular:      Rate and Rhythm: Normal rate and regular rhythm.   Pulmonary:      Effort: Pulmonary effort is normal. No respiratory distress.      Breath sounds: Normal breath sounds.   Skin:     General: Skin is warm and dry.   Neurological:      General: No focal deficit present.      Mental Status: He is alert and oriented to person, place, and time. Mental status is at baseline.       Exam as noted above.        Medications:  Reviewed    Infusion Medications:    dextrose      sodium chloride       Scheduled Medications:    insulin lispro  0-8 Units SubCUTAneous TID WC    insulin lispro  0-4 Units SubCUTAneous Nightly    allopurinol  100 mg Oral BID    amLODIPine  5 mg Oral Daily    aspirin  81 mg Oral Daily    carvedilol  12.5 mg Oral BID    finasteride  5 mg Oral Daily    fluticasone  2 spray Each Nostril Daily    montelukast  10 mg Oral Nightly    rosuvastatin  20 mg Oral Nightly    tamsulosin  0.4 mg Oral Nightly    insulin glargine  30 Units SubCUTAneous Nightly    insulin lispro  6 Units SubCUTAneous TID WC    sodium chloride flush  5-40 mL IntraVENous 2 times per day    enoxaparin  30 mg SubCUTAneous Daily     PRN Meds: glucose, dextrose bolus **OR** dextrose bolus, glucagon (rDNA), dextrose, sodium chloride flush, sodium chloride, ondansetron **OR** ondansetron, polyethylene glycol, acetaminophen **OR** acetaminophen    Labs:   Recent Labs     04/28/24  1400 04/29/24  0517 04/30/24  0502   WBC 11.2* 10.5 8.1   HGB 11.6* 10.0* 9.3*   HCT 36.3* 31.7* 30.2*    322 312     Recent Labs     04/28/24  1442 04/29/24  0517 04/30/24  0502    139 141   K 4.2 4.7 4.5   CL 99 102 104   CO2 23 25 27   BUN 64* 60* 53*   CREATININE 2.97* 2.78* 2.67*   CALCIUM 9.1 8.8 8.5   PHOS  --  5.1* 
(g/day): ~60 (kg x .8)     Fluid (ml/day): Per MD    Nutrition Diagnosis:   Inadequate oral intake related to  (reported decreased appetite) as evidenced by poor intake prior to admission, weight loss (reported)  Altered nutrition-related lab values related to endocrine dysfuntion, renal dysfunction as evidenced by lab values    Nutrition Interventions:   Food and/or Nutrient Delivery: Continue Current Diet, Start Oral Nutrition Supplement  Nutrition Education/Counseling: No recommendation at this time  Coordination of Nutrition Care: Continue to monitor while inpatient       Goals:     Goals: PO intake 75% or greater       Nutrition Monitoring and Evaluation:      Food/Nutrient Intake Outcomes: Food and Nutrient Intake  Physical Signs/Symptoms Outcomes: Biochemical Data, Weight, Fluid Status or Edema, Meal Time Behavior    Discharge Planning:    Too soon to determine     Ro Owens RD, LD      
3.2 oz)      24HR INTAKE/OUTPUT:    Intake/Output Summary (Last 24 hours) at 4/30/2024 1213  Last data filed at 4/30/2024 0039  Gross per 24 hour   Intake --   Output 750 ml   Net -750 ml       General: alert, in no apparent distress, unkempt  HEENT: normocephalic, atraumatic, anicteric  Lungs: non-labored respirations, clear to auscultation bilaterally  Heart: regular rate and rhythm, no murmurs or rubs  Abdomen: soft, non-tender, non-distended  Ext: no cyanosis, no peripheral edema  Neuro: alert and oriented, no gross abnormalities  Psych: normal mood and affect      Electronically signed by Dony Worthington MD

## 2024-05-01 NOTE — PROGRESS NOTES
Acute Injury New Patient Visit    CC: No chief complaint on file.      HPI: Iban is a 74 y.o. male presents today for evaluation for acute left elbow injury sustained a few days ago after he fell out of bed. He went to the ER where x-rays were taken and was told that he has a fracture. He si here for initial evaluation.         Review of Systems   GENERAL: Negative for malaise, significant weight loss, fever  MUSCULOSKELETAL: See HPI  NEURO:  Negative for numbness / tingling     Past Medical History  No past medical history on file.    Medication review  Medication Documentation Review Audit    **Prior to Admission medications have not yet been reviewed**         Allergies  Not on File    Social History  Social History     Socioeconomic History    Marital status: Unknown     Spouse name: Not on file    Number of children: Not on file    Years of education: Not on file    Highest education level: Not on file   Occupational History    Not on file   Tobacco Use    Smoking status: Not on file    Smokeless tobacco: Not on file   Substance and Sexual Activity    Alcohol use: Not on file    Drug use: Not on file    Sexual activity: Not on file   Other Topics Concern    Not on file   Social History Narrative    Not on file     Social Determinants of Health     Financial Resource Strain: Low Risk  (1/11/2024)    Received from [facility]    Overall Financial Resource Strain (CARDIA)     Difficulty of Paying Living Expenses: Not hard at all   Food Insecurity: No Food Insecurity (4/28/2024)    Received from [facility]    Hunger Vital Sign     Worried About Running Out of Food in the Last Year: Never true     Ran Out of Food in the Last Year: Never true   Transportation Needs: No Transportation Needs (4/28/2024)    Received from [facility]    PRAPARE - Transportation     Lack of Transportation (Medical): No     Lack of Transportation (Non-Medical): No    Physical Activity: Inactive (1/6/2023)    Received from [facility]    Exercise Vital Sign     Days of Exercise per Week: 0 days     Minutes of Exercise per Session: 0 min   Stress: Not on file   Social Connections: Not on file   Intimate Partner Violence: Not on file   Housing Stability: Low Risk  (4/28/2024)    Received from [facility] StatusPage [facility]    Housing Stability Vital Sign     Unable to Pay for Housing in the Last Year: No     Number of Places Lived in the Last Year: 1     Unstable Housing in the Last Year: No       Surgical History  No past surgical history on file.    Physical Exam:  GENERAL:  Patient is awake, alert, and oriented to person place and time.  Patient appears well nourished and well kept.  Affect Calm, Not Acutely Distressed.  HEENT:  Normocephalic, Atraumatic, EOMI  CARDIOVASCULAR:  Hemodynamically stable.  RESPIRATORY:  Normal respirations with unlabored breathing.  Extremity: Left elbow shows small skin tear on the lateral aspect.  No active bleeding.  No active drainage.  No clinical signs infection.  He can flex left elbow to 130 degrees and full extension at 0 degrees.  No pain with pronation supination.  Distal biceps intact.  Distal triceps intact.  Mild pain of the radial head and neck.  Right elbow was examined for comparison.      Diagnostics: X-rays reviewed  No image results found.      Procedure: None    Assessment: Acute left elbow radial head fracture    Plan: Iban presents today for initial evaluation for acute left elbow injury sustained a few days ago after a fall. He is clinically doing well.  X-rays reveal a small radial head fracture, recommended nonsurgical treatment, we placed him into sling for comfort, with early passive range of motion exercises.  Recommend fall precautions. He will follow-up in several weeks for reevaluation, and repeat x-rays of the left elbow 3 views AP, lateral and  oblique view.  May consider some physical  therapy.  Wound care instructions were discussed with the patient.    No orders of the defined types were placed in this encounter.     At the conclusion of the visit there were no further questions by the patient/family regarding their plan of care.  Patient was instructed to call or return with any issues, questions, or concerns regarding their injury and/or treatment plan described above.     05/01/24 at 1:49 PM - Marcelo Park MD  Scribe Attestation  By signing my name below, I, Imtiaz Krista, Scribe   attest that this documentation has been prepared under the direction and in the presence of Marcelo Park MD.    Office: [phone]    This note was prepared using voice recognition software.  The details of this note are correct and have been reviewed, and corrected to the best of my ability.  Some grammatical errors may persist related to the Dragon software.

## 2024-05-13 LAB
ALBUMIN SERPL-MCNC: 3.2 G/DL (ref 3.5–4.6)
ANION GAP SERPL CALCULATED.3IONS-SCNC: 14 MEQ/L (ref 9–15)
BILIRUB UR QL STRIP: NEGATIVE
BUN SERPL-MCNC: 39 MG/DL (ref 8–23)
CALCIUM SERPL-MCNC: 8.5 MG/DL (ref 8.5–9.9)
CHLORIDE SERPL-SCNC: 96 MEQ/L (ref 95–107)
CLARITY UR: ABNORMAL
CO2 SERPL-SCNC: 25 MEQ/L (ref 20–31)
COLOR UR: YELLOW
CREAT SERPL-MCNC: 2.44 MG/DL (ref 0.7–1.2)
GLUCOSE SERPL-MCNC: 353 MG/DL (ref 70–99)
GLUCOSE UR STRIP-MCNC: NEGATIVE MG/DL
HGB UR QL STRIP: NEGATIVE
KETONES UR STRIP-MCNC: NEGATIVE MG/DL
LEUKOCYTE ESTERASE UR QL STRIP: ABNORMAL
NITRITE UR QL STRIP: NEGATIVE
PH UR STRIP: 5.5 [PH] (ref 5–9)
PHOSPHATE SERPL-MCNC: 4.3 MG/DL (ref 2.3–4.8)
POTASSIUM SERPL-SCNC: 4.1 MEQ/L (ref 3.4–4.9)
PROT UR STRIP-MCNC: ABNORMAL MG/DL
SODIUM SERPL-SCNC: 135 MEQ/L (ref 135–144)
SP GR UR STRIP: 1.01 (ref 1–1.03)
UROBILINOGEN UR STRIP-ACNC: 0.2 E.U./DL

## 2024-05-16 ENCOUNTER — TELEPHONE (OUTPATIENT)
Dept: ENDOCRINOLOGY | Age: 74
End: 2024-05-16

## 2024-08-14 ENCOUNTER — HOSPITAL ENCOUNTER (EMERGENCY)
Age: 74
Discharge: HOME | End: 2024-08-14
Payer: OTHER GOVERNMENT

## 2024-08-14 ENCOUNTER — APPOINTMENT (OUTPATIENT)
Dept: CT IMAGING | Age: 74
End: 2024-08-14
Payer: OTHER GOVERNMENT

## 2024-08-14 ENCOUNTER — APPOINTMENT (OUTPATIENT)
Dept: GENERAL RADIOLOGY | Age: 74
End: 2024-08-14
Payer: OTHER GOVERNMENT

## 2024-08-14 VITALS
TEMPERATURE: 98.9 F | BODY MASS INDEX: 46.61 KG/M2 | WEIGHT: 290 LBS | SYSTOLIC BLOOD PRESSURE: 144 MMHG | HEIGHT: 66 IN | OXYGEN SATURATION: 94 % | DIASTOLIC BLOOD PRESSURE: 65 MMHG | HEART RATE: 70 BPM | RESPIRATION RATE: 26 BRPM

## 2024-08-14 DIAGNOSIS — N18.9 CHRONIC KIDNEY DISEASE, UNSPECIFIED CKD STAGE: ICD-10-CM

## 2024-08-14 DIAGNOSIS — S09.90XA INJURY OF HEAD, INITIAL ENCOUNTER: Primary | ICD-10-CM

## 2024-08-14 DIAGNOSIS — R05.1 ACUTE COUGH: ICD-10-CM

## 2024-08-14 DIAGNOSIS — E11.65 TYPE 2 DIABETES MELLITUS WITH HYPERGLYCEMIA, UNSPECIFIED WHETHER LONG TERM INSULIN USE (HCC): ICD-10-CM

## 2024-08-14 DIAGNOSIS — S62.609A CLOSED FRACTURE OF PHALANX OF DIGIT OF HAND, INITIAL ENCOUNTER: ICD-10-CM
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
## 2024-08-14 DIAGNOSIS — S63.259A DISLOCATION OF FINGER, INITIAL ENCOUNTER: ICD-10-CM

## 2024-08-14 DIAGNOSIS — T07.XXXA MULTIPLE ABRASIONS: ICD-10-CM

## 2024-08-14 LAB
ALBUMIN SERPL-MCNC: 3.7 G/DL (ref 3.5–4.6)
ALP SERPL-CCNC: 126 U/L (ref 35–104)
ALT SERPL-CCNC: 7 U/L (ref 0–41)
ANION GAP SERPL CALCULATED.3IONS-SCNC: 12 MEQ/L (ref 9–15)
APTT PPP: 29.9 SEC (ref 24.4–36.8)
AST SERPL-CCNC: 8 U/L (ref 0–40)
BASOPHILS # BLD: 0 K/UL (ref 0–0.2)
BASOPHILS NFR BLD: 0.3 %
BILIRUB SERPL-MCNC: 0.7 MG/DL (ref 0.2–0.7)
BUN SERPL-MCNC: 45 MG/DL (ref 8–23)
CALCIUM SERPL-MCNC: 9.1 MG/DL (ref 8.5–9.9)
CHLORIDE SERPL-SCNC: 97 MEQ/L (ref 95–107)
CO2 SERPL-SCNC: 28 MEQ/L (ref 20–31)
CREAT SERPL-MCNC: 2.69 MG/DL (ref 0.7–1.2)
EOSINOPHIL # BLD: 0.1 K/UL (ref 0–0.7)
EOSINOPHIL NFR BLD: 0.7 %
ERYTHROCYTE [DISTWIDTH] IN BLOOD BY AUTOMATED COUNT: 14.7 % (ref 11.5–14.5)
ETHANOL PERCENT: NORMAL G/DL
ETHANOLAMINE SERPL-MCNC: <10 MG/DL (ref 0–0.08)
GLOBULIN SER CALC-MCNC: 3.8 G/DL (ref 2.3–3.5)
GLUCOSE SERPL-MCNC: 221 MG/DL (ref 70–99)
HCT VFR BLD AUTO: 31.6 % (ref 42–52)
HGB BLD-MCNC: 10.4 G/DL (ref 14–18)
INR PPP: 1.2
LYMPHOCYTES # BLD: 1.5 K/UL (ref 1–4.8)
LYMPHOCYTES NFR BLD: 11.5 %
MCH RBC QN AUTO: 29.1 PG (ref 27–31.3)
MCHC RBC AUTO-ENTMCNC: 32.9 % (ref 33–37)
MCV RBC AUTO: 88.3 FL (ref 79–92.2)
MONOCYTES # BLD: 1.4 K/UL (ref 0.2–0.8)
MONOCYTES NFR BLD: 10.4 %
NEUTROPHILS # BLD: 10.3 K/UL (ref 1.4–6.5)
NEUTS SEG NFR BLD: 76.4 %
PLATELET # BLD AUTO: 224 K/UL (ref 130–400)
POTASSIUM SERPL-SCNC: 4.2 MEQ/L (ref 3.4–4.9)
PROT SERPL-MCNC: 7.5 G/DL (ref 6.3–8)
PROTHROMBIN TIME: 15 SEC (ref 12.3–14.9)
RBC # BLD AUTO: 3.58 M/UL (ref 4.7–6.1)
SODIUM SERPL-SCNC: 137 MEQ/L (ref 135–144)
WBC # BLD AUTO: 13.4 K/UL (ref 4.8–10.8)

## 2024-08-14 PROCEDURE — 73140 X-RAY EXAM OF FINGER(S): CPT

## 2024-08-14 PROCEDURE — 72125 CT NECK SPINE W/O DYE: CPT

## 2024-08-14 PROCEDURE — 26770 TREAT FINGER DISLOCATION: CPT

## 2024-08-14 PROCEDURE — 73130 X-RAY EXAM OF HAND: CPT

## 2024-08-14 PROCEDURE — 99285 EMERGENCY DEPT VISIT HI MDM: CPT

## 2024-08-14 PROCEDURE — 73630 X-RAY EXAM OF FOOT: CPT

## 2024-08-14 PROCEDURE — 85730 THROMBOPLASTIN TIME PARTIAL: CPT

## 2024-08-14 PROCEDURE — 85025 COMPLETE CBC W/AUTO DIFF WBC: CPT

## 2024-08-14 PROCEDURE — 73560 X-RAY EXAM OF KNEE 1 OR 2: CPT

## 2024-08-14 PROCEDURE — 85610 PROTHROMBIN TIME: CPT

## 2024-08-14 PROCEDURE — 70450 CT HEAD/BRAIN W/O DYE: CPT

## 2024-08-14 PROCEDURE — 6370000000 HC RX 637 (ALT 250 FOR IP): Performed by: PHYSICIAN ASSISTANT

## 2024-08-14 PROCEDURE — 82077 ASSAY SPEC XCP UR&BREATH IA: CPT

## 2024-08-14 PROCEDURE — 71046 X-RAY EXAM CHEST 2 VIEWS: CPT

## 2024-08-14 PROCEDURE — 80053 COMPREHEN METABOLIC PANEL: CPT

## 2024-08-14 RX ORDER — BACITRACIN ZINC 500 [USP'U]/G
OINTMENT TOPICAL ONCE
Status: COMPLETED | OUTPATIENT
Start: 2024-08-14 | End: 2024-08-14

## 2024-08-14 RX ORDER — HYDROCODONE BITARTRATE AND ACETAMINOPHEN 5; 325 MG/1; MG/1
1 TABLET ORAL EVERY 8 HOURS PRN
Qty: 12 TABLET | Refills: 0 | Status: SHIPPED | OUTPATIENT
Start: 2024-08-14 | End: 2024-08-18

## 2024-08-14 RX ORDER — AMOXICILLIN AND CLAVULANATE POTASSIUM 500; 125 MG/1; MG/1
1 TABLET, FILM COATED ORAL 2 TIMES DAILY
Qty: 14 TABLET | Refills: 0 | Status: SHIPPED | OUTPATIENT
Start: 2024-08-14 | End: 2024-08-21

## 2024-08-14 RX ADMIN — BACITRACIN ZINC: 500 OINTMENT TOPICAL at 18:58

## 2024-08-14 ASSESSMENT — ENCOUNTER SYMPTOMS
ANAL BLEEDING: 0
BACK PAIN: 0
APNEA: 0
SHORTNESS OF BREATH: 0
COUGH: 1
COLOR CHANGE: 1
ABDOMINAL DISTENTION: 0
NAUSEA: 0
EYE DISCHARGE: 0
ABDOMINAL PAIN: 0
VOMITING: 0

## 2024-08-14 ASSESSMENT — PAIN SCALES - GENERAL: PAINLEVEL_OUTOF10: 2

## 2024-08-14 ASSESSMENT — PAIN - FUNCTIONAL ASSESSMENT: PAIN_FUNCTIONAL_ASSESSMENT: 0-10

## 2024-08-14 NOTE — DISCHARGE INSTRUCTIONS
Follow-up with primary care and orthopedics.  Return to if any symptoms worsen or new symptoms develop.  Wash abrasions with gentle soap and water reapply bacitracin dressings.

## 2024-08-14 NOTE — ED PROVIDER NOTES
abrasions.  Basic labs for trauma CBC CMP PT PTT EtOH.  Patient does not want any pain medication at this time.    Attempted dislocation reduction with 2% lidocaine digital block prepped with Betadine prior.  Using gentle distraction manipulation.  Reduction obtained splint placed.  X-ray obtained appears to be in position with lateral view.    Nursing splinted first digit.  Reviewed patient's laboratory workup CT reports chest x-ray shows possible atelectasis versus infiltrate given the number of abrasions patient has he is diabetic we will add Augmentin pharmacy consulted secondary to CKD they recommend Augmentin 500 twice daily.  Patient does request pain medication will add Norco he states he is taken this before without difficulty.  Every 8 hours 5 mg.  Patient follow-up with primary care orthopedics [facility]  Discussed plan of care with patient.  Patient is agreeable to plan of care.      Amount and/or Complexity of Data Reviewed  Labs: ordered. Decision-making details documented in ED Course.  Radiology: ordered.    Risk  OTC drugs.  Prescription drug management.            REASSESSMENT     ED Course as of 08/14/24 1912   Wed Aug 14, 2024   1747 APTT [GR]   1747 CMP [GR]   1747 CBC with Auto Differential [GR]   1747 Protime-INR [GR]   1747 ETOH [GR]   1830 Ethanol Lvl: <10 [GR]   1830 BUN,BUNPL(!): 45 [GR]   1830 Creatinine(!): 2.69 [GR]   1901 Glucose(!): 221 [GR]      ED Course User Index  [GR] Johan Dimas PA-C            CONSULTS:  None    PROCEDURES:  Unless otherwise noted below, none     Procedures      FINAL IMPRESSION      1. Injury of head, initial encounter    2. Fall, initial encounter    3. Dislocation of finger, initial encounter    4. Closed fracture of phalanx of digit of hand, initial encounter    5. Multiple abrasions    6. Chronic kidney disease, unspecified CKD stage    7. Acute cough    8. Type 2 diabetes mellitus with hyperglycemia, unspecified

## 2024-08-14 NOTE — ED NOTES
Pt ambulated to wheelchair with walker. Gait steady. Right hand MSPs intact. Patient discharge reviewed. Patient verbalizes understanding. Pt assisted into vehicle driven by friend.

## 2024-09-02 DIAGNOSIS — E11.65 UNCONTROLLED TYPE 2 DIABETES MELLITUS WITH HYPERGLYCEMIA (HCC): ICD-10-CM

## 2024-09-03 RX ORDER — BLOOD-GLUCOSE SENSOR
1 EACH MISCELLANEOUS
Qty: 2 EACH | Refills: 3 | Status: SHIPPED | OUTPATIENT
Start: 2024-09-03

## 2024-11-02 ENCOUNTER — APPOINTMENT (OUTPATIENT)
Dept: CT IMAGING | Age: 74
DRG: 689 | End: 2024-11-02
Payer: OTHER GOVERNMENT

## 2024-11-02 ENCOUNTER — APPOINTMENT (OUTPATIENT)
Dept: GENERAL RADIOLOGY | Age: 74
DRG: 689 | End: 2024-11-02
Payer: OTHER GOVERNMENT

## 2024-11-02 ENCOUNTER — HOSPITAL ENCOUNTER (INPATIENT)
Age: 74
LOS: 10 days | Discharge: HOME | DRG: 689 | End: 2024-11-12
Attending: STUDENT IN AN ORGANIZED HEALTH CARE EDUCATION/TRAINING PROGRAM | Admitting: STUDENT IN AN ORGANIZED HEALTH CARE EDUCATION/TRAINING PROGRAM
Payer: OTHER GOVERNMENT
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
## 2024-11-02 DIAGNOSIS — I50.9 CONGESTIVE HEART FAILURE, UNSPECIFIED HF CHRONICITY, UNSPECIFIED HEART FAILURE TYPE (HCC): ICD-10-CM

## 2024-11-02 DIAGNOSIS — R31.9 URINARY TRACT INFECTION WITH HEMATURIA, SITE UNSPECIFIED: ICD-10-CM

## 2024-11-02 DIAGNOSIS — N39.0 URINARY TRACT INFECTION WITH HEMATURIA, SITE UNSPECIFIED: ICD-10-CM

## 2024-11-02 LAB
ALBUMIN SERPL-MCNC: 3.4 G/DL (ref 3.5–4.6)
ALP SERPL-CCNC: 127 U/L (ref 35–104)
ALT SERPL-CCNC: 11 U/L (ref 0–41)
ANION GAP SERPL CALCULATED.3IONS-SCNC: 12 MEQ/L (ref 9–15)
AST SERPL-CCNC: 17 U/L (ref 0–40)
BACTERIA URNS QL MICRO: NEGATIVE /HPF
BASE EXCESS ARTERIAL: 0 (ref -3–3)
BASOPHILS # BLD: 0.1 K/UL (ref 0–0.2)
BASOPHILS NFR BLD: 0.3 %
BILIRUB SERPL-MCNC: 0.5 MG/DL (ref 0.2–0.7)
BILIRUB UR QL STRIP: NEGATIVE
BNP BLD-MCNC: 1207 PG/ML
BUN SERPL-MCNC: 59 MG/DL (ref 8–23)
CALCIUM IONIZED: 1.17 MMOL/L (ref 1.12–1.32)
CALCIUM SERPL-MCNC: 8.6 MG/DL (ref 8.5–9.9)
CHLORIDE SERPL-SCNC: 103 MEQ/L (ref 95–107)
CK SERPL-CCNC: 79 U/L (ref 0–190)
CLARITY UR: ABNORMAL
CO2 SERPL-SCNC: 25 MEQ/L (ref 20–31)
COLOR UR: YELLOW
CREAT SERPL-MCNC: 3.18 MG/DL (ref 0.7–1.2)
EOSINOPHIL # BLD: 0.1 K/UL (ref 0–0.7)
EOSINOPHIL NFR BLD: 0.8 %
EPI CELLS #/AREA URNS AUTO: ABNORMAL /HPF (ref 0–5)
ERYTHROCYTE [DISTWIDTH] IN BLOOD BY AUTOMATED COUNT: 16.3 % (ref 11.5–14.5)
GLOBULIN SER CALC-MCNC: 3.6 G/DL (ref 2.3–3.5)
GLUCOSE BLD-MCNC: 270 MG/DL (ref 70–99)
GLUCOSE BLD-MCNC: 278 MG/DL (ref 70–99)
GLUCOSE BLD-MCNC: 283 MG/DL (ref 70–99)
GLUCOSE BLD-MCNC: 301 MG/DL (ref 70–99)
GLUCOSE BLD-MCNC: 344 MG/DL (ref 70–99)
GLUCOSE SERPL-MCNC: 266 MG/DL (ref 70–99)
GLUCOSE UR STRIP-MCNC: 100 MG/DL
HCO3 ARTERIAL: 25.2 MMOL/L (ref 21–29)
HCT VFR BLD AUTO: 32 % (ref 41–53)
HCT VFR BLD AUTO: 32.4 % (ref 42–52)
HGB BLD CALC-MCNC: 10.9 GM/DL (ref 13.5–17.5)
HGB BLD-MCNC: 10.4 G/DL (ref 14–18)
HGB UR QL STRIP: ABNORMAL
HYALINE CASTS #/AREA URNS AUTO: ABNORMAL /HPF (ref 0–5)
KETONES UR STRIP-MCNC: NEGATIVE MG/DL
LACTATE BLDV-SCNC: 1.8 MMOL/L (ref 0.5–2.2)
LACTATE: 1.09 MMOL/L (ref 0.4–2)
LEUKOCYTE ESTERASE UR QL STRIP: ABNORMAL
LYMPHOCYTES # BLD: 0.4 K/UL (ref 1–4.8)
LYMPHOCYTES NFR BLD: 2.4 %
MAGNESIUM SERPL-MCNC: 2.2 MG/DL (ref 1.7–2.4)
MCH RBC QN AUTO: 28.3 PG (ref 27–31.3)
MCHC RBC AUTO-ENTMCNC: 32.1 % (ref 33–37)
MCV RBC AUTO: 88.3 FL (ref 79–92.2)
MONOCYTES # BLD: 1.5 K/UL (ref 0.2–0.8)
MONOCYTES NFR BLD: 8.6 %
NEUTROPHILS # BLD: 14.8 K/UL (ref 1.4–6.5)
NEUTS SEG NFR BLD: 87.3 %
NITRITE UR QL STRIP: NEGATIVE
O2 SAT, ARTERIAL: 93 % (ref 93–100)
PCO2 ARTERIAL: 44 MM HG (ref 35–45)
PERFORMED ON: ABNORMAL
PH ARTERIAL: 7.37 (ref 7.35–7.45)
PH UR STRIP: 5.5 [PH] (ref 5–9)
PLATELET # BLD AUTO: 230 K/UL (ref 130–400)
PO2 ARTERIAL: 71 MM HG (ref 75–108)
POC CHLORIDE: 104 MEQ/L (ref 99–110)
POC CREATININE: 2.7 MG/DL (ref 0.8–1.3)
POC FIO2: 3
POC SAMPLE TYPE: ABNORMAL
POTASSIUM SERPL-SCNC: 3.7 MEQ/L (ref 3.4–4.9)
POTASSIUM SERPL-SCNC: 3.7 MEQ/L (ref 3.5–5.1)
PROCALCITONIN SERPL IA-MCNC: 48.93 NG/ML (ref 0–0.15)
PROT SERPL-MCNC: 7 G/DL (ref 6.3–8)
PROT UR STRIP-MCNC: 100 MG/DL
RBC # BLD AUTO: 3.67 M/UL (ref 4.7–6.1)
RBC #/AREA URNS AUTO: ABNORMAL /HPF (ref 0–5)
SODIUM BLD-SCNC: 140 MEQ/L (ref 136–145)
SODIUM SERPL-SCNC: 140 MEQ/L (ref 135–144)
SP GR UR STRIP: 1.01 (ref 1–1.03)
TCO2 ARTERIAL: 27 MMOL/L (ref 21–32)
URINE REFLEX TO CULTURE: YES
UROBILINOGEN UR STRIP-ACNC: 0.2 E.U./DL
WBC # BLD AUTO: 16.9 K/UL (ref 4.8–10.8)
WBC #/AREA URNS AUTO: >100 /HPF (ref 0–5)

## 2024-11-02 PROCEDURE — 81001 URINALYSIS AUTO W/SCOPE: CPT

## 2024-11-02 PROCEDURE — 82565 ASSAY OF CREATININE: CPT

## 2024-11-02 PROCEDURE — 2580000003 HC RX 258: Performed by: STUDENT IN AN ORGANIZED HEALTH CARE EDUCATION/TRAINING PROGRAM

## 2024-11-02 PROCEDURE — 85014 HEMATOCRIT: CPT

## 2024-11-02 PROCEDURE — 96365 THER/PROPH/DIAG IV INF INIT: CPT

## 2024-11-02 PROCEDURE — 82330 ASSAY OF CALCIUM: CPT

## 2024-11-02 PROCEDURE — 6370000000 HC RX 637 (ALT 250 FOR IP): Performed by: STUDENT IN AN ORGANIZED HEALTH CARE EDUCATION/TRAINING PROGRAM

## 2024-11-02 PROCEDURE — 93005 ELECTROCARDIOGRAM TRACING: CPT | Performed by: STUDENT IN AN ORGANIZED HEALTH CARE EDUCATION/TRAINING PROGRAM

## 2024-11-02 PROCEDURE — 71045 X-RAY EXAM CHEST 1 VIEW: CPT

## 2024-11-02 PROCEDURE — 82803 BLOOD GASES ANY COMBINATION: CPT

## 2024-11-02 PROCEDURE — 36600 WITHDRAWAL OF ARTERIAL BLOOD: CPT

## 2024-11-02 PROCEDURE — 96374 THER/PROPH/DIAG INJ IV PUSH: CPT

## 2024-11-02 PROCEDURE — 87086 URINE CULTURE/COLONY COUNT: CPT

## 2024-11-02 PROCEDURE — 85025 COMPLETE CBC W/AUTO DIFF WBC: CPT

## 2024-11-02 PROCEDURE — 87186 SC STD MICRODIL/AGAR DIL: CPT

## 2024-11-02 PROCEDURE — 99285 EMERGENCY DEPT VISIT HI MDM: CPT

## 2024-11-02 PROCEDURE — 72125 CT NECK SPINE W/O DYE: CPT

## 2024-11-02 PROCEDURE — 87040 BLOOD CULTURE FOR BACTERIA: CPT

## 2024-11-02 PROCEDURE — 6360000002 HC RX W HCPCS: Performed by: STUDENT IN AN ORGANIZED HEALTH CARE EDUCATION/TRAINING PROGRAM

## 2024-11-02 PROCEDURE — 84132 ASSAY OF SERUM POTASSIUM: CPT

## 2024-11-02 PROCEDURE — 84295 ASSAY OF SERUM SODIUM: CPT

## 2024-11-02 PROCEDURE — 87077 CULTURE AEROBIC IDENTIFY: CPT

## 2024-11-02 PROCEDURE — 74176 CT ABD & PELVIS W/O CONTRAST: CPT

## 2024-11-02 PROCEDURE — 83735 ASSAY OF MAGNESIUM: CPT

## 2024-11-02 PROCEDURE — 83605 ASSAY OF LACTIC ACID: CPT

## 2024-11-02 PROCEDURE — 82435 ASSAY OF BLOOD CHLORIDE: CPT

## 2024-11-02 PROCEDURE — 82550 ASSAY OF CK (CPK): CPT

## 2024-11-02 PROCEDURE — 80053 COMPREHEN METABOLIC PANEL: CPT

## 2024-11-02 PROCEDURE — 36415 COLL VENOUS BLD VENIPUNCTURE: CPT

## 2024-11-02 PROCEDURE — 71250 CT THORAX DX C-: CPT

## 2024-11-02 PROCEDURE — 84145 PROCALCITONIN (PCT): CPT

## 2024-11-02 PROCEDURE — 70450 CT HEAD/BRAIN W/O DYE: CPT

## 2024-11-02 PROCEDURE — 83880 ASSAY OF NATRIURETIC PEPTIDE: CPT

## 2024-11-02 PROCEDURE — 1210000000 HC MED SURG R&B

## 2024-11-02 RX ORDER — CARVEDILOL 12.5 MG/1
12.5 TABLET ORAL 2 TIMES DAILY
Status: DISCONTINUED | OUTPATIENT
Start: 2024-11-02 | End: 2024-11-12 | Stop reason: HOSPADM

## 2024-11-02 RX ORDER — TAMSULOSIN HYDROCHLORIDE 0.4 MG/1
0.8 CAPSULE ORAL NIGHTLY
Status: DISCONTINUED | OUTPATIENT
Start: 2024-11-02 | End: 2024-11-12 | Stop reason: HOSPADM

## 2024-11-02 RX ORDER — GLUCAGON 1 MG/ML
1 KIT INJECTION PRN
Status: DISCONTINUED | OUTPATIENT
Start: 2024-11-02 | End: 2024-11-12 | Stop reason: HOSPADM

## 2024-11-02 RX ORDER — ONDANSETRON 4 MG/1
4 TABLET, ORALLY DISINTEGRATING ORAL EVERY 8 HOURS PRN
Status: DISCONTINUED | OUTPATIENT
Start: 2024-11-02 | End: 2024-11-12 | Stop reason: HOSPADM

## 2024-11-02 RX ORDER — TORSEMIDE 20 MG/1
60 TABLET ORAL DAILY
Status: DISCONTINUED | OUTPATIENT
Start: 2024-11-03 | End: 2024-11-02

## 2024-11-02 RX ORDER — ONDANSETRON 2 MG/ML
4 INJECTION INTRAMUSCULAR; INTRAVENOUS EVERY 6 HOURS PRN
Status: DISCONTINUED | OUTPATIENT
Start: 2024-11-02 | End: 2024-11-12 | Stop reason: HOSPADM

## 2024-11-02 RX ORDER — POLYETHYLENE GLYCOL 3350 17 G/17G
17 POWDER, FOR SOLUTION ORAL DAILY PRN
Status: DISCONTINUED | OUTPATIENT
Start: 2024-11-02 | End: 2024-11-12 | Stop reason: HOSPADM

## 2024-11-02 RX ORDER — SODIUM CHLORIDE 9 MG/ML
INJECTION, SOLUTION INTRAVENOUS PRN
Status: DISCONTINUED | OUTPATIENT
Start: 2024-11-02 | End: 2024-11-12 | Stop reason: HOSPADM

## 2024-11-02 RX ORDER — CETIRIZINE HYDROCHLORIDE 10 MG/1
10 TABLET ORAL DAILY
Status: DISCONTINUED | OUTPATIENT
Start: 2024-11-03 | End: 2024-11-12 | Stop reason: HOSPADM

## 2024-11-02 RX ORDER — DEXTROSE MONOHYDRATE 100 MG/ML
INJECTION, SOLUTION INTRAVENOUS CONTINUOUS PRN
Status: DISCONTINUED | OUTPATIENT
Start: 2024-11-02 | End: 2024-11-12 | Stop reason: HOSPADM

## 2024-11-02 RX ORDER — SODIUM CHLORIDE 0.9 % (FLUSH) 0.9 %
5-40 SYRINGE (ML) INJECTION PRN
Status: DISCONTINUED | OUTPATIENT
Start: 2024-11-02 | End: 2024-11-12 | Stop reason: HOSPADM

## 2024-11-02 RX ORDER — INSULIN GLARGINE 100 [IU]/ML
15 INJECTION, SOLUTION SUBCUTANEOUS NIGHTLY
Status: DISCONTINUED | OUTPATIENT
Start: 2024-11-02 | End: 2024-11-05

## 2024-11-02 RX ORDER — MONTELUKAST SODIUM 10 MG/1
10 TABLET ORAL NIGHTLY
Status: DISCONTINUED | OUTPATIENT
Start: 2024-11-02 | End: 2024-11-12 | Stop reason: HOSPADM

## 2024-11-02 RX ORDER — ROSUVASTATIN CALCIUM 20 MG/1
20 TABLET, COATED ORAL NIGHTLY
Status: DISCONTINUED | OUTPATIENT
Start: 2024-11-02 | End: 2024-11-04

## 2024-11-02 RX ORDER — 0.9 % SODIUM CHLORIDE 0.9 %
1000 INTRAVENOUS SOLUTION INTRAVENOUS ONCE
Status: COMPLETED | OUTPATIENT
Start: 2024-11-02 | End: 2024-11-02

## 2024-11-02 RX ORDER — SODIUM CHLORIDE 0.9 % (FLUSH) 0.9 %
5-40 SYRINGE (ML) INJECTION EVERY 12 HOURS SCHEDULED
Status: DISCONTINUED | OUTPATIENT
Start: 2024-11-02 | End: 2024-11-12 | Stop reason: HOSPADM

## 2024-11-02 RX ORDER — ACETAMINOPHEN 650 MG/1
650 SUPPOSITORY RECTAL EVERY 6 HOURS PRN
Status: DISCONTINUED | OUTPATIENT
Start: 2024-11-02 | End: 2024-11-12 | Stop reason: HOSPADM

## 2024-11-02 RX ORDER — ACETAMINOPHEN 325 MG/1
650 TABLET ORAL EVERY 6 HOURS PRN
Status: DISCONTINUED | OUTPATIENT
Start: 2024-11-02 | End: 2024-11-12 | Stop reason: HOSPADM

## 2024-11-02 RX ORDER — INSULIN LISPRO 100 [IU]/ML
0-8 INJECTION, SOLUTION INTRAVENOUS; SUBCUTANEOUS
Status: DISCONTINUED | OUTPATIENT
Start: 2024-11-02 | End: 2024-11-06

## 2024-11-02 RX ADMIN — SODIUM CHLORIDE 1000 ML: 9 INJECTION, SOLUTION INTRAVENOUS at 09:13

## 2024-11-02 RX ADMIN — CEFEPIME 2000 MG: 2 INJECTION, POWDER, FOR SOLUTION INTRAVENOUS at 09:13

## 2024-11-02 RX ADMIN — TAMSULOSIN HYDROCHLORIDE 0.8 MG: 0.4 CAPSULE ORAL at 21:55

## 2024-11-02 RX ADMIN — INSULIN LISPRO 4 UNITS: 100 INJECTION, SOLUTION INTRAVENOUS; SUBCUTANEOUS at 21:56

## 2024-11-02 RX ADMIN — INSULIN GLARGINE 15 UNITS: 100 INJECTION, SOLUTION SUBCUTANEOUS at 21:55

## 2024-11-02 RX ADMIN — SODIUM CHLORIDE 1500 MG: 9 INJECTION, SOLUTION INTRAVENOUS at 10:02

## 2024-11-02 RX ADMIN — ROSUVASTATIN CALCIUM 20 MG: 20 TABLET, FILM COATED ORAL at 21:55

## 2024-11-02 RX ADMIN — Medication 10 ML: at 21:56

## 2024-11-02 RX ADMIN — INSULIN LISPRO 6 UNITS: 100 INJECTION, SOLUTION INTRAVENOUS; SUBCUTANEOUS at 12:49

## 2024-11-02 RX ADMIN — CARVEDILOL 12.5 MG: 12.5 TABLET, FILM COATED ORAL at 21:55

## 2024-11-02 RX ADMIN — MONTELUKAST 10 MG: 10 TABLET, FILM COATED ORAL at 21:55

## 2024-11-02 RX ADMIN — INSULIN LISPRO 6 UNITS: 100 INJECTION, SOLUTION INTRAVENOUS; SUBCUTANEOUS at 16:47

## 2024-11-02 RX ADMIN — WATER 1000 MG: 1 INJECTION INTRAMUSCULAR; INTRAVENOUS; SUBCUTANEOUS at 22:02

## 2024-11-02 ASSESSMENT — LIFESTYLE VARIABLES
HOW MANY STANDARD DRINKS CONTAINING ALCOHOL DO YOU HAVE ON A TYPICAL DAY: 1 OR 2
HOW OFTEN DO YOU HAVE A DRINK CONTAINING ALCOHOL: MONTHLY OR LESS

## 2024-11-02 ASSESSMENT — PAIN - FUNCTIONAL ASSESSMENT: PAIN_FUNCTIONAL_ASSESSMENT: NONE - DENIES PAIN

## 2024-11-02 NOTE — ED NOTES
Pt brief changed at this time   Pt has male external cath in place  Pt remains alert and oriented times 4 when awoken at this time  Pt assisted with rolling in bed for care  Pt provided warm blankets at this time

## 2024-11-02 NOTE — ED TRIAGE NOTES
Patient reports bright red blood in his urine that started at 8pm last night. Takes ASA. He had two falls last night from the bed to his buttocks x1, and knees x1. Denies LOC. Reports increased bilateral knee weakness x2 years and worse in the last 6 months. States his knee pain is his baseline ache

## 2024-11-02 NOTE — H&P
on file   Social History Narrative    Lives With: Alone    Type of Home: House Two level, Able to Live on Main level with bedroom/bathroom-[address] [facility]    Home Access: Stairs to enter without rails - Number of Steps: 6    Bathroom Shower/Tub: Tub/Shower unit    Bathroom Toilet: Standard    Home Equipment: Cane, Rolling walker    ADL Assistance: Independent    Homemaking Assistance: Independent    Homemaking Responsibilities: Yes    Ambulation Assistance: Independent(Cane)    Transfer Assistance: Independent    Active : Yes     Social Determinants of Health     Financial Resource Strain: Low Risk  (1/11/2024)    Overall Financial Resource Strain (CARDIA)     Difficulty of Paying Living Expenses: Not hard at all   Food Insecurity: No Food Insecurity (4/28/2024)    Hunger Vital Sign     Worried About Running Out of Food in the Last Year: Never true     Ran Out of Food in the Last Year: Never true   Transportation Needs: No Transportation Needs (4/28/2024)    PRAPARE - Transportation     Lack of Transportation (Medical): No     Lack of Transportation (Non-Medical): No   Physical Activity: Inactive (1/6/2023)    Exercise Vital Sign     Days of Exercise per Week: 0 days     Minutes of Exercise per Session: 0 min   Stress: Not on file   Social Connections: Not on file   Intimate Partner Violence: Not on file   Housing Stability: Low Risk  (4/28/2024)    Housing Stability Vital Sign     Unable to Pay for Housing in the Last Year: No     Number of Places Lived in the Last Year: 1     Unstable Housing in the Last Year: No     MEDICATIONS: reviewed    ALLERGIES: Niacin and Fluorescein    REVIEW OF SYSTEM:   12 point ROS reviewed and negative other than stated above in the HPI.    OBJECTIVE  PHYSICAL EXAM: BP (!) 116/54   Pulse 78   Temp 99 °F (37.2 °C) (Oral)   Resp 20   Ht 1.676 m (5' 6\")   Wt 131.5 kg (290 lb)   SpO2 97%   BMI 46.81 kg/m²   Physical Exam  Cardiovascular:      Rate and

## 2024-11-02 NOTE — ED NOTES
Pt straight cath completed at this time to obtain urine   Pt has cloudy urine with red flecks at this time  Pt tolerated procedure

## 2024-11-02 NOTE — ED NOTES
Pt SPO2 92% on home O2 of 3L via NC at this time. Pt has no complaints and is resting with eyes closed at this time. Pt is easily awoken when spoken to. Pt remains alert and oriented times 4 at this time  Pt increased O2 to 4 L via NC

## 2024-11-02 NOTE — ED PROVIDER NOTES
[facility]  4[facility] MED SURG UNIT  EMERGENCY DEPARTMENT ENCOUNTER      Pt Name: Amrik Meraz  MRN: [id number]  Birthdate 1950  Date of evaluation: 11/2/2024  Provider: Papi Freeman MD  6:02 AM EDT    CHIEF COMPLAINT       Chief Complaint   Patient presents with    Hematuria    Fall         HISTORY OF PRESENT ILLNESS   (Location/Symptom, Timing/Onset, Context/Setting, Quality, Duration, Modifying Factors, Severity)  Note limiting factors.   Amrik Meraz is a 74 y.o. male with past medical history of DMII, CKD, sciatica, hypertension, CAD, ILIANA, hyperlipidemia, and pulmonary hypertension who presents to the emergency department per EMS after fall at home.  Patient states that he was rolling out of bed and rolled too far and landed on his knees.  Patient denies loss of consciousness or hitting his head.  Patient also states that he started having episodes of hematuria last night at 8 PM which is new for him.  His complaints today are knee pain and hematuria.  He denies headache, chest pain, shortness of breath, abdominal pain, hematochezia, hematemesis, or constipation.  Patient is on blood thinners.  Patient is on 4 L of oxygen at home.  Patient uses wheelchair at home.    HPI    Nursing Notes were reviewed.    REVIEW OF SYSTEMS    (2-9 systems for level 4, 10 or more for level 5)     Review of Systems   Genitourinary:  Positive for hematuria.   Musculoskeletal:  Positive for arthralgias.       Except as noted above the remainder of the review of systems was reviewed and negative.       PAST MEDICAL HISTORY     Past Medical History:   Diagnosis Date    Chronic kidney disease     Diabetes mellitus (HCC)     Hypertension     Type 2 diabetes mellitus with hyperglycemia 1/6/2023    Type 2 diabetes mellitus without complication, without long-term current use of insulin (HCC) 3/19/2019    Uncontrolled type 2 diabetes mellitus with hyperglycemia (HCC)          SURGICAL HISTORY       Past Surgical History:   Procedure

## 2024-11-03 LAB
ANION GAP SERPL CALCULATED.3IONS-SCNC: 11 MEQ/L (ref 9–15)
BASOPHILS # BLD: 0 K/UL (ref 0–0.2)
BASOPHILS NFR BLD: 0.2 %
BUN SERPL-MCNC: 56 MG/DL (ref 8–23)
CALCIUM SERPL-MCNC: 8.2 MG/DL (ref 8.5–9.9)
CHLORIDE SERPL-SCNC: 107 MEQ/L (ref 95–107)
CO2 SERPL-SCNC: 22 MEQ/L (ref 20–31)
CREAT SERPL-MCNC: 2.96 MG/DL (ref 0.7–1.2)
EOSINOPHIL # BLD: 0.2 K/UL (ref 0–0.7)
EOSINOPHIL NFR BLD: 1.3 %
ERYTHROCYTE [DISTWIDTH] IN BLOOD BY AUTOMATED COUNT: 16.7 % (ref 11.5–14.5)
GLUCOSE BLD-MCNC: 207 MG/DL (ref 70–99)
GLUCOSE BLD-MCNC: 208 MG/DL (ref 70–99)
GLUCOSE BLD-MCNC: 211 MG/DL (ref 70–99)
GLUCOSE BLD-MCNC: 230 MG/DL (ref 70–99)
GLUCOSE SERPL-MCNC: 188 MG/DL (ref 70–99)
HCT VFR BLD AUTO: 28.2 % (ref 42–52)
HGB BLD-MCNC: 8.7 G/DL (ref 14–18)
LYMPHOCYTES # BLD: 1.5 K/UL (ref 1–4.8)
LYMPHOCYTES NFR BLD: 12.1 %
MCH RBC QN AUTO: 27.5 PG (ref 27–31.3)
MCHC RBC AUTO-ENTMCNC: 30.9 % (ref 33–37)
MCV RBC AUTO: 89.2 FL (ref 79–92.2)
MONOCYTES # BLD: 1.2 K/UL (ref 0.2–0.8)
MONOCYTES NFR BLD: 10.1 %
NEUTROPHILS # BLD: 9.3 K/UL (ref 1.4–6.5)
NEUTS SEG NFR BLD: 75.7 %
PERFORMED ON: ABNORMAL
PLATELET # BLD AUTO: 177 K/UL (ref 130–400)
POTASSIUM SERPL-SCNC: 4.3 MEQ/L (ref 3.4–4.9)
RBC # BLD AUTO: 3.16 M/UL (ref 4.7–6.1)
SODIUM SERPL-SCNC: 140 MEQ/L (ref 135–144)
WBC # BLD AUTO: 12.2 K/UL (ref 4.8–10.8)

## 2024-11-03 PROCEDURE — 2580000003 HC RX 258: Performed by: STUDENT IN AN ORGANIZED HEALTH CARE EDUCATION/TRAINING PROGRAM

## 2024-11-03 PROCEDURE — 2700000000 HC OXYGEN THERAPY PER DAY

## 2024-11-03 PROCEDURE — 6360000002 HC RX W HCPCS: Performed by: STUDENT IN AN ORGANIZED HEALTH CARE EDUCATION/TRAINING PROGRAM

## 2024-11-03 PROCEDURE — 1210000000 HC MED SURG R&B

## 2024-11-03 PROCEDURE — 80048 BASIC METABOLIC PNL TOTAL CA: CPT

## 2024-11-03 PROCEDURE — 85025 COMPLETE CBC W/AUTO DIFF WBC: CPT

## 2024-11-03 PROCEDURE — 6370000000 HC RX 637 (ALT 250 FOR IP): Performed by: STUDENT IN AN ORGANIZED HEALTH CARE EDUCATION/TRAINING PROGRAM

## 2024-11-03 PROCEDURE — 36415 COLL VENOUS BLD VENIPUNCTURE: CPT

## 2024-11-03 RX ADMIN — WATER 1000 MG: 1 INJECTION INTRAMUSCULAR; INTRAVENOUS; SUBCUTANEOUS at 21:12

## 2024-11-03 RX ADMIN — TAMSULOSIN HYDROCHLORIDE 0.8 MG: 0.4 CAPSULE ORAL at 21:12

## 2024-11-03 RX ADMIN — CARVEDILOL 12.5 MG: 12.5 TABLET, FILM COATED ORAL at 21:12

## 2024-11-03 RX ADMIN — INSULIN GLARGINE 15 UNITS: 100 INJECTION, SOLUTION SUBCUTANEOUS at 21:12

## 2024-11-03 RX ADMIN — Medication 10 ML: at 21:15

## 2024-11-03 RX ADMIN — INSULIN LISPRO 2 UNITS: 100 INJECTION, SOLUTION INTRAVENOUS; SUBCUTANEOUS at 21:11

## 2024-11-03 RX ADMIN — CETIRIZINE HYDROCHLORIDE 10 MG: 10 TABLET, FILM COATED ORAL at 09:04

## 2024-11-03 RX ADMIN — MONTELUKAST 10 MG: 10 TABLET, FILM COATED ORAL at 21:12

## 2024-11-03 RX ADMIN — INSULIN LISPRO 2 UNITS: 100 INJECTION, SOLUTION INTRAVENOUS; SUBCUTANEOUS at 17:27

## 2024-11-03 RX ADMIN — CARVEDILOL 12.5 MG: 12.5 TABLET, FILM COATED ORAL at 09:04

## 2024-11-03 RX ADMIN — INSULIN LISPRO 2 UNITS: 100 INJECTION, SOLUTION INTRAVENOUS; SUBCUTANEOUS at 09:05

## 2024-11-03 RX ADMIN — INSULIN LISPRO 2 UNITS: 100 INJECTION, SOLUTION INTRAVENOUS; SUBCUTANEOUS at 13:25

## 2024-11-03 RX ADMIN — ROSUVASTATIN CALCIUM 20 MG: 20 TABLET, FILM COATED ORAL at 21:12

## 2024-11-03 RX ADMIN — Medication 10 ML: at 09:05

## 2024-11-03 NOTE — ACP (ADVANCE CARE PLANNING)
Advance Care Planning   Healthcare Decision Maker:    Primary Decision Maker: MICHAEL HARDIN - Kailyn - [phone]    Secondary Decision Maker: FATUMA CHACON - Yvonne - [phone]    Click here to complete Healthcare Decision Makers including selection of the Healthcare Decision Maker Relationship (ie \"Primary\").  Today we documented Decision Maker(s) consistent with Legal Next of Kin hierarchy.

## 2024-11-03 NOTE — CARE COORDINATION
Case Management Assessment  Initial Evaluation    Date/Time of Evaluation: 11/3/2024 11:44 AM  Assessment Completed by: NÉSTOR Devries    If patient is discharged prior to next notation, then this note serves as note for discharge by case management.    Patient Name: Amrik Meraz                   YOB: 1950  Diagnosis: Complicated UTI (urinary tract infection) [N39.0]  Fall, initial encounter [W19.XXXA]  Urinary tract infection with hematuria, site unspecified [N39.0, R31.9]                   Date / Time: 11/2/2024  5:51 AM    Patient Admission Status: Inpatient   Readmission Risk (Low < 19, Mod (19-27), High > 27): Readmission Risk Score: 23.9    Current PCP: Anita Figueroa MD  PCP verified by CM? Yes    Chart Reviewed: Yes      History Provided by: Patient  Patient Orientation: Alert and Oriented    Patient Cognition: Alert    Hospitalization in the last 30 days (Readmission):  No    If yes, Readmission Assessment in CM Navigator will be completed.    Advance Directives:      Code Status: DNR-CCA   Patient's Primary Decision Maker is: Legal Next of Kin    Primary Decision Maker: MICHAEL MERAZ - Child - [phone]    Secondary Decision Maker: OSWALDOFATUMA - Friend - [phone]    Discharge Planning:    Patient lives with: Friends Type of Home: House  Primary Care Giver: Self  Patient Support Systems include: Children, Friends/Neighbors   Current Financial resources:    Current community resources:    [facility]            Current DME:              Type of Home Care services:  None    ADLS  Prior functional level: Assistance with the following:, Mobility  Current functional level: Assistance with the following:, Mobility    PT AM-PAC:   /24  OT AM-PAC:   /24    Family can provide assistance at DC: Yes  Would you like Case Management to discuss the discharge plan with any other family members/significant others, and if so, who? No  Plans to Return to Present
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
## 2024-11-04 PROBLEM — R31.9 URINARY TRACT INFECTION WITH HEMATURIA: Status: ACTIVE | Noted: 2024-11-02

## 2024-11-04 LAB
ANION GAP SERPL CALCULATED.3IONS-SCNC: 12 MEQ/L (ref 9–15)
BASOPHILS # BLD: 0 K/UL (ref 0–0.2)
BASOPHILS NFR BLD: 0.2 %
BUN SERPL-MCNC: 54 MG/DL (ref 8–23)
CALCIUM SERPL-MCNC: 8.2 MG/DL (ref 8.5–9.9)
CHLORIDE SERPL-SCNC: 104 MEQ/L (ref 95–107)
CO2 SERPL-SCNC: 23 MEQ/L (ref 20–31)
CREAT SERPL-MCNC: 2.94 MG/DL (ref 0.7–1.2)
EOSINOPHIL # BLD: 0.2 K/UL (ref 0–0.7)
EOSINOPHIL NFR BLD: 1.6 %
ERYTHROCYTE [DISTWIDTH] IN BLOOD BY AUTOMATED COUNT: 16.9 % (ref 11.5–14.5)
GLUCOSE BLD-MCNC: 190 MG/DL (ref 70–99)
GLUCOSE BLD-MCNC: 206 MG/DL (ref 70–99)
GLUCOSE BLD-MCNC: 276 MG/DL (ref 70–99)
GLUCOSE BLD-MCNC: 304 MG/DL (ref 70–99)
GLUCOSE SERPL-MCNC: 192 MG/DL (ref 70–99)
HCT VFR BLD AUTO: 27.7 % (ref 42–52)
HGB BLD-MCNC: 8.7 G/DL (ref 14–18)
LYMPHOCYTES # BLD: 1.4 K/UL (ref 1–4.8)
LYMPHOCYTES NFR BLD: 12.9 %
MCH RBC QN AUTO: 28 PG (ref 27–31.3)
MCHC RBC AUTO-ENTMCNC: 31.4 % (ref 33–37)
MCV RBC AUTO: 89.1 FL (ref 79–92.2)
MONOCYTES # BLD: 1.4 K/UL (ref 0.2–0.8)
MONOCYTES NFR BLD: 13 %
NEUTROPHILS # BLD: 7.6 K/UL (ref 1.4–6.5)
NEUTS SEG NFR BLD: 71.5 %
PERFORMED ON: ABNORMAL
PLATELET # BLD AUTO: 182 K/UL (ref 130–400)
POTASSIUM SERPL-SCNC: 4.5 MEQ/L (ref 3.4–4.9)
RBC # BLD AUTO: 3.11 M/UL (ref 4.7–6.1)
SODIUM SERPL-SCNC: 139 MEQ/L (ref 135–144)
WBC # BLD AUTO: 10.7 K/UL (ref 4.8–10.8)

## 2024-11-04 PROCEDURE — 6370000000 HC RX 637 (ALT 250 FOR IP)

## 2024-11-04 PROCEDURE — 94761 N-INVAS EAR/PLS OXIMETRY MLT: CPT

## 2024-11-04 PROCEDURE — 6370000000 HC RX 637 (ALT 250 FOR IP): Performed by: STUDENT IN AN ORGANIZED HEALTH CARE EDUCATION/TRAINING PROGRAM

## 2024-11-04 PROCEDURE — 85025 COMPLETE CBC W/AUTO DIFF WBC: CPT

## 2024-11-04 PROCEDURE — 99223 1ST HOSP IP/OBS HIGH 75: CPT | Performed by: UROLOGY

## 2024-11-04 PROCEDURE — 6360000002 HC RX W HCPCS: Performed by: STUDENT IN AN ORGANIZED HEALTH CARE EDUCATION/TRAINING PROGRAM

## 2024-11-04 PROCEDURE — 94640 AIRWAY INHALATION TREATMENT: CPT

## 2024-11-04 PROCEDURE — 80048 BASIC METABOLIC PNL TOTAL CA: CPT

## 2024-11-04 PROCEDURE — 36415 COLL VENOUS BLD VENIPUNCTURE: CPT

## 2024-11-04 PROCEDURE — 2700000000 HC OXYGEN THERAPY PER DAY

## 2024-11-04 PROCEDURE — 2580000003 HC RX 258: Performed by: STUDENT IN AN ORGANIZED HEALTH CARE EDUCATION/TRAINING PROGRAM

## 2024-11-04 PROCEDURE — 97162 PT EVAL MOD COMPLEX 30 MIN: CPT

## 2024-11-04 PROCEDURE — 1210000000 HC MED SURG R&B

## 2024-11-04 RX ORDER — IPRATROPIUM BROMIDE AND ALBUTEROL SULFATE 2.5; .5 MG/3ML; MG/3ML
1 SOLUTION RESPIRATORY (INHALATION)
Status: DISCONTINUED | OUTPATIENT
Start: 2024-11-04 | End: 2024-11-07

## 2024-11-04 RX ORDER — IPRATROPIUM BROMIDE AND ALBUTEROL SULFATE 2.5; .5 MG/3ML; MG/3ML
1 SOLUTION RESPIRATORY (INHALATION) EVERY 4 HOURS PRN
Status: DISCONTINUED | OUTPATIENT
Start: 2024-11-04 | End: 2024-11-12 | Stop reason: HOSPADM

## 2024-11-04 RX ORDER — ROSUVASTATIN CALCIUM 10 MG/1
10 TABLET, COATED ORAL NIGHTLY
Status: DISCONTINUED | OUTPATIENT
Start: 2024-11-04 | End: 2024-11-12 | Stop reason: HOSPADM

## 2024-11-04 RX ADMIN — Medication 10 ML: at 21:05

## 2024-11-04 RX ADMIN — INSULIN LISPRO 4 UNITS: 100 INJECTION, SOLUTION INTRAVENOUS; SUBCUTANEOUS at 11:51

## 2024-11-04 RX ADMIN — INSULIN GLARGINE 15 UNITS: 100 INJECTION, SOLUTION SUBCUTANEOUS at 21:18

## 2024-11-04 RX ADMIN — MONTELUKAST 10 MG: 10 TABLET, FILM COATED ORAL at 21:03

## 2024-11-04 RX ADMIN — INSULIN LISPRO 2 UNITS: 100 INJECTION, SOLUTION INTRAVENOUS; SUBCUTANEOUS at 08:18

## 2024-11-04 RX ADMIN — WATER 1000 MG: 1 INJECTION INTRAMUSCULAR; INTRAVENOUS; SUBCUTANEOUS at 21:17

## 2024-11-04 RX ADMIN — INSULIN LISPRO 2 UNITS: 100 INJECTION, SOLUTION INTRAVENOUS; SUBCUTANEOUS at 21:17

## 2024-11-04 RX ADMIN — Medication 10 ML: at 08:19

## 2024-11-04 RX ADMIN — CETIRIZINE HYDROCHLORIDE 10 MG: 10 TABLET, FILM COATED ORAL at 08:19

## 2024-11-04 RX ADMIN — TAMSULOSIN HYDROCHLORIDE 0.8 MG: 0.4 CAPSULE ORAL at 21:02

## 2024-11-04 RX ADMIN — CARVEDILOL 12.5 MG: 12.5 TABLET, FILM COATED ORAL at 21:02

## 2024-11-04 RX ADMIN — ROSUVASTATIN CALCIUM 10 MG: 10 TABLET, FILM COATED ORAL at 21:03

## 2024-11-04 RX ADMIN — INSULIN LISPRO 6 UNITS: 100 INJECTION, SOLUTION INTRAVENOUS; SUBCUTANEOUS at 16:41

## 2024-11-04 RX ADMIN — IPRATROPIUM BROMIDE AND ALBUTEROL SULFATE 1 DOSE: 2.5; .5 SOLUTION RESPIRATORY (INHALATION) at 21:58

## 2024-11-04 NOTE — PLAN OF CARE
Nutrition Problem #1: Inadequate oral intake  Intervention: Food and/or Nutrient Delivery: Modify Current Diet (Carb Control 4 diet  Continue diabetic oral supplement TID until po intakes improve)

## 2024-11-04 NOTE — CARE COORDINATION
1151  ATTEMPTED TO SPEAK WITH PATIENT AT BEDSIDE. PATIENT RESTING IN BED AND STATES TO COME BACK LATER.    1533  MET WITH PATIENT AT BEDSIDE PER REVIEW OF CHART PT RECOMMENDING SNF UPDATED PATIENT. PATIENT STATES HE WAS AT [facility] BUT JUST WANTS TO GO HOME. PATIENT IS AGREEABLE TO C. PATIENT GIVEN PERMISSION FOR  TO SPEAK WITH DAUGHTER MICHAEL. SPOKE WITH DE RODRIGUEZ AND UPDATED ON PT RECOMMENDATION. PATIENT REFUSES SNF AT THIS TIME AGREEABLE TO C. DISCHARGE PLAN HOME WITH C NEED HHC CHOICE PEND MEDICAL CLEARANCE.

## 2024-11-05 ENCOUNTER — APPOINTMENT (OUTPATIENT)
Dept: GENERAL RADIOLOGY | Age: 74
DRG: 689 | End: 2024-11-05
Payer: OTHER GOVERNMENT

## 2024-11-05 ENCOUNTER — HOSPITAL ENCOUNTER (INPATIENT)
Age: 74
Discharge: HOME | DRG: 689 | End: 2024-11-07
Attending: INTERNAL MEDICINE
Payer: OTHER GOVERNMENT

## 2024-11-05 ENCOUNTER — APPOINTMENT (OUTPATIENT)
Dept: MRI IMAGING | Age: 74
DRG: 689 | End: 2024-11-05
Payer: OTHER GOVERNMENT

## 2024-11-05 VITALS
DIASTOLIC BLOOD PRESSURE: 70 MMHG | BODY MASS INDEX: 43.39 KG/M2 | HEIGHT: 66 IN | WEIGHT: 270 LBS | SYSTOLIC BLOOD PRESSURE: 119 MMHG

## 2024-11-05 DIAGNOSIS — N20.1 RIGHT URETERAL STONE: ICD-10-CM

## 2024-11-05 DIAGNOSIS — N17.9 AKI (ACUTE KIDNEY INJURY) (HCC): Primary | ICD-10-CM

## 2024-11-05 LAB
ANION GAP SERPL CALCULATED.3IONS-SCNC: 8 MEQ/L (ref 9–15)
BASOPHILS # BLD: 0 K/UL (ref 0–0.2)
BASOPHILS NFR BLD: 0.4 %
BNP BLD-MCNC: 4383 PG/ML
BUN SERPL-MCNC: 58 MG/DL (ref 8–23)
CALCIUM SERPL-MCNC: 8.3 MG/DL (ref 8.5–9.9)
CHLORIDE SERPL-SCNC: 105 MEQ/L (ref 95–107)
CO2 SERPL-SCNC: 25 MEQ/L (ref 20–31)
CREAT SERPL-MCNC: 2.89 MG/DL (ref 0.7–1.2)
ECHO AV AREA PEAK VELOCITY: 1.9 CM2
ECHO AV AREA VTI: 2 CM2
ECHO AV AREA/BSA VTI: 0.9 CM2/M2
ECHO AV CUSP MM: 1.6 CM
ECHO AV MEAN GRADIENT: 9 MMHG
ECHO AV MEAN VELOCITY: 1.4 M/S
ECHO AV PEAK GRADIENT: 18 MMHG
ECHO AV PEAK GRADIENT: 19 MMHG
ECHO AV PEAK VELOCITY: 2.1 M/S
ECHO AV PEAK VELOCITY: 2.2 M/S
ECHO AV VTI: 48.5 CM
ECHO BSA: 2.39 M2
ECHO EST RA PRESSURE: 3 MMHG
ECHO LA DIAMETER INDEX: 2.11 CM/M2
ECHO LA DIAMETER: 4.8 CM
ECHO LA VOL A-L A2C: 58 ML (ref 18–58)
ECHO LA VOL A-L A4C: 52 ML (ref 18–58)
ECHO LA VOL MOD A2C: 55 ML (ref 18–58)
ECHO LA VOL MOD A4C: 49 ML (ref 18–58)
ECHO LA VOLUME AREA LENGTH: 58 ML
ECHO LA VOLUME INDEX A-L A2C: 26 ML/M2 (ref 16–34)
ECHO LA VOLUME INDEX A-L A4C: 23 ML/M2 (ref 16–34)
ECHO LA VOLUME INDEX AREA LENGTH: 26 ML/M2 (ref 16–34)
ECHO LA VOLUME INDEX MOD A2C: 24 ML/M2 (ref 16–34)
ECHO LA VOLUME INDEX MOD A4C: 22 ML/M2 (ref 16–34)
ECHO LV E' LATERAL VELOCITY: 6.9 CM/S
ECHO LV E' SEPTAL VELOCITY: 6.2 CM/S
ECHO LV EDV A2C: 106 ML
ECHO LV EDV A4C: 152 ML
ECHO LV EDV BP: 126 ML (ref 67–155)
ECHO LV EDV INDEX A4C: 67 ML/M2
ECHO LV EDV INDEX BP: 56 ML/M2
ECHO LV EDV NDEX A2C: 47 ML/M2
ECHO LV EJECTION FRACTION A2C: 65 %
ECHO LV EJECTION FRACTION A4C: 76 %
ECHO LV EJECTION FRACTION BIPLANE: 71 % (ref 55–100)
ECHO LV ESV A2C: 37 ML
ECHO LV ESV A4C: 36 ML
ECHO LV ESV BP: 37 ML (ref 22–58)
ECHO LV ESV INDEX A2C: 16 ML/M2
ECHO LV ESV INDEX A4C: 16 ML/M2
ECHO LV ESV INDEX BP: 16 ML/M2
ECHO LV FRACTIONAL SHORTENING: 35 % (ref 28–44)
ECHO LV INTERNAL DIMENSION DIASTOLE INDEX: 2.25 CM/M2
ECHO LV INTERNAL DIMENSION DIASTOLIC: 5.1 CM (ref 4.2–5.9)
ECHO LV INTERNAL DIMENSION SYSTOLIC INDEX: 1.45 CM/M2
ECHO LV INTERNAL DIMENSION SYSTOLIC: 3.3 CM
ECHO LV IVSD: 1.2 CM (ref 0.6–1)
ECHO LV IVSS: 1.7 CM
ECHO LV MASS 2D: 241.2 G (ref 88–224)
ECHO LV MASS INDEX 2D: 106.3 G/M2 (ref 49–115)
ECHO LV POSTERIOR WALL DIASTOLIC: 1.2 CM (ref 0.6–1)
ECHO LV POSTERIOR WALL SYSTOLIC: 1.5 CM
ECHO LV RELATIVE WALL THICKNESS RATIO: 0.47
ECHO LVOT AREA: 3.1 CM2
ECHO LVOT AV VTI INDEX: 0.66
ECHO LVOT DIAM: 2 CM
ECHO LVOT MEAN GRADIENT: 3 MMHG
ECHO LVOT PEAK GRADIENT: 6 MMHG
ECHO LVOT PEAK GRADIENT: 6 MMHG
ECHO LVOT PEAK VELOCITY: 1.3 M/S
ECHO LVOT PEAK VELOCITY: 1.3 M/S
ECHO LVOT STROKE VOLUME INDEX: 44.1 ML/M2
ECHO LVOT SV: 100.2 ML
ECHO LVOT VTI: 31.9 CM
ECHO MV A VELOCITY: 1.37 M/S
ECHO MV AREA PHT: 3.3 CM2
ECHO MV AREA VTI: 2.1 CM2
ECHO MV E DECELERATION TIME (DT): 208.8 MS
ECHO MV E VELOCITY: 1.17 M/S
ECHO MV E/A RATIO: 0.85
ECHO MV E/E' LATERAL: 16.96
ECHO MV E/E' RATIO (AVERAGED): 17.91
ECHO MV E/E' SEPTAL: 18.87
ECHO MV LVOT VTI INDEX: 1.5
ECHO MV MAX VELOCITY: 1.4 M/S
ECHO MV MEAN GRADIENT: 4 MMHG
ECHO MV MEAN VELOCITY: 0.9 M/S
ECHO MV PEAK GRADIENT: 8 MMHG
ECHO MV PRESSURE HALF TIME (PHT): 66.5 MS
ECHO MV REGURGITANT PEAK GRADIENT: 77 MMHG
ECHO MV REGURGITANT PEAK VELOCITY: 4.4 M/S
ECHO MV VTI: 47.8 CM
ECHO PV MAX VELOCITY: 0.9 M/S
ECHO PV PEAK GRADIENT: 3 MMHG
ECHO RIGHT VENTRICULAR SYSTOLIC PRESSURE (RVSP): 45 MMHG
ECHO RV INTERNAL DIMENSION: 3.4 CM
ECHO RV TAPSE: 2.2 CM (ref 1.7–?)
ECHO TV REGURGITANT MAX VELOCITY: 3.23 M/S
ECHO TV REGURGITANT PEAK GRADIENT: 42 MMHG
EKG ATRIAL RATE: 83 BPM
EKG ATRIAL RATE: 84 BPM
EKG P AXIS: 23 DEGREES
EKG P AXIS: 28 DEGREES
EKG P-R INTERVAL: 184 MS
EKG P-R INTERVAL: 200 MS
EKG Q-T INTERVAL: 414 MS
EKG Q-T INTERVAL: 416 MS
EKG QRS DURATION: 144 MS
EKG QRS DURATION: 148 MS
EKG QTC CALCULATION (BAZETT): 488 MS
EKG QTC CALCULATION (BAZETT): 489 MS
EKG R AXIS: -38 DEGREES
EKG R AXIS: -38 DEGREES
EKG T AXIS: 8 DEGREES
EKG T AXIS: 9 DEGREES
EKG VENTRICULAR RATE: 83 BPM
EKG VENTRICULAR RATE: 84 BPM
EOSINOPHIL # BLD: 0.1 K/UL (ref 0–0.7)
EOSINOPHIL NFR BLD: 1.5 %
ERYTHROCYTE [DISTWIDTH] IN BLOOD BY AUTOMATED COUNT: 16.9 % (ref 11.5–14.5)
GLUCOSE BLD-MCNC: 170 MG/DL (ref 70–99)
GLUCOSE BLD-MCNC: 243 MG/DL (ref 70–99)
GLUCOSE BLD-MCNC: 298 MG/DL (ref 70–99)
GLUCOSE BLD-MCNC: 320 MG/DL (ref 70–99)
GLUCOSE BLD-MCNC: 371 MG/DL (ref 70–99)
GLUCOSE SERPL-MCNC: 146 MG/DL (ref 70–99)
HCT VFR BLD AUTO: 26.1 % (ref 42–52)
HGB BLD-MCNC: 8.1 G/DL (ref 14–18)
LYMPHOCYTES # BLD: 1.3 K/UL (ref 1–4.8)
LYMPHOCYTES NFR BLD: 16.5 %
MCH RBC QN AUTO: 27.6 PG (ref 27–31.3)
MCHC RBC AUTO-ENTMCNC: 31 % (ref 33–37)
MCV RBC AUTO: 88.8 FL (ref 79–92.2)
MONOCYTES # BLD: 1 K/UL (ref 0.2–0.8)
MONOCYTES NFR BLD: 12.2 %
NEUTROPHILS # BLD: 5.4 K/UL (ref 1.4–6.5)
NEUTS SEG NFR BLD: 68.9 %
PERFORMED ON: ABNORMAL
PLATELET # BLD AUTO: 154 K/UL (ref 130–400)
POTASSIUM SERPL-SCNC: 4.5 MEQ/L (ref 3.4–4.9)
RBC # BLD AUTO: 2.94 M/UL (ref 4.7–6.1)
SODIUM SERPL-SCNC: 138 MEQ/L (ref 135–144)
WBC # BLD AUTO: 7.8 K/UL (ref 4.8–10.8)

## 2024-11-05 PROCEDURE — 94640 AIRWAY INHALATION TREATMENT: CPT

## 2024-11-05 PROCEDURE — 2060000000 HC ICU INTERMEDIATE R&B

## 2024-11-05 PROCEDURE — 6370000000 HC RX 637 (ALT 250 FOR IP): Performed by: STUDENT IN AN ORGANIZED HEALTH CARE EDUCATION/TRAINING PROGRAM

## 2024-11-05 PROCEDURE — 2500000003 HC RX 250 WO HCPCS

## 2024-11-05 PROCEDURE — 93010 ELECTROCARDIOGRAM REPORT: CPT | Performed by: INTERNAL MEDICINE

## 2024-11-05 PROCEDURE — 6370000000 HC RX 637 (ALT 250 FOR IP): Performed by: INTERNAL MEDICINE

## 2024-11-05 PROCEDURE — 6360000002 HC RX W HCPCS

## 2024-11-05 PROCEDURE — 80048 BASIC METABOLIC PNL TOTAL CA: CPT

## 2024-11-05 PROCEDURE — 83880 ASSAY OF NATRIURETIC PEPTIDE: CPT

## 2024-11-05 PROCEDURE — 2580000003 HC RX 258: Performed by: STUDENT IN AN ORGANIZED HEALTH CARE EDUCATION/TRAINING PROGRAM

## 2024-11-05 PROCEDURE — 93306 TTE W/DOPPLER COMPLETE: CPT | Performed by: INTERNAL MEDICINE

## 2024-11-05 PROCEDURE — 93306 TTE W/DOPPLER COMPLETE: CPT

## 2024-11-05 PROCEDURE — 1210000000 HC MED SURG R&B

## 2024-11-05 PROCEDURE — 6360000002 HC RX W HCPCS: Performed by: INTERNAL MEDICINE

## 2024-11-05 PROCEDURE — 36415 COLL VENOUS BLD VENIPUNCTURE: CPT

## 2024-11-05 PROCEDURE — 85025 COMPLETE CBC W/AUTO DIFF WBC: CPT

## 2024-11-05 PROCEDURE — 6360000002 HC RX W HCPCS: Performed by: STUDENT IN AN ORGANIZED HEALTH CARE EDUCATION/TRAINING PROGRAM

## 2024-11-05 PROCEDURE — 5A0935A ASSISTANCE WITH RESPIRATORY VENTILATION, LESS THAN 24 CONSECUTIVE HOURS, HIGH NASAL FLOW/VELOCITY: ICD-10-PCS | Performed by: STUDENT IN AN ORGANIZED HEALTH CARE EDUCATION/TRAINING PROGRAM

## 2024-11-05 PROCEDURE — 2700000000 HC OXYGEN THERAPY PER DAY

## 2024-11-05 PROCEDURE — 99222 1ST HOSP IP/OBS MODERATE 55: CPT | Performed by: INTERNAL MEDICINE

## 2024-11-05 PROCEDURE — 99232 SBSQ HOSP IP/OBS MODERATE 35: CPT | Performed by: SURGERY

## 2024-11-05 PROCEDURE — 99231 SBSQ HOSP IP/OBS SF/LOW 25: CPT | Performed by: PHYSICIAN ASSISTANT

## 2024-11-05 PROCEDURE — 71046 X-RAY EXAM CHEST 2 VIEWS: CPT

## 2024-11-05 PROCEDURE — 94761 N-INVAS EAR/PLS OXIMETRY MLT: CPT

## 2024-11-05 RX ORDER — INSULIN LISPRO 100 [IU]/ML
10 INJECTION, SOLUTION INTRAVENOUS; SUBCUTANEOUS ONCE
Status: COMPLETED | OUTPATIENT
Start: 2024-11-05 | End: 2024-11-05

## 2024-11-05 RX ORDER — INSULIN LISPRO 100 [IU]/ML
8 INJECTION, SOLUTION INTRAVENOUS; SUBCUTANEOUS
Status: DISCONTINUED | OUTPATIENT
Start: 2024-11-05 | End: 2024-11-06

## 2024-11-05 RX ORDER — FUROSEMIDE 10 MG/ML
40 INJECTION INTRAMUSCULAR; INTRAVENOUS 2 TIMES DAILY
Status: DISCONTINUED | OUTPATIENT
Start: 2024-11-05 | End: 2024-11-05

## 2024-11-05 RX ORDER — FINASTERIDE 5 MG/1
5 TABLET, FILM COATED ORAL DAILY
Status: DISCONTINUED | OUTPATIENT
Start: 2024-11-05 | End: 2024-11-12 | Stop reason: HOSPADM

## 2024-11-05 RX ORDER — INSULIN GLARGINE 100 [IU]/ML
30 INJECTION, SOLUTION SUBCUTANEOUS NIGHTLY
Status: DISCONTINUED | OUTPATIENT
Start: 2024-11-05 | End: 2024-11-06

## 2024-11-05 RX ORDER — ASPIRIN 325 MG
162 TABLET ORAL DAILY
Status: DISCONTINUED | OUTPATIENT
Start: 2024-11-05 | End: 2024-11-12 | Stop reason: HOSPADM

## 2024-11-05 RX ORDER — AMMONIUM LACTATE 12 G/100G
LOTION TOPICAL PRN
Status: DISCONTINUED | OUTPATIENT
Start: 2024-11-05 | End: 2024-11-12 | Stop reason: HOSPADM

## 2024-11-05 RX ORDER — FUROSEMIDE 10 MG/ML
80 INJECTION INTRAMUSCULAR; INTRAVENOUS 2 TIMES DAILY
Status: DISCONTINUED | OUTPATIENT
Start: 2024-11-05 | End: 2024-11-08

## 2024-11-05 RX ADMIN — INSULIN LISPRO 10 UNITS: 100 INJECTION, SOLUTION INTRAVENOUS; SUBCUTANEOUS at 16:21

## 2024-11-05 RX ADMIN — CARVEDILOL 12.5 MG: 12.5 TABLET, FILM COATED ORAL at 21:23

## 2024-11-05 RX ADMIN — INSULIN LISPRO 8 UNITS: 100 INJECTION, SOLUTION INTRAVENOUS; SUBCUTANEOUS at 18:11

## 2024-11-05 RX ADMIN — INSULIN GLARGINE 30 UNITS: 100 INJECTION, SOLUTION SUBCUTANEOUS at 21:24

## 2024-11-05 RX ADMIN — MONTELUKAST 10 MG: 10 TABLET, FILM COATED ORAL at 21:23

## 2024-11-05 RX ADMIN — FINASTERIDE 5 MG: 5 TABLET, FILM COATED ORAL at 16:11

## 2024-11-05 RX ADMIN — SODIUM CHLORIDE, PRESERVATIVE FREE 10 ML: 5 INJECTION INTRAVENOUS at 16:20

## 2024-11-05 RX ADMIN — FUROSEMIDE 80 MG: 10 INJECTION, SOLUTION INTRAMUSCULAR; INTRAVENOUS at 16:12

## 2024-11-05 RX ADMIN — CETIRIZINE HYDROCHLORIDE 10 MG: 10 TABLET, FILM COATED ORAL at 10:43

## 2024-11-05 RX ADMIN — CARVEDILOL 12.5 MG: 12.5 TABLET, FILM COATED ORAL at 10:43

## 2024-11-05 RX ADMIN — METHYLPREDNISOLONE SODIUM SUCCINATE 40 MG: 40 INJECTION INTRAMUSCULAR; INTRAVENOUS at 20:25

## 2024-11-05 RX ADMIN — ASPIRIN 162 MG: 325 TABLET ORAL at 16:11

## 2024-11-05 RX ADMIN — Medication 10 ML: at 21:24

## 2024-11-05 RX ADMIN — WATER 1000 MG: 1 INJECTION INTRAMUSCULAR; INTRAVENOUS; SUBCUTANEOUS at 21:31

## 2024-11-05 RX ADMIN — IPRATROPIUM BROMIDE AND ALBUTEROL SULFATE 1 DOSE: 2.5; .5 SOLUTION RESPIRATORY (INHALATION) at 21:19

## 2024-11-05 RX ADMIN — ROSUVASTATIN CALCIUM 10 MG: 10 TABLET, FILM COATED ORAL at 21:24

## 2024-11-05 RX ADMIN — INSULIN LISPRO 2 UNITS: 100 INJECTION, SOLUTION INTRAVENOUS; SUBCUTANEOUS at 21:24

## 2024-11-05 RX ADMIN — IPRATROPIUM BROMIDE AND ALBUTEROL SULFATE 1 DOSE: 2.5; .5 SOLUTION RESPIRATORY (INHALATION) at 07:36

## 2024-11-05 RX ADMIN — TAMSULOSIN HYDROCHLORIDE 0.8 MG: 0.4 CAPSULE ORAL at 21:23

## 2024-11-05 RX ADMIN — INSULIN LISPRO 4 UNITS: 100 INJECTION, SOLUTION INTRAVENOUS; SUBCUTANEOUS at 18:10

## 2024-11-05 NOTE — CARE COORDINATION
1059  MET WITH PATIENT AT BEDSIDE TO DISCUSS DISCHARGE PLAN. PATIENT CONTINUES TO REFUSE SNF AT THIS TIME. PATIENT AGREEABLE TO [facility] AND FOC GIVEN. PATIENT CHOOSES [facility].    1150 SPOKE WITH RYAN AT [facility] AND REFERRAL SENT. UNABLE TO ACCEPT DUE TO PATIENT INSURANCE OUT OF NETWORK.    1305  SPOKE WITH JANIS AT [facility] AND REFERRAL SENT. PER JANIS UNABLE TO ACCEPT DUE TO PATIENT INSURANCE OUT OF NETWORK.    1306  REFERRAL SENT TO [facility] SPOKE WITH TERI AT [facility] AND ABLE TO ACCEPT.

## 2024-11-05 NOTE — PLAN OF CARE
Problem: Chronic Conditions and Co-morbidities  Goal: Patient's chronic conditions and co-morbidity symptoms are monitored and maintained or improved  Outcome: Progressing     Problem: Discharge Planning  Goal: Discharge to home or other facility with appropriate resources  Outcome: Progressing     Problem: Skin/Tissue Integrity  Goal: Absence of new skin breakdown  Description: 1.  Monitor for areas of redness and/or skin breakdown  2.  Assess vascular access sites hourly  3.  Every 4-6 hours minimum:  Change oxygen saturation probe site  4.  Every 4-6 hours:  If on nasal continuous positive airway pressure, respiratory therapy assess nares and determine need for appliance change or resting period.  Outcome: Progressing     Problem: Safety - Adult  Goal: Free from fall injury  Outcome: Progressing     Problem: Physical Therapy - Adult  Goal: By Discharge: Performs mobility at highest level of function for planned discharge setting.  See evaluation for individualized goals.  11/4/2024 1252 by Shiloh Mckinney, PT  Outcome: Progressing     Problem: Nutrition Deficit:  Goal: Optimize nutritional status  11/4/2024 2320 by Josseline Loya, RN  Outcome: Progressing  11/4/2024 1421 by Maya Benoit, RD, LD  Flowsheets (Taken 11/4/2024 1421)  Nutrient intake appropriate for improving, restoring, or maintaining nutritional needs:   Assess nutritional status and recommend course of action   Monitor oral intake, labs, and treatment plans   Recommend appropriate diets, oral nutritional supplements, and vitamin/mineral supplements

## 2024-11-06 LAB
ALBUMIN SERPL-MCNC: 3.1 G/DL (ref 3.5–4.6)
ALP SERPL-CCNC: 188 U/L (ref 35–104)
ALT SERPL-CCNC: 19 U/L (ref 0–41)
ANION GAP SERPL CALCULATED.3IONS-SCNC: 13 MEQ/L (ref 9–15)
AST SERPL-CCNC: 23 U/L (ref 0–40)
BACTERIA UR CULT: ABNORMAL
BACTERIA UR CULT: ABNORMAL
BASOPHILS # BLD: 0 K/UL (ref 0–0.2)
BASOPHILS NFR BLD: 0.2 %
BILIRUB SERPL-MCNC: 0.3 MG/DL (ref 0.2–0.7)
BUN SERPL-MCNC: 63 MG/DL (ref 8–23)
CALCIUM SERPL-MCNC: 8.8 MG/DL (ref 8.5–9.9)
CHLORIDE SERPL-SCNC: 100 MEQ/L (ref 95–107)
CO2 SERPL-SCNC: 24 MEQ/L (ref 20–31)
CREAT SERPL-MCNC: 2.86 MG/DL (ref 0.7–1.2)
EOSINOPHIL # BLD: 0 K/UL (ref 0–0.7)
EOSINOPHIL NFR BLD: 0 %
ERYTHROCYTE [DISTWIDTH] IN BLOOD BY AUTOMATED COUNT: 16.6 % (ref 11.5–14.5)
GLOBULIN SER CALC-MCNC: 3.6 G/DL (ref 2.3–3.5)
GLUCOSE BLD-MCNC: 304 MG/DL (ref 70–99)
GLUCOSE BLD-MCNC: 374 MG/DL (ref 70–99)
GLUCOSE BLD-MCNC: 394 MG/DL (ref 70–99)
GLUCOSE BLD-MCNC: 419 MG/DL (ref 70–99)
GLUCOSE SERPL-MCNC: 285 MG/DL (ref 70–99)
HCT VFR BLD AUTO: 27.6 % (ref 42–52)
HGB BLD-MCNC: 8.7 G/DL (ref 14–18)
LYMPHOCYTES # BLD: 0.6 K/UL (ref 1–4.8)
LYMPHOCYTES NFR BLD: 11 %
MCH RBC QN AUTO: 27.7 PG (ref 27–31.3)
MCHC RBC AUTO-ENTMCNC: 31.5 % (ref 33–37)
MCV RBC AUTO: 87.9 FL (ref 79–92.2)
MONOCYTES # BLD: 0.1 K/UL (ref 0.2–0.8)
MONOCYTES NFR BLD: 1.9 %
NEUTROPHILS # BLD: 4.9 K/UL (ref 1.4–6.5)
NEUTS SEG NFR BLD: 85.3 %
ORGANISM: ABNORMAL
PERFORMED ON: ABNORMAL
PLATELET # BLD AUTO: 171 K/UL (ref 130–400)
POTASSIUM SERPL-SCNC: 5.3 MEQ/L (ref 3.4–4.9)
PROT SERPL-MCNC: 6.7 G/DL (ref 6.3–8)
RBC # BLD AUTO: 3.14 M/UL (ref 4.7–6.1)
SODIUM SERPL-SCNC: 137 MEQ/L (ref 135–144)
WBC # BLD AUTO: 5.8 K/UL (ref 4.8–10.8)

## 2024-11-06 PROCEDURE — 36415 COLL VENOUS BLD VENIPUNCTURE: CPT

## 2024-11-06 PROCEDURE — 6360000002 HC RX W HCPCS: Performed by: INTERNAL MEDICINE

## 2024-11-06 PROCEDURE — 2700000000 HC OXYGEN THERAPY PER DAY

## 2024-11-06 PROCEDURE — 94760 N-INVAS EAR/PLS OXIMETRY 1: CPT

## 2024-11-06 PROCEDURE — 2580000003 HC RX 258: Performed by: STUDENT IN AN ORGANIZED HEALTH CARE EDUCATION/TRAINING PROGRAM

## 2024-11-06 PROCEDURE — 99232 SBSQ HOSP IP/OBS MODERATE 35: CPT | Performed by: UROLOGY

## 2024-11-06 PROCEDURE — 94640 AIRWAY INHALATION TREATMENT: CPT

## 2024-11-06 PROCEDURE — 80053 COMPREHEN METABOLIC PANEL: CPT

## 2024-11-06 PROCEDURE — 1210000000 HC MED SURG R&B

## 2024-11-06 PROCEDURE — 99232 SBSQ HOSP IP/OBS MODERATE 35: CPT | Performed by: INTERNAL MEDICINE

## 2024-11-06 PROCEDURE — 94761 N-INVAS EAR/PLS OXIMETRY MLT: CPT

## 2024-11-06 PROCEDURE — 85025 COMPLETE CBC W/AUTO DIFF WBC: CPT

## 2024-11-06 PROCEDURE — 97116 GAIT TRAINING THERAPY: CPT

## 2024-11-06 PROCEDURE — 6370000000 HC RX 637 (ALT 250 FOR IP): Performed by: INTERNAL MEDICINE

## 2024-11-06 PROCEDURE — 94762 N-INVAS EAR/PLS OXIMTRY CONT: CPT

## 2024-11-06 PROCEDURE — 6360000002 HC RX W HCPCS: Performed by: STUDENT IN AN ORGANIZED HEALTH CARE EDUCATION/TRAINING PROGRAM

## 2024-11-06 PROCEDURE — 6370000000 HC RX 637 (ALT 250 FOR IP): Performed by: STUDENT IN AN ORGANIZED HEALTH CARE EDUCATION/TRAINING PROGRAM

## 2024-11-06 RX ORDER — INSULIN LISPRO 100 [IU]/ML
0-16 INJECTION, SOLUTION INTRAVENOUS; SUBCUTANEOUS
Status: DISCONTINUED | OUTPATIENT
Start: 2024-11-06 | End: 2024-11-12 | Stop reason: HOSPADM

## 2024-11-06 RX ORDER — INSULIN LISPRO 100 [IU]/ML
20 INJECTION, SOLUTION INTRAVENOUS; SUBCUTANEOUS
Status: DISCONTINUED | OUTPATIENT
Start: 2024-11-07 | End: 2024-11-07

## 2024-11-06 RX ORDER — INSULIN GLARGINE 100 [IU]/ML
60 INJECTION, SOLUTION SUBCUTANEOUS NIGHTLY
Status: DISCONTINUED | OUTPATIENT
Start: 2024-11-06 | End: 2024-11-07

## 2024-11-06 RX ADMIN — INSULIN GLARGINE 60 UNITS: 100 INJECTION, SOLUTION SUBCUTANEOUS at 22:16

## 2024-11-06 RX ADMIN — IPRATROPIUM BROMIDE AND ALBUTEROL SULFATE 1 DOSE: 2.5; .5 SOLUTION RESPIRATORY (INHALATION) at 07:17

## 2024-11-06 RX ADMIN — ASPIRIN 162 MG: 325 TABLET ORAL at 08:52

## 2024-11-06 RX ADMIN — CARVEDILOL 12.5 MG: 12.5 TABLET, FILM COATED ORAL at 08:52

## 2024-11-06 RX ADMIN — IPRATROPIUM BROMIDE AND ALBUTEROL SULFATE 1 DOSE: 2.5; .5 SOLUTION RESPIRATORY (INHALATION) at 20:14

## 2024-11-06 RX ADMIN — Medication 10 ML: at 22:17

## 2024-11-06 RX ADMIN — CETIRIZINE HYDROCHLORIDE 10 MG: 10 TABLET, FILM COATED ORAL at 08:52

## 2024-11-06 RX ADMIN — INSULIN LISPRO 16 UNITS: 100 INJECTION, SOLUTION INTRAVENOUS; SUBCUTANEOUS at 22:16

## 2024-11-06 RX ADMIN — WATER 1000 MG: 1 INJECTION INTRAMUSCULAR; INTRAVENOUS; SUBCUTANEOUS at 22:16

## 2024-11-06 RX ADMIN — INSULIN LISPRO 8 UNITS: 100 INJECTION, SOLUTION INTRAVENOUS; SUBCUTANEOUS at 16:56

## 2024-11-06 RX ADMIN — CARVEDILOL 12.5 MG: 12.5 TABLET, FILM COATED ORAL at 22:15

## 2024-11-06 RX ADMIN — INSULIN LISPRO 8 UNITS: 100 INJECTION, SOLUTION INTRAVENOUS; SUBCUTANEOUS at 13:04

## 2024-11-06 RX ADMIN — ROSUVASTATIN CALCIUM 10 MG: 10 TABLET, FILM COATED ORAL at 22:15

## 2024-11-06 RX ADMIN — MONTELUKAST 10 MG: 10 TABLET, FILM COATED ORAL at 22:15

## 2024-11-06 RX ADMIN — FUROSEMIDE 80 MG: 10 INJECTION, SOLUTION INTRAMUSCULAR; INTRAVENOUS at 16:57

## 2024-11-06 RX ADMIN — INSULIN LISPRO 6 UNITS: 100 INJECTION, SOLUTION INTRAVENOUS; SUBCUTANEOUS at 08:53

## 2024-11-06 RX ADMIN — TAMSULOSIN HYDROCHLORIDE 0.8 MG: 0.4 CAPSULE ORAL at 22:16

## 2024-11-06 RX ADMIN — INSULIN LISPRO 8 UNITS: 100 INJECTION, SOLUTION INTRAVENOUS; SUBCUTANEOUS at 08:53

## 2024-11-06 RX ADMIN — FINASTERIDE 5 MG: 5 TABLET, FILM COATED ORAL at 08:52

## 2024-11-06 RX ADMIN — INSULIN LISPRO 8 UNITS: 100 INJECTION, SOLUTION INTRAVENOUS; SUBCUTANEOUS at 16:57

## 2024-11-06 RX ADMIN — Medication 10 ML: at 08:55

## 2024-11-06 RX ADMIN — Medication 10 ML: at 08:53

## 2024-11-06 RX ADMIN — FUROSEMIDE 80 MG: 10 INJECTION, SOLUTION INTRAMUSCULAR; INTRAVENOUS at 08:53

## 2024-11-06 NOTE — PLAN OF CARE
Problem: Chronic Conditions and Co-morbidities  Goal: Patient's chronic conditions and co-morbidity symptoms are monitored and maintained or improved  Outcome: Progressing     Problem: Discharge Planning  Goal: Discharge to home or other facility with appropriate resources  Outcome: Progressing     Problem: Skin/Tissue Integrity  Goal: Absence of new skin breakdown  Description: 1.  Monitor for areas of redness and/or skin breakdown  2.  Assess vascular access sites hourly  3.  Every 4-6 hours minimum:  Change oxygen saturation probe site  4.  Every 4-6 hours:  If on nasal continuous positive airway pressure, respiratory therapy assess nares and determine need for appliance change or resting period.  Outcome: Progressing     Problem: Safety - Adult  Goal: Free from fall injury  Outcome: Progressing     Problem: Nutrition Deficit:  Goal: Optimize nutritional status  Outcome: Progressing

## 2024-11-06 NOTE — CARE COORDINATION
MET WITH PATIENT AT BEDSIDE TO DISCUSS DISCHARGE PLAN.  PATIENT SITTING UP IN RECLINER CHAIR.  PATIENT ON 50% HIGH ROZINA OXYGEN. PATIENT DISCHARGE PLAN HOME WITH [facility] WHEN MEDICALLY CLEARED.    Charla  [facility] AND UPDATED ON PLAN OF CARE.

## 2024-11-06 NOTE — PLAN OF CARE
Problem: Chronic Conditions and Co-morbidities  Goal: Patient's chronic conditions and co-morbidity symptoms are monitored and maintained or improved  11/6/2024 1010 by Aleena Reza RN  Outcome: Progressing  11/6/2024 0031 by Josseline Loya RN  Outcome: Progressing     Problem: Discharge Planning  Goal: Discharge to home or other facility with appropriate resources  11/6/2024 1010 by Aleena Reza RN  Outcome: Progressing  11/6/2024 0031 by Josseline Loya RN  Outcome: Progressing     Problem: Skin/Tissue Integrity  Goal: Absence of new skin breakdown  Description: 1.  Monitor for areas of redness and/or skin breakdown  2.  Assess vascular access sites hourly  3.  Every 4-6 hours minimum:  Change oxygen saturation probe site  4.  Every 4-6 hours:  If on nasal continuous positive airway pressure, respiratory therapy assess nares and determine need for appliance change or resting period.  11/6/2024 1010 by Aleena Reza RN  Outcome: Progressing  11/6/2024 0031 by Josseline Loya RN  Outcome: Progressing     Problem: Safety - Adult  Goal: Free from fall injury  11/6/2024 1010 by Aleena Reza RN  Outcome: Progressing  11/6/2024 0031 by Josseline Loya RN  Outcome: Progressing     Problem: Nutrition Deficit:  Goal: Optimize nutritional status  11/6/2024 1010 by Aleena Reza RN  Outcome: Progressing  11/6/2024 0031 by Josseline Loya RN  Outcome: Progressing

## 2024-11-07 ENCOUNTER — APPOINTMENT (OUTPATIENT)
Dept: GENERAL RADIOLOGY | Age: 74
DRG: 689 | End: 2024-11-07
Payer: OTHER GOVERNMENT

## 2024-11-07 ENCOUNTER — APPOINTMENT (OUTPATIENT)
Dept: CT IMAGING | Age: 74
DRG: 689 | End: 2024-11-07
Payer: OTHER GOVERNMENT

## 2024-11-07 LAB
ALBUMIN SERPL-MCNC: 3 G/DL (ref 3.5–4.6)
ALP SERPL-CCNC: 176 U/L (ref 35–104)
ALT SERPL-CCNC: 19 U/L (ref 0–41)
ANION GAP SERPL CALCULATED.3IONS-SCNC: 9 MEQ/L (ref 9–15)
AST SERPL-CCNC: 19 U/L (ref 0–40)
BACTERIA BLD CULT ORG #2: NORMAL
BACTERIA BLD CULT: NORMAL
BACTERIA URNS QL MICRO: NEGATIVE /HPF
BASOPHILS # BLD: 0 K/UL (ref 0–0.2)
BASOPHILS NFR BLD: 0.1 %
BILIRUB SERPL-MCNC: <0.2 MG/DL (ref 0.2–0.7)
BILIRUB UR QL STRIP: NEGATIVE
BUN SERPL-MCNC: 76 MG/DL (ref 8–23)
CALCIUM SERPL-MCNC: 8.6 MG/DL (ref 8.5–9.9)
CHLORIDE SERPL-SCNC: 99 MEQ/L (ref 95–107)
CLARITY UR: CLEAR
CO2 SERPL-SCNC: 25 MEQ/L (ref 20–31)
COLOR UR: YELLOW
CREAT SERPL-MCNC: 3.01 MG/DL (ref 0.7–1.2)
EOSINOPHIL # BLD: 0 K/UL (ref 0–0.7)
EOSINOPHIL NFR BLD: 0.1 %
EPI CELLS #/AREA URNS AUTO: ABNORMAL /HPF (ref 0–5)
ERYTHROCYTE [DISTWIDTH] IN BLOOD BY AUTOMATED COUNT: 16.5 % (ref 11.5–14.5)
GLOBULIN SER CALC-MCNC: 3.6 G/DL (ref 2.3–3.5)
GLUCOSE BLD-MCNC: 135 MG/DL (ref 70–99)
GLUCOSE BLD-MCNC: 200 MG/DL (ref 70–99)
GLUCOSE BLD-MCNC: 273 MG/DL (ref 70–99)
GLUCOSE BLD-MCNC: 279 MG/DL (ref 70–99)
GLUCOSE SERPL-MCNC: 258 MG/DL (ref 70–99)
GLUCOSE UR STRIP-MCNC: NEGATIVE MG/DL
HCT VFR BLD AUTO: 26.3 % (ref 42–52)
HGB BLD-MCNC: 8.2 G/DL (ref 14–18)
HGB UR QL STRIP: NEGATIVE
HYALINE CASTS #/AREA URNS AUTO: ABNORMAL /HPF (ref 0–5)
KETONES UR STRIP-MCNC: NEGATIVE MG/DL
LEUKOCYTE ESTERASE UR QL STRIP: ABNORMAL
LYMPHOCYTES # BLD: 1.1 K/UL (ref 1–4.8)
LYMPHOCYTES NFR BLD: 13.5 %
MCH RBC QN AUTO: 27.4 PG (ref 27–31.3)
MCHC RBC AUTO-ENTMCNC: 31.2 % (ref 33–37)
MCV RBC AUTO: 88 FL (ref 79–92.2)
MONOCYTES # BLD: 0.6 K/UL (ref 0.2–0.8)
MONOCYTES NFR BLD: 6.8 %
NEUTROPHILS # BLD: 6.5 K/UL (ref 1.4–6.5)
NEUTS SEG NFR BLD: 78.8 %
NITRITE UR QL STRIP: NEGATIVE
PERFORMED ON: ABNORMAL
PH UR STRIP: 5 [PH] (ref 5–9)
PLATELET # BLD AUTO: 189 K/UL (ref 130–400)
POTASSIUM SERPL-SCNC: 4.8 MEQ/L (ref 3.4–4.9)
PROCALCITONIN SERPL IA-MCNC: 22.77 NG/ML (ref 0–0.15)
PROT SERPL-MCNC: 6.6 G/DL (ref 6.3–8)
PROT UR STRIP-MCNC: ABNORMAL MG/DL
RBC # BLD AUTO: 2.99 M/UL (ref 4.7–6.1)
RBC #/AREA URNS AUTO: ABNORMAL /HPF (ref 0–5)
SODIUM SERPL-SCNC: 133 MEQ/L (ref 135–144)
SP GR UR STRIP: 1.01 (ref 1–1.03)
URINE REFLEX TO CULTURE: YES
UROBILINOGEN UR STRIP-ACNC: 0.2 E.U./DL
WBC # BLD AUTO: 8.2 K/UL (ref 4.8–10.8)
WBC #/AREA URNS AUTO: ABNORMAL /HPF (ref 0–5)

## 2024-11-07 PROCEDURE — 2700000000 HC OXYGEN THERAPY PER DAY

## 2024-11-07 PROCEDURE — 97166 OT EVAL MOD COMPLEX 45 MIN: CPT

## 2024-11-07 PROCEDURE — 80053 COMPREHEN METABOLIC PANEL: CPT

## 2024-11-07 PROCEDURE — 36415 COLL VENOUS BLD VENIPUNCTURE: CPT

## 2024-11-07 PROCEDURE — 99231 SBSQ HOSP IP/OBS SF/LOW 25: CPT | Performed by: PHYSICIAN ASSISTANT

## 2024-11-07 PROCEDURE — 85025 COMPLETE CBC W/AUTO DIFF WBC: CPT

## 2024-11-07 PROCEDURE — 2580000003 HC RX 258: Performed by: STUDENT IN AN ORGANIZED HEALTH CARE EDUCATION/TRAINING PROGRAM

## 2024-11-07 PROCEDURE — 71045 X-RAY EXAM CHEST 1 VIEW: CPT

## 2024-11-07 PROCEDURE — 6370000000 HC RX 637 (ALT 250 FOR IP): Performed by: STUDENT IN AN ORGANIZED HEALTH CARE EDUCATION/TRAINING PROGRAM

## 2024-11-07 PROCEDURE — 81001 URINALYSIS AUTO W/SCOPE: CPT

## 2024-11-07 PROCEDURE — 1210000000 HC MED SURG R&B

## 2024-11-07 PROCEDURE — 97116 GAIT TRAINING THERAPY: CPT

## 2024-11-07 PROCEDURE — 94761 N-INVAS EAR/PLS OXIMETRY MLT: CPT

## 2024-11-07 PROCEDURE — 87641 MR-STAPH DNA AMP PROBE: CPT

## 2024-11-07 PROCEDURE — 6370000000 HC RX 637 (ALT 250 FOR IP): Performed by: INTERNAL MEDICINE

## 2024-11-07 PROCEDURE — 87040 BLOOD CULTURE FOR BACTERIA: CPT

## 2024-11-07 PROCEDURE — 71250 CT THORAX DX C-: CPT

## 2024-11-07 PROCEDURE — 99231 SBSQ HOSP IP/OBS SF/LOW 25: CPT | Performed by: INTERNAL MEDICINE

## 2024-11-07 PROCEDURE — 84145 PROCALCITONIN (PCT): CPT

## 2024-11-07 PROCEDURE — 94640 AIRWAY INHALATION TREATMENT: CPT

## 2024-11-07 PROCEDURE — 87086 URINE CULTURE/COLONY COUNT: CPT

## 2024-11-07 PROCEDURE — 6360000002 HC RX W HCPCS: Performed by: INTERNAL MEDICINE

## 2024-11-07 PROCEDURE — 2580000003 HC RX 258: Performed by: INTERNAL MEDICINE

## 2024-11-07 PROCEDURE — 99223 1ST HOSP IP/OBS HIGH 75: CPT | Performed by: INTERNAL MEDICINE

## 2024-11-07 PROCEDURE — 74176 CT ABD & PELVIS W/O CONTRAST: CPT

## 2024-11-07 RX ORDER — VANCOMYCIN 1.75 G/350ML
1250 INJECTION, SOLUTION INTRAVENOUS ONCE
Status: COMPLETED | OUTPATIENT
Start: 2024-11-07 | End: 2024-11-07

## 2024-11-07 RX ORDER — INSULIN LISPRO 100 [IU]/ML
24 INJECTION, SOLUTION INTRAVENOUS; SUBCUTANEOUS
Status: DISCONTINUED | OUTPATIENT
Start: 2024-11-07 | End: 2024-11-08

## 2024-11-07 RX ORDER — INSULIN GLARGINE 100 [IU]/ML
70 INJECTION, SOLUTION SUBCUTANEOUS NIGHTLY
Status: DISCONTINUED | OUTPATIENT
Start: 2024-11-07 | End: 2024-11-08

## 2024-11-07 RX ADMIN — INSULIN LISPRO 20 UNITS: 100 INJECTION, SOLUTION INTRAVENOUS; SUBCUTANEOUS at 12:30

## 2024-11-07 RX ADMIN — INSULIN LISPRO 8 UNITS: 100 INJECTION, SOLUTION INTRAVENOUS; SUBCUTANEOUS at 08:40

## 2024-11-07 RX ADMIN — MONTELUKAST 10 MG: 10 TABLET, FILM COATED ORAL at 22:31

## 2024-11-07 RX ADMIN — IPRATROPIUM BROMIDE AND ALBUTEROL SULFATE 1 DOSE: 2.5; .5 SOLUTION RESPIRATORY (INHALATION) at 09:09

## 2024-11-07 RX ADMIN — INSULIN LISPRO 4 UNITS: 100 INJECTION, SOLUTION INTRAVENOUS; SUBCUTANEOUS at 18:14

## 2024-11-07 RX ADMIN — CETIRIZINE HYDROCHLORIDE 10 MG: 10 TABLET, FILM COATED ORAL at 08:40

## 2024-11-07 RX ADMIN — FINASTERIDE 5 MG: 5 TABLET, FILM COATED ORAL at 08:40

## 2024-11-07 RX ADMIN — INSULIN LISPRO 20 UNITS: 100 INJECTION, SOLUTION INTRAVENOUS; SUBCUTANEOUS at 08:40

## 2024-11-07 RX ADMIN — PIPERACILLIN AND TAZOBACTAM 4500 MG: 4; .5 INJECTION, POWDER, FOR SOLUTION INTRAVENOUS at 22:40

## 2024-11-07 RX ADMIN — VANCOMYCIN 1250 MG: 1.75 INJECTION, SOLUTION INTRAVENOUS at 18:50

## 2024-11-07 RX ADMIN — INSULIN LISPRO 8 UNITS: 100 INJECTION, SOLUTION INTRAVENOUS; SUBCUTANEOUS at 12:33

## 2024-11-07 RX ADMIN — FUROSEMIDE 80 MG: 10 INJECTION, SOLUTION INTRAMUSCULAR; INTRAVENOUS at 08:39

## 2024-11-07 RX ADMIN — CARVEDILOL 12.5 MG: 12.5 TABLET, FILM COATED ORAL at 22:32

## 2024-11-07 RX ADMIN — ASPIRIN 162 MG: 325 TABLET ORAL at 08:39

## 2024-11-07 RX ADMIN — Medication 10 ML: at 08:41

## 2024-11-07 RX ADMIN — INSULIN LISPRO 24 UNITS: 100 INJECTION, SOLUTION INTRAVENOUS; SUBCUTANEOUS at 18:14

## 2024-11-07 RX ADMIN — INSULIN GLARGINE 70 UNITS: 100 INJECTION, SOLUTION SUBCUTANEOUS at 22:32

## 2024-11-07 RX ADMIN — CARVEDILOL 12.5 MG: 12.5 TABLET, FILM COATED ORAL at 08:39

## 2024-11-07 RX ADMIN — PIPERACILLIN AND TAZOBACTAM 4500 MG: 4; .5 INJECTION, POWDER, FOR SOLUTION INTRAVENOUS at 18:16

## 2024-11-07 RX ADMIN — TAMSULOSIN HYDROCHLORIDE 0.8 MG: 0.4 CAPSULE ORAL at 22:31

## 2024-11-07 RX ADMIN — ROSUVASTATIN CALCIUM 10 MG: 10 TABLET, FILM COATED ORAL at 22:31

## 2024-11-07 NOTE — RT PROTOCOL NOTE
RT Nebulizer Bronchodilator Protocol Note    There is a bronchodilator order in the chart from a provider indicating to follow the RT Bronchodilator Protocol and there is an “Initiate RT Bronchodilator Protocol” order as well (see protocol at bottom of note).    CXR Findings:  No results found.    The findings from the last RT Protocol Assessment were as follows:  Smoking: None or smoker <15 pack years  Respiratory Pattern: Regular pattern and RR 12-20 bpm  Breath Sounds: Slightly diminished and/or crackles  Cough: Strong, spontaneous, non-productive  Indication for Bronchodilator Therapy: Decreased or absent breath sounds  Bronchodilator Assessment Score: 2    Aerosolized bronchodilator medication orders have been revised according to the RT Nebulizer Bronchodilator Protocol below.    Respiratory Therapist to perform RT Therapy Protocol Assessment initially then follow the protocol.  Repeat RT Therapy Protocol Assessment PRN for score 0-3 or on second treatment, BID, and PRN for scores above 3.    No Indications - adjust the frequency to every 6 hours PRN wheezing or bronchospasm, if no treatments needed after 48 hours then discontinue using Per Protocol order mode.     If indication present, adjust the RT bronchodilator orders based on the Bronchodilator Assessment Score as indicated below.  If a patient is on this medication at home then do not decrease Frequency below that used at home.    0-3 - enter or revise RT bronchodilator order(s) to equivalent RT Bronchodilator order with Frequency of every 4 hours PRN for wheezing or increased work of breathing using Per Protocol order mode.       4-6 - enter or revise RT Bronchodilator order(s) to two equivalent RT bronchodilator orders with one order with BID Frequency and one order with Frequency of every 4 hours PRN wheezing or increased work of breathing using Per Protocol order mode.         7-10 - enter or revise RT Bronchodilator order(s) to two equivalent RT

## 2024-11-08 PROBLEM — N20.0 NEPHROLITHIASIS: Status: ACTIVE | Noted: 2024-11-08

## 2024-11-08 PROBLEM — J98.11 PULMONARY ATELECTASIS: Status: ACTIVE | Noted: 2024-11-08

## 2024-11-08 LAB
ALBUMIN SERPL-MCNC: 2.9 G/DL (ref 3.5–4.6)
ALP SERPL-CCNC: 148 U/L (ref 35–104)
ALT SERPL-CCNC: 19 U/L (ref 0–41)
ANION GAP SERPL CALCULATED.3IONS-SCNC: 11 MEQ/L (ref 9–15)
AST SERPL-CCNC: 17 U/L (ref 0–40)
BASOPHILS # BLD: 0 K/UL (ref 0–0.2)
BASOPHILS NFR BLD: 0.3 %
BILIRUB SERPL-MCNC: 0.3 MG/DL (ref 0.2–0.7)
BUN SERPL-MCNC: 81 MG/DL (ref 8–23)
CALCIUM SERPL-MCNC: 8.5 MG/DL (ref 8.5–9.9)
CHLORIDE SERPL-SCNC: 102 MEQ/L (ref 95–107)
CO2 SERPL-SCNC: 25 MEQ/L (ref 20–31)
CREAT SERPL-MCNC: 3.33 MG/DL (ref 0.7–1.2)
EOSINOPHIL # BLD: 0.4 K/UL (ref 0–0.7)
EOSINOPHIL NFR BLD: 4 %
ERYTHROCYTE [DISTWIDTH] IN BLOOD BY AUTOMATED COUNT: 16.4 % (ref 11.5–14.5)
GLOBULIN SER CALC-MCNC: 3.8 G/DL (ref 2.3–3.5)
GLUCOSE BLD-MCNC: 167 MG/DL (ref 70–99)
GLUCOSE BLD-MCNC: 196 MG/DL (ref 70–99)
GLUCOSE BLD-MCNC: 198 MG/DL (ref 70–99)
GLUCOSE BLD-MCNC: 94 MG/DL (ref 70–99)
GLUCOSE SERPL-MCNC: 55 MG/DL (ref 70–99)
HCT VFR BLD AUTO: 28.5 % (ref 42–52)
HGB BLD-MCNC: 9 G/DL (ref 14–18)
LYMPHOCYTES # BLD: 1.8 K/UL (ref 1–4.8)
LYMPHOCYTES NFR BLD: 19.3 %
MCH RBC QN AUTO: 28.1 PG (ref 27–31.3)
MCHC RBC AUTO-ENTMCNC: 31.6 % (ref 33–37)
MCV RBC AUTO: 89.1 FL (ref 79–92.2)
MONOCYTES # BLD: 0.8 K/UL (ref 0.2–0.8)
MONOCYTES NFR BLD: 8.8 %
NEUTROPHILS # BLD: 6.3 K/UL (ref 1.4–6.5)
NEUTS SEG NFR BLD: 66.4 %
PERFORMED ON: ABNORMAL
PERFORMED ON: NORMAL
PLATELET # BLD AUTO: 201 K/UL (ref 130–400)
POTASSIUM SERPL-SCNC: 4.4 MEQ/L (ref 3.4–4.9)
PROT SERPL-MCNC: 6.7 G/DL (ref 6.3–8)
RBC # BLD AUTO: 3.2 M/UL (ref 4.7–6.1)
SODIUM SERPL-SCNC: 138 MEQ/L (ref 135–144)
WBC # BLD AUTO: 9.5 K/UL (ref 4.8–10.8)

## 2024-11-08 PROCEDURE — 6360000002 HC RX W HCPCS: Performed by: INTERNAL MEDICINE

## 2024-11-08 PROCEDURE — 2580000003 HC RX 258: Performed by: STUDENT IN AN ORGANIZED HEALTH CARE EDUCATION/TRAINING PROGRAM

## 2024-11-08 PROCEDURE — 99222 1ST HOSP IP/OBS MODERATE 55: CPT | Performed by: INTERNAL MEDICINE

## 2024-11-08 PROCEDURE — 97110 THERAPEUTIC EXERCISES: CPT

## 2024-11-08 PROCEDURE — 85025 COMPLETE CBC W/AUTO DIFF WBC: CPT

## 2024-11-08 PROCEDURE — 36415 COLL VENOUS BLD VENIPUNCTURE: CPT

## 2024-11-08 PROCEDURE — 1210000000 HC MED SURG R&B

## 2024-11-08 PROCEDURE — 2580000003 HC RX 258: Performed by: INTERNAL MEDICINE

## 2024-11-08 PROCEDURE — 80053 COMPREHEN METABOLIC PANEL: CPT

## 2024-11-08 PROCEDURE — 99232 SBSQ HOSP IP/OBS MODERATE 35: CPT | Performed by: INTERNAL MEDICINE

## 2024-11-08 PROCEDURE — 99231 SBSQ HOSP IP/OBS SF/LOW 25: CPT | Performed by: PHYSICIAN ASSISTANT

## 2024-11-08 PROCEDURE — 94760 N-INVAS EAR/PLS OXIMETRY 1: CPT

## 2024-11-08 PROCEDURE — 6370000000 HC RX 637 (ALT 250 FOR IP): Performed by: INTERNAL MEDICINE

## 2024-11-08 PROCEDURE — 2700000000 HC OXYGEN THERAPY PER DAY

## 2024-11-08 PROCEDURE — 6370000000 HC RX 637 (ALT 250 FOR IP): Performed by: STUDENT IN AN ORGANIZED HEALTH CARE EDUCATION/TRAINING PROGRAM

## 2024-11-08 RX ORDER — INSULIN GLARGINE 100 [IU]/ML
50 INJECTION, SOLUTION SUBCUTANEOUS NIGHTLY
Status: DISCONTINUED | OUTPATIENT
Start: 2024-11-08 | End: 2024-11-09

## 2024-11-08 RX ORDER — INSULIN LISPRO 100 [IU]/ML
16 INJECTION, SOLUTION INTRAVENOUS; SUBCUTANEOUS
Status: DISCONTINUED | OUTPATIENT
Start: 2024-11-08 | End: 2024-11-08

## 2024-11-08 RX ORDER — INSULIN LISPRO 100 [IU]/ML
10 INJECTION, SOLUTION INTRAVENOUS; SUBCUTANEOUS
Status: DISCONTINUED | OUTPATIENT
Start: 2024-11-08 | End: 2024-11-09

## 2024-11-08 RX ADMIN — INSULIN LISPRO 10 UNITS: 100 INJECTION, SOLUTION INTRAVENOUS; SUBCUTANEOUS at 18:18

## 2024-11-08 RX ADMIN — ROSUVASTATIN CALCIUM 10 MG: 10 TABLET, FILM COATED ORAL at 21:40

## 2024-11-08 RX ADMIN — Medication 10 ML: at 08:24

## 2024-11-08 RX ADMIN — ASPIRIN 162 MG: 325 TABLET ORAL at 08:23

## 2024-11-08 RX ADMIN — PIPERACILLIN AND TAZOBACTAM 4500 MG: 4; .5 INJECTION, POWDER, FOR SOLUTION INTRAVENOUS at 08:23

## 2024-11-08 RX ADMIN — INSULIN LISPRO 16 UNITS: 100 INJECTION, SOLUTION INTRAVENOUS; SUBCUTANEOUS at 12:32

## 2024-11-08 RX ADMIN — CETIRIZINE HYDROCHLORIDE 10 MG: 10 TABLET, FILM COATED ORAL at 08:23

## 2024-11-08 RX ADMIN — TAMSULOSIN HYDROCHLORIDE 0.8 MG: 0.4 CAPSULE ORAL at 21:34

## 2024-11-08 RX ADMIN — CARVEDILOL 12.5 MG: 12.5 TABLET, FILM COATED ORAL at 21:34

## 2024-11-08 RX ADMIN — INSULIN GLARGINE 50 UNITS: 100 INJECTION, SOLUTION SUBCUTANEOUS at 21:42

## 2024-11-08 RX ADMIN — Medication 10 ML: at 21:44

## 2024-11-08 RX ADMIN — FINASTERIDE 5 MG: 5 TABLET, FILM COATED ORAL at 08:23

## 2024-11-08 RX ADMIN — INSULIN LISPRO 4 UNITS: 100 INJECTION, SOLUTION INTRAVENOUS; SUBCUTANEOUS at 12:32

## 2024-11-08 RX ADMIN — CEFTRIAXONE SODIUM 1000 MG: 1 INJECTION, POWDER, FOR SOLUTION INTRAMUSCULAR; INTRAVENOUS at 15:37

## 2024-11-08 RX ADMIN — INSULIN LISPRO 4 UNITS: 100 INJECTION, SOLUTION INTRAVENOUS; SUBCUTANEOUS at 21:44

## 2024-11-08 RX ADMIN — CARVEDILOL 12.5 MG: 12.5 TABLET, FILM COATED ORAL at 08:23

## 2024-11-08 RX ADMIN — MONTELUKAST 10 MG: 10 TABLET, FILM COATED ORAL at 21:40

## 2024-11-08 NOTE — CONSULTS
[facility]                   [address] [place] [address]                              CONSULTATION      PATIENT NAME: NATIVIDAD HARDIN               : 1950  MED REC NO: [id number]                        ROOM: [id number]  ACCOUNT NO: [id number]                       ADMIT DATE: 2024  PROVIDER: Rusty Arce MD    ENDOCRINE CONSULT    CONSULT DATE: 2024    REFERRING PHYSICIAN:  Jean Vizcaino MD      REASON FOR CONSULT:  Management of uncontrolled type 2 diabetes.    HISTORY OF PRESENT ILLNESS:  The patient is a 74-year-old male with known history of diabetes, seen by Endocrinology Service 6-7 months ago initially back in April for hypoglycemia.  At this time, his blood sugars have been higher, admitted with hematuria and fall.  Initial admitting diagnosis was complicated UTI with hemorrhagic cystitis, acute kidney injury.  Blood sugars initially were between 170, had climbed up to 371.  The patient was on Humalog coverage.  Reviewed chemistries; sodium 138, potassium 4.5, chloride 105, CO2 of 25, BUN 58, creatinine was 2.89.  Hemoglobin A1c was 8.4 in May.  The patient is seen here by Urology and General Surgery.  General surgery consult was for possible gallbladder.  Urology consult was for hematuria, hemorrhagic cystitis.    PAST MEDICAL HISTORY:  Significant for type 2 diabetes, chronic kidney disease, history of hypertension.    PAST SURGICAL HISTORY:  Appendectomy.    FAMILY HISTORY:  Reviewed, noncontributory.    SOCIAL HISTORY:  Denies any alcohol, substance abuse.    ALLERGIES:  NIACIN, FLUORESCEIN.      MEDICATIONS:  Here include Rocephin, Coreg, Humalog coverage, Proscar, Lasix, DuoNeb via nebulizer, Crestor, Flomax.    REVIEW OF SYSTEMS:  Other than fall, blood in the urine, and weakness, 14-point review of systems is negative.    PHYSICAL EXAMINATION:  GENERAL:  Patient is alert, awake, oriented x3, in no obvious distress.  VITAL SIGNS:  Blood pressure was 
                 [facility]                   [address] [place] [address]                              CONSULTATION      PATIENT NAME: NATIVIDAD HARDIN               : 1950  MED REC NO: [id number]                        ROOM: [id number]  ACCOUNT NO: [id number]                       ADMIT DATE: 2024  PROVIDER: Fredo Sorensen MD    INPATIENT CONSULTATION    CONSULT DATE: 2024    REFERRING PHYSICIAN:  Dr. Vizcaino      REASON FOR CONSULTATION:  Hematuria, hemorrhagic cystitis.    HISTORY OF PRESENT ILLNESS:  This is a 74-year-old male with history of hypertension, hyperlipidemia, coronary artery disease, type 2 diabetes, severe obesity, BPH.  He is DNR-comfort care arrest.  He was admitted on  after sustaining a fall and was felt to have a urinary tract infection.  Has been started on vancomycin and cefepime, and the patient reported hematuria at home and has had none since admission according to the patient's nurse and the patient, who is somewhat of a poor historian today.  He also is on 4 L of nasal cannula oxygen at baseline.  He has been seen in the past for UTIs.  He also has been seen for small kidney stones in the past.  All of these have passed spontaneously.  He currently denies flank pain, nausea, vomiting, fevers, chills, gross hematuria, or dysuria.  His urine does appear somewhat cloudy in his PureWick.  He has no other current urologic complaints.    PAST MEDICAL HISTORY:  Includes chronic kidney disease, diabetes, hypertension; obstructive sleep apnea, on home oxygen 4 L.    PAST SURGICAL HISTORY:  Includes appendectomy.    FAMILY HISTORY:  He has no history of kidney stones in the family.    SOCIAL HISTORY:  He denies cigarette smoking.    CURRENT MEDICATIONS:  Include cholecalciferol, Zofran, Tylenol, Lantus, Rocephin, Zyrtec, Flomax, Crestor, Singulair, Demadex.    ALLERGIES:  TO FLUORESCEIN AND NIACIN.      REVIEW OF SYSTEMS:  Denies chest pain, shortness of 
                 [facility]                   [address] [place] [address]                              CONSULTATION      PATIENT NAME: NATIVIDAD HARDIN               : 1950  MED REC NO: [id number]                        ROOM: [id number]  ACCOUNT NO: [id number]                       ADMIT DATE: 2024  PROVIDER: Mic Dias DO    RENAL CONSULT    CONSULT DATE: 2024      HISTORY OF PRESENT ILLNESS:  This is a 74-year-old male admitted to the hospital through the emergency room with aches regarding his lower back after falling out of bed.  The patient uses a wheelchair at home.  He uses 4 L of oxygen.  The patient has a history of CKD 3B in addition to diabetes, hypertension, ILIANA, coronary artery disease, and hyperlipidemia.  The patient on record has evidence of pulmonary hypertension.  The patient is a rather poor historian.  Not sure his etiology of kidney disease.  Understands that he has had some issues in the past, but not exactly why.  He denies any nonsteroidal anti-inflammatory medication use.  He denies any gross hematuria, dysuria, or frequent urinary tract infections.  He is diabetic and hypertensive.  Records show at an initial echocardiogram to be essentially normal.  On 2024, his GFR was 27 mL/minute and now 22 mL/minute.  The patient is being treated for complicated urinary tract infection as non-persistent moderate right hydronephrosis.    MEDICAL HISTORY:  Diabetes, CKD 3, hypertension, and COPD.    PAST SURGICAL HISTORY:  Appendectomy.    CURRENT MEDICATIONS:  Presently NovoLog, Tylenol, Coreg, Flonase, Flomax, erythromycin ophthalmic, insulin coverage, and Proscar.    ALLERGIES:  TO MEDICATION, NONE.      PHYSICAL EXAMINATION:  VITAL SIGNS:  The patient is 5 foot 6 inches, 270 pounds.  Blood pressure at time of my evaluation 135/60, heart rate 65, respirations 20, afebrile.  HEENT:  Normocephalic.  Pupils are equal and reactive to light.  Extraocular 
Spiritual Health History and Assessment/Progress Note  [facility]    Advance Care Planning,  ,  ,      Name: Amrik Meraz MRN: [id number]    Age: 74 y.o.     Sex: male   Language: English   Worship: Yarsanism   Urinary tract infection with hematuria     Date: 11/7/2024            Total Time Calculated: 30 min              Spiritual Assessment [place] [facility]        Referral/Consult From: Nurse   Encounter Overview/Reason: Advance Care Planning  Service Provided For: Patient     visited patient for a spiritual care consult. Patient was sitting in a chair with a blanket over his head.  explained advanced directives and provided the patient with an AD packet. Patient expressed understanding of advanced directives and the process for completing them.    Debbie, Belief, Meaning:   Patient has beliefs or practices that help with coping during difficult times  Family/Friends No family/friends present      Importance and Influence:  Patient has spiritual/personal beliefs that influence decisions regarding their health  Family/Friends No family/friends present    Community:  Patient feels well-supported. Support system includes: Friends and Extended family  Family/Friends No family/friends present    Assessment and Plan of Care:     Patient Interventions include: Assisted in Advance Care Planning conversation  Family/Friends Interventions include: No family/friends present    Patient Plan of Care: Spiritual Care available upon further referral  Family/Friends Plan of Care: No family/friends present    Electronically signed by Chaplain Mary on 11/7/2024 at 3:38 PM   
`Renal consult dictated  nl ECHO essentially    MAYKEL   5/13/2024  GFR 27 cc/min now 22 cc/min suspect pre-renal  Hemorrhagic cystitis  Persistent mod right hydro  Complicated UTI  DM type-2  BPH  Hypertension      Plan maintain evolemic state    avoid hypotension  control sugars  known CKS 3b   following labs   
directed jet. Mildly elevated RVSP, consistent with mild pulmonary hypertension. The estimated RVSP is 45 mmHg.   Left Atrium: Left atrium is mildly dilated.   Image quality is adequate.     XR CHEST (2 VW)    Result Date: 11/5/2024  EXAMINATION: TWO XRAY VIEWS OF THE CHEST 11/5/2024 1:13 pm COMPARISON: November 2, 2024 HISTORY: ORDERING SYSTEM PROVIDED HISTORY: orthopnea TECHNOLOGIST PROVIDED HISTORY: Reason for exam:->orthopnea What reading provider will be dictating this exam?->CRC FINDINGS: The heart is enlarged.  Pulmonary vessels appear congested.  Mild bibasilar hazy opacity.  No pneumothorax.  Minimal blunting of the right costophrenic angle.  The upper lungs are clear.     1. Cardiomegaly with pulmonary vascular congestion. 2. Mild bibasilar hazy opacity may represent atelectasis or infiltrate. 3. Small right pleural effusion.       Assessment and  plan:     Complicated UTI with hemorrhagic cystitis.  Chronic respiratory failure on 4 L O2 via nasal cannula.  MAYKEL on CKD stage IV.  Hypertension  Hyperlipidemia  Coronary artery disease  Diabetes mellitus  BPH    He is on 5 L O2 via nasal cannula O2 saturation is 94%.  Continue O2 to keep saturation above 90%.  Chest x-ray showed cardiomegaly with pulmonary vascular congestion and mild bibasilar hazy opacity may represent atelectasis or infiltrates, small right pleural effusion.  Most likely atelectasis.  On IV vancomycin and Zosyn.  Continue present treatment plan.  Will follow    Thank you for consult    NOTE: This report was transcribed using voice recognition software. Every effort was made to ensure accuracy; however, inadvertent computerized transcription errors may be present.    Comments:   Thank you for allowing us to participate in the care of this patient. Will continue to follow.Please call if questions or concerns arise.      Electronically signed by Sarwat Santana MD, FCCP on 11/7/2024 at 10:26 PM    
11/08/2024 06:18 AM     Collected: 11/07/24 1519 Updated: 11/07/24 1551 Specimen Type: Blood [id number] High     Bacteria, UANegative/HPF Hyaline Casts, UA3-5/HPF WBC, [id number] Abnormal /HPF RBC, UA3-5 Abnormal /HPF Epithelial Cells, UA3-5    Mild interlobular septal thickening with patchy ground-glass opacification   and miniscule bilateral pleural effusions, which may reflect mild edema.   Also note that there is dependent opacification in the bilateral lower lobes   which may be atelectatic but cannot exclude pneumonia.     Moderate right hydronephrosis. There is a somewhat linear stringy calcific   density extending for 1.6 cm along the proximal right ureter which may   reflect obstructive stones/fragments.  There is also abnormally prominent   appearance of the distal right ureter and cannot exclude a lesion.  Consider   CT urogram follow-up.     Additional observations as above.    IMPRESSION:    Complicated UTI with Proteus mirabilis, clinically improving  Moderate right hydronephrosis with nephrolithiasis  Acute kidney injury on chronic kidney disease in a patient with diabetes mellitus 2 and morbid obesity  Pulmonary atelectasis and fluid overload rather than pneumonia with chronic respiratory failure  Elevated procalcitonin is probably multifactorial    PLAN:  DC IV vancomycin and Zosyn  Switch to IV Rocephin  Oral antibiotics on discharge to complete 10 days course total  Follow-up with nephrology and urology    Discussed with patient    Radha Zhang MD      
macroscopic fat containing adrenal myelolipoma.  Kidneys demonstrate persistent moderate right hydronephrosis and heterogeneity in the right proximal renal pelvis partially calcified areas of likely minimal progression of stones or fragments with asymmetric perinephric stranding in the right and persistent mild-to-moderate right hydroureter throughout its length however no distal obstructing urolithiasis should be correlated for recent passage of stone and residual versus urinary tract infection.  Lack of intravenous contrast limits evaluation obstructing uropathy should be further evaluated with CT urogram or with cystoscopy/ureteroscopy to exclude underlying malignancy given persistence. GI/Bowel: No focal thickening or disproportion dilatation of bowel.  No inflammatory findings.  Moderate colonic stool burden. Pelvis: No suspicious pelvic lesion or bulky pelvic adenopathy/free fluid. Peritoneum/Retroperitoneum: Patent nonaneurysmal abdominal aorta.  No bulky retroperitoneal adenopathy. Bones/Soft Tissues: Soft tissue edema upper ventral abdominal wall for example series 3, image 115 likely contusion without focal hematoma.  No acute osseous findings.  Fat containing left direct inguinal hernia.     1. No acute traumatic findings of the chest. 2. Ventral abdominal wall soft tissue edema asymmetrically greater on the right consistent of a contusion pattern in the setting of trauma soft tissue contusion without focal hematoma.  No intra-or intrapelvic traumatic findings specifically no evidence for hemoperitoneum or solid organ injury. 3. Cardiomegaly with mild interstitial edema pattern. 4. Persistent moderate right hydronephrosis and heterogeneity in the right proximal renal pelvis partially calcified areas of likely minimal progression of stones or fragments with asymmetric perinephric stranding in the right and persistent mild-to-moderate right hydroureter throughout its length however no distal obstructing

## 2024-11-08 NOTE — CARE COORDINATION
1035 MET WITH PATIENT TO DISCUSS DISCHARGE PLAN. PATIENT SITTING UP IN RECLINER CHAIR WITH HEAD COVERED WITH BLANKETS, PATIENT DENIES NEEDS. PATIENT DECLINES SNF, DISCHARGE PLAN REMAINS HOME WITH [facility] -ACCEPTED.

## 2024-11-09 LAB
ANION GAP SERPL CALCULATED.3IONS-SCNC: 9 MEQ/L (ref 9–15)
BACTERIA BLD CULT: NORMAL
BACTERIA UR CULT: NORMAL
BASOPHILS # BLD: 0 K/UL (ref 0–0.2)
BASOPHILS NFR BLD: 0.4 %
BUN SERPL-MCNC: 86 MG/DL (ref 8–23)
CALCIUM SERPL-MCNC: 8.5 MG/DL (ref 8.5–9.9)
CHLORIDE SERPL-SCNC: 104 MEQ/L (ref 95–107)
CO2 SERPL-SCNC: 28 MEQ/L (ref 20–31)
CREAT SERPL-MCNC: 3.29 MG/DL (ref 0.7–1.2)
EOSINOPHIL # BLD: 0.5 K/UL (ref 0–0.7)
EOSINOPHIL NFR BLD: 5.7 %
ERYTHROCYTE [DISTWIDTH] IN BLOOD BY AUTOMATED COUNT: 16.6 % (ref 11.5–14.5)
GLUCOSE BLD-MCNC: 136 MG/DL (ref 70–99)
GLUCOSE BLD-MCNC: 161 MG/DL (ref 70–99)
GLUCOSE BLD-MCNC: 168 MG/DL (ref 70–99)
GLUCOSE BLD-MCNC: 176 MG/DL (ref 70–99)
GLUCOSE BLD-MCNC: 189 MG/DL (ref 70–99)
GLUCOSE SERPL-MCNC: 150 MG/DL (ref 70–99)
HCT VFR BLD AUTO: 27.2 % (ref 42–52)
HGB BLD-MCNC: 8.4 G/DL (ref 14–18)
LYMPHOCYTES # BLD: 1.8 K/UL (ref 1–4.8)
LYMPHOCYTES NFR BLD: 21.5 %
MCH RBC QN AUTO: 27.4 PG (ref 27–31.3)
MCHC RBC AUTO-ENTMCNC: 30.9 % (ref 33–37)
MCV RBC AUTO: 88.6 FL (ref 79–92.2)
MONOCYTES # BLD: 0.8 K/UL (ref 0.2–0.8)
MONOCYTES NFR BLD: 9.4 %
MRSA, DNA, NASAL: NEGATIVE
NEUTROPHILS # BLD: 5.1 K/UL (ref 1.4–6.5)
NEUTS SEG NFR BLD: 61.6 %
PERFORMED ON: ABNORMAL
PLATELET # BLD AUTO: 214 K/UL (ref 130–400)
POTASSIUM SERPL-SCNC: 4.9 MEQ/L (ref 3.4–4.9)
PROCALCITONIN SERPL IA-MCNC: 8.01 NG/ML (ref 0–0.15)
RBC # BLD AUTO: 3.07 M/UL (ref 4.7–6.1)
SODIUM SERPL-SCNC: 141 MEQ/L (ref 135–144)
SPECIMEN DESCRIPTION: NORMAL
WBC # BLD AUTO: 8.3 K/UL (ref 4.8–10.8)

## 2024-11-09 PROCEDURE — 2700000000 HC OXYGEN THERAPY PER DAY

## 2024-11-09 PROCEDURE — 84145 PROCALCITONIN (PCT): CPT

## 2024-11-09 PROCEDURE — 6370000000 HC RX 637 (ALT 250 FOR IP): Performed by: STUDENT IN AN ORGANIZED HEALTH CARE EDUCATION/TRAINING PROGRAM

## 2024-11-09 PROCEDURE — 2580000003 HC RX 258: Performed by: STUDENT IN AN ORGANIZED HEALTH CARE EDUCATION/TRAINING PROGRAM

## 2024-11-09 PROCEDURE — 6360000002 HC RX W HCPCS: Performed by: INTERNAL MEDICINE

## 2024-11-09 PROCEDURE — 85025 COMPLETE CBC W/AUTO DIFF WBC: CPT

## 2024-11-09 PROCEDURE — 1210000000 HC MED SURG R&B

## 2024-11-09 PROCEDURE — 6370000000 HC RX 637 (ALT 250 FOR IP): Performed by: INTERNAL MEDICINE

## 2024-11-09 PROCEDURE — 99232 SBSQ HOSP IP/OBS MODERATE 35: CPT | Performed by: INTERNAL MEDICINE

## 2024-11-09 PROCEDURE — 80048 BASIC METABOLIC PNL TOTAL CA: CPT

## 2024-11-09 PROCEDURE — 2580000003 HC RX 258: Performed by: INTERNAL MEDICINE

## 2024-11-09 PROCEDURE — 36415 COLL VENOUS BLD VENIPUNCTURE: CPT

## 2024-11-09 RX ORDER — INSULIN LISPRO 100 [IU]/ML
6 INJECTION, SOLUTION INTRAVENOUS; SUBCUTANEOUS
Status: DISCONTINUED | OUTPATIENT
Start: 2024-11-09 | End: 2024-11-12 | Stop reason: HOSPADM

## 2024-11-09 RX ORDER — INSULIN GLARGINE 100 [IU]/ML
40 INJECTION, SOLUTION SUBCUTANEOUS NIGHTLY
Status: DISCONTINUED | OUTPATIENT
Start: 2024-11-09 | End: 2024-11-11

## 2024-11-09 RX ADMIN — CARVEDILOL 12.5 MG: 12.5 TABLET, FILM COATED ORAL at 09:40

## 2024-11-09 RX ADMIN — CETIRIZINE HYDROCHLORIDE 10 MG: 10 TABLET, FILM COATED ORAL at 09:40

## 2024-11-09 RX ADMIN — CARVEDILOL 12.5 MG: 12.5 TABLET, FILM COATED ORAL at 21:02

## 2024-11-09 RX ADMIN — TAMSULOSIN HYDROCHLORIDE 0.8 MG: 0.4 CAPSULE ORAL at 21:01

## 2024-11-09 RX ADMIN — INSULIN GLARGINE 40 UNITS: 100 INJECTION, SOLUTION SUBCUTANEOUS at 21:02

## 2024-11-09 RX ADMIN — INSULIN LISPRO 4 UNITS: 100 INJECTION, SOLUTION INTRAVENOUS; SUBCUTANEOUS at 21:02

## 2024-11-09 RX ADMIN — INSULIN LISPRO 6 UNITS: 100 INJECTION, SOLUTION INTRAVENOUS; SUBCUTANEOUS at 10:33

## 2024-11-09 RX ADMIN — ASPIRIN 162 MG: 325 TABLET ORAL at 09:41

## 2024-11-09 RX ADMIN — MONTELUKAST 10 MG: 10 TABLET, FILM COATED ORAL at 21:01

## 2024-11-09 RX ADMIN — ROSUVASTATIN CALCIUM 10 MG: 10 TABLET, FILM COATED ORAL at 21:01

## 2024-11-09 RX ADMIN — Medication 10 ML: at 21:06

## 2024-11-09 RX ADMIN — INSULIN LISPRO 6 UNITS: 100 INJECTION, SOLUTION INTRAVENOUS; SUBCUTANEOUS at 17:17

## 2024-11-09 RX ADMIN — CEFTRIAXONE SODIUM 1000 MG: 1 INJECTION, POWDER, FOR SOLUTION INTRAMUSCULAR; INTRAVENOUS at 17:15

## 2024-11-09 RX ADMIN — FINASTERIDE 5 MG: 5 TABLET, FILM COATED ORAL at 09:40

## 2024-11-09 RX ADMIN — Medication 10 ML: at 09:48

## 2024-11-09 NOTE — PLAN OF CARE
Problem: Nutrition Deficit:  Goal: Optimize nutritional status  Outcome: Progressing     Problem: Safety - Adult  Goal: Free from fall injury  Outcome: Progressing     Problem: Skin/Tissue Integrity  Goal: Absence of new skin breakdown  Description: 1.  Monitor for areas of redness and/or skin breakdown  2.  Assess vascular access sites hourly  3.  Every 4-6 hours minimum:  Change oxygen saturation probe site  4.  Every 4-6 hours:  If on nasal continuous positive airway pressure, respiratory therapy assess nares and determine need for appliance change or resting period.  Outcome: Progressing     Problem: Discharge Planning  Goal: Discharge to home or other facility with appropriate resources  Outcome: Progressing     Problem: Chronic Conditions and Co-morbidities  Goal: Patient's chronic conditions and co-morbidity symptoms are monitored and maintained or improved  Outcome: Progressing

## 2024-11-10 LAB
ANION GAP SERPL CALCULATED.3IONS-SCNC: 7 MEQ/L (ref 9–15)
BASOPHILS # BLD: 0 K/UL (ref 0–0.2)
BASOPHILS NFR BLD: 0.3 %
BUN SERPL-MCNC: 72 MG/DL (ref 8–23)
CALCIUM SERPL-MCNC: 8.4 MG/DL (ref 8.5–9.9)
CHLORIDE SERPL-SCNC: 101 MEQ/L (ref 95–107)
CO2 SERPL-SCNC: 28 MEQ/L (ref 20–31)
CREAT SERPL-MCNC: 2.76 MG/DL (ref 0.7–1.2)
EOSINOPHIL # BLD: 0.5 K/UL (ref 0–0.7)
EOSINOPHIL NFR BLD: 5 %
ERYTHROCYTE [DISTWIDTH] IN BLOOD BY AUTOMATED COUNT: 16.5 % (ref 11.5–14.5)
GLUCOSE BLD-MCNC: 130 MG/DL (ref 70–99)
GLUCOSE BLD-MCNC: 197 MG/DL (ref 70–99)
GLUCOSE BLD-MCNC: 211 MG/DL (ref 70–99)
GLUCOSE BLD-MCNC: 219 MG/DL (ref 70–99)
GLUCOSE BLD-MCNC: 55 MG/DL (ref 70–99)
GLUCOSE SERPL-MCNC: 56 MG/DL (ref 70–99)
HCT VFR BLD AUTO: 27.1 % (ref 42–52)
HGB BLD-MCNC: 8.3 G/DL (ref 14–18)
LYMPHOCYTES # BLD: 1.7 K/UL (ref 1–4.8)
LYMPHOCYTES NFR BLD: 18.7 %
MCH RBC QN AUTO: 27.1 PG (ref 27–31.3)
MCHC RBC AUTO-ENTMCNC: 30.6 % (ref 33–37)
MCV RBC AUTO: 88.6 FL (ref 79–92.2)
MONOCYTES # BLD: 0.7 K/UL (ref 0.2–0.8)
MONOCYTES NFR BLD: 8.1 %
NEUTROPHILS # BLD: 6 K/UL (ref 1.4–6.5)
NEUTS SEG NFR BLD: 66.9 %
PERFORMED ON: ABNORMAL
PLATELET # BLD AUTO: 199 K/UL (ref 130–400)
POTASSIUM SERPL-SCNC: 4.4 MEQ/L (ref 3.4–4.9)
RBC # BLD AUTO: 3.06 M/UL (ref 4.7–6.1)
SODIUM SERPL-SCNC: 136 MEQ/L (ref 135–144)
WBC # BLD AUTO: 9 K/UL (ref 4.8–10.8)

## 2024-11-10 PROCEDURE — 99232 SBSQ HOSP IP/OBS MODERATE 35: CPT | Performed by: INTERNAL MEDICINE

## 2024-11-10 PROCEDURE — 6370000000 HC RX 637 (ALT 250 FOR IP): Performed by: STUDENT IN AN ORGANIZED HEALTH CARE EDUCATION/TRAINING PROGRAM

## 2024-11-10 PROCEDURE — 85025 COMPLETE CBC W/AUTO DIFF WBC: CPT

## 2024-11-10 PROCEDURE — 6360000002 HC RX W HCPCS: Performed by: INTERNAL MEDICINE

## 2024-11-10 PROCEDURE — 6370000000 HC RX 637 (ALT 250 FOR IP): Performed by: INTERNAL MEDICINE

## 2024-11-10 PROCEDURE — 80048 BASIC METABOLIC PNL TOTAL CA: CPT

## 2024-11-10 PROCEDURE — 1210000000 HC MED SURG R&B

## 2024-11-10 PROCEDURE — 2700000000 HC OXYGEN THERAPY PER DAY

## 2024-11-10 PROCEDURE — 2580000003 HC RX 258: Performed by: INTERNAL MEDICINE

## 2024-11-10 PROCEDURE — 36415 COLL VENOUS BLD VENIPUNCTURE: CPT

## 2024-11-10 PROCEDURE — 2580000003 HC RX 258: Performed by: STUDENT IN AN ORGANIZED HEALTH CARE EDUCATION/TRAINING PROGRAM

## 2024-11-10 RX ADMIN — INSULIN LISPRO 4 UNITS: 100 INJECTION, SOLUTION INTRAVENOUS; SUBCUTANEOUS at 17:39

## 2024-11-10 RX ADMIN — MONTELUKAST 10 MG: 10 TABLET, FILM COATED ORAL at 20:50

## 2024-11-10 RX ADMIN — FINASTERIDE 5 MG: 5 TABLET, FILM COATED ORAL at 09:01

## 2024-11-10 RX ADMIN — INSULIN LISPRO 4 UNITS: 100 INJECTION, SOLUTION INTRAVENOUS; SUBCUTANEOUS at 20:49

## 2024-11-10 RX ADMIN — Medication 10 ML: at 20:50

## 2024-11-10 RX ADMIN — TAMSULOSIN HYDROCHLORIDE 0.8 MG: 0.4 CAPSULE ORAL at 20:50

## 2024-11-10 RX ADMIN — INSULIN GLARGINE 40 UNITS: 100 INJECTION, SOLUTION SUBCUTANEOUS at 20:49

## 2024-11-10 RX ADMIN — CARVEDILOL 12.5 MG: 12.5 TABLET, FILM COATED ORAL at 20:50

## 2024-11-10 RX ADMIN — CARVEDILOL 12.5 MG: 12.5 TABLET, FILM COATED ORAL at 09:01

## 2024-11-10 RX ADMIN — CEFTRIAXONE SODIUM 1000 MG: 1 INJECTION, POWDER, FOR SOLUTION INTRAMUSCULAR; INTRAVENOUS at 14:21

## 2024-11-10 RX ADMIN — INSULIN LISPRO 6 UNITS: 100 INJECTION, SOLUTION INTRAVENOUS; SUBCUTANEOUS at 14:13

## 2024-11-10 RX ADMIN — DEXTROSE MONOHYDRATE 125 ML: 100 INJECTION, SOLUTION INTRAVENOUS at 07:50

## 2024-11-10 RX ADMIN — ROSUVASTATIN CALCIUM 10 MG: 10 TABLET, FILM COATED ORAL at 20:50

## 2024-11-10 RX ADMIN — Medication 10 ML: at 09:03

## 2024-11-10 RX ADMIN — ASPIRIN 162 MG: 325 TABLET ORAL at 09:01

## 2024-11-10 RX ADMIN — CETIRIZINE HYDROCHLORIDE 10 MG: 10 TABLET, FILM COATED ORAL at 09:01

## 2024-11-10 RX ADMIN — INSULIN LISPRO 6 UNITS: 100 INJECTION, SOLUTION INTRAVENOUS; SUBCUTANEOUS at 17:39

## 2024-11-10 RX ADMIN — INSULIN LISPRO 4 UNITS: 100 INJECTION, SOLUTION INTRAVENOUS; SUBCUTANEOUS at 14:12

## 2024-11-11 LAB
ALBUMIN SERPL-MCNC: 3.2 G/DL (ref 3.5–4.6)
ALP SERPL-CCNC: 129 U/L (ref 35–104)
ALT SERPL-CCNC: 13 U/L (ref 0–41)
ANION GAP SERPL CALCULATED.3IONS-SCNC: 8 MEQ/L (ref 9–15)
AST SERPL-CCNC: 10 U/L (ref 0–40)
BASOPHILS # BLD: 0 K/UL (ref 0–0.2)
BASOPHILS NFR BLD: 0.4 %
BILIRUB SERPL-MCNC: 0.3 MG/DL (ref 0.2–0.7)
BUN SERPL-MCNC: 65 MG/DL (ref 8–23)
CALCIUM SERPL-MCNC: 8.7 MG/DL (ref 8.5–9.9)
CHLORIDE SERPL-SCNC: 107 MEQ/L (ref 95–107)
CO2 SERPL-SCNC: 28 MEQ/L (ref 20–31)
CREAT SERPL-MCNC: 2.54 MG/DL (ref 0.7–1.2)
EOSINOPHIL # BLD: 0.3 K/UL (ref 0–0.7)
EOSINOPHIL NFR BLD: 3.8 %
ERYTHROCYTE [DISTWIDTH] IN BLOOD BY AUTOMATED COUNT: 16.5 % (ref 11.5–14.5)
GLOBULIN SER CALC-MCNC: 3.3 G/DL (ref 2.3–3.5)
GLUCOSE BLD-MCNC: 191 MG/DL (ref 70–99)
GLUCOSE BLD-MCNC: 213 MG/DL (ref 70–99)
GLUCOSE BLD-MCNC: 241 MG/DL (ref 70–99)
GLUCOSE BLD-MCNC: 90 MG/DL (ref 70–99)
GLUCOSE SERPL-MCNC: 82 MG/DL (ref 70–99)
HCT VFR BLD AUTO: 28.1 % (ref 42–52)
HGB BLD-MCNC: 8.8 G/DL (ref 14–18)
LYMPHOCYTES # BLD: 1.6 K/UL (ref 1–4.8)
LYMPHOCYTES NFR BLD: 20.6 %
MCH RBC QN AUTO: 27.8 PG (ref 27–31.3)
MCHC RBC AUTO-ENTMCNC: 31.3 % (ref 33–37)
MCV RBC AUTO: 88.6 FL (ref 79–92.2)
MONOCYTES # BLD: 0.6 K/UL (ref 0.2–0.8)
MONOCYTES NFR BLD: 7.3 %
NEUTROPHILS # BLD: 5.3 K/UL (ref 1.4–6.5)
NEUTS SEG NFR BLD: 66.9 %
PERFORMED ON: ABNORMAL
PERFORMED ON: NORMAL
PLATELET # BLD AUTO: 220 K/UL (ref 130–400)
POTASSIUM SERPL-SCNC: 5.2 MEQ/L (ref 3.4–4.9)
POTASSIUM SERPL-SCNC: 5.2 MEQ/L (ref 3.4–4.9)
PROT SERPL-MCNC: 6.5 G/DL (ref 6.3–8)
RBC # BLD AUTO: 3.17 M/UL (ref 4.7–6.1)
SODIUM SERPL-SCNC: 143 MEQ/L (ref 135–144)
WBC # BLD AUTO: 8 K/UL (ref 4.8–10.8)

## 2024-11-11 PROCEDURE — 97535 SELF CARE MNGMENT TRAINING: CPT

## 2024-11-11 PROCEDURE — 6370000000 HC RX 637 (ALT 250 FOR IP): Performed by: INTERNAL MEDICINE

## 2024-11-11 PROCEDURE — 85025 COMPLETE CBC W/AUTO DIFF WBC: CPT

## 2024-11-11 PROCEDURE — 80053 COMPREHEN METABOLIC PANEL: CPT

## 2024-11-11 PROCEDURE — 2700000000 HC OXYGEN THERAPY PER DAY

## 2024-11-11 PROCEDURE — 36415 COLL VENOUS BLD VENIPUNCTURE: CPT

## 2024-11-11 PROCEDURE — 6360000002 HC RX W HCPCS: Performed by: INTERNAL MEDICINE

## 2024-11-11 PROCEDURE — 99231 SBSQ HOSP IP/OBS SF/LOW 25: CPT | Performed by: INTERNAL MEDICINE

## 2024-11-11 PROCEDURE — 2580000003 HC RX 258: Performed by: STUDENT IN AN ORGANIZED HEALTH CARE EDUCATION/TRAINING PROGRAM

## 2024-11-11 PROCEDURE — 99232 SBSQ HOSP IP/OBS MODERATE 35: CPT | Performed by: INTERNAL MEDICINE

## 2024-11-11 PROCEDURE — 2580000003 HC RX 258: Performed by: INTERNAL MEDICINE

## 2024-11-11 PROCEDURE — 6370000000 HC RX 637 (ALT 250 FOR IP): Performed by: STUDENT IN AN ORGANIZED HEALTH CARE EDUCATION/TRAINING PROGRAM

## 2024-11-11 PROCEDURE — 1210000000 HC MED SURG R&B

## 2024-11-11 PROCEDURE — 99232 SBSQ HOSP IP/OBS MODERATE 35: CPT | Performed by: UROLOGY

## 2024-11-11 RX ORDER — INSULIN GLARGINE 100 [IU]/ML
30 INJECTION, SOLUTION SUBCUTANEOUS NIGHTLY
Status: DISCONTINUED | OUTPATIENT
Start: 2024-11-11 | End: 2024-11-12 | Stop reason: HOSPADM

## 2024-11-11 RX ADMIN — MONTELUKAST 10 MG: 10 TABLET, FILM COATED ORAL at 21:45

## 2024-11-11 RX ADMIN — CEFTRIAXONE SODIUM 1000 MG: 1 INJECTION, POWDER, FOR SOLUTION INTRAMUSCULAR; INTRAVENOUS at 14:28

## 2024-11-11 RX ADMIN — Medication 10 ML: at 08:44

## 2024-11-11 RX ADMIN — Medication 10 ML: at 21:45

## 2024-11-11 RX ADMIN — CARVEDILOL 12.5 MG: 12.5 TABLET, FILM COATED ORAL at 08:43

## 2024-11-11 RX ADMIN — FINASTERIDE 5 MG: 5 TABLET, FILM COATED ORAL at 08:44

## 2024-11-11 RX ADMIN — INSULIN LISPRO 4 UNITS: 100 INJECTION, SOLUTION INTRAVENOUS; SUBCUTANEOUS at 21:44

## 2024-11-11 RX ADMIN — CETIRIZINE HYDROCHLORIDE 10 MG: 10 TABLET, FILM COATED ORAL at 08:44

## 2024-11-11 RX ADMIN — TAMSULOSIN HYDROCHLORIDE 0.8 MG: 0.4 CAPSULE ORAL at 21:45

## 2024-11-11 RX ADMIN — ROSUVASTATIN CALCIUM 10 MG: 10 TABLET, FILM COATED ORAL at 21:45

## 2024-11-11 RX ADMIN — ASPIRIN 162 MG: 325 TABLET ORAL at 08:44

## 2024-11-11 RX ADMIN — INSULIN LISPRO 4 UNITS: 100 INJECTION, SOLUTION INTRAVENOUS; SUBCUTANEOUS at 11:54

## 2024-11-11 RX ADMIN — CARVEDILOL 12.5 MG: 12.5 TABLET, FILM COATED ORAL at 21:45

## 2024-11-11 RX ADMIN — INSULIN LISPRO 6 UNITS: 100 INJECTION, SOLUTION INTRAVENOUS; SUBCUTANEOUS at 17:25

## 2024-11-11 RX ADMIN — INSULIN LISPRO 6 UNITS: 100 INJECTION, SOLUTION INTRAVENOUS; SUBCUTANEOUS at 11:54

## 2024-11-11 RX ADMIN — INSULIN GLARGINE 30 UNITS: 100 INJECTION, SOLUTION SUBCUTANEOUS at 21:45

## 2024-11-11 RX ADMIN — ACETAMINOPHEN 325MG 650 MG: 325 TABLET ORAL at 10:39

## 2024-11-11 ASSESSMENT — ENCOUNTER SYMPTOMS
ABDOMINAL PAIN: 0
ABDOMINAL DISTENTION: 0

## 2024-11-11 ASSESSMENT — PAIN SCALES - WONG BAKER: WONGBAKER_NUMERICALRESPONSE: NO HURT

## 2024-11-11 ASSESSMENT — PAIN SCALES - GENERAL
PAINLEVEL_OUTOF10: 2
PAINLEVEL_OUTOF10: 7

## 2024-11-11 ASSESSMENT — PAIN DESCRIPTION - ORIENTATION: ORIENTATION: RIGHT

## 2024-11-11 ASSESSMENT — PAIN DESCRIPTION - LOCATION: LOCATION: SHOULDER

## 2024-11-11 NOTE — CARE COORDINATION
CM ENTERED ROOM TO DISCUSS DC PLANNING.  PT DENIES HOME GOING NEEDS.  PLAN TO DC WITH [facility]. PT HAS PORTABLE O2 AT BEDSIDE.

## 2024-11-11 NOTE — PLAN OF CARE
Problem: Discharge Planning  Goal: Discharge to home or other facility with appropriate resources  11/11/2024 0936 by Yovany Bal, RN  Outcome: Progressing  Flowsheets (Taken 11/11/2024 0931)  Discharge to home or other facility with appropriate resources: Identify barriers to discharge with patient and caregiver  11/10/2024 8955 by Tenisha Ariza, RN  Outcome: Progressing

## 2024-11-12 VITALS
WEIGHT: 260.3 LBS | SYSTOLIC BLOOD PRESSURE: 125 MMHG | OXYGEN SATURATION: 99 % | BODY MASS INDEX: 41.83 KG/M2 | RESPIRATION RATE: 16 BRPM | DIASTOLIC BLOOD PRESSURE: 47 MMHG | HEIGHT: 66 IN | TEMPERATURE: 97.7 F | HEART RATE: 59 BPM

## 2024-11-12 LAB
GLUCOSE BLD-MCNC: 159 MG/DL (ref 70–99)
GLUCOSE BLD-MCNC: 80 MG/DL (ref 70–99)
PERFORMED ON: ABNORMAL
PERFORMED ON: NORMAL

## 2024-11-12 PROCEDURE — 2580000003 HC RX 258: Performed by: INTERNAL MEDICINE

## 2024-11-12 PROCEDURE — 99232 SBSQ HOSP IP/OBS MODERATE 35: CPT | Performed by: INTERNAL MEDICINE

## 2024-11-12 PROCEDURE — 2700000000 HC OXYGEN THERAPY PER DAY

## 2024-11-12 PROCEDURE — 2580000003 HC RX 258: Performed by: STUDENT IN AN ORGANIZED HEALTH CARE EDUCATION/TRAINING PROGRAM

## 2024-11-12 PROCEDURE — 6370000000 HC RX 637 (ALT 250 FOR IP): Performed by: INTERNAL MEDICINE

## 2024-11-12 PROCEDURE — 99231 SBSQ HOSP IP/OBS SF/LOW 25: CPT | Performed by: PHYSICIAN ASSISTANT

## 2024-11-12 PROCEDURE — 6370000000 HC RX 637 (ALT 250 FOR IP): Performed by: STUDENT IN AN ORGANIZED HEALTH CARE EDUCATION/TRAINING PROGRAM

## 2024-11-12 PROCEDURE — 6360000002 HC RX W HCPCS: Performed by: INTERNAL MEDICINE

## 2024-11-12 RX ADMIN — CARVEDILOL 12.5 MG: 12.5 TABLET, FILM COATED ORAL at 08:03

## 2024-11-12 RX ADMIN — ASPIRIN 162 MG: 325 TABLET ORAL at 08:03

## 2024-11-12 RX ADMIN — ACETAMINOPHEN 325MG 650 MG: 325 TABLET ORAL at 08:03

## 2024-11-12 RX ADMIN — FINASTERIDE 5 MG: 5 TABLET, FILM COATED ORAL at 08:03

## 2024-11-12 RX ADMIN — Medication 10 ML: at 08:11

## 2024-11-12 RX ADMIN — CEFTRIAXONE SODIUM 1000 MG: 1 INJECTION, POWDER, FOR SOLUTION INTRAMUSCULAR; INTRAVENOUS at 14:27

## 2024-11-12 RX ADMIN — INSULIN LISPRO 6 UNITS: 100 INJECTION, SOLUTION INTRAVENOUS; SUBCUTANEOUS at 11:49

## 2024-11-12 RX ADMIN — CETIRIZINE HYDROCHLORIDE 10 MG: 10 TABLET, FILM COATED ORAL at 08:03

## 2024-11-12 ASSESSMENT — PAIN SCALES - GENERAL: PAINLEVEL_OUTOF10: 2

## 2024-11-12 ASSESSMENT — PAIN SCALES - WONG BAKER: WONGBAKER_NUMERICALRESPONSE: NO HURT

## 2024-11-12 NOTE — DISCHARGE INSTRUCTIONS
Follow up with primary care physician in the next 7 days or sooner if needed. If you do not have a Primary care physician, please schedule an appointment with one. Please ask prior to discharge about a list of local providers.     Please return to ER or call 911 if you develop any significant signs or symptoms.    I may not have addressed all of your medical illnesses or the abnormal blood work or imaging therefore please ask your PCP to obtain [facility] record to follow up on all of the abnormal labs, imaging and findings that I have and have not addressed during your hospitalization.     Discharging you from the hospital does not mean that your medical care ends here and now. You may still need additional work up, investigation, monitoring, and treatment to be handled from this point on by outside providers including your PCP, Specialists and other healthcare providers.     For medication questions, contact your retail pharmacy and your PCP.    Your medical team at [facility] appreciates the opportunity to work with you to get well!    Adriana Galicia MD  12:11 PM

## 2024-11-12 NOTE — PLAN OF CARE
Problem: Safety - Adult  Goal: Free from fall injury  11/12/2024 0817 by Yovany Bal, RN  Outcome: Progressing  11/12/2024 0344 by Virgie Sarmiento, RN  Outcome: Progressing  Flowsheets (Taken 11/12/2024 0344)  Free From Fall Injury:   Instruct family/caregiver on patient safety   Based on caregiver fall risk screen, instruct family/caregiver to ask for assistance with transferring infant if caregiver noted to have fall risk factors

## 2024-11-12 NOTE — PROGRESS NOTES
Progress Note    11/11/2024   8:18 AM    Name:  Amrik Meraz  MRN:    [id number]     Acct:     [id number]   Room:  [address] Day: 9     Admit Date: 11/2/2024  5:51 AM  PCP: Anita Figueroa MD    Subjective:     C/C:   Chief Complaint   Patient presents with    Hematuria    Fall       Interval History: Status: This is a 74-year-old male with history of hypertension, hyperlipidemia, coronary artery disease, type 2 diabetes, severe obesity, BPH. He is DNR-comfort care arrest. He was admitted on 11/02 after sustaining a fall and was found to have a Proteus urinary tract infection. He has no pain and the urine is yellow in collection device.     Past Medical History:   Diagnosis Date    Chronic kidney disease     Diabetes mellitus (HCC)     Hypertension     Type 2 diabetes mellitus with hyperglycemia 1/6/2023    Type 2 diabetes mellitus without complication, without long-term current use of insulin ([facility]) 3/19/2019    Uncontrolled type 2 diabetes mellitus with hyperglycemia ([facility])        ROS:  Review of Systems   Constitutional:  Negative for fever.   Gastrointestinal:  Negative for abdominal distention and abdominal pain.   Genitourinary:  Negative for difficulty urinating, dysuria, flank pain and hematuria.       Medications:     Allergies:   Allergies   Allergen Reactions    Niacin Other (See Comments)    Fluorescein Diarrhea, Nausea And Vomiting and Other (See Comments)       Current Meds: influenza split vaccine (PF) (AFLURIA;FLUARIX) injection 0.5 mL, Prior to discharge  insulin glargine (LANTUS) injection vial 40 Units, Nightly  insulin lispro (HUMALOG,ADMELOG) injection vial 6 Units, TID WC  cefTRIAXone (ROCEPHIN) 1,000 mg in sodium chloride 0.9 % 50 mL IVPB (mini-bag), Q24H  insulin lispro (HUMALOG,ADMELOG) injection vial 0-16 Units, 4x Daily AC & HS  finasteride (PROSCAR) tablet 5 mg, Daily  aspirin tablet 162 mg, Daily  ammonium lactate (LAC-HYDRIN) 12 % lotion, PRN  rosuvastatin (CRESTOR) tablet 10 
   11/04/24 2200   RT Protocol   History Pulmonary Disease 0   Respiratory pattern 0   Breath sounds 4   Cough 0   Indications for Bronchodilator Therapy Wheezing associated with pulm disorder   Bronchodilator Assessment Score 4       
   11/05/24 2100   RT Protocol   History Pulmonary Disease 0   Respiratory pattern 0   Breath sounds 6   Cough 0   Indications for Bronchodilator Therapy Decreased or absent breath sounds   Bronchodilator Assessment Score 6       
   11/06/24 2100   RT Protocol   History Pulmonary Disease 0   Respiratory pattern 0   Breath sounds 6   Cough 0   Indications for Bronchodilator Therapy Decreased or absent breath sounds   Bronchodilator Assessment Score 6       
   11/07/24 0914   RT Protocol   History Pulmonary Disease 0   Respiratory pattern 0   Breath sounds 2   Cough 0   Indications for Bronchodilator Therapy Decreased or absent breath sounds   Bronchodilator Assessment Score 2       
  GENERAL SURGERY  PROGRESS NOTE    Pt Name: Amrik Meraz  MRN: [id number]  Date: 11/5/2024    Subjective  AMANDA overnight. Afebrile, VSS, satting well. Pt doing well, reports pain is controlled. No nausea/ vomiting. Flatus, BM. Ambulating.    Vitals  BP (!) 115/57   Pulse 60   Temp 97.7 °F (36.5 °C) (Oral)   Resp 18   Ht 1.676 m (5' 6\")   Wt 122.5 kg (270 lb)   SpO2 91%   BMI 43.58 kg/m²      Physical Exam  GEN: A&Ox3, NAD, cooperative   PULM: no increased work of breathing, satting well  CV: regular rate  ABD: Soft, NTND,   EXT: Warm, dry, no lower extremity edema    Intake/ Output    Intake/Output Summary (Last 24 hours) at 11/5/2024 1026  Last data filed at 11/5/2024 0613  Gross per 24 hour   Intake 600 ml   Output 800 ml   Net -200 ml     Date 11/05/24 0000 - 11/05/24 2359   Shift 3744-1717 1258-1098 0506-5148 24 Hour Total   INTAKE   Shift Total(mL/kg)       OUTPUT   Urine(mL/kg/hr) 800(0.8)   800   Shift Total(mL/kg) 800(6.5)   800(6.5)   Weight (kg) 122.5 122.5 122.5 122.5       Labs  Recent Labs     11/03/24  0557 11/04/24  0442 11/05/24  0548   WBC 12.2* 10.7 7.8   HGB 8.7* 8.7* 8.1*   HCT 28.2* 27.7* 26.1*    182 154    139 138   K 4.3 4.5 4.5    104 105   CO2 22 23 25   BUN 56* 54* 58*   CREATININE 2.96* 2.94* 2.89*   CALCIUM 8.2* 8.2* 8.3*         Assessment/ Plan    Amrik Meraz is a 74 y.o. male with PMH of HTN, HLD/CAD, T2DM, BPH, and severe obesity who presents for hematuria as well as a fall this AM. Pt states he went to roll out of bed and rolled to far and fell out of bed. He states he landed on his knees. He denies LOC or hitting his head. He reports he had been feeling weaker over the last few days. He also reports that last night he had an episode of hematuria. He denies any history of hematuria in the past. He denies fevers, chills, chest pain, shortness of breath, abdominal pain, or N/V. He does wear 4L NC at baseline. Patient was consulted to General Surgery for 
  GENERAL SURGERY  PROGRESS NOTE    Pt Name: Amrik Meraz  MRN: [id number]  Date: 11/6/2024    Subjective  AMANDA overnight. Afebrile, VSS, satting well. Pt doing well, reports pain is controlled. No nausea/ vomiting. Flatus, BM. Ambulating.    Vitals  /64   Pulse 66   Temp 98.4 °F (36.9 °C) (Oral)   Resp 22   Ht 1.676 m (5' 6\")   Wt 122.5 kg (270 lb)   SpO2 97%   BMI 43.58 kg/m²      Physical Exam  GEN: A&Ox3, NAD, cooperative   PULM: no increased work of breathing, satting well  CV: regular rate  ABD: Soft, NTND,  EXT: Warm, dry, no lower extremity edema    Intake/ Output    Intake/Output Summary (Last 24 hours) at 11/6/2024 1048  Last data filed at 11/6/2024 0900  Gross per 24 hour   Intake 730 ml   Output 3900 ml   Net -3170 ml     Date 11/06/24 0000 - 11/06/24 2359   Shift 6825-1718 7628-9231 0755-6708 24 Hour Total   INTAKE   P.O.(mL/kg/hr) 240(0.2) 240  480   Shift Total(mL/kg) 240(2) 240(2)  480(3.9)   OUTPUT   Urine(mL/kg/hr) 1750(1.8) 500  2250   Shift Total(mL/kg) 1750(14.3) 500(4.1)  2250(18.4)   Weight (kg) 122.5 122.5 122.5 122.5       Labs  Recent Labs     11/04/24  0442 11/05/24  0548 11/06/24  0556   WBC 10.7 7.8 5.8   HGB 8.7* 8.1* 8.7*   HCT 27.7* 26.1* 27.6*    154 171    138 137   K 4.5 4.5 5.3*    105 100   CO2 23 25 24   BUN 54* 58* 63*   CREATININE 2.94* 2.89* 2.86*   CALCIUM 8.2* 8.3* 8.8   AST  --   --  23   ALT  --   --  19   BILITOT  --   --  0.3         Assessment/ Plan    Amrik Meraz is a 74 y.o. male with PMH of HTN, HLD/CAD, T2DM, BPH, and severe obesity who presents for hematuria as well as a fall this AM. Pt states he went to roll out of bed and rolled to far and fell out of bed. He states he landed on his knees. He denies LOC or hitting his head. He reports he had been feeling weaker over the last few days. He also reports that last night he had an episode of hematuria. He denies any history of hematuria in the past. He denies fevers, chills, chest 
.   
0840 Shift assessment complete. Pt is Axox4. Meds given per mar. Repositioned in bed. Call light within reach.     Electronically signed by Aleena Reza RN on 11/7/2024 at 9:27 AM    
0900 Shift assessment complete. Pt is AxOx4. Meds given per mar. Pt worked with therapy, and is currently sitting up in chair. Pt denies any needs at this time. Call light within reach.     Electronically signed by Aleena Reza RN on 11/6/2024 at 10:13 AM    
2100: Shift assessment completed. Pt complains of SOB and lightheadedness. Denies chest pain. Remains A&Ox4. O2 was ranging from 85-87% on 4 L NC, temporarily increased this to 6 L without any improvement. I contacted respiratory and they informed me that they would be up as soon as they could.I also made IMMANUEL Thayer made aware via perfect serve. Solumedrol was given per orders.     Respiratory came and evaluated patient. He is now 95% on heated high flow at 55% 60%.     Patient states he feels much better.     Electronically signed by Josseline Loya RN on 11/5/2024 at 11:48 PM    
[facility]   Pharmacy Dose Adjustment Per Protocol:  Zosyn Extended Interval Interchange      Amrik Meraz is a 74 y.o. male.     The following ordered dose of Zosyn has been changed to optimize its pharmacodynamic profile per [facility] pharmacy policy approved by [facility].    Recent Labs     11/06/24  0556 11/07/24  0623   CREATININE 2.86* 3.01*   BUN 63* 76*   WBC 5.8 8.2     .  Height: 167.6 cm (5' 6\"), Weight - Scale: 122.5 kg (270 lb), Body mass index is 43.58 kg/m².  Estimated Creatinine Clearance: 26 mL/min (A) (based on SCr of 3.01 mg/dL (H)).    Medication Ordered:   Traditional Dosing   [] Zosyn 2.25 gm q6-8 hr   [x] Zosyn 3.375 gm q6-8 hr   [] Zosyn 4.5 gm q6-8 hr   [] Zosyn 2.25 gm q6-8 hr   [] Zosyn 3.375 gm q6-8 hr   [] Zosyn 4.5 gm q6-8 hr     New Dose      Piperacillin/Tazobactam - Extended Infusion Dosing (4-hour infusion) - Preferred Dosing Strategy       Renal Function (CrCl mL/min)  >= 20  < 20, HD, PD  CRRT    All Indications - Loading dose of 4500 milligrams x 1 over 30 minutes or via IV push. Maintenance dose  should begin 6 hours after load for CrCl > 20 and 8 hours after load for CrCl < 20.    Maintenance dosing for all indications except as outlined below  [] 3375mg q8h  [] 3375mg q12h  [] 3375mg q8h    Febrile neutropenia, Cystic fibrosis, BMI > 40*, local Pseudomonas susceptibility < 80% (refer to page 3 for indications)     [x] 4500mg q8h  [] 4500 q12h  [] 4500mg q8h    *Consider 3375mg q8h for indication of Urinary tract infections in BMI > 40. Adjust for decreased renal function.     Due to BMI=43.6 kg/m2, increase dose to Zosyn 4.5gm IV q8hrs    Pharmacy to monitor renal function and adjust dose if warranted  Nephrology consulted.    Merary Whitfield [facility]  11/7/2024  5:27 PM         Thank You,  Merary Whitfield [facility]  11/7/2024 5:25 PM    
[facility]   Pharmacy Pharmacokinetic Monitoring Service - Vancomycin     Amrik Meraz is a 74 y.o. male starting on vancomycin therapy for Sepsis. Pharmacy consulted by Dr. Pardo for monitoring and adjustment.    Target Concentration: Goal AUC/SHAWN 400-600 mg*hr/L    Additional Antimicrobials: Rocephin 1 gm q24h completed 11/06  Zosyn 4.5 gm     Pertinent Laboratory Values:   Wt Readings from Last 1 Encounters:   11/02/24 122.5 kg (270 lb)     Temp Readings from Last 1 Encounters:   11/07/24 97.7 °F (36.5 °C) (Oral)     Estimated Creatinine Clearance: 26 mL/min (A) (based on SCr of 3.01 mg/dL (H)).  Recent Labs     11/06/24  0556 11/07/24  0623   CREATININE 2.86* 3.01*   BUN 63* 76*   WBC 5.8 8.2     Procalcitonin: 22.77 ng/mL    Pertinent Cultures:  Culture Date Source Results        MRSA Nasal Swab: N/A. Non-respiratory infection.    Plan:  Concentration-guided dosing due to renal impairment/insufficiency  Start vancomycin 1250 mg x1   Anticipated trough concentration of 15-20 at steady state  Renal labs as indicated   Vancomycin concentration ordered for 11/08/2024 @ 1800   Pharmacy will continue to monitor patient and adjust therapy as indicated    Thank you for the consult,  Gena Tai RPH  11/7/2024 5:40 PM   
Comprehensive Nutrition Assessment    Type and Reason for Visit:  Initial, Positive nutrition screen    Nutrition Recommendations/Plan:   Carb Control 4 diet   Continue diabetic oral supplement TID until po intakes improve      Malnutrition Assessment:  Malnutrition Status:  At risk for malnutrition (11/04/24 1419)    Context:  Acute Illness     Findings of the 6 clinical characteristics of malnutrition:  Energy Intake:  50% or less of estimated energy requirements for 5 or more days  Weight Loss:  No weight loss     Body Fat Loss:  No body fat loss     Muscle Mass Loss:  No muscle mass loss    Fluid Accumulation:  Unable to assess     Strength:  Not Performed    Nutrition Assessment:    Pt presents with decreased po intake.  Reports appetite/po intake decreased since 4 days pta.  PO intakes continue to be poor, he is drinking his oral supplement.  Will continue to provide supplements on all trays until po intakes improve    Nutrition Related Findings:    DMII, CKD, sciatica, hypertension, CAD, ILIANA, hyperlipidemia, and pulmonary hypertension, last BM 11/1. Labs: glucose > 200, elevated BUN/Cr, meds reviewed, 2+ ble edema noted Wound Type: None       Current Nutrition Intake & Therapies:    Average Meal Intake: 26-50%, 1-25%  Average Supplements Intake: %  ADULT ORAL NUTRITION SUPPLEMENT; Breakfast, Lunch, Dinner; Diabetic Oral Supplement  ADULT DIET; Regular; 5 carb choices (75 gm/meal)    Anthropometric Measures:  Height: 167.6 cm (5' 6\")  Ideal Body Weight (IBW): 142 lbs (65 kg)    Admission Body Weight: 131.5 kg (290 lb) (stated)  Current Body Weight: 122.5 kg (270 lb) (11/2), . Weight Source: Bed scale  Current BMI (kg/m2): 43.6  Usual Body Weight: 124.7 kg (275 lb) (4/28/24, 292 lb (1/11/24))     % Weight Change (Calculated): -1.8                    BMI Categories: Obese Class 3 (BMI 40.0 or greater)    Estimated Daily Nutrient Needs:  Energy Requirements Based On: Kcal/kg  Weight Used for Energy 
Comprehensive Nutrition Assessment    Type and Reason for Visit:  Reassess    Nutrition Recommendations/Plan:   Continue carb control; low sodium diet   Continue diabetic oral supplement TID      Malnutrition Assessment:  Malnutrition Status:  At risk for malnutrition (11/04/24 1419)    Context:  Acute Illness     Findings of the 6 clinical characteristics of malnutrition:  Energy Intake:  50% or less of estimated energy requirements for 5 or more days  Weight Loss:  No weight loss     Body Fat Loss:  No body fat loss     Muscle Mass Loss:  No muscle mass loss    Fluid Accumulation:  Unable to assess     Strength:  Not Performed    Nutrition Assessment:    PO intakes have been variable per documentation in flowsheets.  Pt sleeping and lethargic during several visits.  Drinking his oral nutrition supplements, will continue to provide on all trays.    Nutrition Related Findings:    DMII, CKD, sciatica, hypertension, CAD, ILIANA, hyperlipidemia, and pulmonary hypertension, last BM 11/1. Labs: glucose > 200, elevated BUN/Cr, meds reviewed, 2+ ble edema noted, -7.9 liters since admission Wound Type: None       Current Nutrition Intake & Therapies:    Average Meal Intake: 26-50%  Average Supplements Intake: %  ADULT ORAL NUTRITION SUPPLEMENT; Breakfast, Lunch, Dinner; Diabetic Oral Supplement  ADULT DIET; Regular; 4 carb choices (60 gm/meal); Low Sodium (2 gm); 1500 ml    Anthropometric Measures:  Height: 167.6 cm (5' 6\")  Ideal Body Weight (IBW): 142 lbs (65 kg)    Admission Body Weight: 131.5 kg (290 lb) (stated)  Current Body Weight: 117.9 kg (260 lb) (11/12 ( -7.9 liters)), Weight Source: Bed scale  Current BMI (kg/m2): 42  Usual Body Weight: 124.7 kg (275 lb) (4/28/24, 292 lb (1/11/24))     % Weight Change (Calculated): -1.8                    BMI Categories: Obese Class 3 (BMI 40.0 or greater)    Estimated Daily Nutrient Needs:  Energy Requirements Based On: Kcal/kg  Weight Used for Energy Requirements: 
Coreg 12.5mg held this morning. BP 95/37 (55) HR 66. Dr. Vizcaino made aware via EarlyTracks.    Electronically signed by SHWETA BERKOWITZ RN on 11/4/24 at 9:03 AM EST    
[facility]  Occupational Therapy        NAME:  Amrik Meraz  ROOM: W485/W485-01  :  1950  DATE: 2024    Attempted to see Amrik Meraz at 1443 on this date for:   [x]  Initial Evaluation   []  Treatment       Patient was unable to be seen due to:   [] Off unit for testing/procedure    [] Patient refused, stating \"    [] Therapy on hold due to     [] Nursing deferred due to    [x] Other:  pt receiving bedside care at this time and imaging during second attempt. Will re-attempt at earliest convenience.    Electronically signed by Kimberlee Cueto OT on 24 at 3:13 PM EST  
Gear/Perfectserve: \"Pts BG this a.m. is 55. Giving a bolus of dextrose and holding insulin this morning. will recheck in 15 minutes and proceed per protocol\"    Charis/Lisave: \"BG improved with 125 bolus dextrose. no other intervention required per protocol. Pt lungs sounds are diminished with ex wheezes throughout. Sounds much worse than yesterday. +2 bilateral lower edema. Output has been well over 800 by shift end. Do we need to put some laxix on board? Pt is currently on Rocephin\"  
Hospitalist Progress Note      PCP: Anita Figueroa MD    Date of Admission: 11/2/2024    Chief Complaint:    Chief Complaint   Patient presents with    Hematuria    Fall     Subjective:  Patient denies fevers, chills, sweats, CP, SOB, diarrhea or burning micturition.  12 point ROS negative other than mentioned above     Medications:  Reviewed    Infusion Medications    sodium chloride      dextrose       Scheduled Medications    rosuvastatin  10 mg Oral Nightly    ipratropium 0.5 mg-albuterol 2.5 mg  1 Dose Inhalation BID RT    sodium chloride flush  5-40 mL IntraVENous 2 times per day    insulin glargine  15 Units SubCUTAneous Nightly    insulin lispro  0-8 Units SubCUTAneous 4x Daily AC & HS    cefTRIAXone (ROCEPHIN) 1,000 mg in sterile water 10 mL IV syringe  1,000 mg IntraVENous Q24H    carvedilol  12.5 mg Oral BID    cetirizine  10 mg Oral Daily    montelukast  10 mg Oral Nightly    tamsulosin  0.8 mg Oral Nightly     PRN Meds: ipratropium 0.5 mg-albuterol 2.5 mg, sodium chloride flush, sodium chloride, ondansetron **OR** ondansetron, polyethylene glycol, acetaminophen **OR** acetaminophen, glucose, dextrose bolus **OR** dextrose bolus, glucagon (rDNA), dextrose      Intake/Output Summary (Last 24 hours) at 11/5/2024 1246  Last data filed at 11/5/2024 0613  Gross per 24 hour   Intake 360 ml   Output 800 ml   Net -440 ml     Exam:    BP (!) 115/57   Pulse 60   Temp 97.7 °F (36.5 °C) (Oral)   Resp 18   Ht 1.676 m (5' 6\")   Wt 122.5 kg (270 lb)   SpO2 91%   BMI 43.58 kg/m²     General appearance: No apparent distress, appears stated age and cooperative.  HEENT:  Conjunctivae/corneas clear.  Neck: Trachea midline.  Respiratory:  Normal respiratory effort. Clear to auscultation  Cardiovascular: Regular rate and rhythm  Abdomen: Soft, non-tender, non-distended with normal bowel sounds.  Musculoskeletal: No clubbing, cyanosis or edema bilaterally  Neuro: Non Focal.     Labs:   Recent Labs     11/03/24  6855 
Hospitalist Progress Note      PCP: Anita Figueroa MD    Date of Admission: 11/2/2024    Chief Complaint:    Chief Complaint   Patient presents with    Hematuria    Fall     Subjective:  Patient is starting to feel like he's getting better. 12 point ROS negative other than mentioned above     Medications:  Reviewed    Infusion Medications    sodium chloride      dextrose       Scheduled Medications    insulin glargine  40 Units SubCUTAneous Nightly    insulin lispro  6 Units SubCUTAneous TID WC    cefTRIAXone (ROCEPHIN) IV  1,000 mg IntraVENous Q24H    insulin lispro  0-16 Units SubCUTAneous 4x Daily AC & HS    finasteride  5 mg Oral Daily    aspirin  162 mg Oral Daily    rosuvastatin  10 mg Oral Nightly    sodium chloride flush  5-40 mL IntraVENous 2 times per day    carvedilol  12.5 mg Oral BID    cetirizine  10 mg Oral Daily    montelukast  10 mg Oral Nightly    tamsulosin  0.8 mg Oral Nightly     PRN Meds: ammonium lactate, ipratropium 0.5 mg-albuterol 2.5 mg, sodium chloride flush, sodium chloride, ondansetron **OR** ondansetron, polyethylene glycol, acetaminophen **OR** acetaminophen, glucose, dextrose bolus **OR** dextrose bolus, glucagon (rDNA), dextrose      Intake/Output Summary (Last 24 hours) at 11/10/2024 1517  Last data filed at 11/10/2024 1130  Gross per 24 hour   Intake 1251.06 ml   Output 1550 ml   Net -298.94 ml     Exam:    /66   Pulse 75   Temp 98.1 °F (36.7 °C) (Oral)   Resp 19   Ht 1.676 m (5' 6\")   Wt 117.4 kg (258 lb 14.4 oz)   SpO2 95%   BMI 41.81 kg/m²     General appearance: No apparent distress, appears stated age and cooperative.  HEENT:  Conjunctivae/corneas clear.  Neck: Trachea midline.  Respiratory:  clear bilateral breath sonds  Cardiovascular: Regular rate and rhythm  Abdomen: Soft, non-tender, non-distended with normal bowel sounds.  Musculoskeletal: chronic venous stasis changes  Neuro: Non Focal.     Labs:   Recent Labs     11/08/24  0618 11/09/24  0543 
Hospitalist Progress Note      PCP: Anita Figueroa MD    Date of Admission: 11/2/2024    Chief Complaint:    Chief Complaint   Patient presents with    Hematuria    Fall     Subjective:  Patient is still short of breath. 12 point ROS negative other than mentioned above     Medications:  Reviewed    Infusion Medications    sodium chloride      dextrose       Scheduled Medications    insulin glargine  70 Units SubCUTAneous Nightly    insulin lispro  24 Units SubCUTAneous TID WC    insulin lispro  0-16 Units SubCUTAneous 4x Daily AC & HS    finasteride  5 mg Oral Daily    aspirin  162 mg Oral Daily    furosemide  80 mg IntraVENous BID    rosuvastatin  10 mg Oral Nightly    sodium chloride flush  5-40 mL IntraVENous 2 times per day    carvedilol  12.5 mg Oral BID    cetirizine  10 mg Oral Daily    montelukast  10 mg Oral Nightly    tamsulosin  0.8 mg Oral Nightly     PRN Meds: ammonium lactate, ipratropium 0.5 mg-albuterol 2.5 mg, sodium chloride flush, sodium chloride, ondansetron **OR** ondansetron, polyethylene glycol, acetaminophen **OR** acetaminophen, glucose, dextrose bolus **OR** dextrose bolus, glucagon (rDNA), dextrose      Intake/Output Summary (Last 24 hours) at 11/7/2024 1430  Last data filed at 11/7/2024 0902  Gross per 24 hour   Intake 1250 ml   Output 2450 ml   Net -1200 ml     Exam:    BP (!) 100/51   Pulse 56   Temp 97.9 °F (36.6 °C) (Oral)   Resp 18   Ht 1.676 m (5' 6\")   Wt 122.5 kg (270 lb)   SpO2 100%   BMI 43.58 kg/m²     General appearance: No apparent distress, appears stated age and cooperative.  HEENT:  Conjunctivae/corneas clear.  Neck: Trachea midline.  Respiratory:  clear bilateral breath sonds  Cardiovascular: Regular rate and rhythm  Abdomen: Soft, non-tender, non-distended with normal bowel sounds.  Musculoskeletal: chronic venous stasis changes  Neuro: Non Focal.     Labs:   Recent Labs     11/05/24  0548 11/06/24  0556 11/07/24  0623   WBC 7.8 5.8 8.2   HGB 8.1* 8.7* 8.2*   HCT 
INPATIENT PROGRESS NOTES    PATIENT NAME: Amrik Meraz  MRN: [id number]  SERVICE DATE:  November 8, 2024   SERVICE TIME:  2:39 PM      PRIMARY SERVICE: Pulmonary Disease    CHIEF COMPLAIN: Hematuria and fall      INTERVAL HPI: Patient seen and examined at bedside, Interval Notes, orders reviewed. Nursing notes noted  Patient is feeling better.  He has no fever or chills.  No chest pain.  No vomiting or diarrhea.  No shortness of breath.  No chest pain.  He is on 3 L O2 via nasal cannula O2 saturation 99%.    OBJECTIVE   I/O:24HR INTAKE/OUTPUT:    Intake/Output Summary (Last 24 hours) at 11/8/2024 1439  Last data filed at 11/8/2024 0824  Gross per 24 hour   Intake 922.77 ml   Output 2400 ml   Net -1477.23 ml     11/07 0701 - 11/08 0700  In: 1672.8 [P.O.:1550]  Out: 2400 [Urine:2400]  Body mass index is 43.58 kg/m².    PHYSICAL EXAM:  Vitals:  BP (!) 121/55   Pulse 53   Temp 97.5 °F (36.4 °C) (Oral)   Resp 17   Ht 1.676 m (5' 6\")   Wt 122.5 kg (270 lb)   SpO2 99%   BMI 43.58 kg/m²     General: .amobesityAlert, awake .comfortable in bed, No distress.  Head: Atraumatic , Normocephalic   Eyes: PERRL. No sclera icterus. No conjunctival injection. No discharge   ENT: No nasal  discharge. Pharynx clear.  Neck:  Trachea midline. No thyromegaly, no JVD, No cervical adenopathy.  Chest : Bilaterally symmetrical ,Normal effort,  No accessory muscle use  Lung : Diminished breath sound bilaterally No Rales. No wheezing. No rhonchi.   Heart:: Normal rate. Regular rhythm. No mumur ,  Rub or gallop  ABD: Non-tender. Non-distended. No masses. No organmegaly. Normal bowel sounds. No hernia.  Ext : No Pitting both leg , No Cyanosis No clubbing  Neuro: no focal weakness    Labs:  CBC:   Recent Labs     11/06/24  0556 11/07/24  0623 11/08/24  0618   WBC 5.8 8.2 9.5   HGB 8.7* 8.2* 9.0*   HCT 27.6* 26.3* 28.5*    189 201     BMP:    Recent Labs     11/06/24  0556 11/07/24  0623 11/08/24  0618    133* 138   K 5.3* 4.8 
INPATIENT PROGRESS NOTES    PATIENT NAME: Amrik Meraz  MRN: [id number]  SERVICE DATE:  November 11, 2024   SERVICE TIME:  9:23 PM      PRIMARY SERVICE: Pulmonary Disease    CHIEF COMPLAIN: Respiratory failure      INTERVAL HPI: Patient seen and examined at bedside, Interval Notes, orders reviewed. Nursing notes noted  Patient is feeling better.  He has no fever or chills.  No chest pain.  No vomiting or diarrhea.  No shortness of breath.  No chest pain.  He is on 3 L O2 via nasal cannula O2 saturation 97%.    OBJECTIVE   I/O:24HR INTAKE/OUTPUT:    Intake/Output Summary (Last 24 hours) at 11/11/2024 2123  Last data filed at 11/11/2024 1818  Gross per 24 hour   Intake 611.85 ml   Output 2250 ml   Net -1638.15 ml     11/10 0701 - 11/11 0700  In: 1146.9 [P.O.:975; I.V.:125.5]  Out: 2800 [Urine:2800]  Body mass index is 41.81 kg/m².    PHYSICAL EXAM:  Vitals:  BP (!) 138/58   Pulse 62   Temp 97.8 °F (36.6 °C) (Oral)   Resp 18   Ht 1.676 m (5' 6\")   Wt 117.4 kg (258 lb 14.4 oz)   SpO2 97%   BMI 41.81 kg/m²     General: Obese, alert, awake .comfortable in bed, No distress.  Head: Atraumatic , Normocephalic   Eyes: PERRL. No sclera icterus. No conjunctival injection. No discharge   ENT: No nasal  discharge. Pharynx clear.  Neck:  Trachea midline. No thyromegaly, no JVD, No cervical adenopathy.  Chest : Bilaterally symmetrical ,Normal effort,  No accessory muscle use  Lung : Diminished breath sound bilaterally No Rales. No wheezing. No rhonchi.   Heart:: Normal rate. Regular rhythm. No mumur ,  Rub or gallop  ABD: Non-tender. Non-distended. No masses. No organmegaly. Normal bowel sounds. No hernia.  Ext : Trace Pitting both leg , No Cyanosis No clubbing  Neuro: no focal weakness    Labs:  CBC:   Recent Labs     11/09/24  0543 11/10/24  0642 11/11/24  0620   WBC 8.3 9.0 8.0   HGB 8.4* 8.3* 8.8*   HCT 27.2* 27.1* 28.1*    199 220     BMP:    Recent Labs     11/09/24  0543 11/10/24  0642 11/11/24  0620    
INPATIENT PROGRESS NOTES    PATIENT NAME: Amrik Meraz  MRN: [id number]  SERVICE DATE:  November 10, 2024   SERVICE TIME:  11:11 AM      PRIMARY SERVICE: Pulmonary Disease    CHIEF COMPLAINTS: Respiratory failure    INTERVAL HPI: Patient seen and examined at bedside, Interval Notes, orders reviewed. Nursing notes noted    No complaint, chest pain, no shortness of breath, no coughing, on 3 L O2 saturation 95%, no fever    New information updated in the note today, rest of the examination did not change compared to yesterday.    Review of system:     GI Abdominal pain No  Skin Rash No    Social History     Tobacco Use    Smoking status: Never     Passive exposure: Never    Smokeless tobacco: Never   Substance Use Topics    Alcohol use: Not Currently     No family history on file.      OBJECTIVE    Body mass index is 41.81 kg/m².    PHYSICAL EXAM:  Vitals:  /66   Pulse 75   Temp 98.1 °F (36.7 °C) (Oral)   Resp 19   Ht 1.676 m (5' 6\")   Wt 117.4 kg (258 lb 14.4 oz)   SpO2 95%   BMI 41.81 kg/m²     General: alert, cooperative, no distress  Head: normocephalic, atraumatic  Eyes:No gross abnormalities.  ENT:  MMM no lesions  Neck:  supple and no masses  Chest : clear to auscultation bilaterally- no wheezes, rales or rhonchi, normal air movement, no respiratory distress  Heart:: Heart sounds are normal.  Regular rate and rhythm without murmur, gallop or rub.  ABD:  symmetric, soft, non-tender  Musculoskeletal : no cyanosis, no clubbing, and no edema  Neuro:  Grossly normal  Skin: No rashes or nodules noted.  Lymph node:  no cervical nodes  Urology: No Andrew   Psychiatric: appropriate    DATA:   Recent Labs     11/09/24  0543 11/10/24  0642   WBC 8.3 9.0   HGB 8.4* 8.3*   HCT 27.2* 27.1*   MCV 88.6 88.6    199     Recent Labs     11/08/24  0618 11/09/24  0543 11/10/24  0642    141 136   K 4.4 4.9 4.4    104 101   CO2 25 28 28   BUN 81* 86* 72*   CREATININE 3.33* 3.29* 2.76*   GLUCOSE 55* 150* 
INPATIENT PROGRESS NOTES    PATIENT NAME: Amrik Meraz  MRN: [id number]  SERVICE DATE:  November 9, 2024   SERVICE TIME:  11:01 AM      PRIMARY SERVICE: Pulmonary Disease    CHIEF COMPLAINTS: Respiratory failure    INTERVAL HPI: Patient seen and examined at bedside, Interval Notes, orders reviewed. Nursing notes noted    Patient report has no complaint, no chest pain, no coughing, no shortness, on 3 L O2 saturation 96%, no fever    New information updated in the note today, rest of the examination did not change compared to yesterday.    Review of system:     GI Abdominal pain No  Skin Rash No    Social History     Tobacco Use    Smoking status: Never     Passive exposure: Never    Smokeless tobacco: Never   Substance Use Topics    Alcohol use: Not Currently     No family history on file.      OBJECTIVE    Body mass index is 43.58 kg/m².    PHYSICAL EXAM:  Vitals:  BP (!) 111/53   Pulse 61   Temp 97.7 °F (36.5 °C) (Oral)   Resp 18   Ht 1.676 m (5' 6\")   Wt 122.5 kg (270 lb)   SpO2 96%   BMI 43.58 kg/m²     General: alert, cooperative, no distress  Head: normocephalic, atraumatic  Eyes:No gross abnormalities.  ENT:  MMM no lesions  Neck:  supple and no masses  Chest : clear to auscultation bilaterally- no wheezes, rales or rhonchi, normal air movement, no respiratory distress  Heart:: Heart sounds are normal.  Regular rate and rhythm without murmur, gallop or rub.  ABD:  symmetric, soft, non-tender  Musculoskeletal : no cyanosis, no clubbing, and no edema  Neuro:  Grossly normal  Skin: No rashes or nodules noted.  Lymph node:  no cervical nodes  Urology: No Andrew   Psychiatric: appropriate    DATA:   Recent Labs     11/08/24  0618 11/09/24  0543   WBC 9.5 8.3   HGB 9.0* 8.4*   HCT 28.5* 27.2*   MCV 89.1 88.6    214     Recent Labs     11/07/24  0623 11/08/24  0618 11/09/24  0543   * 138 141   K 4.8 4.4 4.9   CL 99 102 104   CO2 25 25 28   BUN 76* 81* 86*   CREATININE 3.01* 3.33* 3.29*   GLUCOSE 
Infectious Diseases Inpatient Progress Note          HISTORY OF PRESENT ILLNESS:  Follow up complicated UTI with moderate right hydronephrosis and nephrolithiasis in a patient with acute kidney injury on chronic kidney disease, diabetes mellitus 2, pulmonary atelectasis and fluid overload on IV Rocephin, well tolerated.  Patient Is diuresing well currently. Denies any complaints except for occasional wheezing.  Denies any abdominal pain or flank pain.  no shortness of breath.   No fevers or chills    Current Medications:     influenza virus vaccine  0.5 mL IntraMUSCular Prior to discharge    insulin glargine  30 Units SubCUTAneous Nightly    insulin lispro  6 Units SubCUTAneous TID WC    cefTRIAXone (ROCEPHIN) IV  1,000 mg IntraVENous Q24H    insulin lispro  0-16 Units SubCUTAneous 4x Daily AC & HS    finasteride  5 mg Oral Daily    aspirin  162 mg Oral Daily    rosuvastatin  10 mg Oral Nightly    sodium chloride flush  5-40 mL IntraVENous 2 times per day    carvedilol  12.5 mg Oral BID    cetirizine  10 mg Oral Daily    montelukast  10 mg Oral Nightly    tamsulosin  0.8 mg Oral Nightly       Allergies:  Niacin and Fluorescein      Review of Systems  Poor historian.  Sleeping during my exam  Nurse reported that he was reading his book earlier.  He ate his meals.  Has good appetite.  Walked to the chair.  No pain reported  No cough or shortness of breath    Physical Exam  Vitals:    11/12/24 0147 11/12/24 0542 11/12/24 0754 11/12/24 0803   BP: (!) 122/52  (!) 123/44 (!) 125/47   Pulse: 59  60 59   Resp: 16  16    Temp: 98.2 °F (36.8 °C)  97.7 °F (36.5 °C)    TempSrc:   Oral    SpO2: 97%  99%    Weight:  118.1 kg (260 lb 4.8 oz)     Height:         General Appearance: Sleeping, up in the chair, opens eyes briefly, well-developed and well-nourished, in no acute distress  On 2 L nasal cannula  Skin: warm and dry, no rash.   Head: normocephalic and atraumatic  Eyes: anicteric sclerae  ENT: oropharynx clear and moist with 
Infectious Diseases Inpatient Progress Note          HISTORY OF PRESENT ILLNESS:  Follow up complicated UTI with moderate right hydronephrosis and nephrolithiasis in a patient with acute kidney injury on chronic kidney disease, diabetes mellitus 2, pulmonary atelectasis and fluid overload on IV Rocephin, well tolerated.  Patient remains to be called.  Positive fatigue.  Positive decreased appetite   denies any abdominal pain or flank pain.  no shortness of breath.   No fevers or chills  Remains to be curled up in bed  Current Medications:     influenza virus vaccine  0.5 mL IntraMUSCular Prior to discharge    insulin glargine  40 Units SubCUTAneous Nightly    insulin lispro  6 Units SubCUTAneous TID WC    cefTRIAXone (ROCEPHIN) IV  1,000 mg IntraVENous Q24H    insulin lispro  0-16 Units SubCUTAneous 4x Daily AC & HS    finasteride  5 mg Oral Daily    aspirin  162 mg Oral Daily    rosuvastatin  10 mg Oral Nightly    sodium chloride flush  5-40 mL IntraVENous 2 times per day    carvedilol  12.5 mg Oral BID    cetirizine  10 mg Oral Daily    montelukast  10 mg Oral Nightly    tamsulosin  0.8 mg Oral Nightly       Allergies:  Niacin and Fluorescein      Review of Systems  Rest of system review is negative other than HPI    Physical Exam  Vitals:    11/10/24 0901 11/10/24 1922 11/11/24 0734 11/11/24 0843   BP: 104/66 (!) 130/58 (!) 137/50 (!) 137/50   Pulse: 75 60 64 64   Resp:  16 20    Temp:  98.6 °F (37 °C) 97.9 °F (36.6 °C)    TempSrc:  Oral Oral    SpO2:  95% 98%    Weight:       Height:         General Appearance: alert and oriented to person, place and time, well-developed and well-nourished, in no acute distress  On room air  Skin: warm and dry, no rash.   Head: normocephalic and atraumatic  Eyes: anicteric sclerae  ENT: oropharynx clear and moist with normal mucous membranes. No oral thrush  Lungs: normal respiratory effort, no wheezes  Heart normal S1-S2  Abdomen: soft, no tenderness  Trace leg edema  No 
Infectious Diseases Inpatient Progress Note          HISTORY OF PRESENT ILLNESS:  Follow up complicated UTI with moderate right hydronephrosis and nephrolithiasis in a patient with acute kidney injury on chronic kidney disease, diabetes mellitus 2, pulmonary atelectasis and fluid overload on IV Rocephin, well tolerated.  Patient reports being cold.  Positive fatigue.  Denies any abdominal pain or flank pain.  Mild shortness of breath.   No fevers or chills  Current Medications:     insulin glargine  40 Units SubCUTAneous Nightly    insulin lispro  6 Units SubCUTAneous TID WC    cefTRIAXone (ROCEPHIN) IV  1,000 mg IntraVENous Q24H    insulin lispro  0-16 Units SubCUTAneous 4x Daily AC & HS    finasteride  5 mg Oral Daily    aspirin  162 mg Oral Daily    rosuvastatin  10 mg Oral Nightly    sodium chloride flush  5-40 mL IntraVENous 2 times per day    carvedilol  12.5 mg Oral BID    cetirizine  10 mg Oral Daily    montelukast  10 mg Oral Nightly    tamsulosin  0.8 mg Oral Nightly       Allergies:  Niacin and Fluorescein      Review of Systems  Rest of system review is negative other than HPI    Physical Exam  Vitals:    11/08/24 2138 11/09/24 0155 11/09/24 0734 11/09/24 0940   BP: (!) 136/59 (!) 122/51 (!) 111/53 (!) 111/53   Pulse: 69 61 61 61   Resp:  20 18    Temp:  97.5 °F (36.4 °C) 97.7 °F (36.5 °C)    TempSrc:  Oral Oral    SpO2:  97% 96%    Weight:       Height:         General Appearance: alert and oriented to person, place and time, well-developed and well-nourished, in no acute distress  On room air  Skin: warm and dry, no rash.   Head: normocephalic and atraumatic  Eyes: anicteric sclerae  ENT: oropharynx clear and moist with normal mucous membranes. No oral thrush  Lungs: normal respiratory effort, mild bilateral wheezes  Heart normal S1-S2  Abdomen: soft, no tenderness  Trace leg edema  No erythema, no tenderness      DATA:    Lab Results   Component Value Date    WBC 8.3 11/09/2024    HGB 8.4 (L) 
Jimmy to Dr. Vizcaino: \"pts BG is 350 -8 units Humalog scheduled. Do you want to add more? \"  
[facility] OCCUPATIONAL THERAPY EVALUATION - ACUTE     NAME: Amrik Meraz  : 1950 (74 y.o.)  MRN: [id number]  CODE STATUS: [id number]-CCA  [id number]    Date of Service: 2024    Patient Diagnosis(es): Complicated UTI (urinary tract infection) [N39.0]  Fall, initial encounter [W19.XXXA]  Urinary tract infection with hematuria, site unspecified [N39.0, R31.9]   Patient Active Problem List    Diagnosis Date Noted    Stage 4 chronic kidney disease (HCC) 2023    Type 2 diabetes mellitus with chronic kidney disease 2023    Fall 2024    Urinary tract infection with hematuria 2024    Hydronephrosis 2024    Transient alteration of awareness 2024    Hypoglycemia associated with type 2 diabetes mellitus (HCC) 2024    Abnormal EKG 2023    Right ureteral stone 2023    Arteriosclerosis of coronary artery 2023    Impaired mobility and activities of daily living dt CP debility 2023    Acute respiratory failure with hypoxia 2023    Hypervolemia 2023    Severe pulmonary hypertension (HCC) 2023    Hypoxia 2023    Class 3 severe obesity without serious comorbidity with body mass index (BMI) of 45.0 to 49.9 in adult 2022    Hypertension 2019    Acute gouty arthritis 2019    Adenomatous polyp of colon 2019    Anemia 2019    Back pain 2019    Infestation by bed bug 2019    ILIANA (obstructive sleep apnea) 2019    Other hyperlipidemia 2019    Unspecified hyperplasia of prostate without urinary obstruction and other lower urinary tract symptoms (LUTS) 2019    Primary malignant neoplasm of prostate (HCC) 2019    Pain in joint involving ankle and foot 2019    Poorly controlled diabetes mellitus (HCC)     Hydrocele     MAYKEL (acute kidney injury) (HCC) 03/15/2019        Past Medical History:   Diagnosis Date    Chronic kidney disease     Diabetes mellitus (HCC)     
Nephrology Progress Note    Assessment:  MAYKEL better today  Anemia  COPD-ILIANA pul hypertension  DM type-2  Obese      Plan: check labs daily   control blood sugars watch hypoglycemia  repeat \UA    Patient Active Problem List:     MAYKEL (acute kidney injury) (HCC)     Hydrocele     Hypertension     Poorly controlled diabetes mellitus (HCC)     Acute gouty arthritis     Adenomatous polyp of colon     Anemia     Back pain     Infestation by bed bug     ILIANA (obstructive sleep apnea)     Other hyperlipidemia     Unspecified hyperplasia of prostate without urinary obstruction and other lower urinary tract symptoms (LUTS)     Primary malignant neoplasm of prostate (HCC)     Pain in joint involving ankle and foot     Class 3 severe obesity without serious comorbidity with body mass index (BMI) of 45.0 to 49.9 in adult     Stage 4 chronic kidney disease (HCC)     Type 2 diabetes mellitus with chronic kidney disease     Hypoxia     Acute respiratory failure with hypoxia     Hypervolemia     Severe pulmonary hypertension (HCC)     Arteriosclerosis of coronary artery     Impaired mobility and activities of daily living dt CP debility     Right ureteral stone     Abnormal EKG     Transient alteration of awareness     Hypoglycemia associated with type 2 diabetes mellitus (HCC)     Hydronephrosis of right kidney     Complicated UTI (urinary tract infection)     Fall     Nephrolithiasis     Pulmonary atelectasis      Subjective:  Admit Date: 11/2/2024    Interval History: stable breathing    Medications:  Scheduled Meds:   insulin glargine  40 Units SubCUTAneous Nightly    insulin lispro  6 Units SubCUTAneous TID WC    cefTRIAXone (ROCEPHIN) IV  1,000 mg IntraVENous Q24H    insulin lispro  0-16 Units SubCUTAneous 4x Daily AC & HS    finasteride  5 mg Oral Daily    aspirin  162 mg Oral Daily    rosuvastatin  10 mg Oral Nightly    sodium chloride flush  5-40 mL IntraVENous 2 times per day    carvedilol  12.5 mg Oral BID    cetirizine  
Nephrology Progress Note    Assessment:  MAYKEL with CKD 4  trace proteinuria 1/8/24 GFR 42 cc/min  DM type-2  Anemia  ILIANA  Hyperlipidemia  Hx Prostate cancer  Suspected UTI culture pending        Plan:maintain evolemic state  follow labs    Patient Active Problem List:     MAYKEL (acute kidney injury) (HCC)     Hydrocele     Hypertension     Poorly controlled diabetes mellitus (HCC)     Acute gouty arthritis     Adenomatous polyp of colon     Anemia     Back pain     Infestation by bed bug     ILIANA (obstructive sleep apnea)     Other hyperlipidemia     Unspecified hyperplasia of prostate without urinary obstruction and other lower urinary tract symptoms (LUTS)     Primary malignant neoplasm of prostate (HCC)     Pain in joint involving ankle and foot     Class 3 severe obesity without serious comorbidity with body mass index (BMI) of 45.0 to 49.9 in adult     Stage 4 chronic kidney disease (HCC)     Type 2 diabetes mellitus with chronic kidney disease     Hypoxia     Acute respiratory failure with hypoxia     Hypervolemia     Severe pulmonary hypertension (HCC)     Arteriosclerosis of coronary artery     Impaired mobility and activities of daily living dt CP debility     Right ureteral stone     Abnormal EKG     Transient alteration of awareness     Hypoglycemia associated with type 2 diabetes mellitus (HCC)     Hydronephrosis     Urinary tract infection with hematuria     Fall      Subjective:  Admit Date: 11/2/2024    Interval History: stable with breathing    Medications:  Scheduled Meds:   insulin lispro  16 Units SubCUTAneous TID WC    insulin glargine  70 Units SubCUTAneous Nightly    piperacillin-tazobactam  4,500 mg IntraVENous Q8H    vancomycin (VANCOCIN) intermittent dosing (placeholder)   Other RX Placeholder    insulin lispro  0-16 Units SubCUTAneous 4x Daily AC & HS    finasteride  5 mg Oral Daily    aspirin  162 mg Oral Daily    [Held by provider] furosemide  80 mg IntraVENous BID    rosuvastatin  10 mg Oral 
Nephrology Progress Note  Labs pending  Assessment:  Hyperkalemia  CKD-4  ILIANA  Obese  DM type-2  Hx Prostate cancer      Plan: Renal status stable  no nephrotoxic agents on board   vitals good    Patient Active Problem List:     MAYKEL (acute kidney injury) (HCC)     Hydrocele     Hypertension     Poorly controlled diabetes mellitus (HCC)     Acute gouty arthritis     Adenomatous polyp of colon     Anemia     Back pain     Infestation by bed bug     ILIANA (obstructive sleep apnea)     Other hyperlipidemia     Unspecified hyperplasia of prostate without urinary obstruction and other lower urinary tract symptoms (LUTS)     Primary malignant neoplasm of prostate (HCC)     Pain in joint involving ankle and foot     Class 3 severe obesity without serious comorbidity with body mass index (BMI) of 45.0 to 49.9 in adult     Stage 4 chronic kidney disease (HCC)     Type 2 diabetes mellitus with chronic kidney disease     Hypoxia     Acute respiratory failure with hypoxia     Hypervolemia     Severe pulmonary hypertension (HCC)     Arteriosclerosis of coronary artery     Impaired mobility and activities of daily living dt CP debility     Right ureteral stone     Abnormal EKG     Transient alteration of awareness     Hypoglycemia associated with type 2 diabetes mellitus (HCC)     Hydronephrosis of right kidney     Complicated UTI (urinary tract infection)     Fall     Nephrolithiasis     Pulmonary atelectasis      Subjective:  Admit Date: 11/2/2024    Interval History: stable    Medications:  Scheduled Meds:   influenza virus vaccine  0.5 mL IntraMUSCular Prior to discharge    insulin glargine  30 Units SubCUTAneous Nightly    insulin lispro  6 Units SubCUTAneous TID WC    cefTRIAXone (ROCEPHIN) IV  1,000 mg IntraVENous Q24H    insulin lispro  0-16 Units SubCUTAneous 4x Daily AC & HS    finasteride  5 mg Oral Daily    aspirin  162 mg Oral Daily    rosuvastatin  10 mg Oral Nightly    sodium chloride flush  5-40 mL IntraVENous 2 
Nephrology Progress Note  Procalcitonin better afebrile  Assessment:  MAYKEL on CKD-4  DM type-2  Anemia  Hx Prostate cancer    ILIANA  CAD  Obese      Plan: blood cultures negative  looking for urine culture pending     voiding well    Patient Active Problem List:     MAYKEL (acute kidney injury) (HCC)     Hydrocele     Hypertension     Poorly controlled diabetes mellitus (HCC)     Acute gouty arthritis     Adenomatous polyp of colon     Anemia     Back pain     Infestation by bed bug     ILIANA (obstructive sleep apnea)     Other hyperlipidemia     Unspecified hyperplasia of prostate without urinary obstruction and other lower urinary tract symptoms (LUTS)     Primary malignant neoplasm of prostate (HCC)     Pain in joint involving ankle and foot     Class 3 severe obesity without serious comorbidity with body mass index (BMI) of 45.0 to 49.9 in adult     Stage 4 chronic kidney disease (HCC)     Type 2 diabetes mellitus with chronic kidney disease     Hypoxia     Acute respiratory failure with hypoxia     Hypervolemia     Severe pulmonary hypertension (HCC)     Arteriosclerosis of coronary artery     Impaired mobility and activities of daily living dt CP debility     Right ureteral stone     Abnormal EKG     Transient alteration of awareness     Hypoglycemia associated with type 2 diabetes mellitus (HCC)     Hydronephrosis of right kidney     Complicated UTI (urinary tract infection)     Fall     Nephrolithiasis     Pulmonary atelectasis      Subjective:  Admit Date: 11/2/2024    Interval History: stable    Medications:  Scheduled Meds:   insulin glargine  50 Units SubCUTAneous Nightly    insulin lispro  10 Units SubCUTAneous TID WC    cefTRIAXone (ROCEPHIN) IV  1,000 mg IntraVENous Q24H    insulin lispro  0-16 Units SubCUTAneous 4x Daily AC & HS    finasteride  5 mg Oral Daily    aspirin  162 mg Oral Daily    rosuvastatin  10 mg Oral Nightly    sodium chloride flush  5-40 mL IntraVENous 2 times per day    carvedilol  12.5 mg 
Physical Therapy Med Surg Daily Treatment Note  Facility/Department: [address] MED SURG UNIT  Room: [place] [address]       NAME: Amrik Meraz  : 1950 (74 y.o.)  MRN: [id number]  CODE STATUS: [id number]-CCA    Date of Service: 2024    Patient Diagnosis(es): Complicated UTI (urinary tract infection) [N39.0]  Fall, initial encounter [W19.XXXA]  Urinary tract infection with hematuria, site unspecified [N39.0, R31.9]   Chief Complaint   Patient presents with    Hematuria    Fall     Patient Active Problem List    Diagnosis Date Noted    Stage 4 chronic kidney disease (HCC) 2023    Type 2 diabetes mellitus with chronic kidney disease 2023    Nephrolithiasis 2024    Pulmonary atelectasis 2024    Fall 2024    Complicated UTI (urinary tract infection) 2024    Hydronephrosis of right kidney 2024    Transient alteration of awareness 2024    Hypoglycemia associated with type 2 diabetes mellitus (HCC) 2024    Abnormal EKG 2023    Right ureteral stone 2023    Arteriosclerosis of coronary artery 2023    Impaired mobility and activities of daily living dt CP debility 2023    Acute respiratory failure with hypoxia 2023    Hypervolemia 2023    Severe pulmonary hypertension (HCC) 2023    Hypoxia 2023    Class 3 severe obesity without serious comorbidity with body mass index (BMI) of 45.0 to 49.9 in adult 2022    Hypertension 2019    Acute gouty arthritis 2019    Adenomatous polyp of colon 2019    Anemia 2019    Back pain 2019    Infestation by bed bug 2019    ILIANA (obstructive sleep apnea) 2019    Other hyperlipidemia 2019    Unspecified hyperplasia of prostate without urinary obstruction and other lower urinary tract symptoms (LUTS) 2019    Primary malignant neoplasm of prostate (HCC) 2019    Pain in joint involving ankle and foot 2019    Poorly controlled 
Physical Therapy Med Surg Daily Treatment Note  Facility/Department: [address] MED SURG UNIT  Room: [place] [address]       NAME: Amrik Meraz  : 1950 (74 y.o.)  MRN: [id number]  CODE STATUS: DNR-CCA    Date of Service: 2024    Patient Diagnosis(es): Complicated UTI (urinary tract infection) [N39.0]  Fall, initial encounter [W19.XXXA]  Urinary tract infection with hematuria, site unspecified [N39.0, R31.9]   Chief Complaint   Patient presents with    Hematuria    Fall     Patient Active Problem List    Diagnosis Date Noted    Stage 4 chronic kidney disease (HCC) 2023    Type 2 diabetes mellitus with chronic kidney disease 2023    Fall 2024    Urinary tract infection with hematuria 2024    Hydronephrosis 2024    Transient alteration of awareness 2024    Hypoglycemia associated with type 2 diabetes mellitus (HCC) 2024    Abnormal EKG 2023    Right ureteral stone 2023    Arteriosclerosis of coronary artery 2023    Impaired mobility and activities of daily living dt CP debility 2023    Acute respiratory failure with hypoxia 2023    Hypervolemia 2023    Severe pulmonary hypertension (HCC) 2023    Hypoxia 2023    Class 3 severe obesity without serious comorbidity with body mass index (BMI) of 45.0 to 49.9 in adult 2022    Hypertension 2019    Acute gouty arthritis 2019    Adenomatous polyp of colon 2019    Anemia 2019    Back pain 2019    Infestation by bed bug 2019    ILIANA (obstructive sleep apnea) 2019    Other hyperlipidemia 2019    Unspecified hyperplasia of prostate without urinary obstruction and other lower urinary tract symptoms (LUTS) 2019    Primary malignant neoplasm of prostate (HCC) 2019    Pain in joint involving ankle and foot 2019    Poorly controlled diabetes mellitus (HCC)     Hydrocele     MAYKEL (acute kidney injury) (HCC) 03/15/2019    
Physical Therapy Med Surg Daily Treatment Note  Facility/Department: [address] MED SURG UNIT  Room: [place] [address]       NAME: Amrik Meraz  : 1950 (74 y.o.)  MRN: [id number]  CODE STATUS: DNR-CCA    Date of Service: 2024    Patient Diagnosis(es): Complicated UTI (urinary tract infection) [N39.0]  Fall, initial encounter [W19.XXXA]  Urinary tract infection with hematuria, site unspecified [N39.0, R31.9]   Chief Complaint   Patient presents with    Hematuria    Fall     Patient Active Problem List    Diagnosis Date Noted    Stage 4 chronic kidney disease (HCC) 2023    Type 2 diabetes mellitus with chronic kidney disease 2023    Fall 2024    Urinary tract infection with hematuria 2024    Hydronephrosis 2024    Transient alteration of awareness 2024    Hypoglycemia associated with type 2 diabetes mellitus (HCC) 2024    Abnormal EKG 2023    Right ureteral stone 2023    Arteriosclerosis of coronary artery 2023    Impaired mobility and activities of daily living dt CP debility 2023    Acute respiratory failure with hypoxia 2023    Hypervolemia 2023    Severe pulmonary hypertension (HCC) 2023    Hypoxia 2023    Class 3 severe obesity without serious comorbidity with body mass index (BMI) of 45.0 to 49.9 in adult 2022    Hypertension 2019    Acute gouty arthritis 2019    Adenomatous polyp of colon 2019    Anemia 2019    Back pain 2019    Infestation by bed bug 2019    ILIANA (obstructive sleep apnea) 2019    Other hyperlipidemia 2019    Unspecified hyperplasia of prostate without urinary obstruction and other lower urinary tract symptoms (LUTS) 2019    Primary malignant neoplasm of prostate (HCC) 2019    Pain in joint involving ankle and foot 2019    Poorly controlled diabetes mellitus (HCC)     Hydrocele     MAYKEL (acute kidney injury) (HCC) 03/15/2019    
Physical Therapy Med Surg Daily Treatment Note  Facility/Department: [address] MED SURG UNIT  Room: [place] [address]       NAME: Amrik Meraz  : 1950 (74 y.o.)  MRN: [id number]  CODE STATUS: DNR-CCA    Date of Service: 2024    Patient Diagnosis(es): Complicated UTI (urinary tract infection) [N39.0]  Fall, initial encounter [W19.XXXA]  Urinary tract infection with hematuria, site unspecified [N39.0, R31.9]   Chief Complaint   Patient presents with    Hematuria    Fall     Patient Active Problem List    Diagnosis Date Noted    Stage 4 chronic kidney disease (HCC) 2023    Type 2 diabetes mellitus with chronic kidney disease 2023    Fall 2024    Urinary tract infection with hematuria 2024    Hydronephrosis 2024    Transient alteration of awareness 2024    Hypoglycemia associated with type 2 diabetes mellitus (HCC) 2024    Abnormal EKG 2023    Right ureteral stone 2023    Arteriosclerosis of coronary artery 2023    Impaired mobility and activities of daily living dt CP debility 2023    Acute respiratory failure with hypoxia 2023    Hypervolemia 2023    Severe pulmonary hypertension (HCC) 2023    Hypoxia 2023    Class 3 severe obesity without serious comorbidity with body mass index (BMI) of 45.0 to 49.9 in adult 2022    Hypertension 2019    Acute gouty arthritis 2019    Adenomatous polyp of colon 2019    Anemia 2019    Back pain 2019    Infestation by bed bug 2019    ILIANA (obstructive sleep apnea) 2019    Other hyperlipidemia 2019    Unspecified hyperplasia of prostate without urinary obstruction and other lower urinary tract symptoms (LUTS) 2019    Primary malignant neoplasm of prostate (HCC) 2019    Pain in joint involving ankle and foot 2019    Poorly controlled diabetes mellitus (HCC)     Hydrocele     MAYKEL (acute kidney injury) (HCC) 03/15/2019    
Physical Therapy Missed Treatment   Facility/Department: [facility] MED SURG W485/W485-01    NAME: Amrik Meraz    : 1950 (74 y.o.)  MRN: [id number]    Account: [id number]  Gender: male    Chart reviewed, attempted PT  Patient unavailable 2° to:    [] Hold per nsg request    [x] Pt declined 2* reports \"my knees can't take it.\"  Educated on benefits of PT and pt cont to decline.     [] Pt.. off floor for test/procedure.     [] Pt. Unavailable      Will attempt PT treatment again at earliest convenience.      Electronically signed by Fifi Godwin, PTA on 24 at 3:47 PM EST    
Physical Therapy Missed Treatment   Facility/Department: [facility] MED SURG W485/W485-01    NAME: Amrik Meraz    : 1950 (74 y.o.)  MRN: [id number]    Account: [id number]  Gender: male    Chart reviewed, attempted PT at 1102. Patient unavailable 2° to:    [] Hold per nsg request    [x] Pt declined  just got back to bed tired and back is hurting. Asked for us to check back after lunch     [] Pt.. off floor for test/procedure.     [] Pt. Unavailable       Will attempt PT treatment again at earliest convenience.      Electronically signed by Ching Greene PTA on 24 at 11:04 AM EST  
Progress Note  Date:2024       Room:[place] [address]-  Patient Name:Amrik Meraz     YOB: 1950     Age:74 y.o.    Chief complaint uncontrolled type 2 diabetes    Subjective    Subjective:  Symptoms:  Stable.  He reports malaise and weakness.    Diet:  Adequate intake.    Activity level: Impaired due to weakness.    Pain:  He reports no pain.       Review of Systems   Neurological:  Positive for weakness.     Objective         Vitals Last 24 Hours:  TEMPERATURE:  Temp  Av.9 °F (36.6 °C)  Min: 97.7 °F (36.5 °C)  Max: 98.2 °F (36.8 °C)  RESPIRATIONS RANGE: Resp  Av  Min: 16  Max: 20  PULSE OXIMETRY RANGE: SpO2  Av %  Min: 96 %  Max: 98 %  PULSE RANGE: Pulse  Av.3  Min: 58  Max: 64  BLOOD PRESSURE RANGE: Systolic (24hrs), Av , Min:99 , Max:138   ; Diastolic (24hrs), Av, Min:47, Max:58    I/O (24Hr):    Intake/Output Summary (Last 24 hours) at 2024 0710  Last data filed at 2024 0542  Gross per 24 hour   Intake 450 ml   Output 1600 ml   Net -1150 ml     Objective:  General Appearance:  Comfortable.    Vital signs: (most recent): Blood pressure (!) 122/52, pulse 59, temperature 98.2 °F (36.8 °C), resp. rate 16, height 1.676 m (5' 6\"), weight 118.1 kg (260 lb 4.8 oz), SpO2 97%.    HEENT: Normal HEENT exam.    Lungs:  Normal effort and normal respiratory rate.    Heart: Normal rate.    Abdomen: Abdomen is soft.    Extremities: Normal range of motion.    Neurological: Patient is alert.    Skin:  No rash.     Labs/Imaging/Diagnostics    Labs:  CBC:  Recent Labs     11/10/24  0642 11/11/24  0620   WBC 9.0 8.0   RBC 3.06* 3.17*   HGB 8.3* 8.8*   HCT 27.1* 28.1*   MCV 88.6 88.6   RDW 16.5* 16.5*    220     CHEMISTRIES:  Recent Labs     11/10/24  0642 11/11/24  0620    143   K 4.4 5.2*  5.2*    107   CO2 28 28   BUN 72* 65*   CREATININE 2.76* 2.54*   GLUCOSE 56* 82     PT/INR:No results for input(s): \"PROTIME\", \"INR\" in the last 72 hours.  APTT:No 
Progress Note  Date:2024       Room:[place] [address]-  Patient Name:Amrik Meraz     YOB: 1950     Age:74 y.o.    Chief complaint uncontrolled type 2 diabetes    Subjective    Subjective:  Symptoms:  Stable.  He reports weakness.    Diet:  Poor intake.    Activity level: Impaired due to weakness.    Pain:  He complains of pain that is mild.       Review of Systems   Constitutional:  Positive for fatigue.   Neurological:  Positive for weakness.   All other systems reviewed and are negative.    Objective         Vitals Last 24 Hours:  TEMPERATURE:  Temp  Av.2 °F (36.8 °C)  Min: 97.7 °F (36.5 °C)  Max: 98.4 °F (36.9 °C)  RESPIRATIONS RANGE: Resp  Av.3  Min: 18  Max: 22  PULSE OXIMETRY RANGE: SpO2  Av.3 %  Min: 92 %  Max: 99 %  PULSE RANGE: Pulse  Av.4  Min: 56  Max: 67  BLOOD PRESSURE RANGE: Systolic (24hrs), Av , Min:115 , Max:135   ; Diastolic (24hrs), Av, Min:54, Max:65    I/O (24Hr):    Intake/Output Summary (Last 24 hours) at 2024  Last data filed at 2024  Gross per 24 hour   Intake 1080 ml   Output 4000 ml   Net -2920 ml     Objective:  General Appearance:  Ill-appearing.    Vital signs: (most recent): Blood pressure 130/62, pulse 67, temperature 98.2 °F (36.8 °C), temperature source Oral, resp. rate 18, height 1.676 m (5' 6\"), weight 122.5 kg (270 lb), SpO2 92%.  Vital signs are normal.    HEENT: Normal HEENT exam.    Lungs:  Normal effort and normal respiratory rate.    Heart: Normal rate.    Abdomen: Abdomen is soft.    Extremities: Normal range of motion.  There is dependent edema.    Neurological: Patient is alert.    Skin:  No rash.     Labs/Imaging/Diagnostics    Labs:  CBC:  Recent Labs     24  0442 24  0548 24  0556   WBC 10.7 7.8 5.8   RBC 3.11* 2.94* 3.14*   HGB 8.7* 8.1* 8.7*   HCT 27.7* 26.1* 27.6*   MCV 89.1 88.8 87.9   RDW 16.9* 16.9* 16.6*    154 171     CHEMISTRIES:  Recent Labs     24  0209 
Progress Note  Date:2024       Room:[place] [address]-  Patient Name:Amrik Meraz     YOB: 1950     Age:74 y.o.    Chief complaint uncontrolled type 2 diabetes    Subjective    Subjective:  Symptoms:  Stable.    Diet:  Adequate intake.    Activity level: Returning to normal.    Pain:  He reports no pain.       Review of Systems  Objective         Vitals Last 24 Hours:  TEMPERATURE:  Temp  Av.6 °F (36.4 °C)  Min: 97.3 °F (36.3 °C)  Max: 97.9 °F (36.6 °C)  RESPIRATIONS RANGE: Resp  Av  Min: 18  Max: 18  PULSE OXIMETRY RANGE: SpO2  Av %  Min: 93 %  Max: 100 %  PULSE RANGE: Pulse  Av.5  Min: 54  Max: 56  BLOOD PRESSURE RANGE: Systolic (24hrs), Av , Min:100 , Max:117   ; Diastolic (24hrs), Av, Min:51, Max:57    I/O (24Hr):    Intake/Output Summary (Last 24 hours) at 2024 0702  Last data filed at 2024 0537  Gross per 24 hour   Intake 1672.77 ml   Output 2400 ml   Net -727.23 ml     Objective:  General Appearance:  Comfortable.    Vital signs: (most recent): Blood pressure (!) 117/57, pulse 56, temperature 97.3 °F (36.3 °C), temperature source Oral, resp. rate 18, height 1.676 m (5' 6\"), weight 122.5 kg (270 lb), SpO2 94%.    HEENT: Normal HEENT exam.    Lungs:  Normal effort.    Heart: Normal rate.    Abdomen: Abdomen is soft.    Extremities: Normal range of motion.  There is dependent edema.    Neurological: Patient is alert.    Skin:  No rash.     Labs/Imaging/Diagnostics    Labs:  CBC:  Recent Labs     24  0556 24  0623 24  0618   WBC 5.8 8.2 9.5   RBC 3.14* 2.99* 3.20*   HGB 8.7* 8.2* 9.0*   HCT 27.6* 26.3* 28.5*   MCV 87.9 88.0 89.1   RDW 16.6* 16.5* 16.4*    189 201     CHEMISTRIES:  Recent Labs     24  0556 24  0623    133*   K 5.3* 4.8    99   CO2 24 25   BUN 63* 76*   CREATININE 2.86* 3.01*   GLUCOSE 285* 258*     PT/INR:No results for input(s): \"PROTIME\", \"INR\" in the last 72 hours.  APTT:No results for 
Progress Note  Date:2024       Room:[place] [address]-  Patient Name:Amrik Meraz     YOB: 1950     Age:74 y.o.    Chief complaint uncontrolled type 2 diabetes    Subjective    Subjective:  Symptoms:  Stable.  He reports malaise and weakness.    Diet:  Adequate intake.    Activity level: Impaired due to weakness.    Pain:  He reports no pain.       Review of Systems   Neurological:  Positive for weakness.     Objective         Vitals Last 24 Hours:  TEMPERATURE:  Temp  Av.8 °F (36.6 °C)  Min: 97.5 °F (36.4 °C)  Max: 98.1 °F (36.7 °C)  RESPIRATIONS RANGE: Resp  Av  Min: 16  Max: 20  PULSE OXIMETRY RANGE: SpO2  Av.8 %  Min: 96 %  Max: 98 %  PULSE RANGE: Pulse  Av.4  Min: 57  Max: 69  BLOOD PRESSURE RANGE: Systolic (24hrs), Av , Min:111 , Max:150   ; Diastolic (24hrs), Av, Min:47, Max:75    I/O (24Hr):    Intake/Output Summary (Last 24 hours) at 2024 1028  Last data filed at 2024 0607  Gross per 24 hour   Intake 1027.58 ml   Output 2100 ml   Net -1072.42 ml     Objective:  General Appearance:  Ill-appearing and comfortable.    Vital signs: (most recent): Blood pressure (!) 111/53, pulse 61, temperature 97.7 °F (36.5 °C), temperature source Oral, resp. rate 18, height 1.676 m (5' 6\"), weight 122.5 kg (270 lb), SpO2 96%.  Vital signs are normal.    HEENT: Normal HEENT exam.    Lungs:  Normal effort and normal respiratory rate.    Heart: Normal rate.    Abdomen: Abdomen is soft.    Extremities: Normal range of motion.  There is venous stasis and dependent edema.    Neurological: Patient is alert.    Skin:  No rash.     Labs/Imaging/Diagnostics    Labs:  CBC:  Recent Labs     24  0623 24  0618 24  0543   WBC 8.2 9.5 8.3   RBC 2.99* 3.20* 3.07*   HGB 8.2* 9.0* 8.4*   HCT 26.3* 28.5* 27.2*   MCV 88.0 89.1 88.6   RDW 16.5* 16.4* 16.6*    201 214     CHEMISTRIES:  Recent Labs     24  0623 24  0618 24  0543   * 138 141   K 4.8 
See OT evaluation for all goals and OT POC. Electronically signed by Shay Loya OTR/L on 11/7/2024 at 10:14 AM   
Subjective:      Patient ID: Amrik Meraz is a 74 y.o. male    Sneha is a 74-year-old male with history of hypertension, hyperlipidemia, coronary artery disease, type 2 diabetes, severe obesity, BPH. He is DNR-comfort care arrest. He was admitted on 11/02 after sustaining a fall and was found to have a Proteus urinary tract infection. He has no pain and the urine is yellow in collection device.     Past Medical History:   Diagnosis Date    Chronic kidney disease     Diabetes mellitus (HCC)     Hypertension     Type 2 diabetes mellitus with hyperglycemia 1/6/2023    Type 2 diabetes mellitus without complication, without long-term current use of insulin (HCC) 3/19/2019    Uncontrolled type 2 diabetes mellitus with hyperglycemia (HCC)      Past Surgical History:   Procedure Laterality Date    APPENDECTOMY      age 10     Social History     Socioeconomic History    Marital status: Single   Tobacco Use    Smoking status: Never     Passive exposure: Never    Smokeless tobacco: Never   Vaping Use    Vaping status: Never Used   Substance and Sexual Activity    Alcohol use: Not Currently    Drug use: Never   Social History Narrative    Lives With: Alone    Type of Home: House Two level, Able to Live on Main level with bedroom/bathroom-[address] [place] [address]    Home Access: Stairs to enter without rails - Number of Steps: 6    Bathroom Shower/Tub: Tub/Shower unit    Bathroom Toilet: Standard    Home Equipment: Cane, Rolling walker    ADL Assistance: Independent    Homemaking Assistance: Independent    Homemaking Responsibilities: Yes    Ambulation Assistance: Independent(Cane)    Transfer Assistance: Independent    Active : Yes     Social Determinants of Health     Financial Resource Strain: Low Risk  (1/11/2024)    Overall Financial Resource Strain (CARDIA)     Difficulty of Paying Living Expenses: Not hard at all   Food Insecurity: Patient Declined (11/6/2024)    Hunger Vital Sign     Worried About 
Waiting for family to return with portable O2 to transport home.  1530 Family returned, Discharge instructions provided to patient and spouse. Verbalized understanding of follow up appointments, diet, activity, medications and reasons to return to ED/call physician. All questions answered. Copy of discharge instructions provided. Awaiting transport to personal car. Denies further needs.     
11/03/24  0557   WBC 16.9*  --  12.2*   HGB 10.4* 10.9* 8.7*   HCT 32.4*  --  28.2*     --  177     Recent Labs     11/02/24  0638 11/02/24  0753 11/03/24  0557     --  140   K 3.7  --  4.3     --  107   CO2 25  --  22   BUN 59*  --  56*   CREATININE 3.18* 2.7* 2.96*   CALCIUM 8.6  --  8.2*     Recent Labs     11/02/24  0638   AST 17   ALT 11   BILITOT 0.5   ALKPHOS 127*     No results for input(s): \"INR\" in the last 72 hours.  Recent Labs     11/02/24 0638   CKTOTAL 79     Urinalysis:      Lab Results   Component Value Date/Time    NITRU Negative 11/02/2024 09:00 AM    WBCUA >100 11/02/2024 09:00 AM    BACTERIA Negative 11/02/2024 09:00 AM    RBCUA  11/02/2024 09:00 AM    BLOODU LARGE 11/02/2024 09:00 AM    SPECGRAV 1.020 01/06/2023 02:22 PM    GLUCOSEU 100 11/02/2024 09:00 AM     Radiology:  CT HEAD WO CONTRAST   Final Result   1. There is no acute intracranial abnormality.  Specifically, there is no   intracranial hemorrhage.   2. Atrophy and periventricular microvascular ischemic disease,   . .         CT CERVICAL SPINE WO CONTRAST   Final Result   1. There is no acute compression fracture or subluxation of the cervical   spine.   2. Advanced multilevel degenerative disc and degenerative joint disease.         CT CHEST WO CONTRAST   Final Result   1. No acute traumatic findings of the chest.   2. Ventral abdominal wall soft tissue edema asymmetrically greater on the   right consistent of a contusion pattern in the setting of trauma soft tissue   contusion without focal hematoma.  No intra-or intrapelvic traumatic findings   specifically no evidence for hemoperitoneum or solid organ injury.   3. Cardiomegaly with mild interstitial edema pattern.   4. Persistent moderate right hydronephrosis and heterogeneity in the right   proximal renal pelvis partially calcified areas of likely minimal progression   of stones or fragments with asymmetric perinephric stranding in the right and 
Running Out of Food in the Last Year: Patient declined     Ran Out of Food in the Last Year: Patient declined   Transportation Needs: Patient Declined (11/6/2024)    PRAPARE - Transportation     Lack of Transportation (Medical): Patient declined     Lack of Transportation (Non-Medical): Patient declined   Physical Activity: Inactive (1/6/2023)    Exercise Vital Sign     Days of Exercise per Week: 0 days     Minutes of Exercise per Session: 0 min   Housing Stability: Patient Declined (11/6/2024)    Housing Stability Vital Sign     Unable to Pay for Housing in the Last Year: Patient declined     Number of Times Moved in the Last Year: 1     Homeless in the Last Year: Patient declined     No family history on file.  Current Facility-Administered Medications   Medication Dose Route Frequency Provider Last Rate Last Admin    insulin lispro (HUMALOG,ADMELOG) injection vial 16 Units  16 Units SubCUTAneous TID WC Rusty Arce MD        insulin glargine (LANTUS) injection vial 70 Units  70 Units SubCUTAneous Nightly Rusty Arce MD   70 Units at 11/07/24 2232    piperacillin-tazobactam (ZOSYN) 4,500 mg in sodium chloride 0.9 % 100 mL IVPB (mini-bag)  4,500 mg IntraVENous Q8H Jean Vizcaino MD   Stopped at 11/08/24 0824    vancomycin (VANCOCIN) intermittent dosing (placeholder)   Other RX Placeholder Jean Vizcaino MD        insulin lispro (HUMALOG,ADMELOG) injection vial 0-16 Units  0-16 Units SubCUTAneous 4x Daily AC & HS Rusty Arce MD   4 Units at 11/07/24 1814    finasteride (PROSCAR) tablet 5 mg  5 mg Oral Daily Jean Vizcaino MD   5 mg at 11/08/24 0823    aspirin tablet 162 mg  162 mg Oral Daily Jean Vizcaino MD   162 mg at 11/08/24 0823    ammonium lactate (LAC-HYDRIN) 12 % lotion   Topical PRN Jean Vizcaino MD        [Held by provider] furosemide (LASIX) injection 80 mg  80 mg IntraVENous BID Jean Vizcaino MD   80 mg at 11/07/24 0839    rosuvastatin (CRESTOR) tablet 10 mg  10 mg Oral Nightly Adriana Galicia 
dru garciack    DATA:    Lab Results   Component Value Date    WBC 9.0 11/10/2024    HGB 8.3 (L) 11/10/2024    HCT 27.1 (L) 11/10/2024    MCV 88.6 11/10/2024     11/10/2024     Lab Results   Component Value Date    CREATININE 2.76 (H) 11/10/2024    BUN 72 (H) 11/10/2024     11/10/2024    K 4.4 11/10/2024     11/10/2024    CO2 28 11/10/2024       Hepatic Function Panel:  Lab Results   Component Value Date/Time    ALKPHOS 148 11/08/2024 06:18 AM    ALT 19 11/08/2024 06:18 AM    AST 17 11/08/2024 06:18 AM    BILITOT 0.3 11/08/2024 06:18 AM       Microbiology:   Recent Labs     11/07/24  1742   BC date.  Any change in status will be called.     No results for input(s): \"BLOODCULT2\" in the last 72 hours.  Recent Labs     11/07/24  1947   LABURIN Cult,Urine:  NO GROWTH  Performed at [facility] [address] [place] [id number]  [phone]     Procalcitonin continues to trend down, currently at 8.01    IMPRESSION:    Complicated UTI with Proteus mirabilis, clinically improving  Moderate right hydronephrosis with nephrolithiasis  Acute kidney injury on chronic kidney disease in a patient with diabetes mellitus 2 and morbid obesity  Pulmonary atelectasis and fluid overload rather than pneumonia with chronic respiratory failure  Elevated procalcitonin is probably multifactorial        PLAN:  Continue IV Rocephin till dischargeWith stop date of November 14  Switch to oral Keflex on discharge to complete a 10-14 days course  Follow-up clinically    Discussed with patient    Radha Zhang MD        
Independent    Active : Yes     Social Determinants of Health     Financial Resource Strain: Low Risk  (2024)    Overall Financial Resource Strain (CARDIA)     Difficulty of Paying Living Expenses: Not hard at all   Food Insecurity: No Food Insecurity (2024)    Hunger Vital Sign     Worried About Running Out of Food in the Last Year: Never true     Ran Out of Food in the Last Year: Never true   Transportation Needs: No Transportation Needs (2024)    PRAPARE - Transportation     Lack of Transportation (Medical): No     Lack of Transportation (Non-Medical): No   Physical Activity: Inactive (2023)    Exercise Vital Sign     Days of Exercise per Week: 0 days     Minutes of Exercise per Session: 0 min   Stress: Not on file   Social Connections: Not on file   Intimate Partner Violence: Not on file   Housing Stability: Low Risk  (2024)    Housing Stability Vital Sign     Unable to Pay for Housing in the Last Year: No     Number of Places Lived in the Last Year: 1     Unstable Housing in the Last Year: No       Vitals:  /64   Pulse 66   Temp 98.4 °F (36.9 °C) (Oral)   Resp 22   Ht 1.676 m (5' 6\")   Wt 122.5 kg (270 lb)   SpO2 97%   BMI 43.58 kg/m²   Temp (24hrs), Av.3 °F (36.8 °C), Min:98.1 °F (36.7 °C), Max:98.4 °F (36.9 °C)    Recent Labs     24  1634 24  1802 24  2106 24  0756   POCGLU 371* 298* 243* 304*       I/O(24Hr):    Intake/Output Summary (Last 24 hours) at 2024 0830  Last data filed at 2024 0553  Gross per 24 hour   Intake 490 ml   Output 3400 ml   Net -2910 ml       Labs:  Hematology:  Recent Labs     24  0556   WBC 5.8   HGB 8.7*   HCT 27.6*        Chemistry:  Recent Labs     24  0556      K 5.3*      CO2 24   GLUCOSE 285*   BUN 63*   CREATININE 2.86*   ANIONGAP 13   LABGLOM 22.0*   CALCIUM 8.8       Urine Culture  Lab Results   Component Value Date/Time    LABURIN  2024 09:30 AM     Performed 
no cyanosis, no peripheral edema  Neuro: alert and oriented, no gross abnormalities  Psych: normal mood and affect  Skin: no rash      Electronically signed by Mic Dias DO              
packet 17 g  17 g Oral Daily PRN Adrinaa Galicia MD        acetaminophen (TYLENOL) tablet 650 mg  650 mg Oral Q6H PRN Adriana Galicia MD        Or    acetaminophen (TYLENOL) suppository 650 mg  650 mg Rectal Q6H PRN Adriana Galicia MD        glucose chewable tablet 16 g  4 tablet Oral PRN Adriana Galicia MD        dextrose bolus 10% 125 mL  125 mL IntraVENous PRN Adriana Galicia MD        Or    dextrose bolus 10% 250 mL  250 mL IntraVENous PRN Adriana Galicia MD        glucagon injection 1 mg  1 mg SubCUTAneous PRN Adriana Galicia MD        dextrose 10 % infusion   IntraVENous Continuous PRN Adriana Galicia MD        insulin glargine (LANTUS) injection vial 15 Units  15 Units SubCUTAneous Nightly Adriana Galicia MD   15 Units at 11/04/24 2118    insulin lispro (HUMALOG,ADMELOG) injection vial 0-8 Units  0-8 Units SubCUTAneous 4x Daily AC & HS Adriana Galicia MD   2 Units at 11/04/24 2117    cefTRIAXone (ROCEPHIN) 1,000 mg in sterile water 10 mL IV syringe  1,000 mg IntraVENous Q24H Adriana Galicia MD   1,000 mg at 11/04/24 2117    carvedilol (COREG) tablet 12.5 mg  12.5 mg Oral BID Adriana Galicia MD   12.5 mg at 11/05/24 1043    cetirizine (ZYRTEC) tablet 10 mg  10 mg Oral Daily Adriana Galicia MD   10 mg at 11/05/24 1043    montelukast (SINGULAIR) tablet 10 mg  10 mg Oral Nightly Adriana Galicia MD   10 mg at 11/04/24 2103    tamsulosin (FLOMAX) capsule 0.8 mg  0.8 mg Oral Nightly Adriana Galicia MD   0.8 mg at 11/04/24 2102     Niacin and Fluorescein  reviewed      Review of Systems   Constitutional:  Negative for fever.   HENT:  Negative for congestion.    Respiratory:  Negative for apnea.    Genitourinary:  Negative for hematuria.   Neurological:  Negative for speech difficulty.         Objective:   Physical Exam  HENT:      Mouth/Throat:      Mouth: Mucous membranes are moist.   Pulmonary:      Effort: Pulmonary effort is normal.   Neurological:      Mental Status: He is alert and 
DNR-comfort care arrest. He was admitted on 11/02 after sustaining a fall and was found to have a Proteus urinary tract infection. He has no pain and the urine is yellow in collection device.          Plan:      Continue present management and external urine collection device           Malcolm Drummond PA-C  
of indep and safety. High falls risk. Decreased safety awareness. Would benefit from therapy stay prior to DC home.  Requires PT Follow-Up: Yes       PLAN OF CARE:  Physical Therapy Plan  General Plan: 1 time a day 3-6 times a week  Current Treatment Recommendations: Strengthening, ROM, Balance training, Functional mobility training, Transfer training, Endurance training, Gait training, Stair training, Neuromuscular re-education, Home exercise program, Safety education & training, Patient/Caregiver education & training, Equipment evaluation, education, & procurement, Modalities    Safety Devices  Type of Devices: All fall risk precautions in place    Goals:  Long Term Goals  Long Term Goal 1: Pt to complete bed mobility with indep  Long Term Goal 2: Pt to complete transfers with indep  Long Term Goal 3: Pt to ambulate 10ft with LRD and indep    AMPAC (6 CLICK) BASIC MOBILITY  AM-PAC Inpatient Mobility Raw Score : 16     Therapy Time:   Individual   Time In 1029   Time Out 1043   Minutes 14           Shiloh Mckinney, PT, 11/04/24 at 12:52 PM         Definitions for assistance levels  Independent = pt does not require any physical supervision or assistance from another person for activity completion. Device may be needed.  Stand by assistance = pt requires verbal cues or instructions from another person, close to but not touching, to perform the activity  Minimal assistance= pt performs 75% or more of the activity; assistance is required to complete the activity  Moderate assistance= pt performs 50% of the activity; assistance is required to complete the activity  Maximal assistance = pt performs 25% of the activity; assistance is required to complete the activity  Dependent = pt requires total physical assistance to accomplish the task  
the body and fundus   partially calcified areas of adenomyomatosis and likely mixed cholelithiasis   versus underlying mass indeterminate without significant progression or   extension into the adjacent liver on limited noncontrast evaluation.   6. Fat containing left direct inguinal hernia.         XR CHEST PORTABLE   Final Result   1. Cardiomegaly with mild interstitial edema.   2. Asymmetric elevation right hemidiaphragm.           Assessment/Plan:    Complicated UTI with hemorrhagic cystitis  - Continue abx; awaiting final cultures; urology is following     MAYKEL on CKD4  - Cr 3.18 on arrival, baseline 2.4-2.8  - improving  - avoid nephrotoxins as able  - renally dose meds     Fall  - pt rolled out of bed this morning, denies LOC or head strike  - CT head, CT C-spine, CT C/A/P largely unremarkable  - PT/OT eval  - fall precautions    #Gallbladder heterogencicity    - unclear etiology; gen surg consutled for their opinion     HTN: continue home carvedilol  HLD/CAD: holding aspirin due to hematuria, continue home statin  T2DM: FSBG per protocol with nightly lantus and SSI  BPH: continue home tamsulosin  Chronic hypoxic respiratory failure: pt stable on home 4L NC  Severe obesity: BMI 46.81, complicates all aspects of care       Active Hospital Problems    Diagnosis Date Noted    Fall [W19.XXXA] 11/04/2024    Urinary tract infection with hematuria [N39.0, R31.9] 11/02/2024        Additional work up or/and treatment plan may be added today or then after based on clinical progression. I am managing a portion of pt care. Some medical issues are handled by other specialists. Additional work up and treatment should be done in out pt setting by pt PCP and other out pt providers.     In addition to examining and evaluating pt, I spent additional time explaining care, normal and abnormal findings, and treatment plan. All of pt questions were answered. Counseling, diet and education were  provided. Case will be discussed with 
4L NC  Severe obesity: BMI 46.81, complicates all aspects of care       Active Hospital Problems    Diagnosis Date Noted    Fall [W19.XXXA] 11/04/2024    Urinary tract infection with hematuria [N39.0, R31.9] 11/02/2024        Additional work up or/and treatment plan may be added today or then after based on clinical progression. I am managing a portion of pt care. Some medical issues are handled by other specialists. Additional work up and treatment should be done in out pt setting by pt PCP and other out pt providers.     In addition to examining and evaluating pt, I spent additional time explaining care, normal and abnormal findings, and treatment plan. All of pt questions were answered. Counseling, diet and education were  provided. Case will be discussed with nursing staff when appropriate. Family will be updated if and when appropriate.      Diet: ADULT ORAL NUTRITION SUPPLEMENT; Breakfast, Lunch, Dinner; Diabetic Oral Supplement  ADULT DIET; Regular; 4 carb choices (60 gm/meal)    Code Status: DNR-CCA    PT/OT Eval     Electronically signed by Jean Vizcaino MD on 11/5/2024 at 1:40 PM    
Gallbladder demonstrates heterogeneity again noted in the body and fundus   partially calcified areas of adenomyomatosis and likely mixed cholelithiasis   versus underlying mass indeterminate without significant progression or   extension into the adjacent liver on limited noncontrast evaluation.   6. Fat containing left direct inguinal hernia.         XR CHEST PORTABLE   Final Result   1. Cardiomegaly with mild interstitial edema.   2. Asymmetric elevation right hemidiaphragm.         MRI ABDOMEN W WO CONTRAST MRCP    (Results Pending)     Assessment/Plan:    #Elevated procal; complicated UTI    - discussed with ID and urology; will likely require a 10 day course of abx; continue CTX     #Acute on chronic diastolic CHF    -   per nephrology euvolemic now; consider resuming home torsemide tomorrow    MAYKEL on CKD4  - per nephrology  - avoid nephrotoxins as able  - renally dose meds     Fall  - pt rolled out of bed this morning, denies LOC or head strike  - CT head, CT C-spine, CT C/A/P largely unremarkable  - PT/OT eval  - fall precautions    #Gallbladder heterogencicity    - unclear etiology; gen surg consutled for their opinion; plan on MRI when patient is able to lay flat     HTN: continue home carvedilol  HLD/CAD: holding aspirin due to hematuria, continue home statin  T2DM: FSBG per protocol with nightly lantus and SSI; endocrine was consulted  BPH: continue home tamsulosin  Chronic hypoxic respiratory failure: pt stable on home 4L NC  Severe obesity: BMI 46.81, complicates all aspects of care       Active Hospital Problems    Diagnosis Date Noted    Nephrolithiasis [N20.0] 11/08/2024    Pulmonary atelectasis [J98.11] 11/08/2024    Fall [W19.XXXA] 11/04/2024    Complicated UTI (urinary tract infection) [N39.0] 11/02/2024    Hydronephrosis of right kidney [N13.30] 04/30/2024    Poorly controlled diabetes mellitus (HCC) [E11.65]         Additional work up or/and treatment plan may be added today or then after based 
tract infection with hematuria [N39.0, R31.9] 11/02/2024    Poorly controlled diabetes mellitus (HCC) [E11.65]         Additional work up or/and treatment plan may be added today or then after based on clinical progression. I am managing a portion of pt care. Some medical issues are handled by other specialists. Additional work up and treatment should be done in out pt setting by pt PCP and other out pt providers.     In addition to examining and evaluating pt, I spent additional time explaining care, normal and abnormal findings, and treatment plan. All of pt questions were answered. Counseling, diet and education were  provided. Case will be discussed with nursing staff when appropriate. Family will be updated if and when appropriate.      Diet: ADULT ORAL NUTRITION SUPPLEMENT; Breakfast, Lunch, Dinner; Diabetic Oral Supplement  ADULT DIET; Regular; 4 carb choices (60 gm/meal); Low Sodium (2 gm); 1500 ml    Code Status: DNR-CCA    PT/OT Eval     Electronically signed by Jean Vizcaino MD on 11/6/2024 at 1:09 PM    
progression. I am managing a portion of pt care. Some medical issues are handled by other specialists. Additional work up and treatment should be done in out pt setting by pt PCP and other out pt providers.     In addition to examining and evaluating pt, I spent additional time explaining care, normal and abnormal findings, and treatment plan. All of pt questions were answered. Counseling, diet and education were  provided. Case will be discussed with nursing staff when appropriate. Family will be updated if and when appropriate.      Diet: ADULT ORAL NUTRITION SUPPLEMENT; Breakfast, Lunch, Dinner; Diabetic Oral Supplement  ADULT DIET; Regular; 4 carb choices (60 gm/meal); Low Sodium (2 gm); 1500 ml    Code Status: DNR-CCA    PT/OT Eval     Electronically signed by Jean Vizcaino MD on 11/9/2024 at 2:56 PM

## 2024-11-12 NOTE — PLAN OF CARE
Nutrition Problem #1: Inadequate oral intake  Intervention: Food and/or Nutrient Delivery: Continue Current Diet, Continue Oral Nutrition Supplement (Continue carb control; low sodium diet  Continue diabetic oral supplement TID)

## 2024-11-12 NOTE — PLAN OF CARE
Problem: Chronic Conditions and Co-morbidities  Goal: Patient's chronic conditions and co-morbidity symptoms are monitored and maintained or improved  Outcome: Progressing  Flowsheets (Taken 11/11/2024 0931 by Yovany Bal, RN)  Care Plan - Patient's Chronic Conditions and Co-Morbidity Symptoms are Monitored and Maintained or Improved: Monitor and assess patient's chronic conditions and comorbid symptoms for stability, deterioration, or improvement     Problem: Discharge Planning  Goal: Discharge to home or other facility with appropriate resources  Outcome: Progressing  Flowsheets (Taken 11/11/2024 0931 by Yovany Bal, RN)  Discharge to home or other facility with appropriate resources: Identify barriers to discharge with patient and caregiver     Problem: Skin/Tissue Integrity  Goal: Absence of new skin breakdown  Description: 1.  Monitor for areas of redness and/or skin breakdown  2.  Assess vascular access sites hourly  3.  Every 4-6 hours minimum:  Change oxygen saturation probe site  4.  Every 4-6 hours:  If on nasal continuous positive airway pressure, respiratory therapy assess nares and determine need for appliance change or resting period.  Outcome: Progressing     Problem: Safety - Adult  Goal: Free from fall injury  Outcome: Progressing  Flowsheets (Taken 11/12/2024 0344)  Free From Fall Injury:   Instruct family/caregiver on patient safety   Based on caregiver fall risk screen, instruct family/caregiver to ask for assistance with transferring infant if caregiver noted to have fall risk factors

## 2024-11-12 NOTE — CARE COORDINATION
PT DENIES HOME GOING NEEDS, HE HAS PORTABLE O2 FOR HOME.      SPOKE TO RT, DESAT ORDERED, BUT NOT NECESSARY, PT HAS O2 AT HOME 2-3L.

## 2024-11-13 ENCOUNTER — CARE COORDINATION (OUTPATIENT)
Dept: CARE COORDINATION | Age: 74
End: 2024-11-13

## 2024-11-13 NOTE — CARE COORDINATION
Care Transitions Note    Initial Call - Call within 2 business days of discharge: Yes    Attempted to reach patient for transitions of care follow up. Unable to reach patient. Recording stating called party is unavailable    Outreach Attempts:   Unable to leave message.     Patient: Amrik Meraz    Patient : 1950   MRN: [id number]    Reason for Admission: Fall, hematuria, UTI  Discharge Date: 24  RURS: Readmission Risk Score: 24    Last Discharge Facility       Date Complaint Diagnosis Description Type Department Provider    24 Hematuria; Fall Fall, initial encounter ... ED to Hosp-Admission (Discharged) (ADMITTED) Adriana Malave MD; Pete,...            Was this an external facility discharge? No    Follow Up Appointment:   Patient has hospital follow up appointment scheduled within 7 days of discharge.    Future Appointments         Provider Specialty Dept Phone    2024 9:30 AM Malcolm Drummond PA Urology [phone]            Plan for follow-up on next business day.      Jennifer Suárez RN

## 2024-11-14 ENCOUNTER — CARE COORDINATION (OUTPATIENT)
Dept: CARE COORDINATION | Age: 74
End: 2024-11-14

## 2024-11-14 NOTE — CARE COORDINATION
Care Transitions Note    Initial Call - Call within 2 business days of discharge: Yes    Attempted to reach patient for transitions of care follow up. Unable to reach patient. \"Unavailable \" per recording. CTN sign off for transitions. Non [facility] PCP.    Outreach Attempts:   Multiple attempts to contact patient at phone numbers on file.     Patient: Amrik Meraz    Patient : 1950   MRN: <[id number]>    Reason for Admission: fall, UTI, CHF  Discharge Date: 24  RURS: Readmission Risk Score: 24    Last Discharge Facility       Date Complaint Diagnosis Description Type Department Provider    24 Hematuria; Fall Fall, initial encounter ... ED to Hosp-Admission (Discharged) (ADMITTED) Adriana Malave MD; Pete,...            Was this an external facility discharge? No    Follow Up Appointment:   Patient has hospital follow up appointment scheduled within 7 days of discharge.    Future Appointments         Provider Specialty Dept Phone    2024 9:30 AM Malcolm Drummond PA Urology [phone]            No further follow-up call indicated     Jennifer Suárez RN

## 2024-11-25 ENCOUNTER — OFFICE VISIT (OUTPATIENT)
Dept: UROLOGY | Age: 74
End: 2024-11-25
Payer: MEDICARE

## 2024-11-25 ENCOUNTER — HOSPITAL ENCOUNTER (OUTPATIENT)
Dept: GENERAL RADIOLOGY | Age: 74
Discharge: HOME | End: 2024-11-27
Payer: MEDICARE

## 2024-11-25 VITALS
BODY MASS INDEX: 43.39 KG/M2 | SYSTOLIC BLOOD PRESSURE: 90 MMHG | OXYGEN SATURATION: 94 % | DIASTOLIC BLOOD PRESSURE: 62 MMHG | HEART RATE: 61 BPM | HEIGHT: 66 IN | WEIGHT: 270 LBS

## 2024-11-25 DIAGNOSIS — N17.9 AKI (ACUTE KIDNEY INJURY) (HCC): ICD-10-CM

## 2024-11-25 DIAGNOSIS — N20.1 RIGHT URETERAL STONE: ICD-10-CM

## 2024-11-25 DIAGNOSIS — N20.0 NEPHROLITHIASIS: Primary | ICD-10-CM

## 2024-11-25 LAB
BILIRUBIN, POC: ABNORMAL
BLOOD URINE, POC: ABNORMAL
CLARITY, POC: CLEAR
COLOR, POC: YELLOW
GLUCOSE URINE, POC: ABNORMAL MG/DL
KETONES, POC: ABNORMAL MG/DL
LEUKOCYTE EST, POC: ABNORMAL
NITRITE, POC: ABNORMAL
PH, POC: 6
PROTEIN, POC: ABNORMAL MG/DL
SPECIFIC GRAVITY, POC: 1.01
UROBILINOGEN, POC: 0.2 MG/DL

## 2024-11-25 PROCEDURE — 3078F DIAST BP <80 MM HG: CPT | Performed by: PHYSICIAN ASSISTANT

## 2024-11-25 PROCEDURE — 99203 OFFICE O/P NEW LOW 30 MIN: CPT | Performed by: PHYSICIAN ASSISTANT

## 2024-11-25 PROCEDURE — 74018 RADEX ABDOMEN 1 VIEW: CPT

## 2024-11-25 PROCEDURE — 1123F ACP DISCUSS/DSCN MKR DOCD: CPT | Performed by: PHYSICIAN ASSISTANT

## 2024-11-25 PROCEDURE — 81003 URINALYSIS AUTO W/O SCOPE: CPT | Performed by: PHYSICIAN ASSISTANT

## 2024-11-25 PROCEDURE — 1159F MED LIST DOCD IN RCRD: CPT | Performed by: PHYSICIAN ASSISTANT

## 2024-11-25 PROCEDURE — 3074F SYST BP LT 130 MM HG: CPT | Performed by: PHYSICIAN ASSISTANT

## 2024-11-25 ASSESSMENT — ENCOUNTER SYMPTOMS: APNEA: 0

## 2024-11-25 NOTE — PROGRESS NOTES
effort is normal.   Skin:     General: Skin is warm.   Neurological:      Mental Status: He is alert and oriented to person, place, and time.          Assessment:      74-year-old male with history of hypertension, hyperlipidemia, coronary artery disease, type 2 diabetes, severe obesity, BPH. He is DNR-comfort care arrest. He was admitted on 11/02 after sustaining a fall and was found to have a Proteus urinary tract infection. CT on 11/7/24 showed moderate right hydronephrosis. A linear stringy calcification density extending for 1.6 cm which may reflect obstructive stone/fragments. He denies abdominal pain or flank pain. Denies gross hematuria        Plan:      Will consult Dr. Mason for management of large right renal stone  Prn follow up         Malcolm Drummond PA-C

## 2024-12-02 ENCOUNTER — APPOINTMENT (OUTPATIENT)
Dept: GENERAL RADIOLOGY | Age: 74
End: 2024-12-02
Payer: OTHER GOVERNMENT

## 2024-12-02 ENCOUNTER — HOSPITAL ENCOUNTER (OUTPATIENT)
Age: 74
Setting detail: OBSERVATION
Discharge: HOME HEALTH | End: 2024-12-04
Attending: EMERGENCY MEDICINE
Payer: OTHER GOVERNMENT

## 2024-12-02 ENCOUNTER — APPOINTMENT (OUTPATIENT)
Dept: CT IMAGING | Age: 74
End: 2024-12-02
Payer: OTHER GOVERNMENT

## 2024-12-02 DIAGNOSIS — N30.01 ACUTE CYSTITIS WITH HEMATURIA: ICD-10-CM

## 2024-12-02 DIAGNOSIS — E16.2 HYPOGLYCEMIA: ICD-10-CM

## 2024-12-02 DIAGNOSIS — R41.82 ALTERED MENTAL STATUS, UNSPECIFIED ALTERED MENTAL STATUS TYPE: Primary | ICD-10-CM

## 2024-12-02 PROBLEM — G93.40 ACUTE ENCEPHALOPATHY: Status: ACTIVE | Noted: 2024-12-02

## 2024-12-02 LAB
ALBUMIN SERPL-MCNC: 3.7 G/DL (ref 3.5–4.6)
ALP SERPL-CCNC: 105 U/L (ref 35–104)
ALT SERPL-CCNC: 7 U/L (ref 0–41)
AMPHET UR QL SCN: NORMAL
ANION GAP SERPL CALCULATED.3IONS-SCNC: 12 MEQ/L (ref 9–15)
APAP SERPL-MCNC: <5 UG/ML (ref 10–30)
AST SERPL-CCNC: 13 U/L (ref 0–40)
BACTERIA URNS QL MICRO: ABNORMAL /HPF
BARBITURATES UR QL SCN: NORMAL
BASE EXCESS ARTERIAL: 6 (ref -3–3)
BASOPHILS # BLD: 0 K/UL (ref 0–0.2)
BASOPHILS NFR BLD: 0.3 %
BENZODIAZ UR QL SCN: NORMAL
BILIRUB SERPL-MCNC: 0.4 MG/DL (ref 0.2–0.7)
BILIRUB UR QL STRIP: NEGATIVE
BUN SERPL-MCNC: 43 MG/DL (ref 8–23)
CALCIUM IONIZED: 1.23 MMOL/L (ref 1.12–1.32)
CALCIUM SERPL-MCNC: 8.7 MG/DL (ref 8.5–9.9)
CANNABINOIDS UR QL SCN: NORMAL
CHLORIDE SERPL-SCNC: 102 MEQ/L (ref 95–107)
CK SERPL-CCNC: 50 U/L (ref 0–190)
CLARITY UR: ABNORMAL
CO2 SERPL-SCNC: 29 MEQ/L (ref 20–31)
COCAINE UR QL SCN: NORMAL
COLOR UR: YELLOW
CREAT SERPL-MCNC: 2.94 MG/DL (ref 0.7–1.2)
DRUG SCREEN COMMENT UR-IMP: NORMAL
EOSINOPHIL # BLD: 0.2 K/UL (ref 0–0.7)
EOSINOPHIL NFR BLD: 2.5 %
EPI CELLS #/AREA URNS AUTO: ABNORMAL /HPF (ref 0–5)
ERYTHROCYTE [DISTWIDTH] IN BLOOD BY AUTOMATED COUNT: 15.9 % (ref 11.5–14.5)
FENTANYL SCREEN, URINE: NORMAL
GLOBULIN SER CALC-MCNC: 3.5 G/DL (ref 2.3–3.5)
GLUCOSE BLD-MCNC: 104 MG/DL
GLUCOSE BLD-MCNC: 104 MG/DL (ref 70–99)
GLUCOSE BLD-MCNC: 152 MG/DL (ref 70–99)
GLUCOSE BLD-MCNC: 186 MG/DL (ref 70–99)
GLUCOSE BLD-MCNC: 71 MG/DL
GLUCOSE BLD-MCNC: 71 MG/DL (ref 70–99)
GLUCOSE BLD-MCNC: 74 MG/DL (ref 70–99)
GLUCOSE SERPL-MCNC: 83 MG/DL (ref 70–99)
GLUCOSE UR STRIP-MCNC: NEGATIVE MG/DL
HCO3 ARTERIAL: 31 MMOL/L (ref 21–29)
HCT VFR BLD AUTO: 32 % (ref 41–53)
HCT VFR BLD AUTO: 33 % (ref 42–52)
HGB BLD CALC-MCNC: 10.9 GM/DL (ref 13.5–17.5)
HGB BLD-MCNC: 10.5 G/DL (ref 14–18)
HGB UR QL STRIP: ABNORMAL
HYALINE CASTS #/AREA URNS AUTO: ABNORMAL /HPF (ref 0–5)
INR PPP: 1.1
KETONES UR STRIP-MCNC: NEGATIVE MG/DL
LACTATE BLDV-SCNC: 0.7 MMOL/L (ref 0.5–2.2)
LACTATE: 0.5 MMOL/L (ref 0.4–2)
LEUKOCYTE ESTERASE UR QL STRIP: ABNORMAL
LYMPHOCYTES # BLD: 1.3 K/UL (ref 1–4.8)
LYMPHOCYTES NFR BLD: 17.3 %
MAGNESIUM SERPL-MCNC: 2.5 MG/DL (ref 1.7–2.4)
MCH RBC QN AUTO: 28.6 PG (ref 27–31.3)
MCHC RBC AUTO-ENTMCNC: 31.8 % (ref 33–37)
MCV RBC AUTO: 89.9 FL (ref 79–92.2)
METHADONE UR QL SCN: NORMAL
MONOCYTES # BLD: 1.1 K/UL (ref 0.2–0.8)
MONOCYTES NFR BLD: 13.8 %
NEUTROPHILS # BLD: 5 K/UL (ref 1.4–6.5)
NEUTS SEG NFR BLD: 65.8 %
NITRITE UR QL STRIP: NEGATIVE
O2 SAT, ARTERIAL: 96 % (ref 93–100)
OPIATES UR QL SCN: NORMAL
OXYCODONE UR QL SCN: NORMAL
PCO2 ARTERIAL: 52 MM HG (ref 35–45)
PCP UR QL SCN: NORMAL
PERFORMED ON: ABNORMAL
PERFORMED ON: NORMAL
PH ARTERIAL: 7.39 (ref 7.35–7.45)
PH UR STRIP: 6 [PH] (ref 5–9)
PLATELET # BLD AUTO: 210 K/UL (ref 130–400)
PO2 ARTERIAL: 83 MM HG (ref 75–108)
POC CHLORIDE: 103 MEQ/L (ref 99–110)
POC CREATININE: 3 MG/DL (ref 0.8–1.3)
POC FIO2: 4
POC SAMPLE TYPE: ABNORMAL
POTASSIUM SERPL-SCNC: 3.3 MEQ/L (ref 3.5–5.1)
POTASSIUM SERPL-SCNC: 3.6 MEQ/L (ref 3.4–4.9)
PROPOXYPH UR QL SCN: NORMAL
PROT SERPL-MCNC: 7.2 G/DL (ref 6.3–8)
PROT UR STRIP-MCNC: ABNORMAL MG/DL
PROTHROMBIN TIME: 14.3 SEC (ref 12.3–14.9)
RBC # BLD AUTO: 3.67 M/UL (ref 4.7–6.1)
RBC #/AREA URNS AUTO: ABNORMAL /HPF (ref 0–5)
SALICYLATES SERPL-MCNC: <0.3 MG/DL (ref 15–30)
SODIUM BLD-SCNC: 144 MEQ/L (ref 136–145)
SODIUM SERPL-SCNC: 143 MEQ/L (ref 135–144)
SP GR UR STRIP: 1.01 (ref 1–1.03)
TCO2 ARTERIAL: 33 MMOL/L (ref 21–32)
TROPONIN, HIGH SENSITIVITY: 72 NG/L (ref 0–19)
TSH SERPL-MCNC: 4.35 UIU/ML (ref 0.44–3.86)
URINE REFLEX TO CULTURE: YES
UROBILINOGEN UR STRIP-ACNC: 0.2 E.U./DL
WBC # BLD AUTO: 7.6 K/UL (ref 4.8–10.8)
WBC #/AREA URNS AUTO: >100 /HPF (ref 0–5)

## 2024-12-02 PROCEDURE — 96372 THER/PROPH/DIAG INJ SC/IM: CPT

## 2024-12-02 PROCEDURE — 82565 ASSAY OF CREATININE: CPT

## 2024-12-02 PROCEDURE — 71046 X-RAY EXAM CHEST 2 VIEWS: CPT

## 2024-12-02 PROCEDURE — 80053 COMPREHEN METABOLIC PANEL: CPT

## 2024-12-02 PROCEDURE — 82948 REAGENT STRIP/BLOOD GLUCOSE: CPT

## 2024-12-02 PROCEDURE — 6370000000 HC RX 637 (ALT 250 FOR IP)

## 2024-12-02 PROCEDURE — 99285 EMERGENCY DEPT VISIT HI MDM: CPT

## 2024-12-02 PROCEDURE — 82550 ASSAY OF CK (CPK): CPT

## 2024-12-02 PROCEDURE — 6360000002 HC RX W HCPCS: Performed by: EMERGENCY MEDICINE

## 2024-12-02 PROCEDURE — 87077 CULTURE AEROBIC IDENTIFY: CPT

## 2024-12-02 PROCEDURE — 82330 ASSAY OF CALCIUM: CPT

## 2024-12-02 PROCEDURE — 81001 URINALYSIS AUTO W/SCOPE: CPT

## 2024-12-02 PROCEDURE — 80307 DRUG TEST PRSMV CHEM ANLYZR: CPT

## 2024-12-02 PROCEDURE — 96365 THER/PROPH/DIAG IV INF INIT: CPT

## 2024-12-02 PROCEDURE — 83735 ASSAY OF MAGNESIUM: CPT

## 2024-12-02 PROCEDURE — 93005 ELECTROCARDIOGRAM TRACING: CPT | Performed by: EMERGENCY MEDICINE

## 2024-12-02 PROCEDURE — 84443 ASSAY THYROID STIM HORMONE: CPT

## 2024-12-02 PROCEDURE — 2580000003 HC RX 258: Performed by: EMERGENCY MEDICINE

## 2024-12-02 PROCEDURE — 96366 THER/PROPH/DIAG IV INF ADDON: CPT

## 2024-12-02 PROCEDURE — 36600 WITHDRAWAL OF ARTERIAL BLOOD: CPT

## 2024-12-02 PROCEDURE — 80143 DRUG ASSAY ACETAMINOPHEN: CPT

## 2024-12-02 PROCEDURE — 51701 INSERT BLADDER CATHETER: CPT

## 2024-12-02 PROCEDURE — 84295 ASSAY OF SERUM SODIUM: CPT

## 2024-12-02 PROCEDURE — 87186 SC STD MICRODIL/AGAR DIL: CPT

## 2024-12-02 PROCEDURE — 85610 PROTHROMBIN TIME: CPT

## 2024-12-02 PROCEDURE — 96376 TX/PRO/DX INJ SAME DRUG ADON: CPT

## 2024-12-02 PROCEDURE — 70450 CT HEAD/BRAIN W/O DYE: CPT

## 2024-12-02 PROCEDURE — 96361 HYDRATE IV INFUSION ADD-ON: CPT

## 2024-12-02 PROCEDURE — G0378 HOSPITAL OBSERVATION PER HR: HCPCS

## 2024-12-02 PROCEDURE — 6360000002 HC RX W HCPCS

## 2024-12-02 PROCEDURE — 80179 DRUG ASSAY SALICYLATE: CPT

## 2024-12-02 PROCEDURE — 82435 ASSAY OF BLOOD CHLORIDE: CPT

## 2024-12-02 PROCEDURE — 85014 HEMATOCRIT: CPT

## 2024-12-02 PROCEDURE — 85025 COMPLETE CBC W/AUTO DIFF WBC: CPT

## 2024-12-02 PROCEDURE — 82803 BLOOD GASES ANY COMBINATION: CPT

## 2024-12-02 PROCEDURE — 96375 TX/PRO/DX INJ NEW DRUG ADDON: CPT

## 2024-12-02 PROCEDURE — 36415 COLL VENOUS BLD VENIPUNCTURE: CPT

## 2024-12-02 PROCEDURE — 84484 ASSAY OF TROPONIN QUANT: CPT

## 2024-12-02 PROCEDURE — 87086 URINE CULTURE/COLONY COUNT: CPT

## 2024-12-02 PROCEDURE — 84132 ASSAY OF SERUM POTASSIUM: CPT

## 2024-12-02 PROCEDURE — 83605 ASSAY OF LACTIC ACID: CPT

## 2024-12-02 PROCEDURE — 2500000003 HC RX 250 WO HCPCS: Performed by: EMERGENCY MEDICINE

## 2024-12-02 RX ORDER — LOPERAMIDE HYDROCHLORIDE 2 MG/1
2 CAPSULE ORAL 4 TIMES DAILY PRN
Status: DISCONTINUED | OUTPATIENT
Start: 2024-12-02 | End: 2024-12-04 | Stop reason: HOSPADM

## 2024-12-02 RX ORDER — HEPARIN SODIUM 5000 [USP'U]/ML
5000 INJECTION, SOLUTION INTRAVENOUS; SUBCUTANEOUS EVERY 8 HOURS SCHEDULED
Status: DISCONTINUED | OUTPATIENT
Start: 2024-12-02 | End: 2024-12-03

## 2024-12-02 RX ORDER — FINASTERIDE 5 MG/1
5 TABLET, FILM COATED ORAL DAILY
Status: DISCONTINUED | OUTPATIENT
Start: 2024-12-03 | End: 2024-12-04 | Stop reason: HOSPADM

## 2024-12-02 RX ORDER — HEPARIN SODIUM 5000 [USP'U]/ML
5000 INJECTION, SOLUTION INTRAVENOUS; SUBCUTANEOUS EVERY 8 HOURS SCHEDULED
Status: DISCONTINUED | OUTPATIENT
Start: 2024-12-02 | End: 2024-12-02

## 2024-12-02 RX ORDER — 0.9 % SODIUM CHLORIDE 0.9 %
1000 INTRAVENOUS SOLUTION INTRAVENOUS ONCE
Status: COMPLETED | OUTPATIENT
Start: 2024-12-02 | End: 2024-12-02

## 2024-12-02 RX ORDER — GLUCAGON 1 MG/ML
1 KIT INJECTION PRN
Status: DISCONTINUED | OUTPATIENT
Start: 2024-12-02 | End: 2024-12-04 | Stop reason: HOSPADM

## 2024-12-02 RX ORDER — ENOXAPARIN SODIUM 100 MG/ML
40 INJECTION SUBCUTANEOUS DAILY
Status: DISCONTINUED | OUTPATIENT
Start: 2024-12-02 | End: 2024-12-02

## 2024-12-02 RX ORDER — TAMSULOSIN HYDROCHLORIDE 0.4 MG/1
0.4 CAPSULE ORAL NIGHTLY
Status: DISCONTINUED | OUTPATIENT
Start: 2024-12-02 | End: 2024-12-04 | Stop reason: HOSPADM

## 2024-12-02 RX ORDER — POLYETHYLENE GLYCOL 3350 17 G/17G
17 POWDER, FOR SOLUTION ORAL DAILY PRN
Status: DISCONTINUED | OUTPATIENT
Start: 2024-12-02 | End: 2024-12-04 | Stop reason: HOSPADM

## 2024-12-02 RX ORDER — DEXTROSE MONOHYDRATE 25 G/50ML
12.5 INJECTION, SOLUTION INTRAVENOUS ONCE
Status: COMPLETED | OUTPATIENT
Start: 2024-12-02 | End: 2024-12-02

## 2024-12-02 RX ORDER — ASPIRIN 81 MG/1
162 TABLET ORAL DAILY
Status: DISCONTINUED | OUTPATIENT
Start: 2024-12-03 | End: 2024-12-04 | Stop reason: HOSPADM

## 2024-12-02 RX ORDER — VITAMIN B COMPLEX
2000 TABLET ORAL DAILY
Status: DISCONTINUED | OUTPATIENT
Start: 2024-12-03 | End: 2024-12-04 | Stop reason: HOSPADM

## 2024-12-02 RX ORDER — SODIUM CHLORIDE 0.9 % (FLUSH) 0.9 %
5-40 SYRINGE (ML) INJECTION PRN
Status: DISCONTINUED | OUTPATIENT
Start: 2024-12-02 | End: 2024-12-04 | Stop reason: HOSPADM

## 2024-12-02 RX ORDER — DEXTROSE MONOHYDRATE 100 MG/ML
INJECTION, SOLUTION INTRAVENOUS CONTINUOUS PRN
Status: DISCONTINUED | OUTPATIENT
Start: 2024-12-02 | End: 2024-12-04 | Stop reason: HOSPADM

## 2024-12-02 RX ORDER — ACETAMINOPHEN 325 MG/1
650 TABLET ORAL EVERY 6 HOURS PRN
Status: DISCONTINUED | OUTPATIENT
Start: 2024-12-02 | End: 2024-12-04 | Stop reason: HOSPADM

## 2024-12-02 RX ORDER — ONDANSETRON 2 MG/ML
4 INJECTION INTRAMUSCULAR; INTRAVENOUS EVERY 6 HOURS PRN
Status: DISCONTINUED | OUTPATIENT
Start: 2024-12-02 | End: 2024-12-04 | Stop reason: HOSPADM

## 2024-12-02 RX ORDER — ENOXAPARIN SODIUM 100 MG/ML
30 INJECTION SUBCUTANEOUS DAILY
Status: DISCONTINUED | OUTPATIENT
Start: 2024-12-02 | End: 2024-12-02

## 2024-12-02 RX ORDER — SODIUM CHLORIDE 0.9 % (FLUSH) 0.9 %
5-40 SYRINGE (ML) INJECTION EVERY 12 HOURS SCHEDULED
Status: DISCONTINUED | OUTPATIENT
Start: 2024-12-02 | End: 2024-12-04 | Stop reason: HOSPADM

## 2024-12-02 RX ORDER — ALBUTEROL SULFATE 90 UG/1
2 INHALANT RESPIRATORY (INHALATION) EVERY 4 HOURS PRN
Status: DISCONTINUED | OUTPATIENT
Start: 2024-12-02 | End: 2024-12-04 | Stop reason: HOSPADM

## 2024-12-02 RX ORDER — TORSEMIDE 20 MG/1
60 TABLET ORAL DAILY
Status: DISCONTINUED | OUTPATIENT
Start: 2024-12-03 | End: 2024-12-04 | Stop reason: HOSPADM

## 2024-12-02 RX ORDER — POTASSIUM CHLORIDE 1500 MG/1
40 TABLET, EXTENDED RELEASE ORAL PRN
Status: DISCONTINUED | OUTPATIENT
Start: 2024-12-02 | End: 2024-12-04 | Stop reason: HOSPADM

## 2024-12-02 RX ORDER — INSULIN LISPRO 100 [IU]/ML
0-8 INJECTION, SOLUTION INTRAVENOUS; SUBCUTANEOUS
Status: DISCONTINUED | OUTPATIENT
Start: 2024-12-02 | End: 2024-12-04 | Stop reason: HOSPADM

## 2024-12-02 RX ORDER — POTASSIUM CHLORIDE 7.45 MG/ML
10 INJECTION INTRAVENOUS PRN
Status: DISCONTINUED | OUTPATIENT
Start: 2024-12-02 | End: 2024-12-04 | Stop reason: HOSPADM

## 2024-12-02 RX ORDER — CARVEDILOL 12.5 MG/1
12.5 TABLET ORAL 2 TIMES DAILY
Status: DISCONTINUED | OUTPATIENT
Start: 2024-12-02 | End: 2024-12-04 | Stop reason: HOSPADM

## 2024-12-02 RX ORDER — MAGNESIUM SULFATE IN WATER 40 MG/ML
2000 INJECTION, SOLUTION INTRAVENOUS PRN
Status: DISCONTINUED | OUTPATIENT
Start: 2024-12-02 | End: 2024-12-04 | Stop reason: HOSPADM

## 2024-12-02 RX ORDER — ROSUVASTATIN CALCIUM 10 MG/1
10 TABLET, COATED ORAL NIGHTLY
Status: DISCONTINUED | OUTPATIENT
Start: 2024-12-02 | End: 2024-12-04 | Stop reason: HOSPADM

## 2024-12-02 RX ORDER — ONDANSETRON 4 MG/1
4 TABLET, ORALLY DISINTEGRATING ORAL EVERY 8 HOURS PRN
Status: DISCONTINUED | OUTPATIENT
Start: 2024-12-02 | End: 2024-12-04 | Stop reason: HOSPADM

## 2024-12-02 RX ORDER — DEXTROSE MONOHYDRATE AND SODIUM CHLORIDE 5; .9 G/100ML; G/100ML
INJECTION, SOLUTION INTRAVENOUS CONTINUOUS
Status: DISCONTINUED | OUTPATIENT
Start: 2024-12-02 | End: 2024-12-02

## 2024-12-02 RX ORDER — AMMONIUM LACTATE 12 G/100G
LOTION TOPICAL PRN
Status: DISCONTINUED | OUTPATIENT
Start: 2024-12-02 | End: 2024-12-04 | Stop reason: HOSPADM

## 2024-12-02 RX ORDER — CETIRIZINE HYDROCHLORIDE 10 MG/1
5 TABLET ORAL DAILY
Status: DISCONTINUED | OUTPATIENT
Start: 2024-12-03 | End: 2024-12-04 | Stop reason: HOSPADM

## 2024-12-02 RX ORDER — NALOXONE HYDROCHLORIDE 1 MG/ML
2 INJECTION INTRAMUSCULAR; INTRAVENOUS; SUBCUTANEOUS ONCE
Status: COMPLETED | OUTPATIENT
Start: 2024-12-02 | End: 2024-12-02

## 2024-12-02 RX ORDER — SODIUM CHLORIDE 9 MG/ML
INJECTION, SOLUTION INTRAVENOUS PRN
Status: DISCONTINUED | OUTPATIENT
Start: 2024-12-02 | End: 2024-12-04 | Stop reason: HOSPADM

## 2024-12-02 RX ORDER — ACETAMINOPHEN 650 MG/1
650 SUPPOSITORY RECTAL EVERY 6 HOURS PRN
Status: DISCONTINUED | OUTPATIENT
Start: 2024-12-02 | End: 2024-12-04 | Stop reason: HOSPADM

## 2024-12-02 RX ADMIN — NALOXONE HYDROCHLORIDE 2 MG: 1 INJECTION INTRAMUSCULAR; INTRAVENOUS; SUBCUTANEOUS at 16:17

## 2024-12-02 RX ADMIN — DEXTROSE AND SODIUM CHLORIDE: 5; 900 INJECTION, SOLUTION INTRAVENOUS at 16:23

## 2024-12-02 RX ADMIN — INSULIN LISPRO 2 UNITS: 100 INJECTION, SOLUTION INTRAVENOUS; SUBCUTANEOUS at 22:54

## 2024-12-02 RX ADMIN — HEPARIN SODIUM 5000 UNITS: 5000 INJECTION INTRAVENOUS; SUBCUTANEOUS at 22:54

## 2024-12-02 RX ADMIN — DEXTROSE MONOHYDRATE 12.5 G: 25 INJECTION, SOLUTION INTRAVENOUS at 18:33

## 2024-12-02 RX ADMIN — CEFTRIAXONE SODIUM 1000 MG: 1 INJECTION, POWDER, FOR SOLUTION INTRAMUSCULAR; INTRAVENOUS at 20:24

## 2024-12-02 RX ADMIN — DEXTROSE MONOHYDRATE 12.5 G: 25 INJECTION, SOLUTION INTRAVENOUS at 16:59

## 2024-12-02 RX ADMIN — SODIUM CHLORIDE 1000 ML: 9 INJECTION, SOLUTION INTRAVENOUS at 18:06

## 2024-12-02 ASSESSMENT — PAIN SCALES - WONG BAKER: WONGBAKER_NUMERICALRESPONSE: NO HURT

## 2024-12-02 ASSESSMENT — PAIN SCALES - GENERAL: PAINLEVEL_OUTOF10: 0

## 2024-12-02 NOTE — ED NOTES
Pt cleaned for incontinence of urine and small stool. Warm blankets, depends and male ext catheter in place. Pt minimally reponsive

## 2024-12-02 NOTE — ED TRIAGE NOTES
Pt brought in by EMS when roommates could not wake him. EMS glucose was 40. Pt was give D10 bolus of 250ml. Repeat glucose was 80. Upon arrival to ER, pt remains unresponsive, incontinent of urine. Repeat glucose 92. Pt unresponsive to sternal rub. Resp even and non-labored.

## 2024-12-02 NOTE — ED PROVIDER NOTES
[facility] TELE  EMERGENCY DEPARTMENT ENCOUNTER      Pt Name: Amrik Meraz  MRN: [id number]  Birthdate 1950  Date of evaluation: 12/2/2024  Provider: Tamie Webb DO    CHIEF COMPLAINT       Chief Complaint   Patient presents with    Hypoglycemia     Pt brought in by EMS with hypoglycemia and unresponsive         HISTORY OF PRESENT ILLNESS   (Location/Symptom, Timing/Onset, Context/Setting, Quality, Duration, Modifying Factors, Severity)  Note limiting factors.   Amrik Meraz is a 74 y.o. male who presents to the emergency department .  Patient brought in for altered mental status.  His roommate could not wake him up.  Blood sugar was 40 at the scene.  Given some D10.  On arrival patient still unresponsive.  Blood sugar 80.  No one reported seizure type activity.    HPI    Nursing Notes were reviewed.    REVIEW OF SYSTEMS    (2-9 systems for level 4, 10 or more for level 5)     Review of Systems   Unable to perform ROS: Mental status change       Except as noted above the remainder of the review of systems was reviewed and negative.       PAST MEDICAL HISTORY     Past Medical History:   Diagnosis Date    Chronic kidney disease     Diabetes mellitus (HCC)     Hypertension     Type 2 diabetes mellitus with hyperglycemia 1/6/2023    Type 2 diabetes mellitus without complication, without long-term current use of insulin (HCC) 3/19/2019    Uncontrolled type 2 diabetes mellitus with hyperglycemia (HCC)          SURGICAL HISTORY       Past Surgical History:   Procedure Laterality Date    APPENDECTOMY      age 10         CURRENT MEDICATIONS       Current Discharge Medication List        CONTINUE these medications which have NOT CHANGED    Details   Cholecalciferol 50 MCG (2000 UT) TABS Take 1 tablet by mouth daily      insulin glargine (LANTUS) 100 UNIT/ML injection vial 30 units at bedtime  Qty: 30 mL, Refills: 2    Associated Diagnoses: Uncontrolled type 2 diabetes mellitus with hyperglycemia (HCC)

## 2024-12-02 NOTE — ED NOTES
Pt straight cathed. Cloudy yellow urine obtained. Tubing had monse red blood noted. Pt was minimally responsive during procedure

## 2024-12-03 ENCOUNTER — APPOINTMENT (OUTPATIENT)
Dept: CT IMAGING | Age: 74
End: 2024-12-03
Payer: OTHER GOVERNMENT

## 2024-12-03 LAB
BASOPHILS # BLD: 0 K/UL (ref 0–0.2)
BASOPHILS NFR BLD: 0.3 %
EKG ATRIAL RATE: 53 BPM
EKG P AXIS: 54 DEGREES
EKG P-R INTERVAL: 218 MS
EKG Q-T INTERVAL: 556 MS
EKG QRS DURATION: 180 MS
EKG QTC CALCULATION (BAZETT): 521 MS
EKG R AXIS: -64 DEGREES
EKG T AXIS: 19 DEGREES
EKG VENTRICULAR RATE: 53 BPM
EOSINOPHIL # BLD: 0.1 K/UL (ref 0–0.7)
EOSINOPHIL NFR BLD: 1.8 %
ERYTHROCYTE [DISTWIDTH] IN BLOOD BY AUTOMATED COUNT: 15.8 % (ref 11.5–14.5)
GLUCOSE BLD-MCNC: 186 MG/DL (ref 70–99)
GLUCOSE BLD-MCNC: 190 MG/DL (ref 70–99)
GLUCOSE BLD-MCNC: 211 MG/DL (ref 70–99)
GLUCOSE BLD-MCNC: 80 MG/DL (ref 70–99)
HCT VFR BLD AUTO: 31 % (ref 42–52)
HGB BLD-MCNC: 9.6 G/DL (ref 14–18)
LYMPHOCYTES # BLD: 1.1 K/UL (ref 1–4.8)
LYMPHOCYTES NFR BLD: 15 %
MCH RBC QN AUTO: 27.7 PG (ref 27–31.3)
MCHC RBC AUTO-ENTMCNC: 31 % (ref 33–37)
MCV RBC AUTO: 89.6 FL (ref 79–92.2)
MONOCYTES # BLD: 0.9 K/UL (ref 0.2–0.8)
MONOCYTES NFR BLD: 12 %
NEUTROPHILS # BLD: 5.4 K/UL (ref 1.4–6.5)
NEUTS SEG NFR BLD: 70.6 %
PERFORMED ON: ABNORMAL
PERFORMED ON: NORMAL
PLATELET # BLD AUTO: 200 K/UL (ref 130–400)
RBC # BLD AUTO: 3.46 M/UL (ref 4.7–6.1)
WBC # BLD AUTO: 7.6 K/UL (ref 4.8–10.8)

## 2024-12-03 PROCEDURE — 96372 THER/PROPH/DIAG INJ SC/IM: CPT

## 2024-12-03 PROCEDURE — 97162 PT EVAL MOD COMPLEX 30 MIN: CPT

## 2024-12-03 PROCEDURE — 99222 1ST HOSP IP/OBS MODERATE 55: CPT | Performed by: INTERNAL MEDICINE

## 2024-12-03 PROCEDURE — 96361 HYDRATE IV INFUSION ADD-ON: CPT

## 2024-12-03 PROCEDURE — 85025 COMPLETE CBC W/AUTO DIFF WBC: CPT

## 2024-12-03 PROCEDURE — G0378 HOSPITAL OBSERVATION PER HR: HCPCS

## 2024-12-03 PROCEDURE — 6370000000 HC RX 637 (ALT 250 FOR IP): Performed by: INTERNAL MEDICINE

## 2024-12-03 PROCEDURE — 2580000003 HC RX 258

## 2024-12-03 PROCEDURE — 74176 CT ABD & PELVIS W/O CONTRAST: CPT

## 2024-12-03 PROCEDURE — 6370000000 HC RX 637 (ALT 250 FOR IP)

## 2024-12-03 PROCEDURE — 6360000002 HC RX W HCPCS: Performed by: INTERNAL MEDICINE

## 2024-12-03 PROCEDURE — 36415 COLL VENOUS BLD VENIPUNCTURE: CPT

## 2024-12-03 PROCEDURE — 6360000002 HC RX W HCPCS

## 2024-12-03 PROCEDURE — 2700000000 HC OXYGEN THERAPY PER DAY

## 2024-12-03 PROCEDURE — 97166 OT EVAL MOD COMPLEX 45 MIN: CPT

## 2024-12-03 RX ORDER — ENOXAPARIN SODIUM 100 MG/ML
30 INJECTION SUBCUTANEOUS DAILY
Status: DISCONTINUED | OUTPATIENT
Start: 2024-12-03 | End: 2024-12-04 | Stop reason: HOSPADM

## 2024-12-03 RX ADMIN — TAMSULOSIN HYDROCHLORIDE 0.4 MG: 0.4 CAPSULE ORAL at 00:03

## 2024-12-03 RX ADMIN — SODIUM CHLORIDE, PRESERVATIVE FREE 10 ML: 5 INJECTION INTRAVENOUS at 20:19

## 2024-12-03 RX ADMIN — ENOXAPARIN SODIUM 30 MG: 100 INJECTION SUBCUTANEOUS at 14:42

## 2024-12-03 RX ADMIN — ROSUVASTATIN CALCIUM 10 MG: 10 TABLET, FILM COATED ORAL at 00:03

## 2024-12-03 RX ADMIN — CETIRIZINE HYDROCHLORIDE 5 MG: 10 TABLET, FILM COATED ORAL at 08:46

## 2024-12-03 RX ADMIN — TORSEMIDE 60 MG: 20 TABLET ORAL at 08:46

## 2024-12-03 RX ADMIN — SODIUM CHLORIDE, PRESERVATIVE FREE 10 ML: 5 INJECTION INTRAVENOUS at 08:46

## 2024-12-03 RX ADMIN — Medication 2000 UNITS: at 08:46

## 2024-12-03 RX ADMIN — INSULIN LISPRO 2 UNITS: 100 INJECTION, SOLUTION INTRAVENOUS; SUBCUTANEOUS at 12:01

## 2024-12-03 RX ADMIN — CARVEDILOL 12.5 MG: 12.5 TABLET, FILM COATED ORAL at 08:46

## 2024-12-03 RX ADMIN — HEPARIN SODIUM 5000 UNITS: 5000 INJECTION INTRAVENOUS; SUBCUTANEOUS at 05:34

## 2024-12-03 RX ADMIN — CARVEDILOL 12.5 MG: 12.5 TABLET, FILM COATED ORAL at 20:18

## 2024-12-03 RX ADMIN — ASPIRIN 162 MG: 81 TABLET, COATED ORAL at 08:46

## 2024-12-03 RX ADMIN — FINASTERIDE 5 MG: 5 TABLET, FILM COATED ORAL at 08:46

## 2024-12-03 RX ADMIN — CARVEDILOL 12.5 MG: 12.5 TABLET, FILM COATED ORAL at 00:03

## 2024-12-03 RX ADMIN — TAMSULOSIN HYDROCHLORIDE 0.4 MG: 0.4 CAPSULE ORAL at 20:18

## 2024-12-03 RX ADMIN — ROSUVASTATIN CALCIUM 10 MG: 10 TABLET, FILM COATED ORAL at 20:18

## 2024-12-03 RX ADMIN — INSULIN LISPRO 2 UNITS: 100 INJECTION, SOLUTION INTRAVENOUS; SUBCUTANEOUS at 20:18

## 2024-12-03 RX ADMIN — INSULIN LISPRO 2 UNITS: 100 INJECTION, SOLUTION INTRAVENOUS; SUBCUTANEOUS at 17:25

## 2024-12-03 ASSESSMENT — PAIN SCALES - GENERAL
PAINLEVEL_OUTOF10: 0
PAINLEVEL_OUTOF10: 0

## 2024-12-03 ASSESSMENT — PAIN SCALES - WONG BAKER: WONGBAKER_NUMERICALRESPONSE: NO HURT

## 2024-12-03 NOTE — CARE COORDINATION
members/significant others, and if so, who? Yes  Plans to Return to Present Housing: Yes  Other Identified Issues/Barriers to RETURNING to current housing: NO  Potential Assistance needed at discharge: Emergency Call  System            Potential DME:    Patient expects to discharge to: House  Plan for transportation at discharge:      Financial    Payor: EMIR OPTUM / Plan: VACCN OPTUM / Product Type: *No Product type* /     Does insurance require precert for SNF: Yes    Potential assistance Purchasing Medications:    Meds-to-Beds request: Yes      [id number] [place] - [address] -  [phone] - F [phone]  [address] [address]  Phone: [phone] Fax: [phone]    [email or web address] [place], [place] - [address] [facility] [address] [phone] - F [phone]  [address] [facility] [address]  Phone: [phone] Fax: [phone]      Notes:    Factors facilitating achievement of predicted outcomes: Caregiver support    Barriers to discharge: Medical complications and Medication managment    Additional Case Management Notes: PT IS FROM HOME, LIVES WITH 2 OTHER MALE ROOMATES.  PT HAS FWW, W/C AND CANE AT HOME.  PT WERS 2L THRU MEDICAL SERVICES.  PT IS CURRENT WITH [facility].  PT IS [facility]% [facility].  PT DOESN'T DRIVES.  INDEPENDENT.      The Plan for Transition of Care is related to the following treatment goals of Hypoglycemia [E16.2]  Acute cystitis with hematuria [N30.01]  Acute encephalopathy [G93.40]  Altered mental status, unspecified altered mental status type [R41.82]    IF APPLICABLE: The Patient and/or patient representative Amrik and his family were provided with a choice of provider and agrees with the discharge plan. Freedom of choice list with basic dialogue that supports the patient's individualized plan of care/goals and shares the quality data associated with the providers was provided to:     Patient Representative Name:       The Patient

## 2024-12-03 NOTE — H&P
HISTORY: ORDERING SYSTEM PROVIDED HISTORY: ams  TECHNOLOGIST PROVIDED HISTORY:  Reason for exam:->ams  Has a \"code stroke\" or \"stroke alert\" been called?->No Decision Support Exception - unselect if not a suspected or confirmed emergency medical condition->Emergency Medical Condition (MA)  What reading provider will be dictating this exam?->CRC TECHNIQUE: Axial computed tomography images of the head/brain without intravenous contrast.  This CT exam was performed using one or more of the following dose reduction techniques:  automated exposure control, adjustment of the mA and/or kV according to patient size, and/or use of iterative reconstruction technique. COMPARISON: 11/02/2024 FINDINGS: Brain:  No evidence of acute intracranial process.  No acute ischemia.  No mass or mass effect.  No acute intracranial hemorrhage.  Scattered periventricular deep white matter hypodensity consistent with small vessel ischemic deep white matter disease. Ventricles:  No acute findings.  No ventriculomegaly. Bones/joints:  No acute findings.  No acute fracture. Soft tissues:  No acute findings. Sinuses:  Unremarkable as visualized.  No acute sinusitis. Mastoid air cells:  Unremarkable as visualized.  No mastoid effusion.     1.  No evidence of acute intracranial process. 2.  Findings of presumed small vessel ischemic deep white matter disease.       VTE Prophylaxis: Heparin    ASSESSMENT AND PLAN  Amrik Meraz is a 74 y.o. male with past medical history of recent discharge for complicated UTI/acute on chronic diastolic heart failure/MAYKEL  is brought to the ED due to being unresponsive.      Acute Problems:  Acute encephalopathy  Hypoglycemia  Asymptomatic bacteriuria  Left groin pain        Plan:  Hypoglycemia protocol in place  Okay to discontinue maintenance D5  Hold patient's home Lantus and empagliflozin  Start insulin sliding scale   CT of the abdomen pelvis ordered to rule out acute abnormality  Endocrinology consulted

## 2024-12-03 NOTE — PLAN OF CARE
Problem: Chronic Conditions and Co-morbidities  Goal: Patient's chronic conditions and co-morbidity symptoms are monitored and maintained or improved  12/3/2024 1017 by Faiza Cantu RN  Outcome: Progressing  12/3/2024 0040 by Esperanza Jones RN  Outcome: Progressing     Problem: Discharge Planning  Goal: Discharge to home or other facility with appropriate resources  12/3/2024 1017 by Faiza Cantu RN  Outcome: Progressing  12/3/2024 0040 by Esperanza Jones RN  Outcome: Progressing     Problem: Safety - Adult  Goal: Free from fall injury  12/3/2024 1017 by Faiza Cantu RN  Outcome: Progressing  12/3/2024 0040 by Esperanza Jones RN  Outcome: Progressing     Problem: Pain  Goal: Verbalizes/displays adequate comfort level or baseline comfort level  Outcome: Progressing

## 2024-12-03 NOTE — PLAN OF CARE
Therapy evaluation completed.  Please see daily notes and/or progress notes for details related to planned treatment interventions, goals and functional performance.     Electronically signed by Chana Haq PT on 12/3/2024 at 9:36 AM

## 2024-12-03 NOTE — ACP (ADVANCE CARE PLANNING)
Advance Care Planning   Healthcare Decision Maker:    Primary Decision Maker: MICHAEL HARDIN - Kailyn - [phone]    Secondary Decision Maker: FATUMA CHACON - Yvonne - [phone]    Click here to complete Healthcare Decision Makers including selection of the Healthcare Decision Maker Relationship (ie \"Primary\").  Today we documented Decision Maker(s) consistent with Legal Next of Kin hierarchy.

## 2024-12-04 VITALS
HEIGHT: 66 IN | SYSTOLIC BLOOD PRESSURE: 129 MMHG | TEMPERATURE: 98.2 F | OXYGEN SATURATION: 99 % | BODY MASS INDEX: 41.75 KG/M2 | RESPIRATION RATE: 18 BRPM | DIASTOLIC BLOOD PRESSURE: 50 MMHG | WEIGHT: 259.8 LBS | HEART RATE: 65 BPM
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
## 2024-12-04 LAB
ALBUMIN SERPL-MCNC: 3.1 G/DL (ref 3.5–4.6)
ANION GAP SERPL CALCULATED.3IONS-SCNC: 9 MEQ/L (ref 9–15)
BASOPHILS # BLD: 0 K/UL (ref 0–0.2)
BASOPHILS NFR BLD: 0.3 %
BUN SERPL-MCNC: 39 MG/DL (ref 8–23)
CALCIUM SERPL-MCNC: 8.4 MG/DL (ref 8.5–9.9)
CHLORIDE SERPL-SCNC: 105 MEQ/L (ref 95–107)
CO2 SERPL-SCNC: 28 MEQ/L (ref 20–31)
CREAT SERPL-MCNC: 2.96 MG/DL (ref 0.7–1.2)
EOSINOPHIL # BLD: 0.3 K/UL (ref 0–0.7)
EOSINOPHIL NFR BLD: 5.1 %
ERYTHROCYTE [DISTWIDTH] IN BLOOD BY AUTOMATED COUNT: 15.8 % (ref 11.5–14.5)
GLUCOSE BLD-MCNC: 225 MG/DL (ref 70–99)
GLUCOSE BLD-MCNC: 95 MG/DL (ref 70–99)
GLUCOSE SERPL-MCNC: 107 MG/DL (ref 70–99)
HCT VFR BLD AUTO: 30.4 % (ref 42–52)
HGB BLD-MCNC: 9.8 G/DL (ref 14–18)
LYMPHOCYTES # BLD: 1.8 K/UL (ref 1–4.8)
LYMPHOCYTES NFR BLD: 27.2 %
MAGNESIUM SERPL-MCNC: 2.1 MG/DL (ref 1.7–2.4)
MCH RBC QN AUTO: 28.9 PG (ref 27–31.3)
MCHC RBC AUTO-ENTMCNC: 32.2 % (ref 33–37)
MCV RBC AUTO: 89.7 FL (ref 79–92.2)
MONOCYTES # BLD: 0.7 K/UL (ref 0.2–0.8)
MONOCYTES NFR BLD: 11.5 %
NEUTROPHILS # BLD: 3.6 K/UL (ref 1.4–6.5)
NEUTS SEG NFR BLD: 55.6 %
PERFORMED ON: ABNORMAL
PERFORMED ON: NORMAL
PHOSPHATE SERPL-MCNC: 4.9 MG/DL (ref 2.3–4.8)
PLATELET # BLD AUTO: 197 K/UL (ref 130–400)
POTASSIUM SERPL-SCNC: 3.9 MEQ/L (ref 3.4–4.9)
RBC # BLD AUTO: 3.39 M/UL (ref 4.7–6.1)
SODIUM SERPL-SCNC: 142 MEQ/L (ref 135–144)
WBC # BLD AUTO: 6.5 K/UL (ref 4.8–10.8)

## 2024-12-04 PROCEDURE — 80069 RENAL FUNCTION PANEL: CPT

## 2024-12-04 PROCEDURE — 85025 COMPLETE CBC W/AUTO DIFF WBC: CPT

## 2024-12-04 PROCEDURE — G0378 HOSPITAL OBSERVATION PER HR: HCPCS

## 2024-12-04 PROCEDURE — 97535 SELF CARE MNGMENT TRAINING: CPT

## 2024-12-04 PROCEDURE — 97112 NEUROMUSCULAR REEDUCATION: CPT

## 2024-12-04 PROCEDURE — 83735 ASSAY OF MAGNESIUM: CPT

## 2024-12-04 PROCEDURE — 96372 THER/PROPH/DIAG INJ SC/IM: CPT

## 2024-12-04 PROCEDURE — 2700000000 HC OXYGEN THERAPY PER DAY

## 2024-12-04 PROCEDURE — 2580000003 HC RX 258

## 2024-12-04 PROCEDURE — 6360000002 HC RX W HCPCS: Performed by: INTERNAL MEDICINE

## 2024-12-04 PROCEDURE — 6370000000 HC RX 637 (ALT 250 FOR IP)

## 2024-12-04 PROCEDURE — 36415 COLL VENOUS BLD VENIPUNCTURE: CPT

## 2024-12-04 PROCEDURE — 6370000000 HC RX 637 (ALT 250 FOR IP): Performed by: INTERNAL MEDICINE

## 2024-12-04 RX ADMIN — CARVEDILOL 12.5 MG: 12.5 TABLET, FILM COATED ORAL at 09:31

## 2024-12-04 RX ADMIN — FINASTERIDE 5 MG: 5 TABLET, FILM COATED ORAL at 09:31

## 2024-12-04 RX ADMIN — INSULIN LISPRO 2 UNITS: 100 INJECTION, SOLUTION INTRAVENOUS; SUBCUTANEOUS at 12:09

## 2024-12-04 RX ADMIN — ENOXAPARIN SODIUM 30 MG: 100 INJECTION SUBCUTANEOUS at 09:30

## 2024-12-04 RX ADMIN — ASPIRIN 162 MG: 81 TABLET, COATED ORAL at 09:31

## 2024-12-04 RX ADMIN — Medication 2000 UNITS: at 09:30

## 2024-12-04 RX ADMIN — CETIRIZINE HYDROCHLORIDE 5 MG: 10 TABLET, FILM COATED ORAL at 09:31

## 2024-12-04 RX ADMIN — TORSEMIDE 60 MG: 20 TABLET ORAL at 09:30

## 2024-12-04 RX ADMIN — SODIUM CHLORIDE, PRESERVATIVE FREE 10 ML: 5 INJECTION INTRAVENOUS at 09:30

## 2024-12-04 NOTE — CONSULTS
[facility]                   [address] [place] [address]                              CONSULTATION      PATIENT NAME: NATIVIDAD HARDIN               : 1950  MED REC NO: [id number]                        ROOM: W272  ACCOUNT NO: [id number]                       ADMIT DATE: 2024  PROVIDER: Rusty Arce MD    ENDOCRINE CONSULT    CONSULT DATE: 2024    REFERRING PHYSICIAN:  Chau Gonzalez MD      REASON FOR CONSULT:  Management of hypoglycemia.    HISTORY OF PRESENT ILLNESS:  Obtained through prior H and P.  The patient is a poor historian.  The patient is a 74-year-old male admitted to [facility] because of encephalopathy and also was found to have hypoglycemia.  Initial glucose was 40, after treatment with D10 repeat was 80.  History of complicated UTI with heart failure and acute kidney injury.  The patient also had groin pain, seen by Urology in visit in November.  Patient was seen by Endocrinology a month ago for management of diabetes.  At that time, he was started on Lantus 30 plus Humalog 8 units with each meals.  Blood sugars have improved after hypoglycemia into 190-200 range on Humalog medium dose.  Chemistries were reviewed.  Sodium was 143, potassium 3.6, chloride was 102, CO2 was 29, BUN 43, creatinine 2.94.  Hemoglobin A1c was 8.4.  TSH was 4.3.    PAST MEDICAL HISTORY:  Significant for type 2 diabetes, chronic kidney disease, hypertension.    PAST SURGICAL HISTORY:  Appendectomy.    FAMILY HISTORY:  Reviewed, noncontributory.    SOCIAL HISTORY:  Denies any alcohol, substance abuse, smoking.    ALLERGIES:  INCLUDE NIACIN, FLUORESCAMINE.    MEDICATIONS:  Here included Coreg, Rocephin 1 g daily, Zyrtec, Lovenox, Proscar, Humalog coverage medium dose, Crestor, Demadex.    REVIEW OF SYSTEMS:  Other than mental status changes, hypoglycemia, 14-point review of systems negative.    PHYSICAL EXAMINATION:  GENERAL:  Patient is alert, awake,

## 2024-12-04 NOTE — DISCHARGE SUMMARY
Hospital Medicine Discharge Summary    Amrik Meraz  :  1950  MRN:  [id number]    Admit date:  2024  Discharge date:  2024    Admitting Physician:  Chau Rosales MD  Primary Care Physician:  Anita Figueroa MD      Discharge Diagnoses:    Principal Problem:    Acute encephalopathy  Active Problems:    Hypoglycemia due to type 2 diabetes mellitus (HCC)  Resolved Problems:    * No resolved hospital problems. *      Hospital Course:   Amrik Meraz is a 74 y.o. male that was admitted and treated at [facility] for the following medical issues:     Acute metabolic encephalopathy  - due to hypoglycemia with glucose of 40 in the setting insulin therapy and CKD,   - requiring D10 in ED,   - clinically improved  - followed by endocrinology     Asymptomatic bacteruria  - no need for antibiotic therapy      Disposition -  home with [facility]      Patient was seen by the following consultants while admitted to [facility]:   Consults:  IP CONSULT TO ENDOCRINOLOGY    Significant Diagnostic Studies:    CT ABDOMEN PELVIS WO CONTRAST Additional Contrast? None    Result Date: 12/3/2024  EXAMINATION: CT OF THE ABDOMEN AND PELVIS WITHOUT CONTRAST 12/3/2024 11:10 am TECHNIQUE: CT of the abdomen and pelvis was performed without the administration of intravenous contrast. Multiplanar reformatted images are provided for review. Automated exposure control, iterative reconstruction, and/or weight based adjustment of the mA/kV was utilized to reduce the radiation dose to as low as reasonably achievable. COMPARISON:  HISTORY: ORDERING SYSTEM PROVIDED HISTORY: Left groin pain TECHNOLOGIST PROVIDED HISTORY: Reason for exam:->Left groin pain Additional Contrast?->None What reading provider will be dictating this exam?->CRC FINDINGS: Lower Chest: Cardiomegaly with small effusions.  Dense coronary calcifications. Organs: The liver is homogeneous.  Tiny hepatic calcified granuloma.

## 2024-12-04 NOTE — PLAN OF CARE
Problem: Chronic Conditions and Co-morbidities  Goal: Patient's chronic conditions and co-morbidity symptoms are monitored and maintained or improved  12/3/2024 2125 by Esperanza Jones RN  Outcome: Progressing  12/3/2024 1017 by Faiza Cantu RN  Outcome: Progressing     Problem: Discharge Planning  Goal: Discharge to home or other facility with appropriate resources  12/3/2024 2125 by Esperanza Jones RN  Outcome: Progressing  12/3/2024 1017 by Faiza Cantu RN  Outcome: Progressing     Problem: Safety - Adult  Goal: Free from fall injury  12/3/2024 2125 by Esperanza Jones RN  Outcome: Progressing  12/3/2024 1017 by Faiza Cantu RN  Outcome: Progressing     Problem: Pain  Goal: Verbalizes/displays adequate comfort level or baseline comfort level  12/3/2024 2125 by Esperanza Jones RN  Outcome: Progressing  12/3/2024 1017 by Faiza Cantu RN  Outcome: Progressing

## 2024-12-05 ENCOUNTER — CARE COORDINATION (OUTPATIENT)
Dept: CARE COORDINATION | Age: 74
End: 2024-12-05

## 2024-12-05 LAB
BACTERIA UR CULT: ABNORMAL
BACTERIA UR CULT: ABNORMAL
ORGANISM: ABNORMAL

## 2024-12-05 NOTE — PROGRESS NOTES
12/02/24 2300   RT Protocol   History Pulmonary Disease 1   Respiratory pattern 0   Breath sounds 0   Cough 0   Indications for Bronchodilator Therapy None   Bronchodilator Assessment Score 1       
DVT / VTE PROPHYLAXIS EVALUATION    Recent Labs     12/02/24  1626   BUN 43*   CREATININE 2.94*      HGB 10.5*   HCT 33.0*   INR 1.1     ADMITTING DX OR CHIEF COMPLAINT? Encephalopathy  WARFARIN? DOAC'S? no  ANY APPARENT BLEEDING? Hgb=10.5  SCHEDULED SURGERY? no     If yes to following, excluded from auto adjustment in Table 1 of policy - please contact provider with recommendations as appropriate.  Include condition/exception in scratch notes. Yes No   Trauma Service or Ortho Surgery []  [x]    Pregnancy []  [x]        Current order:  Enoxaparin 40 mg SUBQ once daily       , Weight - Scale: 125.6 kg (277 lb)  Estimated Creatinine Clearance: 27 mL/min (A) (based on SCr of 2.94 mg/dL (H)).    Plan:  Pharmacologic VTE prophylaxis modified based on patient weight and renal function per [facility]/P&T approved protocol     Patient Weight (kg)      50.9 and below .9 101-150.9 151-174.9 175 or greater   Estimated   CrCl  (ml/min) 30 or greater []   30 mg   SUBQ daily   []   40 mg   SUBQ daily (or 30 mg BID for orthopedic cases) []  30 mg SUBQ   BID*  []  40 mg   SUBQ   BID []  60mg SUBQ BID    15-29.9 []  UFH 5000   units SUBQ BID []  30 mg   SUBQ daily [x] 30 mg SUBQ   daily []  40 mg SUBQ   daily [] 60 mg SUBQ   Daily*    Less than 15 or dialysis []  UFH 5000   units SUBQ BID [] UFH 5000 units SUBQ TID []  UFH 7500   units   SUBQ TID*   *Do not exceed enoxaparin 40mg daily or UFH 5000 units SUBQ TID in patients with epidurals,   lumbar drains, or external ventricular drains      
Hospitalist Progress Note      PCP: Anita Figueroa MD    Date of Admission: 12/2/2024    Chief Complaint:  no acute events, afebrile, stable HD, on 3-4 liters of O2, u/o-1000 ml, denies abdominal or flank pain    Medications:  Reviewed    Infusion Medications    sodium chloride      dextrose       Scheduled Medications    sodium chloride flush  5-40 mL IntraVENous 2 times per day    insulin lispro  0-8 Units SubCUTAneous 4x Daily AC & HS    heparin (porcine)  5,000 Units SubCUTAneous 3 times per day    aspirin  162 mg Oral Daily    carvedilol  12.5 mg Oral BID    Vitamin D  2,000 Units Oral Daily    finasteride  5 mg Oral Daily    cetirizine  5 mg Oral Daily    rosuvastatin  10 mg Oral Nightly    tamsulosin  0.4 mg Oral Nightly    torsemide  60 mg Oral Daily     PRN Meds: sodium chloride flush, sodium chloride, potassium chloride **OR** potassium alternative oral replacement **OR** potassium chloride, magnesium sulfate, ondansetron **OR** ondansetron, polyethylene glycol, acetaminophen **OR** acetaminophen, glucose, dextrose bolus **OR** dextrose bolus, glucagon (rDNA), dextrose, acetaminophen, albuterol sulfate HFA, ammonium lactate, loperamide      Intake/Output Summary (Last 24 hours) at 12/3/2024 1253  Last data filed at 12/3/2024 0529  Gross per 24 hour   Intake 240 ml   Output 1000 ml   Net -760 ml       Exam:    BP (!) 101/45   Pulse 61   Temp 98.3 °F (36.8 °C) (Oral)   Resp 18   Ht 1.676 m (5' 5.98\")   Wt 117.8 kg (259 lb 12.8 oz)   SpO2 98%   BMI 41.95 kg/m²     General appearance: appears stated age and cooperative.  Respiratory:  clear to auscultation, bilaterally  Cardiovascular: Regular rate and rhythm, S1/S2 .  Abdomen: Soft, active bowel sounds.  Musculoskeletal: No edema bilaterally.     Labs:   Recent Labs     12/02/24  1621 12/02/24  1626 12/03/24  0506   WBC  --  7.6 7.6   HGB 10.9* 10.5* 9.6*   HCT  --  33.0* 31.0*   PLT  --  210 200     Recent Labs     12/02/24  1621 12/02/24  1626   NA  
[facility] OCCUPATIONAL THERAPY EVALUATION - ACUTE     NAME: Amrik Meraz  : 1950 (74 y.o.)  MRN: [id number]  CODE STATUS: Limited  Room: W272/W272-01    Date of Service: 12/3/2024    Patient Diagnosis(es): Hypoglycemia [E16.2]  Acute cystitis with hematuria [N30.01]  Acute encephalopathy [G93.40]  Altered mental status, unspecified altered mental status type [R41.82]   Patient Active Problem List    Diagnosis Date Noted    Stage 4 chronic kidney disease (HCC) 2023    Type 2 diabetes mellitus with chronic kidney disease 2023    Acute encephalopathy 2024    Nephrolithiasis 2024    Pulmonary atelectasis 2024    Fall 2024    Complicated UTI (urinary tract infection) 2024    Hydronephrosis of right kidney 2024    Transient alteration of awareness 2024    Hypoglycemia associated with type 2 diabetes mellitus (HCC) 2024    Abnormal EKG 2023    Right ureteral stone 2023    Arteriosclerosis of coronary artery 2023    Impaired mobility and activities of daily living dt CP debility 2023    Acute respiratory failure with hypoxia 2023    Hypervolemia 2023    Severe pulmonary hypertension (HCC) 2023    Hypoxia 2023    Class 3 severe obesity without serious comorbidity with body mass index (BMI) of 45.0 to 49.9 in adult 2022    Hypertension 2019    Acute gouty arthritis 2019    Adenomatous polyp of colon 2019    Anemia 2019    Back pain 2019    Infestation by bed bug 2019    ILIANA (obstructive sleep apnea) 2019    Other hyperlipidemia 2019    Unspecified hyperplasia of prostate without urinary obstruction and other lower urinary tract symptoms (LUTS) 2019    Primary malignant neoplasm of prostate (HCC) 2019    Pain in joint involving ankle and foot 2019    Poorly controlled diabetes mellitus (HCC)     Hydrocele     MAYKEL (acute kidney injury) 
Physical Therapy  Facility/Department: [facility] W272/W272-01  Physical Therapy Discharge      NAME: Amrik Meraz    : 1950 (74 y.o.)  MRN: [id number]    Account: [id number]  Gender: male      Patient has been discharged from acute care hospital. DC patient from current PT program.      Electronically signed by Natasha Edwards PT on 24 at 1:17 PM EST      
Physical Therapy Med Surg Daily Treatment Note  Facility/Department: [address] ORTHO TELE  Room: [place] [id number]       NAME: Amrik Meraz  : 1950 (74 y.o.)  MRN: [id number]  CODE STATUS: Limited    Date of Service: 2024    Patient Diagnosis(es): Hypoglycemia [E16.2]  Acute cystitis with hematuria [N30.01]  Acute encephalopathy [G93.40]  Altered mental status, unspecified altered mental status type [R41.82]   Chief Complaint   Patient presents with    Hypoglycemia     Pt brought in by EMS with hypoglycemia and unresponsive     Patient Active Problem List    Diagnosis Date Noted    Stage 4 chronic kidney disease (HCC) 2023    Type 2 diabetes mellitus with chronic kidney disease 2023    Acute encephalopathy 2024    Nephrolithiasis 2024    Pulmonary atelectasis 2024    Fall 2024    Complicated UTI (urinary tract infection) 2024    Hydronephrosis of right kidney 2024    Transient alteration of awareness 2024    Hypoglycemia due to type 2 diabetes mellitus (HCC) 2024    Abnormal EKG 2023    Right ureteral stone 2023    Arteriosclerosis of coronary artery 2023    Impaired mobility and activities of daily living dt CP debility 2023    Acute respiratory failure with hypoxia 2023    Hypervolemia 2023    Severe pulmonary hypertension (HCC) 2023    Hypoxia 2023    Class 3 severe obesity without serious comorbidity with body mass index (BMI) of 45.0 to 49.9 in adult 2022    Hypertension 2019    Acute gouty arthritis 2019    Adenomatous polyp of colon 2019    Anemia 2019    Back pain 2019    Infestation by bed bug 2019    ILIANA (obstructive sleep apnea) 2019    Other hyperlipidemia 2019    Unspecified hyperplasia of prostate without urinary obstruction and other lower urinary tract symptoms (LUTS) 2019    Primary malignant neoplasm of prostate (HCC) 
Physical Therapy Med Surg Initial Assessment  Facility/Department: [address] ORTHO TELE  Room: [facility]       NAME: Amrik Meraz  : 1950 (74 y.o.)  MRN: [id number]  CODE STATUS: Limited    Date of Service: 12/3/2024    Patient Diagnosis(es): Hypoglycemia [E16.2]  Acute cystitis with hematuria [N30.01]  Acute encephalopathy [G93.40]  Altered mental status, unspecified altered mental status type [R41.82]   Chief Complaint   Patient presents with    Hypoglycemia     Pt brought in by EMS with hypoglycemia and unresponsive     Patient Active Problem List    Diagnosis Date Noted    Stage 4 chronic kidney disease (HCC) 2023    Type 2 diabetes mellitus with chronic kidney disease 2023    Acute encephalopathy 2024    Nephrolithiasis 2024    Pulmonary atelectasis 2024    Fall 2024    Complicated UTI (urinary tract infection) 2024    Hydronephrosis of right kidney 2024    Transient alteration of awareness 2024    Hypoglycemia associated with type 2 diabetes mellitus (HCC) 2024    Abnormal EKG 2023    Right ureteral stone 2023    Arteriosclerosis of coronary artery 2023    Impaired mobility and activities of daily living dt CP debility 2023    Acute respiratory failure with hypoxia 2023    Hypervolemia 2023    Severe pulmonary hypertension (HCC) 2023    Hypoxia 2023    Class 3 severe obesity without serious comorbidity with body mass index (BMI) of 45.0 to 49.9 in adult 2022    Hypertension 2019    Acute gouty arthritis 2019    Adenomatous polyp of colon 2019    Anemia 2019    Back pain 2019    Infestation by bed bug 2019    ILIANA (obstructive sleep apnea) 2019    Other hyperlipidemia 2019    Unspecified hyperplasia of prostate without urinary obstruction and other lower urinary tract symptoms (LUTS) 2019    Primary malignant neoplasm of prostate (HCC) 
Renal Adjustment Per Protocol:     Cetirizine 10 mg daily changed to cetirizine 5 mg daily based on CrCl < 32 ml/min.    Rosuvastatin 20 mg daily changed to rosuvastatin 10 mg daily based on CrCl < 30 ml/min.    Recent Labs     12/02/24  1626   CREATININE 2.94*   Estimated Creatinine Clearance: 26 mL/min (A) (based on SCr of 2.94 mg/dL (H)).  .      Thank you,    Vasyl Mendez, VIKKI.Ph.  12/2/2024  11:31 PM    
Spiritual Health History and Assessment/Progress Note  [facility]    Interdisciplinary rounds,  ,  ,      Name: Amrik Meraz MRN: [id number]    Age: 74 y.o.     Sex: male   Language: English   Restorationist: Tenriism   Acute encephalopathy     Date: 12/4/2024            Total Time Calculated: 15 min              Spiritual Assessment began in [facility] ORTHO TELE        Referral/Consult From: Rounding   Encounter Overview/Reason: Interdisciplinary rounds  Service Provided For: Patient    Patient awake sitting in chair next to bed alert. Patient friendly and calm. Patient feels well enough to go home. Patient has debbie and uses it for strength. Patient fells well supported by child.     Debbie, Belief, Meaning:   Patient has beliefs or practices that help with coping during difficult times  Family/Friends No family/friends present      Importance and Influence:  Patient has spiritual/personal beliefs that influence decisions regarding their health  Family/Friends No family/friends present    Community:  Patient feels well-supported. Support system includes: Children and Friends  Family/Friends No family/friends present    Assessment and Plan of Care:     Patient Interventions include: Facilitated expression of thoughts and feelings and Affirmed coping skills/support systems  Family/Friends Interventions include: No family/friends present    Patient Plan of Care: Spiritual Care available upon further referral  Family/Friends Plan of Care: No family/friends present    Electronically signed by Chaplain Autumn Intern on 12/4/2024 at 4:03 PM   
Wound Ostomy Continence Nurse  Consult Note       NAME:  Amrik Meraz  MEDICAL RECORD NUMBER:  [id number]  AGE: 74 y.o.   GENDER: male  : 1950  TODAY'S DATE:  12/3/2024    Subjective   Reason for WOC Nurse Evaluation and Assessment: right groin      Amrik Meraz is a 74 y.o. male referred by:   [] Physician  [x] Nursing  [] Other:     Wound Identification:  Wound Type: undetermined  Contributing Factors: none    Wound History: Patient admitted to [facility] on 24. Wound ostomy consult placed for evaluation of right groin. Patient states he believes he had an ingrown hair there but it \"doesn't bother me\" and \"the nurses accidentally found it when changing me\".   Current Wound Care Treatment:  Recommending 1) ok to leave open to air 2) may place interdry in moist groin fold    Patient Goal of Care:  [] Wound Healing  [] Odor Control  [] Palliative Care  [] Pain Control   [] Other:         PAST MEDICAL HISTORY        Diagnosis Date    Chronic kidney disease     Diabetes mellitus (HCC)     Hypertension     Type 2 diabetes mellitus with hyperglycemia 2023    Type 2 diabetes mellitus without complication, without long-term current use of insulin ([facility]) 3/19/2019    Uncontrolled type 2 diabetes mellitus with hyperglycemia ([facility])        PAST SURGICAL HISTORY    Past Surgical History:   Procedure Laterality Date    APPENDECTOMY      age 10       FAMILY HISTORY    No family history on file.    SOCIAL HISTORY    Social History     Tobacco Use    Smoking status: Never     Passive exposure: Never    Smokeless tobacco: Never   Vaping Use    Vaping status: Never Used   Substance Use Topics    Alcohol use: Not Currently    Drug use: Never       ALLERGIES    Allergies   Allergen Reactions    Niacin Other (See Comments)    Fluorescein Diarrhea, Nausea And Vomiting and Other (See Comments)       MEDICATIONS    No current facility-administered medications on file prior to encounter.     Current Outpatient 
105   CO2  --  29 28   BUN  --  43* 39*   CREATININE 3.0* 2.94* 2.96*   CALCIUM  --  8.7 8.4*   PHOS  --   --  4.9*     Recent Labs     12/02/24  1626   AST 13   ALT 7   BILITOT 0.4   ALKPHOS 105*     Recent Labs     12/02/24  1626   INR 1.1     Recent Labs     12/02/24  1816   CKTOTAL 50       Urinalysis:      Lab Results   Component Value Date/Time    NITRU Negative 12/02/2024 06:40 PM    WBCUA >100 12/02/2024 06:40 PM    BACTERIA RARE 12/02/2024 06:40 PM    RBCUA 10-20 12/02/2024 06:40 PM    BLOODU MODERATE 12/02/2024 06:40 PM    SPECGRAV 1.015 11/25/2024 02:24 PM    GLUCOSEU Negative 12/02/2024 06:40 PM       Radiology:  CT ABDOMEN PELVIS WO CONTRAST Additional Contrast? None   Final Result   1. Moderate right hydronephrosis and diffuse right hydroureter.The distal   right ureter is somewhat ill-defined. There may be slight intraluminal   fullness.  No obstructing calcification.  Investigation of the distal right   ureter may be considered to exclude a lesion   2. Slight urinary bladder wall thickening.   3. Cardiomegaly with small effusions.  The appearance of the lung bases has   improved in the interim from November 7th   4. Right adrenal myelolipoma.         XR CHEST (2 VW)   Final Result   1. Cardiomegaly with pulmonary vascular congestion.   2. Mild hazy opacity in the lung bases worse on the right.         CT Head W/O Contrast   Final Result      1.  No evidence of acute intracranial process.      2.  Findings of presumed small vessel ischemic deep white matter disease.                 Assessment/Plan:    75 [facility] man [facility] with PMH of HTN, chronic diastolic HF, IDDM2, CKD3b, right hydronephrosis, BPH, severe obesity, ILIANA, chronic hypoxic respiratory failure-3 liters of O2, nephrolithiasis who presented with:      Acute metabolic encephalopathy  - due to hypoglycemia with glucose of 40 in the setting insulin therapy and CKD,   - requiring D10 in ED,   - clinically improved  - on ISS  - followed by

## 2024-12-05 NOTE — CARE COORDINATION
Care Transitions Note    Initial Call - Call within 2 business days of discharge: Yes    Care Transition Nurse attempted initial CT outrach leaving HIPAA VM, purpose of call, my contact info and reminder to schedule appts. Noting 24 A1C 8.4. Pt h/o CKD 4, DM, HTN, Prostate CA, ILIANA. [facility] pt. Home O2? HHC needs?     Patient: Amrik Meraz      Patient : 1950   MRN: [id number]      Reason for Admission: 2024 - 2024 [facility] Obs. Acute encephalopathy, Hypoglycemia, Asymptomatic bacteruria .    Discharge Date: 24    RURS: Readmission Risk Score: 24    [facility] pt  Follow up with Anita Figueroa MD (Internal Medicine) in 3 days (2024)  Follow up with Issa Souza MD (Internal Medicine)    STOP taking:  empagliflozin 25 MG tablet (JARDIANCE)    Last Discharge Facility       Date Complaint Diagnosis Description Type Department Provider    24 Hypoglycemia Altered mental status, unspecified altered mental status type ... ED to Hosp-Admission (Discharged) (ADMITTED) Estevan Chapa MD; David Webb...            Negra Marroquin RN

## 2024-12-06 ENCOUNTER — CARE COORDINATION (OUTPATIENT)
Dept: CARE COORDINATION | Age: 74
End: 2024-12-06

## 2024-12-06 NOTE — CARE COORDINATION
Care Transitions Note    Initial Call - Call within 2 business days of discharge: Yes    Care Transition Nurse attempted 2nd initial CT outreach leaving HIPAA VM, purpose of call, and my contact info. CTN s/o.      Patient: Amrik Meraz      Patient : 1950   MRN: [id number]      Reason for Admission: 2024 - 2024 [facility] Obs. Acute encephalopathy, Hypoglycemia, Asymptomatic bacteruria.    Discharge Date: 24    RURS: Readmission Risk Score: 24    [facility] pt  Follow up with Anita Figueroa MD (Internal Medicine) in 3 days (2024)  Follow up with Issa Souza MD (Internal Medicine)     STOP taking:  empagliflozin 25 MG tablet (JARDIANCE)      Negra Marroquin RN

## 2024-12-11 ENCOUNTER — TELEPHONE (OUTPATIENT)
Dept: FAMILY MEDICINE CLINIC | Age: 74
End: 2024-12-11

## 2024-12-11 NOTE — TELEPHONE ENCOUNTER
Patient contacted regarding AWV  [facility] will [facility] with [facility] as he just got home from the hospital

## 2024-12-12 ENCOUNTER — TELEPHONE (OUTPATIENT)
Dept: ENDOCRINOLOGY | Age: 74
End: 2024-12-12

## 2024-12-12 NOTE — TELEPHONE ENCOUNTER
Patient is having difficutly getting  salina 3 sensors wanted to know what you suggest he hasn't been able to monitor sugars please advise .

## 2024-12-16 DIAGNOSIS — E11.65 UNCONTROLLED TYPE 2 DIABETES MELLITUS WITH HYPERGLYCEMIA (HCC): ICD-10-CM

## 2024-12-16 RX ORDER — ACYCLOVIR 800 MG/1
1 TABLET ORAL
Qty: 2 EACH | Refills: 3 | Status: SHIPPED | OUTPATIENT
Start: 2024-12-16

## 2024-12-16 NOTE — TELEPHONE ENCOUNTER
Left a message , look like he needs a refill and it was sent to provider. And if he still unable to get the salina 3, let the office know

## 2025-02-04 DIAGNOSIS — E11.65 UNCONTROLLED TYPE 2 DIABETES MELLITUS WITH HYPERGLYCEMIA (HCC): ICD-10-CM

## 2025-02-04 RX ORDER — ACYCLOVIR 800 MG/1
1 TABLET ORAL
Qty: 2 EACH | Refills: 3 | Status: SHIPPED | OUTPATIENT
Start: 2025-02-04

## 2025-02-04 NOTE — TELEPHONE ENCOUNTER
Requested Prescriptions     Pending Prescriptions Disp Refills    Continuous Glucose Sensor (FREESTYLE ANEUDY 3 SENSOR) MISC 2 each 3     Si each by Does not apply route every 14 days E11.65

## 2025-03-09 NOTE — ED NOTES
Liver Mets   On assessment   DINESH Maurice found bed bugs on pt   Will decon pt and place on precautions      Abram Haddad RN  11/20/23 [id number]

## 2025-07-11 ENCOUNTER — HOSPITAL ENCOUNTER (INPATIENT)
Facility: HOSPITAL | Age: 75
DRG: 189 | End: 2025-07-11
Attending: STUDENT IN AN ORGANIZED HEALTH CARE EDUCATION/TRAINING PROGRAM | Admitting: INTERNAL MEDICINE
Payer: MEDICARE

## 2025-07-11 ENCOUNTER — APPOINTMENT (OUTPATIENT)
Dept: RADIOLOGY | Facility: HOSPITAL | Age: 75
DRG: 189 | End: 2025-07-11
Payer: MEDICARE

## 2025-07-11 ENCOUNTER — APPOINTMENT (OUTPATIENT)
Dept: CARDIOLOGY | Facility: HOSPITAL | Age: 75
DRG: 189 | End: 2025-07-11
Payer: MEDICARE

## 2025-07-11 DIAGNOSIS — R09.02 HYPOXIA: ICD-10-CM

## 2025-07-11 DIAGNOSIS — E87.70 HYPERVOLEMIA, UNSPECIFIED HYPERVOLEMIA TYPE: ICD-10-CM

## 2025-07-11 DIAGNOSIS — R06.02 SOB (SHORTNESS OF BREATH): Primary | ICD-10-CM

## 2025-07-11 LAB
ALBUMIN SERPL BCP-MCNC: 3.7 G/DL (ref 3.4–5)
ALP SERPL-CCNC: 114 U/L (ref 33–136)
ALT SERPL W P-5'-P-CCNC: 8 U/L (ref 10–52)
ANION GAP BLDV CALCULATED.4IONS-SCNC: 13 MMOL/L (ref 10–25)
ANION GAP SERPL CALC-SCNC: 14 MMOL/L (ref 10–20)
AST SERPL W P-5'-P-CCNC: 8 U/L (ref 9–39)
BASE EXCESS BLDV CALC-SCNC: -0.4 MMOL/L (ref -2–3)
BASOPHILS # BLD AUTO: 0.05 X10*3/UL (ref 0–0.1)
BASOPHILS NFR BLD AUTO: 0.6 %
BILIRUB SERPL-MCNC: 0.7 MG/DL (ref 0–1.2)
BNP SERPL-MCNC: 481 PG/ML (ref 0–99)
BODY TEMPERATURE: 37 DEGREES CELSIUS
BUN SERPL-MCNC: 53 MG/DL (ref 6–23)
CA-I BLDV-SCNC: 1.19 MMOL/L (ref 1.1–1.33)
CALCIUM SERPL-MCNC: 8.6 MG/DL (ref 8.6–10.3)
CARDIAC TROPONIN I PNL SERPL HS: 441 NG/L (ref 0–20)
CARDIAC TROPONIN I PNL SERPL HS: 484 NG/L (ref 0–20)
CARDIAC TROPONIN I PNL SERPL HS: 517 NG/L (ref 0–20)
CARDIAC TROPONIN I PNL SERPL HS: 530 NG/L (ref 0–20)
CHLORIDE BLDV-SCNC: 103 MMOL/L (ref 98–107)
CHLORIDE SERPL-SCNC: 103 MMOL/L (ref 98–107)
CO2 SERPL-SCNC: 25 MMOL/L (ref 21–32)
CREAT SERPL-MCNC: 3.3 MG/DL (ref 0.5–1.3)
D DIMER PPP FEU-MCNC: 765 NG/ML FEU
EGFRCR SERPLBLD CKD-EPI 2021: 19 ML/MIN/1.73M*2
EOSINOPHIL # BLD AUTO: 0.25 X10*3/UL (ref 0–0.4)
EOSINOPHIL NFR BLD AUTO: 3 %
ERYTHROCYTE [DISTWIDTH] IN BLOOD BY AUTOMATED COUNT: 16.1 % (ref 11.5–14.5)
FLUAV RNA RESP QL NAA+PROBE: NOT DETECTED
FLUBV RNA RESP QL NAA+PROBE: NOT DETECTED
GLUCOSE BLD MANUAL STRIP-MCNC: 150 MG/DL (ref 74–99)
GLUCOSE BLDV-MCNC: 259 MG/DL (ref 74–99)
GLUCOSE SERPL-MCNC: 255 MG/DL (ref 74–99)
HCO3 BLDV-SCNC: 26.3 MMOL/L (ref 22–26)
HCT VFR BLD AUTO: 35.3 % (ref 41–52)
HCT VFR BLD EST: 35 % (ref 41–52)
HGB BLD-MCNC: 10.4 G/DL (ref 13.5–17.5)
HGB BLDV-MCNC: 11.6 G/DL (ref 13.5–17.5)
IMM GRANULOCYTES # BLD AUTO: 0.03 X10*3/UL (ref 0–0.5)
IMM GRANULOCYTES NFR BLD AUTO: 0.4 % (ref 0–0.9)
INHALED O2 CONCENTRATION: 44 %
LACTATE BLDV-SCNC: 0.7 MMOL/L (ref 0.4–2)
LYMPHOCYTES # BLD AUTO: 1.53 X10*3/UL (ref 0.8–3)
LYMPHOCYTES NFR BLD AUTO: 18.5 %
MAGNESIUM SERPL-MCNC: 2.37 MG/DL (ref 1.6–2.4)
MCH RBC QN AUTO: 27.3 PG (ref 26–34)
MCHC RBC AUTO-ENTMCNC: 29.5 G/DL (ref 32–36)
MCV RBC AUTO: 93 FL (ref 80–100)
MONOCYTES # BLD AUTO: 0.85 X10*3/UL (ref 0.05–0.8)
MONOCYTES NFR BLD AUTO: 10.3 %
NEUTROPHILS # BLD AUTO: 5.55 X10*3/UL (ref 1.6–5.5)
NEUTROPHILS NFR BLD AUTO: 67.2 %
NRBC BLD-RTO: 0 /100 WBCS (ref 0–0)
OXYHGB MFR BLDV: 61.4 % (ref 45–75)
PCO2 BLDV: 51 MM HG (ref 41–51)
PH BLDV: 7.32 PH (ref 7.33–7.43)
PLATELET # BLD AUTO: 233 X10*3/UL (ref 150–450)
PO2 BLDV: 38 MM HG (ref 35–45)
POTASSIUM BLDV-SCNC: 4.4 MMOL/L (ref 3.5–5.3)
POTASSIUM SERPL-SCNC: 4.2 MMOL/L (ref 3.5–5.3)
PROT SERPL-MCNC: 7.7 G/DL (ref 6.4–8.2)
RBC # BLD AUTO: 3.81 X10*6/UL (ref 4.5–5.9)
SAO2 % BLDV: 64 % (ref 45–75)
SARS-COV-2 RNA RESP QL NAA+PROBE: NOT DETECTED
SODIUM BLDV-SCNC: 138 MMOL/L (ref 136–145)
SODIUM SERPL-SCNC: 138 MMOL/L (ref 136–145)
WBC # BLD AUTO: 8.3 X10*3/UL (ref 4.4–11.3)

## 2025-07-11 PROCEDURE — 99223 1ST HOSP IP/OBS HIGH 75: CPT | Performed by: INTERNAL MEDICINE

## 2025-07-11 PROCEDURE — 84145 PROCALCITONIN (PCT): CPT | Mod: PARLAB | Performed by: INTERNAL MEDICINE

## 2025-07-11 PROCEDURE — 96374 THER/PROPH/DIAG INJ IV PUSH: CPT

## 2025-07-11 PROCEDURE — 84132 ASSAY OF SERUM POTASSIUM: CPT | Performed by: STUDENT IN AN ORGANIZED HEALTH CARE EDUCATION/TRAINING PROGRAM

## 2025-07-11 PROCEDURE — 71045 X-RAY EXAM CHEST 1 VIEW: CPT

## 2025-07-11 PROCEDURE — 36415 COLL VENOUS BLD VENIPUNCTURE: CPT | Performed by: STUDENT IN AN ORGANIZED HEALTH CARE EDUCATION/TRAINING PROGRAM

## 2025-07-11 PROCEDURE — 2500000002 HC RX 250 W HCPCS SELF ADMINISTERED DRUGS (ALT 637 FOR MEDICARE OP, ALT 636 FOR OP/ED): Performed by: INTERNAL MEDICINE

## 2025-07-11 PROCEDURE — 2500000004 HC RX 250 GENERAL PHARMACY W/ HCPCS (ALT 636 FOR OP/ED): Performed by: INTERNAL MEDICINE

## 2025-07-11 PROCEDURE — 2060000001 HC INTERMEDIATE ICU ROOM DAILY

## 2025-07-11 PROCEDURE — 2500000005 HC RX 250 GENERAL PHARMACY W/O HCPCS: Performed by: INTERNAL MEDICINE

## 2025-07-11 PROCEDURE — 93010 ELECTROCARDIOGRAM REPORT: CPT | Performed by: STUDENT IN AN ORGANIZED HEALTH CARE EDUCATION/TRAINING PROGRAM

## 2025-07-11 PROCEDURE — 70450 CT HEAD/BRAIN W/O DYE: CPT | Performed by: RADIOLOGY

## 2025-07-11 PROCEDURE — 83735 ASSAY OF MAGNESIUM: CPT | Performed by: STUDENT IN AN ORGANIZED HEALTH CARE EDUCATION/TRAINING PROGRAM

## 2025-07-11 PROCEDURE — 85379 FIBRIN DEGRADATION QUANT: CPT | Performed by: STUDENT IN AN ORGANIZED HEALTH CARE EDUCATION/TRAINING PROGRAM

## 2025-07-11 PROCEDURE — 5A0945A ASSISTANCE WITH RESPIRATORY VENTILATION, 24-96 CONSECUTIVE HOURS, HIGH NASAL FLOW/VELOCITY: ICD-10-PCS | Performed by: STUDENT IN AN ORGANIZED HEALTH CARE EDUCATION/TRAINING PROGRAM

## 2025-07-11 PROCEDURE — 84484 ASSAY OF TROPONIN QUANT: CPT | Performed by: STUDENT IN AN ORGANIZED HEALTH CARE EDUCATION/TRAINING PROGRAM

## 2025-07-11 PROCEDURE — 85025 COMPLETE CBC W/AUTO DIFF WBC: CPT | Performed by: STUDENT IN AN ORGANIZED HEALTH CARE EDUCATION/TRAINING PROGRAM

## 2025-07-11 PROCEDURE — 93005 ELECTROCARDIOGRAM TRACING: CPT

## 2025-07-11 PROCEDURE — 87636 SARSCOV2 & INF A&B AMP PRB: CPT | Performed by: INTERNAL MEDICINE

## 2025-07-11 PROCEDURE — 2500000004 HC RX 250 GENERAL PHARMACY W/ HCPCS (ALT 636 FOR OP/ED): Performed by: STUDENT IN AN ORGANIZED HEALTH CARE EDUCATION/TRAINING PROGRAM

## 2025-07-11 PROCEDURE — 71045 X-RAY EXAM CHEST 1 VIEW: CPT | Performed by: RADIOLOGY

## 2025-07-11 PROCEDURE — 70450 CT HEAD/BRAIN W/O DYE: CPT

## 2025-07-11 PROCEDURE — 99291 CRITICAL CARE FIRST HOUR: CPT | Performed by: STUDENT IN AN ORGANIZED HEALTH CARE EDUCATION/TRAINING PROGRAM

## 2025-07-11 PROCEDURE — 2500000001 HC RX 250 WO HCPCS SELF ADMINISTERED DRUGS (ALT 637 FOR MEDICARE OP): Performed by: INTERNAL MEDICINE

## 2025-07-11 PROCEDURE — 84484 ASSAY OF TROPONIN QUANT: CPT | Performed by: INTERNAL MEDICINE

## 2025-07-11 PROCEDURE — 83880 ASSAY OF NATRIURETIC PEPTIDE: CPT | Performed by: STUDENT IN AN ORGANIZED HEALTH CARE EDUCATION/TRAINING PROGRAM

## 2025-07-11 PROCEDURE — 82947 ASSAY GLUCOSE BLOOD QUANT: CPT

## 2025-07-11 RX ORDER — ROSUVASTATIN CALCIUM 10 MG/1
10 TABLET, COATED ORAL NIGHTLY
Status: DISCONTINUED | OUTPATIENT
Start: 2025-07-11 | End: 2025-07-21 | Stop reason: HOSPADM

## 2025-07-11 RX ORDER — CETIRIZINE HYDROCHLORIDE 10 MG/1
10 TABLET ORAL DAILY
Status: DISCONTINUED | OUTPATIENT
Start: 2025-07-11 | End: 2025-07-21 | Stop reason: HOSPADM

## 2025-07-11 RX ORDER — CHOLECALCIFEROL (VITAMIN D3) 25 MCG
25 TABLET ORAL DAILY
COMMUNITY
Start: 2025-05-29

## 2025-07-11 RX ORDER — SILDENAFIL 50 MG/1
50 TABLET, FILM COATED ORAL
COMMUNITY
Start: 2025-01-23

## 2025-07-11 RX ORDER — HEPARIN SODIUM 5000 [USP'U]/ML
7500 INJECTION, SOLUTION INTRAVENOUS; SUBCUTANEOUS EVERY 8 HOURS SCHEDULED
Status: DISCONTINUED | OUTPATIENT
Start: 2025-07-11 | End: 2025-07-21 | Stop reason: HOSPADM

## 2025-07-11 RX ORDER — INSULIN GLARGINE 100 [IU]/ML
15 INJECTION, SOLUTION SUBCUTANEOUS NIGHTLY
Status: DISCONTINUED | OUTPATIENT
Start: 2025-07-11 | End: 2025-07-12

## 2025-07-11 RX ORDER — TALC
3 POWDER (GRAM) TOPICAL NIGHTLY PRN
Status: DISCONTINUED | OUTPATIENT
Start: 2025-07-11 | End: 2025-07-21 | Stop reason: HOSPADM

## 2025-07-11 RX ORDER — FLUTICASONE PROPIONATE 50 MCG
2 SPRAY, SUSPENSION (ML) NASAL NIGHTLY
Status: DISCONTINUED | OUTPATIENT
Start: 2025-07-11 | End: 2025-07-21 | Stop reason: HOSPADM

## 2025-07-11 RX ORDER — ASPIRIN 81 MG/1
162 TABLET ORAL DAILY
Status: DISCONTINUED | OUTPATIENT
Start: 2025-07-11 | End: 2025-07-21 | Stop reason: HOSPADM

## 2025-07-11 RX ORDER — ROSUVASTATIN CALCIUM 10 MG/1
10 TABLET, COATED ORAL NIGHTLY
COMMUNITY
Start: 2024-10-27

## 2025-07-11 RX ORDER — INSULIN GLARGINE 100 [IU]/ML
35 INJECTION, SOLUTION SUBCUTANEOUS 2 TIMES DAILY
COMMUNITY
Start: 2025-03-31

## 2025-07-11 RX ORDER — ALLOPURINOL 100 MG/1
100 TABLET ORAL DAILY
COMMUNITY
Start: 2025-01-21

## 2025-07-11 RX ORDER — FUROSEMIDE 10 MG/ML
40 INJECTION INTRAMUSCULAR; INTRAVENOUS 2 TIMES DAILY
Status: DISCONTINUED | OUTPATIENT
Start: 2025-07-11 | End: 2025-07-12

## 2025-07-11 RX ORDER — ACETAMINOPHEN 650 MG/1
650 SUPPOSITORY RECTAL EVERY 6 HOURS PRN
Status: DISCONTINUED | OUTPATIENT
Start: 2025-07-11 | End: 2025-07-11

## 2025-07-11 RX ORDER — FLUTICASONE PROPIONATE 50 MCG
2 SPRAY, SUSPENSION (ML) NASAL NIGHTLY
COMMUNITY
Start: 2025-04-17

## 2025-07-11 RX ORDER — ACETAMINOPHEN 160 MG/5ML
650 SOLUTION ORAL EVERY 6 HOURS PRN
Status: DISCONTINUED | OUTPATIENT
Start: 2025-07-11 | End: 2025-07-11

## 2025-07-11 RX ORDER — ACETAMINOPHEN 325 MG/1
650 TABLET ORAL EVERY 6 HOURS PRN
Status: DISCONTINUED | OUTPATIENT
Start: 2025-07-11 | End: 2025-07-21 | Stop reason: HOSPADM

## 2025-07-11 RX ORDER — FUROSEMIDE 10 MG/ML
40 INJECTION INTRAMUSCULAR; INTRAVENOUS ONCE
Status: COMPLETED | OUTPATIENT
Start: 2025-07-11 | End: 2025-07-11

## 2025-07-11 RX ORDER — TORSEMIDE 20 MG/1
60 TABLET ORAL DAILY
Status: DISCONTINUED | OUTPATIENT
Start: 2025-07-11 | End: 2025-07-21

## 2025-07-11 RX ORDER — FINASTERIDE 5 MG/1
5 TABLET, FILM COATED ORAL DAILY
COMMUNITY
Start: 2024-12-15

## 2025-07-11 RX ORDER — INSULIN ASPART 100 [IU]/ML
35 INJECTION, SOLUTION INTRAVENOUS; SUBCUTANEOUS
COMMUNITY
Start: 2024-04-30

## 2025-07-11 RX ORDER — AMLODIPINE BESYLATE 5 MG/1
5 TABLET ORAL DAILY
Status: DISCONTINUED | OUTPATIENT
Start: 2025-07-11 | End: 2025-07-12

## 2025-07-11 RX ORDER — TORSEMIDE 20 MG/1
60 TABLET ORAL DAILY
Status: ON HOLD | COMMUNITY
Start: 2024-04-30 | End: 2025-07-21

## 2025-07-11 RX ORDER — MONTELUKAST SODIUM 10 MG/1
10 TABLET ORAL NIGHTLY
Status: DISCONTINUED | OUTPATIENT
Start: 2025-07-11 | End: 2025-07-21 | Stop reason: HOSPADM

## 2025-07-11 RX ORDER — TAMSULOSIN HYDROCHLORIDE 0.4 MG/1
0.8 CAPSULE ORAL NIGHTLY
COMMUNITY
Start: 2024-12-15

## 2025-07-11 RX ORDER — POLYETHYLENE GLYCOL 3350 17 G/17G
17 POWDER, FOR SOLUTION ORAL DAILY PRN
Status: DISCONTINUED | OUTPATIENT
Start: 2025-07-11 | End: 2025-07-21 | Stop reason: HOSPADM

## 2025-07-11 RX ORDER — MONTELUKAST SODIUM 10 MG/1
10 TABLET ORAL NIGHTLY
COMMUNITY
Start: 2024-10-15

## 2025-07-11 RX ORDER — LORATADINE 10 MG/1
10 TABLET ORAL DAILY
COMMUNITY
Start: 2024-10-23

## 2025-07-11 RX ORDER — CHOLECALCIFEROL (VITAMIN D3) 25 MCG
25 TABLET ORAL DAILY
Status: DISCONTINUED | OUTPATIENT
Start: 2025-07-11 | End: 2025-07-21 | Stop reason: HOSPADM

## 2025-07-11 RX ORDER — BLOOD-GLUCOSE SENSOR
1 EACH MISCELLANEOUS
COMMUNITY
Start: 2025-06-30

## 2025-07-11 RX ORDER — TAMSULOSIN HYDROCHLORIDE 0.4 MG/1
0.8 CAPSULE ORAL NIGHTLY
Status: DISCONTINUED | OUTPATIENT
Start: 2025-07-11 | End: 2025-07-21 | Stop reason: HOSPADM

## 2025-07-11 RX ORDER — AMLODIPINE BESYLATE 5 MG/1
5 TABLET ORAL DAILY
COMMUNITY
Start: 2024-09-23

## 2025-07-11 RX ORDER — INSULIN LISPRO 100 [IU]/ML
0-10 INJECTION, SOLUTION INTRAVENOUS; SUBCUTANEOUS
Status: DISCONTINUED | OUTPATIENT
Start: 2025-07-12 | End: 2025-07-12

## 2025-07-11 RX ORDER — ASPIRIN 81 MG/1
162 TABLET ORAL DAILY
COMMUNITY
Start: 2024-12-15

## 2025-07-11 RX ORDER — FINASTERIDE 5 MG/1
5 TABLET, FILM COATED ORAL DAILY
Status: DISCONTINUED | OUTPATIENT
Start: 2025-07-11 | End: 2025-07-21 | Stop reason: HOSPADM

## 2025-07-11 RX ORDER — ALLOPURINOL 100 MG/1
100 TABLET ORAL DAILY
Status: DISCONTINUED | OUTPATIENT
Start: 2025-07-11 | End: 2025-07-21 | Stop reason: HOSPADM

## 2025-07-11 RX ADMIN — MONTELUKAST 10 MG: 10 TABLET, FILM COATED ORAL at 21:13

## 2025-07-11 RX ADMIN — HEPARIN SODIUM 7500 UNITS: 5000 INJECTION, SOLUTION INTRAVENOUS; SUBCUTANEOUS at 15:14

## 2025-07-11 RX ADMIN — ASPIRIN 162 MG: 81 TABLET, COATED ORAL at 15:14

## 2025-07-11 RX ADMIN — INSULIN GLARGINE 15 UNITS: 100 INJECTION, SOLUTION SUBCUTANEOUS at 21:13

## 2025-07-11 RX ADMIN — FUROSEMIDE 40 MG: 10 INJECTION, SOLUTION INTRAVENOUS at 13:42

## 2025-07-11 RX ADMIN — ROSUVASTATIN CALCIUM 10 MG: 10 TABLET, FILM COATED ORAL at 21:13

## 2025-07-11 RX ADMIN — Medication 25 MCG: at 15:14

## 2025-07-11 RX ADMIN — HEPARIN SODIUM 7500 UNITS: 5000 INJECTION, SOLUTION INTRAVENOUS; SUBCUTANEOUS at 21:13

## 2025-07-11 RX ADMIN — FLUTICASONE PROPIONATE 2 SPRAY: 50 SPRAY, METERED NASAL at 21:13

## 2025-07-11 RX ADMIN — TAMSULOSIN HYDROCHLORIDE 0.8 MG: 0.4 CAPSULE ORAL at 21:13

## 2025-07-11 RX ADMIN — CETIRIZINE HYDROCHLORIDE 10 MG: 10 TABLET, FILM COATED ORAL at 15:14

## 2025-07-11 RX ADMIN — AMLODIPINE BESYLATE 5 MG: 5 TABLET ORAL at 15:14

## 2025-07-11 RX ADMIN — FINASTERIDE 5 MG: 5 TABLET, FILM COATED ORAL at 15:26

## 2025-07-11 RX ADMIN — Medication 45 L/MIN: at 18:37

## 2025-07-11 RX ADMIN — ALLOPURINOL 100 MG: 100 TABLET ORAL at 15:26

## 2025-07-11 RX ADMIN — Medication 45 L/MIN: at 17:49

## 2025-07-11 RX ADMIN — FUROSEMIDE 40 MG: 10 INJECTION, SOLUTION INTRAVENOUS at 16:54

## 2025-07-11 SDOH — SOCIAL STABILITY: SOCIAL INSECURITY
WITHIN THE LAST YEAR, HAVE YOU BEEN KICKED, HIT, SLAPPED, OR OTHERWISE PHYSICALLY HURT BY YOUR PARTNER OR EX-PARTNER?: NO

## 2025-07-11 SDOH — SOCIAL STABILITY: SOCIAL INSECURITY
WITHIN THE LAST YEAR, HAVE YOU BEEN RAPED OR FORCED TO HAVE ANY KIND OF SEXUAL ACTIVITY BY YOUR PARTNER OR EX-PARTNER?: NO

## 2025-07-11 SDOH — ECONOMIC STABILITY: INCOME INSECURITY: IN THE PAST 12 MONTHS HAS THE ELECTRIC, GAS, OIL, OR WATER COMPANY THREATENED TO SHUT OFF SERVICES IN YOUR HOME?: NO

## 2025-07-11 SDOH — SOCIAL STABILITY: SOCIAL INSECURITY: WITHIN THE LAST YEAR, HAVE YOU BEEN HUMILIATED OR EMOTIONALLY ABUSED IN OTHER WAYS BY YOUR PARTNER OR EX-PARTNER?: NO

## 2025-07-11 SDOH — SOCIAL STABILITY: SOCIAL INSECURITY: DO YOU FEEL ANYONE HAS EXPLOITED OR TAKEN ADVANTAGE OF YOU FINANCIALLY OR OF YOUR PERSONAL PROPERTY?: NO

## 2025-07-11 SDOH — SOCIAL STABILITY: SOCIAL INSECURITY: HAS ANYONE EVER THREATENED TO HURT YOUR FAMILY OR YOUR PETS?: NO

## 2025-07-11 SDOH — SOCIAL STABILITY: SOCIAL INSECURITY: DOES ANYONE TRY TO KEEP YOU FROM HAVING/CONTACTING OTHER FRIENDS OR DOING THINGS OUTSIDE YOUR HOME?: NO

## 2025-07-11 SDOH — SOCIAL STABILITY: SOCIAL INSECURITY: HAVE YOU HAD ANY THOUGHTS OF HARMING ANYONE ELSE?: NO

## 2025-07-11 SDOH — SOCIAL STABILITY: SOCIAL INSECURITY: WITHIN THE LAST YEAR, HAVE YOU BEEN AFRAID OF YOUR PARTNER OR EX-PARTNER?: NO

## 2025-07-11 SDOH — SOCIAL STABILITY: SOCIAL INSECURITY: DO YOU FEEL UNSAFE GOING BACK TO THE PLACE WHERE YOU ARE LIVING?: NO

## 2025-07-11 SDOH — SOCIAL STABILITY: SOCIAL INSECURITY: HAVE YOU HAD THOUGHTS OF HARMING ANYONE ELSE?: NO

## 2025-07-11 SDOH — SOCIAL STABILITY: SOCIAL INSECURITY: WERE YOU ABLE TO COMPLETE ALL THE BEHAVIORAL HEALTH SCREENINGS?: YES

## 2025-07-11 SDOH — SOCIAL STABILITY: SOCIAL INSECURITY: ARE THERE ANY APPARENT SIGNS OF INJURIES/BEHAVIORS THAT COULD BE RELATED TO ABUSE/NEGLECT?: NO

## 2025-07-11 SDOH — SOCIAL STABILITY: SOCIAL INSECURITY: ABUSE: ADULT

## 2025-07-11 SDOH — SOCIAL STABILITY: SOCIAL INSECURITY: ARE YOU OR HAVE YOU BEEN THREATENED OR ABUSED PHYSICALLY, EMOTIONALLY, OR SEXUALLY BY ANYONE?: NO

## 2025-07-11 ASSESSMENT — COGNITIVE AND FUNCTIONAL STATUS - GENERAL
HELP NEEDED FOR BATHING: A LITTLE
MOVING TO AND FROM BED TO CHAIR: A LOT
DAILY ACTIVITIY SCORE: 21
PATIENT BASELINE BEDBOUND: NO
MOBILITY SCORE: 13
DRESSING REGULAR UPPER BODY CLOTHING: A LITTLE
WALKING IN HOSPITAL ROOM: A LITTLE
WALKING IN HOSPITAL ROOM: A LOT
CLIMB 3 TO 5 STEPS WITH RAILING: A LOT
STANDING UP FROM CHAIR USING ARMS: A LOT
STANDING UP FROM CHAIR USING ARMS: A LITTLE
HELP NEEDED FOR BATHING: A LITTLE
TURNING FROM BACK TO SIDE WHILE IN FLAT BAD: A LITTLE
MOVING TO AND FROM BED TO CHAIR: A LITTLE
TOILETING: A LITTLE
PERSONAL GROOMING: A LITTLE
MOVING FROM LYING ON BACK TO SITTING ON SIDE OF FLAT BED WITH BEDRAILS: A LITTLE
MOBILITY SCORE: 18
CLIMB 3 TO 5 STEPS WITH RAILING: TOTAL
DRESSING REGULAR LOWER BODY CLOTHING: A LITTLE
DAILY ACTIVITIY SCORE: 20
DRESSING REGULAR LOWER BODY CLOTHING: A LITTLE
TURNING FROM BACK TO SIDE WHILE IN FLAT BAD: A LITTLE

## 2025-07-11 ASSESSMENT — LIFESTYLE VARIABLES
AUDIT-C TOTAL SCORE: 0
HOW OFTEN DO YOU HAVE A DRINK CONTAINING ALCOHOL: NEVER
AUDIT-C TOTAL SCORE: 0
HOW OFTEN DO YOU HAVE 6 OR MORE DRINKS ON ONE OCCASION: NEVER
PRESCIPTION_ABUSE_PAST_12_MONTHS: NO
HOW MANY STANDARD DRINKS CONTAINING ALCOHOL DO YOU HAVE ON A TYPICAL DAY: PATIENT DOES NOT DRINK
SKIP TO QUESTIONS 9-10: 1
SUBSTANCE_ABUSE_PAST_12_MONTHS: NO

## 2025-07-11 ASSESSMENT — ACTIVITIES OF DAILY LIVING (ADL)
ASSISTIVE_DEVICE: WALKER
ADEQUATE_TO_COMPLETE_ADL: YES
HEARING - RIGHT EAR: DIFFICULTY WITH NOISE
PATIENT'S MEMORY ADEQUATE TO SAFELY COMPLETE DAILY ACTIVITIES?: NO
HEARING - LEFT EAR: DIFFICULTY WITH NOISE
JUDGMENT_ADEQUATE_SAFELY_COMPLETE_DAILY_ACTIVITIES: YES
DRESSING YOURSELF: NEEDS ASSISTANCE
TOILETING: NEEDS ASSISTANCE
GROOMING: NEEDS ASSISTANCE
FEEDING YOURSELF: NEEDS ASSISTANCE
WALKS IN HOME: NEEDS ASSISTANCE
BATHING: NEEDS ASSISTANCE
LACK_OF_TRANSPORTATION: NO

## 2025-07-11 ASSESSMENT — PATIENT HEALTH QUESTIONNAIRE - PHQ9
1. LITTLE INTEREST OR PLEASURE IN DOING THINGS: NOT AT ALL
2. FEELING DOWN, DEPRESSED OR HOPELESS: NOT AT ALL
SUM OF ALL RESPONSES TO PHQ9 QUESTIONS 1 & 2: 0

## 2025-07-11 ASSESSMENT — PAIN - FUNCTIONAL ASSESSMENT: PAIN_FUNCTIONAL_ASSESSMENT: 0-10

## 2025-07-11 ASSESSMENT — PAIN SCALES - GENERAL: PAINLEVEL_OUTOF10: 0 - NO PAIN

## 2025-07-11 NOTE — PROGRESS NOTES
Pharmacy Medication History Review    Iban Bruce is a 75 y.o. male admitted for SOB (shortness of breath). Pharmacy reviewed the patient's [id number] medications and allergies for accuracy.    The list below reflectives the updated PTA list. Please review each medication in order reconciliation for additional clarification and justification.  Prior to Admission medications    Medication Sig Start Date End Date Taking? Authorizing Provider   allopurinol (Zyloprim) 100 mg tablet Take 1 tablet (100 mg) by mouth once daily. 1/21/25  Yes Historical Provider, MD   amLODIPine (Norvasc) 5 mg tablet Take 1 tablet (5 mg) by mouth once daily. 9/23/24  Yes Historical Provider, MD   aspirin 81 mg EC tablet Take 2 tablets (162 mg) by mouth once daily. 12/15/24  Yes Historical Provider, MD   cholecalciferol (Vitamin D-3) 25 mcg (1,000 units) tablet Take 1 tablet (25 mcg) by mouth once daily. 5/29/25  Yes Historical Provider, MD   finasteride (Proscar) 5 mg tablet Take 1 tablet (5 mg) by mouth once daily. 12/15/24  Yes Historical Provider, MD   fluticasone (Flonase) 50 mcg/actuation nasal spray Administer 2 sprays into each nostril once daily at bedtime. 4/17/25  Yes Historical Provider, MD Dalyyle Evelyn 3 Plus Sensor device 1 each every 14 (fourteen) days. 6/30/25  Yes Historical Provider, MD   insulin aspart (NovoLOG Flexpen U-100 Insulin) 100 unit/mL (3 mL) pen Inject 35 Units under the skin 3 times a day before meals. as directed 4/30/24  Yes Historical Provider, MD   insulin glargine (Lantus) 100 unit/mL injection Inject 35 Units under the skin 2 times a day. 3/31/25  Yes Historical Provider, MD   loratadine (Claritin) 10 mg tablet Take 1 tablet (10 mg) by mouth once daily. 10/23/24  Yes Historical Provider, MD   montelukast (Singulair) 10 mg tablet Take 1 tablet (10 mg) by mouth once daily at bedtime. 10/15/24  Yes Historical Provider, MD   oxygen (O2) gas therapy Inhale 4 L/min at 240,000 mL/hr continuously.    Yes Historical Provider, MD   rosuvastatin (Crestor) 10 mg tablet Take 1 tablet (10 mg) by mouth once daily at bedtime. for high cholesterol 10/27/24  Yes Historical Provider, MD   sildenafil (Viagra) 50 mg tablet Take 1 tablet (50 mg) by mouth every 24 (twenty four) hours if needed for erectile dysfunction. Take one hour prior to sexual activity 1/23/25  Yes Historical Provider, MD   tamsulosin (Flomax) 0.4 mg 24 hr capsule Take 2 capsules (0.8 mg) by mouth once daily at bedtime. 12/15/24  Yes Historical Provider, MD   torsemide (Demadex) 20 mg tablet Take 3 tablets (60 mg) by mouth once daily. 4/30/24  Yes Historical Provider, MD        The list below reflectives the updated allergy list. Please review each documented allergy for additional clarification and justification.  Allergies  Reviewed by Augustine Layne DO on 7/11/2025        Severity Reactions Comments    Fluorescein Medium Diarrhea, Nausea/vomiting     Niacin Medium Diarrhea, Unknown     Semaglutide Low GI Upset, Nausea Only             Below are additional concerns with the patient's PTA list.      Yusra Monahan

## 2025-07-11 NOTE — ED PROVIDER NOTES
EMERGENCY DEPARTMENT ENCOUNTER      Pt Name: Iban Bruce  MRN: [id number]  Birthdate 1950  Date of evaluation: 7/11/2025  Provider: Augustine Layne DO    CHIEF COMPLAINT       Chief Complaint   Patient presents with    Hypoxia        HISTORY OF PRESENT ILLNESS    Iban Bruce is a 75 y.o. male who presents to the emergency department with EMS due to hypoxia with increased oxygen requirement.  Patient is alert and oriented to person time and place.  Has no complaints at this time although does admit he has had increased work of breathing for quite some time.  Patient was at his routine nephrology appointment today and due to his work of breathing as well as increased oxygen requirement EMS was called for further evaluation.  He reports that he typically is on 4 L at baseline this was escalated to 6 L by EMS.  Patient endorses chronic lower extremity swelling which is unchanged.  No further related symptoms at this time.          Nursing Notes were reviewed.    REVIEW OF SYSTEMS     CONSTITUTIONAL: Denies fever, sweats, chills.   NEURO: Denies numbness, weakness, tingling, headache.   HEENT: Denies sore throat, rhinorrhea, changes in vision.   CARDIO: Denies chest pain, palpitations.  PULM: Endorses shortness of breath.  Denies cough.   GI: Denies abdominal pain, nausea, vomiting, diarrhea, constipation, melena, hematochezia.  : Denies painful urination, frequency, hematuria.   MSK: Denies recent trauma.   SKIN: Denies rash, lesions.   ENDOCRINE: Denies unexpected weight-loss.   HEME: Denies bleeding disorder.     PAST MEDICAL HISTORY   Medical History[1]    SURGICAL HISTORY     Surgical History[2]    ALLERGIES     Fluorescein, Niacin, and Semaglutide    FAMILY HISTORY     Family History[3]     SOCIAL HISTORY     Social History[4]    PHYSICAL EXAM   VS: As documented in the triage note from today's date and EMR flowsheet were reviewed.  Gen: Well developed. No acute distress. Seated in bed. Appears  nontoxic.   Skin: Warm. Dry. Intact. No rashes or lesions.  Eyes: Pupils equally round and reactive to light. Clear sclera.   HENT: Atraumatic appearance. Mucosal membranes moist. No oral lesions, uvula midline, airway patent.   CV: Regular rate and regular rhythm. S1, S2.  +2 bilateral pitting pedal edema. Warm extremities.  Resp: Increased work of breathing rhonchorous throughout saturating in the high 80s on 6 L nasal cannula.  GI: Soft and nontender. No rebound or guarding. Bowel sounds x4 present.  Marie sign McBurney's point tenderness is negative no CVA tenderness.  MSK: Symmetric muscle bulk. No joint swelling in the extremities. Compartments are soft. Neurovascularly intact x4 extremities. Radial pulses +2 equal bilaterally.  Pedal pulses +2 equal bilaterally.  Neuro: Alert. Speech fluent. Moving all extremities. No focal deficits.   Psych: Disheveled.    DIAGNOSTIC RESULTS   RADIOLOGY:   Non-plain film images such as CT, Ultrasound and MRI are read by the radiologist. Plain radiographic images are visualized and preliminarily interpreted by the emergency physician with the below findings: Chest x-ray shows vascular congestion.      Interpretation per the Radiologist below, if available at the time of this note:    CT head wo IV contrast   Final Result   1.  No acute intracranial hemorrhage or acute territorial infarct.   2.  Diffuse parenchymal volume loss. Chronic microvascular ischemic   changes.             MACRO:   None.        Signed by: Kassandra Sterling 7/11/2025 9:54 PM   Dictation workstation:   [id number]      XR chest 1 view   Final Result   Diffuse interstitial infiltrates bilaterally, as above. Clinical   correlation and continued follow-up until clearing is recommended.        MACRO:   None.        Signed by: Usama Jarvis 7/11/2025 12:44 PM   Dictation workstation:   [id number]            ED BEDSIDE ULTRASOUND:   Performed by ED Physician - none    LABS:  Labs Reviewed   CBC WITH AUTO  DIFFERENTIAL - Abnormal       Result Value    WBC 8.3      nRBC 0.0      RBC 3.81 (*)     Hemoglobin 10.4 (*)     Hematocrit 35.3 (*)     MCV 93      MCH 27.3      MCHC 29.5 (*)     RDW 16.1 (*)     Platelets 233      Neutrophils % 67.2      Immature Granulocytes %, Automated 0.4      Lymphocytes % 18.5      Monocytes % 10.3      Eosinophils % 3.0      Basophils % 0.6      Neutrophils Absolute 5.55 (*)     Immature Granulocytes Absolute, Automated 0.03      Lymphocytes Absolute 1.53      Monocytes Absolute 0.85 (*)     Eosinophils Absolute 0.25      Basophils Absolute 0.05     COMPREHENSIVE METABOLIC PANEL - Abnormal    Glucose 255 (*)     Sodium 138      Potassium 4.2      Chloride 103      Bicarbonate 25      Anion Gap 14      Urea Nitrogen 53 (*)     Creatinine 3.30 (*)     eGFR 19 (*)     Calcium 8.6      Albumin 3.7      Alkaline Phosphatase 114      Total Protein 7.7      AST 8 (*)     Bilirubin, Total 0.7      ALT 8 (*)    TROPONIN I, HIGH SENSITIVITY - Abnormal    Troponin I, High Sensitivity 441 (*)     Narrative:     Less than 99th percentile of normal range cutoff-  Female and children under 18 years old <14 ng/L; Male <21 ng/L: Negative  Repeat testing should be performed if clinically indicated.     Female and children under 18 years old 14-50 ng/L; Male 21-50 ng/L:  Consistent with possible cardiac damage and possible increased clinical   risk. Serial measurements may help to assess extent of myocardial damage.     >50 ng/L: Consistent with cardiac damage, increased clinical risk and  myocardial infarction. Serial measurements may help assess extent of   myocardial damage.      NOTE: Children less than 1 year old may have higher baseline troponin   levels and results should be interpreted in conjunction with the overall   clinical context.     NOTE: Troponin I testing is performed using a different   testing methodology at [facility] than at other   [facility]. Direct result  comparisons should only   be made within the same method.   B-TYPE NATRIURETIC PEPTIDE - Abnormal     (*)     Narrative:        <100 pg/mL - Heart failure unlikely  100-299 pg/mL - Intermediate probability of acute heart                  failure exacerbation. Correlate with clinical                  context and patient history.    >=300 pg/mL - Heart Failure likely. Correlate with clinical                  context and patient history.    BNP testing is performed using different testing methodology at [facility] than at other [facility]. Direct result comparisons should only be made within the same method.      BLOOD GAS VENOUS FULL PANEL - Abnormal    POCT pH, Venous 7.32 (*)     POCT pCO2, Venous 51      POCT pO2, Venous 38      POCT SO2, Venous 64      POCT Oxy Hemoglobin, Venous 61.4      POCT Hematocrit Calculated, Venous 35.0 (*)     POCT Sodium, Venous 138      POCT Potassium, Venous 4.4      POCT Chloride, Venous 103      POCT Ionized Calicum, Venous 1.19      POCT Glucose, Venous 259 (*)     POCT Lactate, Venous 0.7      POCT Base Excess, Venous -0.4      POCT HCO3 Calculated, Venous 26.3 (*)     POCT Hemoglobin, Venous 11.6 (*)     POCT Anion Gap, Venous 13.0      Patient Temperature 37.0      FiO2 44     D-DIMER, NON VTE - Abnormal    D-Dimer Non VTE, Quant (ng/mL FEU) 765 (*)     Narrative:     The D-Dimer assay is reported in ng/mL Fibrinogen Equivalent Units (FEU). The results of this assay should NOT be used for the exclusion of Deep Vein Thrombosis and/or Pulmonary Embolism.   TROPONIN I, HIGH SENSITIVITY - Abnormal    Troponin I, High Sensitivity 484 (*)     Narrative:     Less than 99th percentile of normal range cutoff-  Female and children under 18 years old <14 ng/L; Male <21 ng/L: Negative  Repeat testing should be performed if clinically indicated.     Female and children under 18 years old 14-50 ng/L; Male 21-50 ng/L:  Consistent with possible cardiac damage and  possible increased clinical   risk. Serial measurements may help to assess extent of myocardial damage.     >50 ng/L: Consistent with cardiac damage, increased clinical risk and  myocardial infarction. Serial measurements may help assess extent of   myocardial damage.      NOTE: Children less than 1 year old may have higher baseline troponin   levels and results should be interpreted in conjunction with the overall   clinical context.     NOTE: Troponin I testing is performed using a different   testing methodology at [facility] than at other   [facility]. Direct result comparisons should only   be made within the same method.   TROPONIN I, HIGH SENSITIVITY - Abnormal    Troponin I, High Sensitivity 517 (*)     Narrative:     Less than 99th percentile of normal range cutoff-  Female and children under 18 years old <14 ng/L; Male <21 ng/L: Negative  Repeat testing should be performed if clinically indicated.     Female and children under 18 years old 14-50 ng/L; Male 21-50 ng/L:  Consistent with possible cardiac damage and possible increased clinical   risk. Serial measurements may help to assess extent of myocardial damage.     >50 ng/L: Consistent with cardiac damage, increased clinical risk and  myocardial infarction. Serial measurements may help assess extent of   myocardial damage.      NOTE: Children less than 1 year old may have higher baseline troponin   levels and results should be interpreted in conjunction with the overall   clinical context.     NOTE: Troponin I testing is performed using a different   testing methodology at [facility] than at other   [facility]. Direct result comparisons should only   be made within the same method.   TROPONIN I, HIGH SENSITIVITY - Abnormal    Troponin I, High Sensitivity 530 (*)     Narrative:     Less than 99th percentile of normal range cutoff-  Female and children under 18 years old <14 ng/L; Male <21 ng/L: Negative  Repeat  testing should be performed if clinically indicated.     Female and children under 18 years old 14-50 ng/L; Male 21-50 ng/L:  Consistent with possible cardiac damage and possible increased clinical   risk. Serial measurements may help to assess extent of myocardial damage.     >50 ng/L: Consistent with cardiac damage, increased clinical risk and  myocardial infarction. Serial measurements may help assess extent of   myocardial damage.      NOTE: Children less than 1 year old may have higher baseline troponin   levels and results should be interpreted in conjunction with the overall   clinical context.     NOTE: Troponin I testing is performed using a different   testing methodology at [facility] than at other   [facility]. Direct result comparisons should only   be made within the same method.   POCT GLUCOSE - Abnormal    POCT Glucose 150 (*)    MAGNESIUM - Normal    Magnesium 2.37     SARS-COV-2 AND INFLUENZA A/B PCR - Normal    Flu A Result Not Detected      Flu B Result Not Detected      Coronavirus 2019, PCR Not Detected      Narrative:     This assay is an FDA-cleared, in vitro diagnostic nucleic acid amplification test for the qualitative detection and differentiation of SARS CoV-2/ Influenza A/B from nasopharyngeal specimens collected from individuals with signs and symptoms of respiratory tract infections, and has been validated for use at [facility]. Negative results do not preclude COVID-19/ Influenza A/B infections and should not be used as the sole basis for diagnosis, treatment, or other management decisions. Testing for SARS CoV-2 is recommended only for patients who meet current clinical and/or epidemiological criteria defined by federal, state, or local public health directives.   PROCALCITONIN   CBC   BASIC METABOLIC PANEL   MAGNESIUM   TROPONIN I, HIGH SENSITIVITY       All other labs were within normal range or not returned as of this  dictation.    EMERGENCY DEPARTMENT COURSE/MDM:   Vitals:    Vitals:    07/11/25 1749 07/11/25 1812 07/11/25 1837 07/11/25 1900   BP:  126/54  99/54   BP Location:    Right arm   Patient Position:    Lying   Pulse:  68  66   Resp:    20   Temp:  35.9 °C (96.6 °F)  37.1 °C (98.8 °F)   TempSrc:    Temporal   SpO2: 99% 99% 99% 97%   Weight:       Height:           I reviewed the patient's triage vitals and they are hypoxic and tachypneic did request patient be placed on high flow nasal cannula for support.  Hypertensive recommend follow-up with primary physician for repeat checks.    Due to the above findings the following was ordered cardiac workup to include BNP as well as high flow nasal cannula and VBG.  D-dimer to be included.    Lab work shows age-adjusted D-dimer appropriate making PE less likely.  Patient's VBG is reassuring.  There are no self electrolyte derangements.  Patient does have mild SAIMA on CKD comparatively to outside records.  Will begin to diurese with elevated BNP.  I do suspect heart failure exacerbation.  No leukocytosis making infectious etiology less likely.  There is a mild anemia although no active signs of bleeding recommended close monitoring.  Patient's cardiac troponin is elevated I do suspect this is likely secondary to demand/hypoxia.  Patient has no chest pain no acute injury pattern on EKG doubt atypical ACS.  Spoke with the admitting hospitalist is agreed to accept the patient she did take over care at time of admission order.    Patient is resting comfortably on 50/50 high flow nasal cannula work of breathing has improved significantly.    ED Course as of 07/11/25 2219 Fri Jul 11, 2025   1256 Interpreted by the Emergency Department Attending: ECG revealed normal sinus rhythm at a rate of 83 beats per minute with PA interval 188 , QRS of 158 , QTc of 502.  No acute injury pattern.  No previous EKG to compare. [MG]      ED Course User Index  [MG] Augustine Layne DO          Diagnoses as of 07/11/25 2219   SOB (shortness of breath)   Hypoxia   Hypervolemia, unspecified hypervolemia type       Patient was counseled regarding labs, imaging, likely diagnosis, and plan. All questions were answered.     ------------------------------------------------------------------  Information provided by the patient had EMS  Consults hospitalist  Past medical history complicating workup heart failure  Previous medical records reviewed office visit 5/15/2025, office visit today as well.  Considered CTA of the chest although low concern for PE age-adjusted D-dimer appropriate, also considered heparinization although doubt ACS.  Shared medical decision making patient agreeable with hospitalization for continued workup and treatment.  ------------------------------------------------------------------  ED Medications administered this visit:    Medications   allopurinol (Zyloprim) tablet 100 mg (100 mg oral Given 7/11/25 1526)   amLODIPine (Norvasc) tablet 5 mg (5 mg oral Given 7/11/25 1514)   aspirin EC tablet 162 mg (162 mg oral Given 7/11/25 1514)   cholecalciferol (Vitamin D-3) tablet 25 mcg (25 mcg oral Given 7/11/25 1514)   finasteride (Proscar) tablet 5 mg (5 mg oral Given 7/11/25 1526)   fluticasone (Flonase) nasal spray 2 spray (2 sprays Each Nostril Given 7/11/25 2113)   cetirizine (ZyrTEC) tablet 10 mg (10 mg oral Given 7/11/25 1514)   montelukast (Singulair) tablet 10 mg (10 mg oral Given 7/11/25 2113)   rosuvastatin (Crestor) tablet 10 mg (10 mg oral Given 7/11/25 2113)   tamsulosin (Flomax) 24 hr capsule 0.8 mg (0.8 mg oral Given 7/11/25 2113)   torsemide (Demadex) tablet 60 mg ( oral Dose Auto Held 7/15/25 0900)   acetaminophen (Tylenol) tablet 650 mg (has no administration in time range)   melatonin tablet 3 mg (has no administration in time range)   polyethylene glycol (Glycolax, Miralax) packet 17 g (has no administration in time range)   heparin (porcine) injection 7,500 Units (7,500  Units subcutaneous Given 7/11/25 2113)   furosemide (Lasix) injection 40 mg (40 mg intravenous Given 7/11/25 1654)   oxygen (O2) therapy (45 L/min inhalation Rate Verify Medical Gas 7/11/25 1837)   insulin lispro injection 0-10 Units (has no administration in time range)   insulin glargine (Lantus) injection 15 Units (15 Units subcutaneous Given 7/11/25 2113)   furosemide (Lasix) injection 40 mg (40 mg intravenous Given 7/11/25 1342)        Final Impression:   1. SOB (shortness of breath)    2. Hypoxia    3. Hypervolemia, unspecified hypervolemia type          Augustine Layne DO    (Please note that portions of this note were completed with a voice recognition program.  Efforts were made to edit the dictations but occasionally words are mis-transcribed.)       [1] No past medical history on file.  [2] No past surgical history on file.  [3] No family history on file.  [4]   Social History  Socioeconomic History    Marital status: Unknown     Social Drivers of Health     Financial Resource Strain: Low Risk  (7/11/2025)    Overall Financial Resource Strain (CARDIA)     Difficulty of Paying Living Expenses: Not hard at all   Food Insecurity: No Food Insecurity (12/2/2024)    Received from [facility]    Hunger Vital Sign     Worried About Running Out of Food in the Last Year: Never true     Ran Out of Food in the Last Year: Never true   Transportation Needs: No Transportation Needs (7/11/2025)    PRAPARE - Transportation     Lack of Transportation (Medical): No     Lack of Transportation (Non-Medical): No   Physical Activity: Inactive (1/6/2023)    Received from [facility]    Exercise Vital Sign     Days of Exercise per Week: 0 days     Minutes of Exercise per Session: 0 min   Intimate Partner Violence: Not At Risk (7/11/2025)    Humiliation, Afraid, Rape, and Kick questionnaire     Fear of Current or Ex-Partner: No     Emotionally Abused: No     Physically Abused: No      Sexually Abused: No   Housing Stability: Low Risk  (7/11/2025)    Housing Stability Vital Sign     Unable to Pay for Housing in the Last Year: No     Number of Times Moved in the Last Year: 0     Homeless in the Last Year: No        Augustine Layne DO  07/11/25 2203

## 2025-07-11 NOTE — H&P
"Hospital Medicine History & Physical    Subjective:  Iban Bruce is a 75 y.o. male with DM, CKD 4, HFpEF, HTN, HLD, CAD, BPH, chronic respiratory failure on 4L NC, gout, VELVET, who presents with progressively worsening sob & nonproductive cough. Pt sent in from his nephrology appt as he was 78% on RA. Pt states he got a cold about 5 days ago and his sympts have been worsening since. Leg edema worse than baseline. He denies any fevers, chills, CP, or other sympts.       A 10 point ROS was completed and is negative expect as stated in HPI.     PMHx: As above  PSHx: appendectomy   Social Hx: never smoker, denies alcohol use, denies illicit drug use  Family Hx: reviewed    Objective:    /54 (BP Location: Right arm, Patient Position: Lying)   Pulse 66   Temp 36.2 °C (97.2 °F) (Temporal)   Resp 20   Ht 1.676 m (5' 6\")   Wt 125 kg (275 lb 9.2 oz)   SpO2 99%   BMI 44.48 kg/m²     Physical Exam:  General: A&Ox3, no distress, obese  HEENT: NC/AT, clear sclera, MMM  NECK: Supple  Cardiovascular: RRR, no murmurs. S1/S2  Respiratory: Diminished, no RRW or crackles. No distress  Abdomen: Soft, ND, non-tender  Extremities: Lymphedema, chronic venous stasis changes  Neurological: No focal deficits  Skin: Warm and dry, no rashes  Psych: appropriate mood and affect    I personally reviewed all imaging, labs and notes/ documentation.     Assessment & Plan:    Acute on chronic hypoxic resp failure  Decompensated HFpEF  Elevated troponin, suspect demand ischemia    DM  CKD 4  HTN, HLD  CAD  BPH  Gout  VELVET    Wean Airvo as able, pt normally on 4L NC at baseline, IV lasix 40mg BID, monitor strict I&Os, electrolytes, fluid/ salt restricted diet, consult cards  Continue to trend trop to peak, EKG with some TWIs  Check viral swabs  Cr near baseline, monitor labs  Resume home meds, hold torsemide. Dec insulin regimen, monitor BG    DVT Prophylaxis: heparin, SCDs  Code Status: DNR and No Intubation confirmed with pt  Disposition: " Stepelvia Mcpherson,   Hospitalist

## 2025-07-11 NOTE — ED TRIAGE NOTES
Patient BIBA from nephrology appointment for Hypoxia. Per EMS staff reports patient was in office for a routine appointment on assessment staff reports Sp02 78 on R/A. Patient wear 4LPM n/c at baseline. EMS placed patient on 6LPM n/c with sp02 95. Patient presents with BLE edema. Patient endorses SOB, nonproductive cough and SOB on exertion. Denies fevers, chills, and CP.

## 2025-07-12 LAB
ANION GAP SERPL CALC-SCNC: 12 MMOL/L (ref 10–20)
ATRIAL RATE: 83 BPM
BUN SERPL-MCNC: 54 MG/DL (ref 6–23)
CALCIUM SERPL-MCNC: 8.3 MG/DL (ref 8.6–10.3)
CARDIAC TROPONIN I PNL SERPL HS: 544 NG/L (ref 0–20)
CARDIAC TROPONIN I PNL SERPL HS: 682 NG/L (ref 0–20)
CHLORIDE SERPL-SCNC: 107 MMOL/L (ref 98–107)
CO2 SERPL-SCNC: 28 MMOL/L (ref 21–32)
CREAT SERPL-MCNC: 3.4 MG/DL (ref 0.5–1.3)
CREAT UR-MCNC: 32.9 MG/DL (ref 20–370)
CREAT UR-MCNC: 32.9 MG/DL (ref 20–370)
EGFRCR SERPLBLD CKD-EPI 2021: 18 ML/MIN/1.73M*2
ERYTHROCYTE [DISTWIDTH] IN BLOOD BY AUTOMATED COUNT: 16 % (ref 11.5–14.5)
GLUCOSE BLD MANUAL STRIP-MCNC: 142 MG/DL (ref 74–99)
GLUCOSE BLD MANUAL STRIP-MCNC: 171 MG/DL (ref 74–99)
GLUCOSE BLD MANUAL STRIP-MCNC: 203 MG/DL (ref 74–99)
GLUCOSE BLD MANUAL STRIP-MCNC: 67 MG/DL (ref 74–99)
GLUCOSE SERPL-MCNC: 51 MG/DL (ref 74–99)
HCT VFR BLD AUTO: 31.3 % (ref 41–52)
HGB BLD-MCNC: 9.3 G/DL (ref 13.5–17.5)
MAGNESIUM SERPL-MCNC: 2.37 MG/DL (ref 1.6–2.4)
MCH RBC QN AUTO: 27.4 PG (ref 26–34)
MCHC RBC AUTO-ENTMCNC: 29.7 G/DL (ref 32–36)
MCV RBC AUTO: 92 FL (ref 80–100)
MICROALBUMIN UR-MCNC: 59.2 MG/L
MICROALBUMIN/CREAT UR: 179.9 UG/MG CREAT
NRBC BLD-RTO: 0 /100 WBCS (ref 0–0)
P AXIS: 25 DEGREES
P OFFSET: 154 MS
P ONSET: 97 MS
PLATELET # BLD AUTO: 207 X10*3/UL (ref 150–450)
POTASSIUM SERPL-SCNC: 4.4 MMOL/L (ref 3.5–5.3)
PR INTERVAL: 188 MS
PROCALCITONIN SERPL-MCNC: 0.28 NG/ML
Q ONSET: 191 MS
QRS COUNT: 14 BEATS
QRS DURATION: 158 MS
QT INTERVAL: 428 MS
QTC CALCULATION(BAZETT): 502 MS
QTC FREDERICIA: 477 MS
R AXIS: -45 DEGREES
RBC # BLD AUTO: 3.39 X10*6/UL (ref 4.5–5.9)
SODIUM SERPL-SCNC: 143 MMOL/L (ref 136–145)
SODIUM UR-SCNC: 97 MMOL/L
SODIUM/CREAT UR-RTO: 295 MMOL/G CREAT
T AXIS: 47 DEGREES
T OFFSET: 405 MS
VENTRICULAR RATE: 83 BPM
WBC # BLD AUTO: 6.9 X10*3/UL (ref 4.4–11.3)

## 2025-07-12 PROCEDURE — 84484 ASSAY OF TROPONIN QUANT: CPT | Performed by: INTERNAL MEDICINE

## 2025-07-12 PROCEDURE — 2060000001 HC INTERMEDIATE ICU ROOM DAILY

## 2025-07-12 PROCEDURE — 36415 COLL VENOUS BLD VENIPUNCTURE: CPT | Performed by: INTERNAL MEDICINE

## 2025-07-12 PROCEDURE — 82570 ASSAY OF URINE CREATININE: CPT | Performed by: INTERNAL MEDICINE

## 2025-07-12 PROCEDURE — 82947 ASSAY GLUCOSE BLOOD QUANT: CPT

## 2025-07-12 PROCEDURE — 2500000005 HC RX 250 GENERAL PHARMACY W/O HCPCS: Performed by: INTERNAL MEDICINE

## 2025-07-12 PROCEDURE — 2500000004 HC RX 250 GENERAL PHARMACY W/ HCPCS (ALT 636 FOR OP/ED): Performed by: INTERNAL MEDICINE

## 2025-07-12 PROCEDURE — 84300 ASSAY OF URINE SODIUM: CPT | Performed by: INTERNAL MEDICINE

## 2025-07-12 PROCEDURE — 80048 BASIC METABOLIC PNL TOTAL CA: CPT | Performed by: INTERNAL MEDICINE

## 2025-07-12 PROCEDURE — 99223 1ST HOSP IP/OBS HIGH 75: CPT | Performed by: STUDENT IN AN ORGANIZED HEALTH CARE EDUCATION/TRAINING PROGRAM

## 2025-07-12 PROCEDURE — 2500000002 HC RX 250 W HCPCS SELF ADMINISTERED DRUGS (ALT 637 FOR MEDICARE OP, ALT 636 FOR OP/ED): Performed by: INTERNAL MEDICINE

## 2025-07-12 PROCEDURE — 83735 ASSAY OF MAGNESIUM: CPT | Performed by: INTERNAL MEDICINE

## 2025-07-12 PROCEDURE — 2500000001 HC RX 250 WO HCPCS SELF ADMINISTERED DRUGS (ALT 637 FOR MEDICARE OP): Performed by: INTERNAL MEDICINE

## 2025-07-12 PROCEDURE — 85027 COMPLETE CBC AUTOMATED: CPT | Performed by: INTERNAL MEDICINE

## 2025-07-12 PROCEDURE — 99232 SBSQ HOSP IP/OBS MODERATE 35: CPT | Performed by: INTERNAL MEDICINE

## 2025-07-12 RX ORDER — DEXTROSE 50 % IN WATER (D50W) INTRAVENOUS SYRINGE
12.5
Status: DISCONTINUED | OUTPATIENT
Start: 2025-07-12 | End: 2025-07-21 | Stop reason: HOSPADM

## 2025-07-12 RX ORDER — INSULIN LISPRO 100 [IU]/ML
0-10 INJECTION, SOLUTION INTRAVENOUS; SUBCUTANEOUS
Status: DISCONTINUED | OUTPATIENT
Start: 2025-07-12 | End: 2025-07-13

## 2025-07-12 RX ORDER — INSULIN GLARGINE 100 [IU]/ML
10 INJECTION, SOLUTION SUBCUTANEOUS NIGHTLY
Status: DISCONTINUED | OUTPATIENT
Start: 2025-07-12 | End: 2025-07-13

## 2025-07-12 RX ORDER — FUROSEMIDE 10 MG/ML
80 INJECTION INTRAMUSCULAR; INTRAVENOUS EVERY 8 HOURS
Status: DISCONTINUED | OUTPATIENT
Start: 2025-07-12 | End: 2025-07-21

## 2025-07-12 RX ORDER — DEXTROSE 50 % IN WATER (D50W) INTRAVENOUS SYRINGE
25
Status: DISCONTINUED | OUTPATIENT
Start: 2025-07-12 | End: 2025-07-21 | Stop reason: HOSPADM

## 2025-07-12 RX ADMIN — ALLOPURINOL 100 MG: 100 TABLET ORAL at 09:56

## 2025-07-12 RX ADMIN — CETIRIZINE HYDROCHLORIDE 10 MG: 10 TABLET, FILM COATED ORAL at 10:00

## 2025-07-12 RX ADMIN — ASPIRIN 162 MG: 81 TABLET, COATED ORAL at 10:00

## 2025-07-12 RX ADMIN — HEPARIN SODIUM 7500 UNITS: 5000 INJECTION, SOLUTION INTRAVENOUS; SUBCUTANEOUS at 14:36

## 2025-07-12 RX ADMIN — HEPARIN SODIUM 7500 UNITS: 5000 INJECTION, SOLUTION INTRAVENOUS; SUBCUTANEOUS at 06:13

## 2025-07-12 RX ADMIN — FUROSEMIDE 80 MG: 10 INJECTION, SOLUTION INTRAMUSCULAR; INTRAVENOUS at 17:40

## 2025-07-12 RX ADMIN — AMLODIPINE BESYLATE 5 MG: 5 TABLET ORAL at 09:59

## 2025-07-12 RX ADMIN — HEPARIN SODIUM 7500 UNITS: 5000 INJECTION, SOLUTION INTRAVENOUS; SUBCUTANEOUS at 22:09

## 2025-07-12 RX ADMIN — Medication 40 L/MIN: at 20:00

## 2025-07-12 RX ADMIN — INSULIN GLARGINE 10 UNITS: 100 INJECTION, SOLUTION SUBCUTANEOUS at 21:20

## 2025-07-12 RX ADMIN — INSULIN LISPRO 4 UNITS: 100 INJECTION, SOLUTION INTRAVENOUS; SUBCUTANEOUS at 21:20

## 2025-07-12 RX ADMIN — MONTELUKAST 10 MG: 10 TABLET, FILM COATED ORAL at 21:20

## 2025-07-12 RX ADMIN — FLUTICASONE PROPIONATE 2 SPRAY: 50 SPRAY, METERED NASAL at 21:21

## 2025-07-12 RX ADMIN — Medication 25 MCG: at 10:00

## 2025-07-12 RX ADMIN — FINASTERIDE 5 MG: 5 TABLET, FILM COATED ORAL at 10:00

## 2025-07-12 RX ADMIN — TAMSULOSIN HYDROCHLORIDE 0.8 MG: 0.4 CAPSULE ORAL at 21:20

## 2025-07-12 RX ADMIN — ROSUVASTATIN CALCIUM 10 MG: 10 TABLET, FILM COATED ORAL at 21:20

## 2025-07-12 RX ADMIN — FUROSEMIDE 40 MG: 10 INJECTION, SOLUTION INTRAVENOUS at 10:00

## 2025-07-12 RX ADMIN — INSULIN LISPRO 2 UNITS: 100 INJECTION, SOLUTION INTRAVENOUS; SUBCUTANEOUS at 16:08

## 2025-07-12 RX ADMIN — ACETAMINOPHEN 650 MG: 325 TABLET ORAL at 01:36

## 2025-07-12 ASSESSMENT — COGNITIVE AND FUNCTIONAL STATUS - GENERAL
TOILETING: A LITTLE
HELP NEEDED FOR BATHING: A LITTLE
MOVING TO AND FROM BED TO CHAIR: A LOT
MOVING FROM LYING ON BACK TO SITTING ON SIDE OF FLAT BED WITH BEDRAILS: A LITTLE
PERSONAL GROOMING: A LITTLE
MOBILITY SCORE: 13
TURNING FROM BACK TO SIDE WHILE IN FLAT BAD: A LITTLE
HELP NEEDED FOR BATHING: A LITTLE
DRESSING REGULAR LOWER BODY CLOTHING: A LITTLE
CLIMB 3 TO 5 STEPS WITH RAILING: TOTAL
MOVING TO AND FROM BED TO CHAIR: A LOT
PERSONAL GROOMING: A LITTLE
PERSONAL GROOMING: A LITTLE
TURNING FROM BACK TO SIDE WHILE IN FLAT BAD: A LITTLE
MOVING FROM LYING ON BACK TO SITTING ON SIDE OF FLAT BED WITH BEDRAILS: A LITTLE
DRESSING REGULAR LOWER BODY CLOTHING: A LITTLE
TOILETING: A LITTLE
MOBILITY SCORE: 13
WALKING IN HOSPITAL ROOM: A LOT
DAILY ACTIVITIY SCORE: 20
WALKING IN HOSPITAL ROOM: A LOT
STANDING UP FROM CHAIR USING ARMS: A LOT
DAILY ACTIVITIY SCORE: 19
DRESSING REGULAR LOWER BODY CLOTHING: A LITTLE
CLIMB 3 TO 5 STEPS WITH RAILING: TOTAL
STANDING UP FROM CHAIR USING ARMS: A LOT
TOILETING: A LITTLE
DRESSING REGULAR UPPER BODY CLOTHING: A LITTLE

## 2025-07-12 ASSESSMENT — PAIN SCALES - GENERAL
PAINLEVEL_OUTOF10: 0 - NO PAIN
PAINLEVEL_OUTOF10: 3
PAINLEVEL_OUTOF10: 0 - NO PAIN

## 2025-07-12 ASSESSMENT — PAIN - FUNCTIONAL ASSESSMENT
PAIN_FUNCTIONAL_ASSESSMENT: 0-10

## 2025-07-12 ASSESSMENT — PAIN DESCRIPTION - LOCATION: LOCATION: GENERALIZED

## 2025-07-12 NOTE — PROGRESS NOTES
"Hospital Medicine Progress Note    Subjective:  Iban Bruce is a 75 y.o. male, who is hospital day 1.     Patient feels a little better today, leg edema is improving. No new concerns.     Objective:    /57 (BP Location: Right arm, Patient Position: Sitting)   Pulse 66   Temp 35.4 °C (95.7 °F) (Temporal)   Resp 14   Ht 1.676 m (5' 6\")   Wt 126 kg (278 lb)   SpO2 98%   BMI 44.87 kg/m²     Physical Exam:  General: A&Ox3, no distress, obese  HEENT: NC/AT, clear sclera, MMM  NECK: Supple  Cardiovascular: RRR, no murmurs. S1/S2  Respiratory: Diminished, no RRW or crackles. No distress  Abdomen: Soft, ND, non-tender  Extremities: Lymphedema, chronic venous stasis changes, edema improving  Neurological: No focal deficits  Skin: Warm and dry, no rashes  Psych: appropriate mood and affect    I personally reviewed all imaging, labs and notes/ documentation.     Assessment & Plan:    Acute on chronic hypoxic resp failure  Decompensated HFpEF  Elevated troponin, suspect demand ischemia    DM  CKD 4  HTN, HLD  CAD  BPH  Gout  VELVET    Wean Airvo as able, pt normally on 4L NC at baseline, monitor strict I&Os, electrolytes, fluid/ salt restricted diet, cards & nephro consulted, IV lasix 80mg TID  Trop peaked at 682, no CP or concerning EKG changes  Cr near baseline, monitor labs  Resume home meds, hold torsemide. Dec insulin regimen, monitor BG    DVT Prophylaxis: heparin, SCDs  Code Status: DNR and No Intubation confirmed with pt  Disposition: Stepdown       Jacquelyn Mcpherson DO  Hospitalist   "

## 2025-07-12 NOTE — ED PROCEDURE NOTE
Procedure  Critical Care    Performed by: Augustine Layne DO  Authorized by: Augustine Layne DO    Critical care provider statement:     Critical care time (minutes):  33    Critical care time was exclusive of:  Teaching time and separately billable procedures and treating other patients    Critical care was necessary to treat or prevent imminent or life-threatening deterioration of the following conditions:  Other (use comment box to enter another option) (Hypoxia, fluid overload)    Critical care was time spent personally by me on the following activities:  Ordering and performing treatments and interventions, ordering and review of laboratory studies, ordering and review of radiographic studies, pulse oximetry, re-evaluation of patient's condition, review of old charts, examination of patient and evaluation of patient's response to treatment    Care discussed with: admitting provider                 Augustine Layne DO  07/11/25 8240

## 2025-07-12 NOTE — CONSULTS
Cardiology Consultation- New Consult    Assessment and Plan:   Problem List[1]   Acute on chronic hypoxic resp failure  Decompensated HFpEF  Elevated troponin     DM  CKD 4  HTN, HLD  CAD  BPH  Gout  VELVET    His EKG shows sinus rhythm with right bundle yaneli block. No significant ST-T changes  Echocardiogram in November 2024 done at [facility] is reported to show ejection fraction of 60 to 65% with impaired relaxation pattern with mild mitral regurgitation and elevated RVSP at 45 mmHg.  He was also reported to have mild aortic regurgitation with aortic valve sclerosis.  Patient troponin has increased from initial of 441 to now 682 from yesterday.  Currently denies having chest pain.  Patient does have significant CKD with creatinine around 3.4 now.  His sugar is low this morning at 51.  He is shivering and is in multiple blankets.    Will obtain an echocardiogram to reevaluate cardiac function and valves considering his shortness of breath and known valvular abnormalities and now also elevated troponin.  Will recheck troponin to see where the peak is.  Currently chest pain-free and it is likely that this troponin increase is type II myocardial infarction in the setting of demand ischemia mismatch especially with his significant CKD.    Will continue with baby aspirin and statin.  Agree to hold torsemide for now and giving IV Lasix and monitor renal function and volume status closely.  Recommend nephrology consultation to establish care and further evaluation of his CKD.  Pulmonary and diabetes management per primary team.  I will follow-up with the patient tomorrow.    Thank you for the cardiology consult. We will follow with you.     Jose Ríos MD, PhD, FAC, [facility]  Interventional Cardiology, Senior Attending Physician,  [facility] Catheterization Laboratory Medical Director,  [facility]  Associate Professor of Medicine,   [facility]  Office: [phone]      Reason for referral: Shortness of breath    HPI: Iban Bruce is a 75 y.o. male with DM, CKD 4, HFpEF, HTN, HLD, CAD, BPH, chronic respiratory failure on 4L NC, gout, VELVET, who presents with progressively worsening sob & nonproductive cough. Pt sent in from his nephrology appt as he was 78% on RA. Pt states he got a cold about 5 days ago and his sympts have been worsening since. Leg edema worse than baseline. He denies any fevers, chills, CP, or other sympts.         A 10 point ROS was completed and is negative expect as stated in HPI.      PMHx: As above  PSHx: appendectomy   Social Hx: never smoker, denies alcohol use, denies illicit drug use  Family Hx: reviewed        Past Medical History:   Medical History[2]     Surgical History:   Surgical History[3]    Social History:   Social History     Socioeconomic History    Marital status: Unknown     Spouse name: Not on file    Number of children: Not on file    Years of education: Not on file    Highest education level: Not on file   Occupational History    Not on file   Tobacco Use    Smoking status: Not on file    Smokeless tobacco: Not on file   Substance and Sexual Activity    Alcohol use: Not on file    Drug use: Not on file    Sexual activity: Not on file   Other Topics Concern    Not on file   Social History Narrative    Not on file     Social Drivers of Health     Financial Resource Strain: Low Risk  (7/11/2025)    Overall Financial Resource Strain (CARDIA)     Difficulty of Paying Living Expenses: Not hard at all   Food Insecurity: No Food Insecurity (12/2/2024)    Received from [facility]    Hunger Vital Sign     Worried About Running Out of Food in the Last Year: Never true     Ran Out of Food in the Last Year: Never true   Transportation Needs: No Transportation Needs (7/11/2025)    PRAPARE - Transportation     Lack of Transportation (Medical): No     Lack of Transportation (Non-Medical): No   Physical Activity: Inactive  "(1/6/2023)    Received from [facility]    Exercise Vital Sign     Days of Exercise per Week: 0 days     Minutes of Exercise per Session: 0 min   Stress: Not on file   Social Connections: Not on file   Intimate Partner Violence: Not At Risk (7/11/2025)    Humiliation, Afraid, Rape, and Kick questionnaire     Fear of Current or Ex-Partner: No     Emotionally Abused: No     Physically Abused: No     Sexually Abused: No   Housing Stability: Low Risk  (7/11/2025)    Housing Stability Vital Sign     Unable to Pay for Housing in the Last Year: No     Number of Times Moved in the Last Year: 0     Homeless in the Last Year: No        Family History:   Family History[4]    Allergies:  Fluorescein, Niacin, and Semaglutide     I reviewed available prior Cardiovascular Testing.      Review of Systems:    12 system point of review is negative except for what described in history of present illness    Objective     Outpatient Medications:  Current Medications[5]     Last Recorded Vitals  /57 (BP Location: Right arm, Patient Position: Sitting)   Pulse 66   Temp 35.4 °C (95.7 °F) (Temporal)   Resp 14   Ht 1.676 m (5' 6\")   Wt 126 kg (278 lb)   SpO2 98%   BMI 44.87 kg/m²     Physical Exam:    General: A&Ox3, no distress, obese  HEENT: NC/AT, clear sclera, MMM  NECK: Supple  Cardiovascular: RRR, no murmurs. S1/S2  Respiratory: Diminished, no RRW or crackles. No distress  Abdomen: Soft, ND, non-tender  Extremities: Lymphedema, chronic venous stasis changes  Neurological: No focal deficits  Skin: Warm and dry, no rashes  Psych: appropriate mood and affect    Vitals:    07/12/25 0500 07/12/25 0600 07/12/25 0800 07/12/25 0820   BP:   118/57    BP Location:   Right arm    Patient Position:   Sitting    Pulse:   66    Resp:   14    Temp:   35.4 °C (95.7 °F)    TempSrc:   Temporal    SpO2: 92%  99% 98%   Weight:  126 kg (278 lb)     Height:            7 Day Weight Change: Unable to Calculate " "      Lab/Radiology/Diagnostic Review:      Lab Results   Component Value Date    WBC 6.9 07/12/2025    HGB 9.3 (L) 07/12/2025    HCT 31.3 (L) 07/12/2025     07/12/2025    ALT 8 (L) 07/11/2025    AST 8 (L) 07/11/2025     07/12/2025    K 4.4 07/12/2025     07/12/2025    CREATININE 3.40 (H) 07/12/2025    BUN 54 (H) 07/12/2025    CO2 28 07/12/2025    HGBA1C 8.4 (H) 05/13/2024     No results found for: \"CKTOTAL\", \"CKMB\", \"CKMBINDEX\", \"TROPONINI\"   No results found for: \"INR\", \"PROTIME\"       Assessment and Plan:   Problem List[6]             Macro dragon disclaimer: This note was dictated by speech recognition. Minor errors in transcription may be present.           [1]   Patient Active Problem List  Diagnosis    SOB (shortness of breath)   [2] No past medical history on file.  [3] No past surgical history on file.  [4] No family history on file.  [5]   Current Facility-Administered Medications:     acetaminophen (Tylenol) tablet 650 mg, 650 mg, oral, q6h PRN, 650 mg at 07/12/25 0136 **OR** [DISCONTINUED] acetaminophen (Tylenol) oral liquid 650 mg, 650 mg, nasogastric tube, q6h PRN **OR** [DISCONTINUED] acetaminophen (Tylenol) suppository 650 mg, 650 mg, rectal, q6h PRN, Jacquelyn Fedchik, DO    allopurinol (Zyloprim) tablet 100 mg, 100 mg, oral, Daily, Jacquelyn Fedchik, DO, 100 mg at 07/12/25 0956    amLODIPine (Norvasc) tablet 5 mg, 5 mg, oral, Daily, Jacquelyn Fedchik, DO, 5 mg at 07/12/25 0959    aspirin EC tablet 162 mg, 162 mg, oral, Daily, Jacquelyn Fedchik, DO, 162 mg at 07/12/25 1000    cetirizine (ZyrTEC) tablet 10 mg, 10 mg, oral, Daily, Jacquelyn Mcpherson DO, 10 mg at 07/11/25 1514    cholecalciferol (Vitamin D-3) tablet 25 mcg, 25 mcg, oral, Daily, Jacquelyn Mcpherson DO, 25 mcg at 07/11/25 1514    dextrose 50 % injection 12.5 g, 12.5 g, intravenous, q15 min PRN, Jacquelyn Mcpherson DO    dextrose 50 % injection 25 g, 25 g, intravenous, q15 min PRN, Jacquelyn Mcpherson DO    finasteride (Proscar) tablet 5 mg, 5 " mg, oral, Daily, Jacquelyn Fedchik, DO, 5 mg at 07/11/25 1526    fluticasone (Flonase) nasal spray 2 spray, 2 spray, Each Nostril, Nightly, Jacquelyn Fedchik, DO, 2 spray at 07/11/25 2113    furosemide (Lasix) injection 40 mg, 40 mg, intravenous, BID, Jacquelyn Fedchik, DO, 40 mg at 07/11/25 1654    glucagon (Glucagen) injection 1 mg, 1 mg, intramuscular, q15 min PRN, Jacquelyn Fedchik, DO    glucagon (Glucagen) injection 1 mg, 1 mg, intramuscular, q15 min PRN, Jacquelyn Fedchik, DO    heparin (porcine) injection 7,500 Units, 7,500 Units, subcutaneous, q8h JASON, Jacquelyn Fedchik, DO, 7,500 Units at 07/12/25 0613    insulin glargine (Lantus) injection 15 Units, 15 Units, subcutaneous, Nightly, Jacquelyn Fedchik, DO, 15 Units at 07/11/25 2113    insulin lispro injection 0-10 Units, 0-10 Units, subcutaneous, Before meals & nightly, Jacquelyn Fedchik, DO    melatonin tablet 3 mg, 3 mg, oral, Nightly PRN, Jacquelyn Fedchik, DO    montelukast (Singulair) tablet 10 mg, 10 mg, oral, Nightly, Jacquelyn Fedchik, DO, 10 mg at 07/11/25 2113    oxygen (O2) therapy, , inhalation, Continuous - Inhalation, Jacquelyn Fedchik, DO, 40 percent at 07/12/25 0820    polyethylene glycol (Glycolax, Miralax) packet 17 g, 17 g, oral, Daily PRN, Jacquelyn Fedchik, DO    rosuvastatin (Crestor) tablet 10 mg, 10 mg, oral, Nightly, Jacquelyn Fedchik, DO, 10 mg at 07/11/25 2113    tamsulosin (Flomax) 24 hr capsule 0.8 mg, 0.8 mg, oral, Nightly, Jacquelyn Fedchik, DO, 0.8 mg at 07/11/25 2113    [Held by provider] torsemide (Demadex) tablet 60 mg, 60 mg, oral, Daily, Jacquelyn Fedchik, DO  [6]   Patient Active Problem List  Diagnosis    SOB (shortness of breath)

## 2025-07-12 NOTE — CONSULTS
"Reason For Consult  Kidney disease    History Of Present Illness  Iban Bruce is a 75 y.o. male presenting with shortness of breath.  [facility]  He reports he has been diabetic for the last 20 years and hypertension for at least 15 years.  To me he denies any underlying heart disease.  Denies having an MI or heart failure.  Most recent echocardiogram revealed an EF of 60 to 65%.  He does have elevated right-sided pressures at 45 mmHg.    [facility] was told that he has stage IV chronic kidney disease.  This was thought to be related to diabetes.     Past Medical History  He has no past medical history on file.    Surgical History  He has no past surgical history on file.     Social History  He has no history on file for tobacco use, alcohol use, and drug use.    Family History  Family History[1]     Allergies  Fluorescein, Niacin, and Semaglutide    Review of Systems    Increasing fatigue.  Increasing shortness of breath.  No productive cough.  No fevers or chills.  No nausea vomiting diarrhea.     Physical Exam    Patient is morbidly obese.  Neck is quite thick.  Difficult to assess for JVD.  Breath sounds are diminished.  Heart without rub.  Abdomen is obese he has 3+ edema with venous stasis changes.         I&O 24HR    Intake/Output Summary (Last 24 hours) at 7/12/2025 1208  Last data filed at 7/11/2025 1535  Gross per 24 hour   Intake --   Output 325 ml   Net -325 ml       Vitals 24HR  Heart Rate:  [61-83]   Temp:  [35.4 °C (95.7 °F)-37.1 °C (98.8 °F)]   Resp:  [14-26]   BP: ()/(52-65)   Height:  [167.6 cm (5' 6\")]   Weight:  [125 kg (275 lb 9.2 oz)-126 kg (278 lb)]   SpO2:  [88 %-99 %]         Relevant Results    Patient has a creatinine of 3.4 with an estimated GFR of 18.  Hemoglobin is 9.3.     Assessment & Plan  SOB (shortness of breath)    Patient with what appears to be advanced Stage IV chronic kidney disease.  This is likely on the basis of diabetic " nephropathy as well as hypertensive nephrosclerosis.    Patient is also significantly volume overloaded.    Off amlodipine to allow increased blood pressure to diurese.    Increase furosemide 80 mg IV 3 times daily.    Check iron stores, B12 folate levels.    Check PTH and vitamin D 25 levels.    Check a hemoglobin A1c to assess glycemic control.    Quantify proteinuria.    Will eventually benefit from the addition of an ARB.      Christopher Ortiz MD         [1] No family history on file.

## 2025-07-12 NOTE — CARE PLAN
The patient's goals for the shift include  rest    The clinical goals for the shift include manage blood glucose      Problem: Skin  Goal: Prevent/minimize sheer/friction injuries  Outcome: Progressing     Problem: Skin  Goal: Prevent/manage excess moisture  Outcome: Progressing     Problem: Chronic Conditions and Co-morbidities  Goal: Patient's chronic conditions and co-morbidity symptoms are monitored and maintained or improved  Outcome: Progressing     Problem: Nutrition  Goal: Nutrient intake appropriate for maintaining nutritional needs  Outcome: Progressing

## 2025-07-12 NOTE — CARE PLAN
The clinical goals for the shift include Monitor SpO2. Maintain safety and comfort      Problem: Skin  Goal: Prevent/manage excess moisture  Outcome: Progressing  Flowsheets (Taken 7/11/2025 2216)  Prevent/manage excess moisture: Moisturize dry skin  Goal: Prevent/minimize sheer/friction injuries  Outcome: Progressing  Flowsheets (Taken 7/11/2025 2216)  Prevent/minimize sheer/friction injuries:   Turn/reposition every 2 hours/use positioning/transfer devices   Use pull sheet   HOB 30 degrees or less     Problem: Safety - Adult  Goal: Free from fall injury  Outcome: Progressing     Problem: Discharge Planning  Goal: Discharge to home or other facility with appropriate resources  Outcome: Progressing  Flowsheets (Taken 7/11/2025 2216)  Discharge to home or other facility with appropriate resources:   Identify barriers to discharge with patient and caregiver   Identify discharge learning needs (meds, wound care, etc)

## 2025-07-13 VITALS
WEIGHT: 278.88 LBS | DIASTOLIC BLOOD PRESSURE: 59 MMHG | SYSTOLIC BLOOD PRESSURE: 131 MMHG | OXYGEN SATURATION: 97 % | TEMPERATURE: 96.8 F | BODY MASS INDEX: 44.82 KG/M2 | HEART RATE: 72 BPM | RESPIRATION RATE: 18 BRPM | HEIGHT: 66 IN

## 2025-07-13 LAB
25(OH)D3 SERPL-MCNC: 79 NG/ML (ref 30–100)
ALBUMIN SERPL BCP-MCNC: 3.4 G/DL (ref 3.4–5)
ANION GAP SERPL CALC-SCNC: 16 MMOL/L (ref 10–20)
BUN SERPL-MCNC: 55 MG/DL (ref 6–23)
CALCIUM SERPL-MCNC: 8.7 MG/DL (ref 8.6–10.3)
CHLORIDE SERPL-SCNC: 102 MMOL/L (ref 98–107)
CO2 SERPL-SCNC: 28 MMOL/L (ref 21–32)
CREAT SERPL-MCNC: 3.35 MG/DL (ref 0.5–1.3)
EGFRCR SERPLBLD CKD-EPI 2021: 18 ML/MIN/1.73M*2
ERYTHROCYTE [DISTWIDTH] IN BLOOD BY AUTOMATED COUNT: 15.7 % (ref 11.5–14.5)
FERRITIN SERPL-MCNC: 87 NG/ML (ref 20–300)
FOLATE SERPL-MCNC: 14 NG/ML
GLUCOSE BLD MANUAL STRIP-MCNC: 137 MG/DL (ref 74–99)
GLUCOSE BLD MANUAL STRIP-MCNC: 152 MG/DL (ref 74–99)
GLUCOSE BLD MANUAL STRIP-MCNC: 196 MG/DL (ref 74–99)
GLUCOSE BLD MANUAL STRIP-MCNC: 73 MG/DL (ref 74–99)
GLUCOSE BLD MANUAL STRIP-MCNC: 87 MG/DL (ref 74–99)
GLUCOSE SERPL-MCNC: 63 MG/DL (ref 74–99)
HCT VFR BLD AUTO: 32.5 % (ref 41–52)
HGB BLD-MCNC: 9.4 G/DL (ref 13.5–17.5)
IRON SATN MFR SERPL: 8 % (ref 25–45)
IRON SERPL-MCNC: 21 UG/DL (ref 35–150)
MAGNESIUM SERPL-MCNC: 2.2 MG/DL (ref 1.6–2.4)
MCH RBC QN AUTO: 26.6 PG (ref 26–34)
MCHC RBC AUTO-ENTMCNC: 28.9 G/DL (ref 32–36)
MCV RBC AUTO: 92 FL (ref 80–100)
NRBC BLD-RTO: 0 /100 WBCS (ref 0–0)
PHOSPHATE SERPL-MCNC: 4.9 MG/DL (ref 2.5–4.9)
PLATELET # BLD AUTO: 245 X10*3/UL (ref 150–450)
POTASSIUM SERPL-SCNC: 4 MMOL/L (ref 3.5–5.3)
PTH-INTACT SERPL-MCNC: 185.2 PG/ML (ref 18.5–88)
RBC # BLD AUTO: 3.54 X10*6/UL (ref 4.5–5.9)
SODIUM SERPL-SCNC: 142 MMOL/L (ref 136–145)
TIBC SERPL-MCNC: 270 UG/DL (ref 240–445)
UIBC SERPL-MCNC: 249 UG/DL (ref 110–370)
URATE SERPL-MCNC: 7.2 MG/DL (ref 4–7.5)
VIT B12 SERPL-MCNC: 580 PG/ML (ref 211–911)
WBC # BLD AUTO: 7.4 X10*3/UL (ref 4.4–11.3)

## 2025-07-13 PROCEDURE — 85027 COMPLETE CBC AUTOMATED: CPT | Performed by: INTERNAL MEDICINE

## 2025-07-13 PROCEDURE — 99233 SBSQ HOSP IP/OBS HIGH 50: CPT | Performed by: INTERNAL MEDICINE

## 2025-07-13 PROCEDURE — 99233 SBSQ HOSP IP/OBS HIGH 50: CPT | Performed by: STUDENT IN AN ORGANIZED HEALTH CARE EDUCATION/TRAINING PROGRAM

## 2025-07-13 PROCEDURE — 82728 ASSAY OF FERRITIN: CPT | Mod: PARLAB | Performed by: INTERNAL MEDICINE

## 2025-07-13 PROCEDURE — 2500000004 HC RX 250 GENERAL PHARMACY W/ HCPCS (ALT 636 FOR OP/ED): Performed by: INTERNAL MEDICINE

## 2025-07-13 PROCEDURE — 84550 ASSAY OF BLOOD/URIC ACID: CPT | Performed by: INTERNAL MEDICINE

## 2025-07-13 PROCEDURE — 2500000002 HC RX 250 W HCPCS SELF ADMINISTERED DRUGS (ALT 637 FOR MEDICARE OP, ALT 636 FOR OP/ED): Performed by: INTERNAL MEDICINE

## 2025-07-13 PROCEDURE — 82607 VITAMIN B-12: CPT | Mod: PARLAB | Performed by: INTERNAL MEDICINE

## 2025-07-13 PROCEDURE — 83970 ASSAY OF PARATHORMONE: CPT | Mod: PARLAB | Performed by: INTERNAL MEDICINE

## 2025-07-13 PROCEDURE — 2060000001 HC INTERMEDIATE ICU ROOM DAILY

## 2025-07-13 PROCEDURE — 82947 ASSAY GLUCOSE BLOOD QUANT: CPT

## 2025-07-13 PROCEDURE — 82306 VITAMIN D 25 HYDROXY: CPT | Mod: PARLAB | Performed by: INTERNAL MEDICINE

## 2025-07-13 PROCEDURE — 2500000001 HC RX 250 WO HCPCS SELF ADMINISTERED DRUGS (ALT 637 FOR MEDICARE OP): Performed by: INTERNAL MEDICINE

## 2025-07-13 PROCEDURE — 83540 ASSAY OF IRON: CPT | Performed by: INTERNAL MEDICINE

## 2025-07-13 PROCEDURE — 82746 ASSAY OF FOLIC ACID SERUM: CPT | Mod: PARLAB | Performed by: INTERNAL MEDICINE

## 2025-07-13 PROCEDURE — 83735 ASSAY OF MAGNESIUM: CPT | Performed by: INTERNAL MEDICINE

## 2025-07-13 PROCEDURE — 36415 COLL VENOUS BLD VENIPUNCTURE: CPT | Performed by: INTERNAL MEDICINE

## 2025-07-13 PROCEDURE — 80069 RENAL FUNCTION PANEL: CPT | Performed by: INTERNAL MEDICINE

## 2025-07-13 RX ORDER — INSULIN LISPRO 100 [IU]/ML
0-5 INJECTION, SOLUTION INTRAVENOUS; SUBCUTANEOUS
Status: DISCONTINUED | OUTPATIENT
Start: 2025-07-13 | End: 2025-07-19

## 2025-07-13 RX ORDER — INSULIN GLARGINE 100 [IU]/ML
5 INJECTION, SOLUTION SUBCUTANEOUS NIGHTLY
Status: DISCONTINUED | OUTPATIENT
Start: 2025-07-13 | End: 2025-07-19

## 2025-07-13 RX ADMIN — ROSUVASTATIN CALCIUM 10 MG: 10 TABLET, FILM COATED ORAL at 20:58

## 2025-07-13 RX ADMIN — INSULIN LISPRO 1 UNITS: 100 INJECTION, SOLUTION INTRAVENOUS; SUBCUTANEOUS at 20:57

## 2025-07-13 RX ADMIN — IRON SUCROSE 300 MG: 20 INJECTION, SOLUTION INTRAVENOUS at 12:24

## 2025-07-13 RX ADMIN — FUROSEMIDE 80 MG: 10 INJECTION, SOLUTION INTRAMUSCULAR; INTRAVENOUS at 18:00

## 2025-07-13 RX ADMIN — FINASTERIDE 5 MG: 5 TABLET, FILM COATED ORAL at 08:38

## 2025-07-13 RX ADMIN — CETIRIZINE HYDROCHLORIDE 10 MG: 10 TABLET, FILM COATED ORAL at 08:37

## 2025-07-13 RX ADMIN — ALLOPURINOL 100 MG: 100 TABLET ORAL at 08:38

## 2025-07-13 RX ADMIN — HEPARIN SODIUM 7500 UNITS: 5000 INJECTION, SOLUTION INTRAVENOUS; SUBCUTANEOUS at 20:57

## 2025-07-13 RX ADMIN — HEPARIN SODIUM 7500 UNITS: 5000 INJECTION, SOLUTION INTRAVENOUS; SUBCUTANEOUS at 15:16

## 2025-07-13 RX ADMIN — INSULIN LISPRO 1 UNITS: 100 INJECTION, SOLUTION INTRAVENOUS; SUBCUTANEOUS at 15:52

## 2025-07-13 RX ADMIN — ACETAMINOPHEN 650 MG: 325 TABLET ORAL at 05:28

## 2025-07-13 RX ADMIN — FUROSEMIDE 80 MG: 10 INJECTION, SOLUTION INTRAMUSCULAR; INTRAVENOUS at 08:38

## 2025-07-13 RX ADMIN — HEPARIN SODIUM 7500 UNITS: 5000 INJECTION, SOLUTION INTRAVENOUS; SUBCUTANEOUS at 05:28

## 2025-07-13 RX ADMIN — INSULIN GLARGINE 5 UNITS: 100 INJECTION, SOLUTION SUBCUTANEOUS at 20:58

## 2025-07-13 RX ADMIN — Medication 25 MCG: at 08:38

## 2025-07-13 RX ADMIN — TAMSULOSIN HYDROCHLORIDE 0.8 MG: 0.4 CAPSULE ORAL at 20:57

## 2025-07-13 RX ADMIN — FUROSEMIDE 80 MG: 10 INJECTION, SOLUTION INTRAMUSCULAR; INTRAVENOUS at 00:21

## 2025-07-13 RX ADMIN — ASPIRIN 162 MG: 81 TABLET, COATED ORAL at 08:37

## 2025-07-13 RX ADMIN — MONTELUKAST 10 MG: 10 TABLET, FILM COATED ORAL at 20:58

## 2025-07-13 SDOH — ECONOMIC STABILITY: FOOD INSECURITY: WITHIN THE PAST 12 MONTHS, YOU WORRIED THAT YOUR FOOD WOULD RUN OUT BEFORE YOU GOT THE MONEY TO BUY MORE.: NEVER TRUE

## 2025-07-13 SDOH — ECONOMIC STABILITY: FOOD INSECURITY: WITHIN THE PAST 12 MONTHS, THE FOOD YOU BOUGHT JUST DIDN'T LAST AND YOU DIDN'T HAVE MONEY TO GET MORE.: NEVER TRUE

## 2025-07-13 ASSESSMENT — PAIN - FUNCTIONAL ASSESSMENT
PAIN_FUNCTIONAL_ASSESSMENT: 0-10

## 2025-07-13 ASSESSMENT — COGNITIVE AND FUNCTIONAL STATUS - GENERAL
TOILETING: A LITTLE
CLIMB 3 TO 5 STEPS WITH RAILING: TOTAL
DAILY ACTIVITIY SCORE: 19
DRESSING REGULAR LOWER BODY CLOTHING: A LITTLE
MOBILITY SCORE: 13
TOILETING: A LITTLE
PERSONAL GROOMING: A LITTLE
DRESSING REGULAR LOWER BODY CLOTHING: A LITTLE
DRESSING REGULAR UPPER BODY CLOTHING: A LITTLE
MOVING TO AND FROM BED TO CHAIR: A LOT
HELP NEEDED FOR BATHING: A LITTLE
MOVING TO AND FROM BED TO CHAIR: A LOT
TURNING FROM BACK TO SIDE WHILE IN FLAT BAD: A LITTLE
WALKING IN HOSPITAL ROOM: A LOT
MOVING FROM LYING ON BACK TO SITTING ON SIDE OF FLAT BED WITH BEDRAILS: A LITTLE
TURNING FROM BACK TO SIDE WHILE IN FLAT BAD: A LITTLE
DAILY ACTIVITIY SCORE: 19
HELP NEEDED FOR BATHING: A LITTLE
STANDING UP FROM CHAIR USING ARMS: A LOT
MOVING FROM LYING ON BACK TO SITTING ON SIDE OF FLAT BED WITH BEDRAILS: A LITTLE
DRESSING REGULAR UPPER BODY CLOTHING: A LITTLE
STANDING UP FROM CHAIR USING ARMS: A LOT
MOBILITY SCORE: 13
WALKING IN HOSPITAL ROOM: A LOT
CLIMB 3 TO 5 STEPS WITH RAILING: TOTAL
PERSONAL GROOMING: A LITTLE

## 2025-07-13 ASSESSMENT — PAIN SCALES - GENERAL
PAINLEVEL_OUTOF10: 0 - NO PAIN

## 2025-07-13 NOTE — CARE PLAN
The patient's goals for the shift include  rest and comfort  Problem: Skin  Goal: Prevent/manage excess moisture  Outcome: Progressing  Goal: Promote/optimize nutrition  Outcome: Progressing  Goal: Promote skin healing  Outcome: Progressing     Problem: Safety - Adult  Goal: Free from fall injury  Outcome: Progressing     Problem: Chronic Conditions and Co-morbidities  Goal: Patient's chronic conditions and co-morbidity symptoms are monitored and maintained or improved  Outcome: Progressing       The clinical goals for the shift include remain hemodynamically stable

## 2025-07-13 NOTE — PROGRESS NOTES
"Hospital Medicine Progress Note    Subjective:  Iban Bruce is a 75 y.o. male, who is hospital day 2.     Patient denies any complaints, in bed with covers over his head.     Objective:    /56 (BP Location: Right arm, Patient Position: Sitting)   Pulse 70   Temp 36.1 °C (97 °F) (Temporal)   Resp 18   Ht 1.676 m (5' 6\")   Wt 126 kg (278 lb 14.1 oz)   SpO2 93%   BMI 45.01 kg/m²     Physical Exam:  General: Awake, alert, no distress, obese  HEENT: NC/AT, clear sclera, MMM  NECK: Supple  Cardiovascular: RRR, no murmurs. S1/S2  Respiratory: Diminished, no RRW or crackles. No distress  Abdomen: Soft, ND, non-tender  Extremities: Lymphedema, chronic venous stasis changes, edema improving  Neurological: No focal deficits  Skin: Warm and dry, no rashes  Psych: appropriate mood and affect    I personally reviewed all imaging, labs and notes/ documentation.     Assessment & Plan:    Acute on chronic hypoxic resp failure  Decompensated HFpEF  Elevated troponin, suspect demand ischemia  Anemia of CKD & SONALI    DM  CKD 4  HTN, HLD  CAD  BPH  Gout  VELVET    Weaned to 3L NC, pt normally on 4L NC at baseline, monitor strict I&Os, electrolytes, fluid/ salt restricted diet, cards & nephro consulted, IV lasix 80mg TID  Nephro ordered IV iron replacement   Trop peaked at 682, no CP or concerning EKG changes  Cr near baseline, monitor labs  Pt needs new CPAP, will try to arrange  Continue home meds, hold torsemide. Dec insulin regimen, monitor BG    DVT Prophylaxis: heparin, SCDs  Code Status: DNR and No Intubation confirmed with pt  Disposition: Stepdown       Jacquelyn Mcpherson, DO  Hospitalist   "

## 2025-07-13 NOTE — CARE PLAN
The patient's goals for the shift include      The clinical goals for the shift include manage blood glucose      Problem: Skin  Goal: Participates in plan/prevention/treatment measures  Outcome: Progressing  Goal: Prevent/manage excess moisture  Outcome: Progressing  Goal: Prevent/minimize sheer/friction injuries  Outcome: Progressing  Goal: Promote/optimize nutrition  Outcome: Progressing     Problem: Pain - Adult  Goal: Verbalizes/displays adequate comfort level or baseline comfort level  Outcome: Progressing     Problem: Safety - Adult  Goal: Free from fall injury  Outcome: Progressing     Problem: Chronic Conditions and Co-morbidities  Goal: Patient's chronic conditions and co-morbidity symptoms are monitored and maintained or improved  Outcome: Progressing     Problem: Nutrition  Goal: Nutrient intake appropriate for maintaining nutritional needs  Outcome: Progressing

## 2025-07-13 NOTE — PROGRESS NOTES
Subjective   Very sleepy.  No acute issues overnight.  In sinus rhythm with rate of 60s    Assessment and Plan:  Assessment and Plan:   [Problem List]     [Problem List]  Patient Active Problem List      Diagnosis    SOB (shortness of breath)     Acute on chronic hypoxic resp failure  Decompensated HFpEF  Elevated troponin     DM  CKD 4  HTN, HLD  CAD  BPH  Gout  VELVET     His EKG shows sinus rhythm with right bundle yaneli block. No significant ST-T changes  Echocardiogram in November 2024 done at [facility] is reported to show ejection fraction of 60 to 65% with impaired relaxation pattern with mild mitral regurgitation and elevated RVSP at 45 mmHg.  He was also reported to have mild aortic regurgitation with aortic valve sclerosis.  Patient troponin has increased from initial of 441 to now 682 from yesterday.  Currently denies having chest pain.  Patient does have significant CKD with creatinine around 3.4 now.  His sugar is low this morning at 51.  He is shivering and is in multiple blankets.     Will obtain an echocardiogram to reevaluate cardiac function and valves considering his shortness of breath and known valvular abnormalities and now also elevated troponin.  Will recheck troponin to see where the peak is.  Currently chest pain-free and it is likely that this troponin increase is type II myocardial infarction in the setting of demand ischemia mismatch especially with his significant CKD.     Will continue with baby aspirin and statin.  Agree to hold torsemide for now and giving IV Lasix and monitor renal function and volume status closely.  Recommend nephrology consultation to establish care and further evaluation of his CKD.  Pulmonary and diabetes management per primary team.  I will follow-up with the patient tomorrow.     Thank you for the cardiology consult. We will follow with you.     July 13, 2025  Appreciate nephrology consultation and recommendation.  I agree with aggressive IV diuresis while  monitoring renal function and urine output as he does have significant volume overload and lower extremity edema.  Will continue other medications from cardiac standpoint otherwise.         Jose Ríos MD, PhD, [facility], [facility]  Interventional Cardiology, Senior Attending Physician,  [facility] Catheterization Laboratory Medical Director,  [facility]  Associate Professor of Medicine,   [facility]  Office: [phone]                      I appreciate the opportunity to participate in this patient's care.      Jose Ríos MD, PhD, [facility], [facility]  Interventional Cardiology, Senior Attending Physician,  [facility] Catheterization Laboratory Medical Director,  [facility]  Associate Professor of Medicine,   [facility]  Office: [phone]               Medical History:   Medical History[1]  Surgical History:   Surgical History[2]   Social History:   Social Drivers of Health with Concerns     Physical Activity: Inactive (1/6/2023)    Received from [facility]    Exercise Vital Sign     Days of Exercise per Week: 0 days     Minutes of Exercise per Session: 0 min   Stress: Not on file   Social Connections: Not on file   Digital Equity: Not on file   Health Literacy: Not on file     Family History:   Family History[3]       Objective   Physical Exam  Vitals:    07/13/25 0407 07/13/25 0525 07/13/25 0815 07/13/25 1138   BP: 111/53  110/56 104/54   BP Location: Right arm  Right arm Right arm   Patient Position: Sitting  Sitting Lying   Pulse: 65  70 64   Resp: 18  18 18   Temp: 37.2 °C (99 °F)  36.1 °C (97 °F) 35.2 °C (95.4 °F)   TempSrc: Temporal  Temporal Temporal   SpO2: 98%  93% 94%   Weight:  126 kg (278 lb 14.1 oz)     Height:         Wt Readings from Last 3 Encounters:   07/13/25 126 kg (278 lb 14.1 oz)     Physical Exam  Constitutional:       Appearance: Normal appearance.   HENT:      Head:  "Normocephalic and atraumatic.      Nose: Nose normal.      Mouth/Throat:      Mouth: Mucous membranes are moist.   Eyes:      Extraocular Movements: Extraocular movements intact.      Pupils: Pupils are equal, round, and reactive to light.   Cardiovascular:      Rate and Rhythm: Normal rate and regular rhythm.      Heart sounds: No murmur heard.  Pulmonary:      Effort: Pulmonary effort is normal.      Breath sounds: Normal breath sounds.   Abdominal:      General: Abdomen is flat.      Palpations: Abdomen is soft.   Musculoskeletal:         General: Normal range of motion.      Cervical back: Normal range of motion and neck supple.    3+ bilateral lower extremity edema  Skin:     General: Skin is warm.   Neurological:      General: No focal deficit present.      Mental Status: alert and oriented to person, place, and time.   Psychiatric:         Mood and Affect: Mood normal.         Behavior: Behavior normal.     Lab Results   Component Value Date    WBC 7.4 07/13/2025    HGB 9.4 (L) 07/13/2025    HCT 32.5 (L) 07/13/2025     07/13/2025    ALT 8 (L) 07/11/2025    AST 8 (L) 07/11/2025     07/13/2025    K 4.0 07/13/2025     07/13/2025    CREATININE 3.35 (H) 07/13/2025    BUN 55 (H) 07/13/2025    CO2 28 07/13/2025    HGBA1C 8.4 (H) 05/13/2024     No results found for: \"CKTOTAL\", \"CKMB\", \"CKMBINDEX\", \"TROPONINI\"     No results found for: \"INR\", \"PROTIME\"        Current Medications[4]          Macro dragon disclaimer: This note was dictated by speech recognition. Minor errors in transcription may be present.       [1] History reviewed. No pertinent past medical history.  [2] History reviewed. No pertinent surgical history.  [3] No family history on file.  [4]   Current Facility-Administered Medications   Medication Dose Route Frequency Provider Last Rate Last Admin    acetaminophen (Tylenol) tablet 650 mg  650 mg oral q6h PRN Jacquelyn Navicarlie, DO   650 mg at 07/13/25 0528    allopurinol (Zyloprim) tablet " 100 mg  100 mg oral Daily Yuliya Fedchik, DO   100 mg at 07/13/25 0838    aspirin EC tablet 162 mg  162 mg oral Daily Yuliya Fedchik, DO   162 mg at 07/13/25 0837    cetirizine (ZyrTEC) tablet 10 mg  10 mg oral Daily Yuliya Fedchik, DO   10 mg at 07/13/25 0837    cholecalciferol (Vitamin D-3) tablet 25 mcg  25 mcg oral Daily Yuliya Fedchik, DO   25 mcg at 07/13/25 0838    dextrose 50 % injection 12.5 g  12.5 g intravenous q15 min PRN Yuliya Fedchik, DO        dextrose 50 % injection 25 g  25 g intravenous q15 min PRN Yuliya Fedchik, DO        finasteride (Proscar) tablet 5 mg  5 mg oral Daily Yuliya Fedchik, DO   5 mg at 07/13/25 0838    fluticasone (Flonase) nasal spray 2 spray  2 spray Each Nostril Nightly Yuliya Fedchik, DO   2 spray at 07/12/25 2121    furosemide (Lasix) injection 80 mg  80 mg intravenous q8h Christopher Ortiz MD   80 mg at 07/13/25 0838    glucagon (Glucagen) injection 1 mg  1 mg intramuscular q15 min PRN Yuliya Fedchik, DO        glucagon (Glucagen) injection 1 mg  1 mg intramuscular q15 min PRN Yuliya Fedchik, DO        heparin (porcine) injection 7,500 Units  7,500 Units subcutaneous q8h [facility] Yuliya Fedchik, DO   7,500 Units at 07/13/25 0528    insulin glargine (Lantus) injection 5 Units  5 Units subcutaneous Nightly Yuliya Fedchik, DO        insulin lispro injection 0-5 Units  0-5 Units subcutaneous TID AC Yuliya Fedchik, DO        iron sucrose (Venofer) 300 mg in sodium chloride 0.9% 282 mL IV  300 mg intravenous Daily Christopher Ortiz MD        melatonin tablet 3 mg  3 mg oral Nightly PRN Yuliya Fedchik, DO        montelukast (Singulair) tablet 10 mg  10 mg oral Nightly Yuliya Fedchik, DO   10 mg at 07/12/25 2120    oxygen (O2) therapy   inhalation Continuous - Inhalation Jacquelyn Fedchik, DO   40 percent at 07/13/25 0321    polyethylene glycol (Glycolax, Miralax) packet 17 g  17 g oral Daily PRN Jacquelyn Mcpherson DO        rosuvastatin (Crestor) tablet 10 mg  10 mg oral Nightly Jacquelyn Mcpherson,  DO   10 mg at 07/12/25 2120    tamsulosin (Flomax) 24 hr capsule 0.8 mg  0.8 mg oral Nightly Jacquelyn Mcpherson DO   0.8 mg at 07/12/25 2120    [Held by provider] torsemide (Demadex) tablet 60 mg  60 mg oral Daily Jacquelyn Mcpherson DO

## 2025-07-13 NOTE — CARE PLAN
The patient's goals for the shift include  rest    The clinical goals for the shift include remain hemodynamically stable      Problem: Skin  Goal: Prevent/minimize sheer/friction injuries  Outcome: Progressing     Problem: Skin  Goal: Promote/optimize nutrition  Outcome: Progressing     Problem: Chronic Conditions and Co-morbidities  Goal: Patient's chronic conditions and co-morbidity symptoms are monitored and maintained or improved  Outcome: Progressing

## 2025-07-13 NOTE — PROGRESS NOTES
Iban Bruce is a 75 y.o. male on day 2 of admission presenting with SOB (shortness of breath).      Subjective     Patient still significantly tired and fatigued.       Objective     No IV fluids or pressor requirements.  Is wearing oxygen.       Vitals 24HR  Heart Rate:  [65-70]   Temp:  [36.1 °C (97 °F)-37.2 °C (99 °F)]   Resp:  [16-19]   BP: (110-124)/(53-60)   Weight:  [126 kg (278 lb 14.1 oz)]   SpO2:  [92 %-98 %]         Intake/Output last 3 Shifts:    Intake/Output Summary (Last 24 hours) at 7/13/2025 1039  Last data filed at 7/13/2025 1016  Gross per 24 hour   Intake 680 ml   Output 3600 ml   Net -2920 ml       Physical Exam    Patient is morbidly obese.  No increased work of breathing.  Neck is quite thick with no obvious jugular distention.  Few crackles at the bases.  No cardiac rub.  Isolated peripheral edema.    Assessment & Plan  SOB (shortness of breath)    Patient with underlying stage G4/A3 chronic kidney disease presumably on the basis of diabetic nephropathy as well as hypertensive nephrosclerosis.    Patient admitted with shortness of breath and evidence of volume overload.  Continue with furosemide 80 mg IV 3 times daily.    Potassium phosphorus are acceptable.  Reasonable acid-base status.    Significant iron deficiency based on a TSAT of 8%.  Intravenous iron.  No indication for ROCCO unless hemoglobin drops below 9.    Follow-up on PTH and vitamin D 25 levels.    Would benefit from ARB therapy once euvolemic.      Christopher Ortiz MD

## 2025-07-14 ENCOUNTER — APPOINTMENT (OUTPATIENT)
Dept: RADIOLOGY | Facility: HOSPITAL | Age: 75
DRG: 189 | End: 2025-07-14
Payer: MEDICARE

## 2025-07-14 LAB
ALBUMIN SERPL BCP-MCNC: 3.4 G/DL (ref 3.4–5)
ANION GAP SERPL CALC-SCNC: 14 MMOL/L (ref 10–20)
BUN SERPL-MCNC: 62 MG/DL (ref 6–23)
CALCIUM SERPL-MCNC: 8.6 MG/DL (ref 8.6–10.3)
CHLORIDE SERPL-SCNC: 100 MMOL/L (ref 98–107)
CO2 SERPL-SCNC: 29 MMOL/L (ref 21–32)
CREAT SERPL-MCNC: 3.29 MG/DL (ref 0.5–1.3)
EGFRCR SERPLBLD CKD-EPI 2021: 19 ML/MIN/1.73M*2
ERYTHROCYTE [DISTWIDTH] IN BLOOD BY AUTOMATED COUNT: 15.5 % (ref 11.5–14.5)
GLUCOSE BLD MANUAL STRIP-MCNC: 120 MG/DL (ref 74–99)
GLUCOSE BLD MANUAL STRIP-MCNC: 174 MG/DL (ref 74–99)
GLUCOSE BLD MANUAL STRIP-MCNC: 245 MG/DL (ref 74–99)
GLUCOSE SERPL-MCNC: 128 MG/DL (ref 74–99)
HCT VFR BLD AUTO: 31.3 % (ref 41–52)
HGB BLD-MCNC: 9.6 G/DL (ref 13.5–17.5)
MAGNESIUM SERPL-MCNC: 2.19 MG/DL (ref 1.6–2.4)
MCH RBC QN AUTO: 27.8 PG (ref 26–34)
MCHC RBC AUTO-ENTMCNC: 30.7 G/DL (ref 32–36)
MCV RBC AUTO: 91 FL (ref 80–100)
NRBC BLD-RTO: 0 /100 WBCS (ref 0–0)
PHOSPHATE SERPL-MCNC: 5.1 MG/DL (ref 2.5–4.9)
PLATELET # BLD AUTO: 223 X10*3/UL (ref 150–450)
POTASSIUM SERPL-SCNC: 4.1 MMOL/L (ref 3.5–5.3)
RBC # BLD AUTO: 3.45 X10*6/UL (ref 4.5–5.9)
SODIUM SERPL-SCNC: 139 MMOL/L (ref 136–145)
WBC # BLD AUTO: 7.4 X10*3/UL (ref 4.4–11.3)

## 2025-07-14 PROCEDURE — 99232 SBSQ HOSP IP/OBS MODERATE 35: CPT | Performed by: INTERNAL MEDICINE

## 2025-07-14 PROCEDURE — 2500000002 HC RX 250 W HCPCS SELF ADMINISTERED DRUGS (ALT 637 FOR MEDICARE OP, ALT 636 FOR OP/ED): Performed by: INTERNAL MEDICINE

## 2025-07-14 PROCEDURE — 99232 SBSQ HOSP IP/OBS MODERATE 35: CPT | Performed by: STUDENT IN AN ORGANIZED HEALTH CARE EDUCATION/TRAINING PROGRAM

## 2025-07-14 PROCEDURE — 97161 PT EVAL LOW COMPLEX 20 MIN: CPT | Mod: GP

## 2025-07-14 PROCEDURE — 2500000005 HC RX 250 GENERAL PHARMACY W/O HCPCS: Performed by: INTERNAL MEDICINE

## 2025-07-14 PROCEDURE — 85027 COMPLETE CBC AUTOMATED: CPT | Performed by: INTERNAL MEDICINE

## 2025-07-14 PROCEDURE — 83735 ASSAY OF MAGNESIUM: CPT | Performed by: INTERNAL MEDICINE

## 2025-07-14 PROCEDURE — 2500000004 HC RX 250 GENERAL PHARMACY W/ HCPCS (ALT 636 FOR OP/ED): Performed by: INTERNAL MEDICINE

## 2025-07-14 PROCEDURE — 80069 RENAL FUNCTION PANEL: CPT | Performed by: INTERNAL MEDICINE

## 2025-07-14 PROCEDURE — 82947 ASSAY GLUCOSE BLOOD QUANT: CPT

## 2025-07-14 PROCEDURE — 97165 OT EVAL LOW COMPLEX 30 MIN: CPT | Mod: GO

## 2025-07-14 PROCEDURE — 76770 US EXAM ABDO BACK WALL COMP: CPT

## 2025-07-14 PROCEDURE — 2060000001 HC INTERMEDIATE ICU ROOM DAILY

## 2025-07-14 PROCEDURE — 76770 US EXAM ABDO BACK WALL COMP: CPT | Performed by: STUDENT IN AN ORGANIZED HEALTH CARE EDUCATION/TRAINING PROGRAM

## 2025-07-14 PROCEDURE — 36415 COLL VENOUS BLD VENIPUNCTURE: CPT | Performed by: INTERNAL MEDICINE

## 2025-07-14 PROCEDURE — 2500000001 HC RX 250 WO HCPCS SELF ADMINISTERED DRUGS (ALT 637 FOR MEDICARE OP): Performed by: INTERNAL MEDICINE

## 2025-07-14 RX ADMIN — ALLOPURINOL 100 MG: 100 TABLET ORAL at 08:53

## 2025-07-14 RX ADMIN — MONTELUKAST 10 MG: 10 TABLET, FILM COATED ORAL at 20:14

## 2025-07-14 RX ADMIN — TAMSULOSIN HYDROCHLORIDE 0.8 MG: 0.4 CAPSULE ORAL at 20:14

## 2025-07-14 RX ADMIN — FINASTERIDE 5 MG: 5 TABLET, FILM COATED ORAL at 08:54

## 2025-07-14 RX ADMIN — IRON SUCROSE 300 MG: 20 INJECTION, SOLUTION INTRAVENOUS at 05:37

## 2025-07-14 RX ADMIN — INSULIN LISPRO 1 UNITS: 100 INJECTION, SOLUTION INTRAVENOUS; SUBCUTANEOUS at 12:00

## 2025-07-14 RX ADMIN — INSULIN LISPRO 2 UNITS: 100 INJECTION, SOLUTION INTRAVENOUS; SUBCUTANEOUS at 16:45

## 2025-07-14 RX ADMIN — HEPARIN SODIUM 7500 UNITS: 5000 INJECTION, SOLUTION INTRAVENOUS; SUBCUTANEOUS at 05:38

## 2025-07-14 RX ADMIN — FLUTICASONE PROPIONATE 2 SPRAY: 50 SPRAY, METERED NASAL at 20:31

## 2025-07-14 RX ADMIN — Medication 70 PERCENT: at 19:32

## 2025-07-14 RX ADMIN — Medication 25 MCG: at 08:53

## 2025-07-14 RX ADMIN — FUROSEMIDE 80 MG: 10 INJECTION, SOLUTION INTRAMUSCULAR; INTRAVENOUS at 08:54

## 2025-07-14 RX ADMIN — CETIRIZINE HYDROCHLORIDE 10 MG: 10 TABLET, FILM COATED ORAL at 08:53

## 2025-07-14 RX ADMIN — HEPARIN SODIUM 7500 UNITS: 5000 INJECTION, SOLUTION INTRAVENOUS; SUBCUTANEOUS at 14:19

## 2025-07-14 RX ADMIN — FUROSEMIDE 80 MG: 10 INJECTION, SOLUTION INTRAMUSCULAR; INTRAVENOUS at 17:41

## 2025-07-14 RX ADMIN — INSULIN GLARGINE 5 UNITS: 100 INJECTION, SOLUTION SUBCUTANEOUS at 20:14

## 2025-07-14 RX ADMIN — FUROSEMIDE 80 MG: 10 INJECTION, SOLUTION INTRAMUSCULAR; INTRAVENOUS at 00:13

## 2025-07-14 RX ADMIN — HEPARIN SODIUM 7500 UNITS: 5000 INJECTION, SOLUTION INTRAVENOUS; SUBCUTANEOUS at 21:05

## 2025-07-14 RX ADMIN — ASPIRIN 162 MG: 81 TABLET, COATED ORAL at 08:53

## 2025-07-14 RX ADMIN — ROSUVASTATIN CALCIUM 10 MG: 10 TABLET, FILM COATED ORAL at 20:14

## 2025-07-14 ASSESSMENT — COGNITIVE AND FUNCTIONAL STATUS - GENERAL
HELP NEEDED FOR BATHING: A LOT
DRESSING REGULAR UPPER BODY CLOTHING: A LITTLE
STANDING UP FROM CHAIR USING ARMS: A LOT
EATING MEALS: A LITTLE
HELP NEEDED FOR BATHING: A LITTLE
PERSONAL GROOMING: A LITTLE
DAILY ACTIVITIY SCORE: 19
STANDING UP FROM CHAIR USING ARMS: A LOT
DRESSING REGULAR UPPER BODY CLOTHING: A LITTLE
MOVING TO AND FROM BED TO CHAIR: A LOT
CLIMB 3 TO 5 STEPS WITH RAILING: TOTAL
DAILY ACTIVITIY SCORE: 15
DRESSING REGULAR LOWER BODY CLOTHING: A LITTLE
WALKING IN HOSPITAL ROOM: A LOT
WALKING IN HOSPITAL ROOM: TOTAL
PERSONAL GROOMING: A LITTLE
MOBILITY SCORE: 13
TURNING FROM BACK TO SIDE WHILE IN FLAT BAD: A LITTLE
TOILETING: A LITTLE
TURNING FROM BACK TO SIDE WHILE IN FLAT BAD: A LITTLE
PERSONAL GROOMING: A LITTLE
DRESSING REGULAR LOWER BODY CLOTHING: A LOT
MOVING FROM LYING ON BACK TO SITTING ON SIDE OF FLAT BED WITH BEDRAILS: A LITTLE
WALKING IN HOSPITAL ROOM: A LOT
MOVING FROM LYING ON BACK TO SITTING ON SIDE OF FLAT BED WITH BEDRAILS: A LITTLE
MOVING TO AND FROM BED TO CHAIR: A LOT
STANDING UP FROM CHAIR USING ARMS: A LOT
MOVING TO AND FROM BED TO CHAIR: A LOT
DAILY ACTIVITIY SCORE: 19
DRESSING REGULAR LOWER BODY CLOTHING: A LITTLE
HELP NEEDED FOR BATHING: A LITTLE
DRESSING REGULAR UPPER BODY CLOTHING: A LITTLE
CLIMB 3 TO 5 STEPS WITH RAILING: A LOT
MOBILITY SCORE: 14
TOILETING: A LOT
TURNING FROM BACK TO SIDE WHILE IN FLAT BAD: TOTAL
CLIMB 3 TO 5 STEPS WITH RAILING: TOTAL
TOILETING: A LITTLE
MOVING FROM LYING ON BACK TO SITTING ON SIDE OF FLAT BED WITH BEDRAILS: A LITTLE
MOBILITY SCORE: 10

## 2025-07-14 ASSESSMENT — PAIN SCALES - GENERAL
PAINLEVEL_OUTOF10: 3
PAINLEVEL_OUTOF10: 0 - NO PAIN
PAINLEVEL_OUTOF10: 0 - NO PAIN

## 2025-07-14 ASSESSMENT — ACTIVITIES OF DAILY LIVING (ADL): BATHING_ASSISTANCE: MAXIMAL

## 2025-07-14 ASSESSMENT — PAIN - FUNCTIONAL ASSESSMENT
PAIN_FUNCTIONAL_ASSESSMENT: 0-10

## 2025-07-14 NOTE — PROGRESS NOTES
Occupational Therapy    Occupational Therapy    Evaluation    Patient Name: Iban Bruce  MRN: [id number]  Today's Date: 7/14/2025  Time Calculation  Start Time: 0905  Stop Time: 0931  Time Calculation (min): 26 min  820/820-A    Assessment  IP OT Assessment  OT Assessment: Pt. presents with a decline in self-care, mobility, and safety and would benefit from skilled OT services to maximize independence and promote return to prior level of function.  Prognosis: Good  Barriers to Discharge Home: Caregiver assistance  Caregiver Assistance: Caregiver assistance needed per identified barriers - however, level of patient's required assistance exceeds assistance available at home  Evaluation/Treatment Tolerance: Patient tolerated treatment well  Medical Staff Made Aware: Yes  End of Session Communication: Bedside nurse  End of Session Patient Position: Bed, 2 rail up, Alarm on (call light in reach)    Plan:  Treatment Interventions: ADL retraining, Functional transfer training, UE strengthening/ROM, Endurance training, Equipment evaluation/education, Patient/family training  OT Frequency: 3 times per week (during this acute inpatient hospitalization)  OT Discharge Recommendations: Moderate intensity level of continued care (Based on current functional status and rehab potential, patient is anticipated to tolerate and benefit from 5 or more days per week of skilled rehabilitative therapy after discharge from this acute inpatient hospitalization.)  OT Recommended Transfer Status: Moderate assist  OT - OK to Discharge: Yes (once cleared by medical team)    Subjective     Current Problem:  1. SOB (shortness of breath)        2. Hypoxia        3. Hypervolemia, unspecified hypervolemia type            General:  General  Reason for Referral: OT eval and treat for adls  Referred By: Jacquelyn Mcpherson DO  Past Medical History Relevant to Rehab: Iban Bruce is a 75 y.o. male with DM, CKD 4, HFpEF, HTN, HLD, CAD, BPH, chronic  respiratory failure on 4L NC, gout, VELVET, who presents with progressively worsening sob & nonproductive cough. Pt sent in from his nephrology appt as he was 78% on RA.  Family/Caregiver Present: No  Co-Treatment: PT  Co-Treatment Reason: to maximize pt. safety and mobility  Prior to Session Communication: Bedside nurse  Patient Position Received: Bed, 2 rail up, Alarm off, not on at start of session  General Comment: pt. pleasant and agreeable to therapy evaluation    Precautions:  Medical Precautions: Fall precautions  Precautions Comment: fall, alarm, IV, 5LO2, telemetry, purewick, fluid, I&O, [place]    Pain:  Pain Assessment  Pain Assessment: 0-10  0-10 (Numeric) Pain Score: 3 (bilat. hips)    Objective     Cognition:  Overall Cognitive Status: Within Functional Limits  Orientation Level: Oriented X4     Home Living:  Type of Home: House  Lives With: Friends (2 friends)  Home Adaptive Equipment: Hospital bed (rollator)  Home Layout: Laundry in basement, Two level (stair lift to 2nd floor bathroom.)  Home Access: Ramped entrance  Bathroom Shower/Tub: Tub/shower unit (tubseat, HHS)  Bathroom Toilet: Standard     Prior Function:  Level of [place]: Needs assistance with ADLs, Needs assistance with homemaking  Receives Help From:  (has medical alert button)  ADL Assistance:  (assist for showering by friend. ind. with dressing and toileting)  Homemaking Assistance:  (pt. needs assist with homemaking)  Ambulatory Assistance: Independent (WC or limited rollator)  Hand Dominance: Right    IADL History:  IADL Comments: pt. does some cooking/cleaning, friends do laundry. pt. drives, uses electric scooter at store    ADL:  Eating Assistance: Stand by  Eating Deficit: Setup  Grooming Assistance: Minimal  Grooming Deficit: Setup  Bathing Assistance: Maximal  UE Dressing Assistance: Minimal  LE Dressing Assistance: Maximal  Toileting Assistance with Device: Total    Activity Tolerance:  Endurance: Decreased tolerance for  upright activites    Bed Mobility/Transfers:   Bed Mobility  Bed Mobility:  (mod. assist supine to sit with hob elevated. max. assist of 1 sit to supine)  Transfers  Transfer:  (sit to stand with mod. assist of 1.)    Ambulation/Gait Training:  Functional Mobility  Functional Mobility Performed:  (pt. side stepped along eob with mod. assist of 1 with rolling walker)    Sitting Balance:  Static Sitting Balance  Static Sitting-Level of Assistance: Close supervision    Sensation:  Light Touch: No apparent deficits    Strength:  Strength Comments: bilat. elbow/ strength 4-/5    Coordination:  Movements are Fluid and Coordinated: Yes     Hand Function:  Hand Function  Gross Grasp: Functional  Coordination: Functional    Extremities:   RUE : Within Functional Limits   LUE: Within Functional Limits    Outcome Measures: [facility] Daily Activity  Putting on and taking off regular lower body clothing: A lot  Bathing (including washing, rinsing, drying): A lot  Putting on and taking off regular upper body clothing: A little  Toileting, which includes using toilet, bedpan or urinal: A lot  Taking care of personal grooming such as brushing teeth: A little  Eating Meals: A little  Daily Activity - Total Score: 15     EDUCATION:     Education Documentation  ADL Training, taught by Nelly Merino, OT at 7/14/2025  2:18 PM.  Learner: Patient  Readiness: Acceptance  Method: Explanation  Response: Verbalizes Understanding    Education Comments  No comments found.        Goals:   Encounter Problems       Encounter Problems (Active)       OT Goals       Pt. will perform bathing and dressing with min. assist using adaptive equipment as needed.  (Progressing)       Start:  07/14/25    Expected End:  07/28/25            Pt. will perform toileting with min. assist using dme/adaptive equipment as needed.  (Progressing)       Start:  07/14/25    Expected End:  07/28/25            Pt. will perform bed mobility/chair/commode transfers with cga.  (Progressing)       Start:  07/14/25    Expected End:  07/28/25            Pt. will perform functional mobility with rolling walker with cga to access bathroom. (Progressing)       Start:  07/14/25    Expected End:  07/28/25            Pt. will tolerate 5-7 min. of standing tasks with cga in preparation for grooming at sink.  (Progressing)       Start:  07/14/25    Expected End:  07/28/25

## 2025-07-14 NOTE — PROGRESS NOTES
"Hospital Medicine Progress Note    Subjective:  Iban Bruce is a 75 y.o. male, who is hospital day 3.     Patient feels okay overall, denies any acute complaints, sleeping with covers over his head.    Objective:    /61 (BP Location: Right arm, Patient Position: Lying)   Pulse 69   Temp 36 °C (96.8 °F) (Temporal)   Resp 16   Ht 1.676 m (5' 6\")   Wt 124 kg (272 lb 4.3 oz)   SpO2 93%   BMI 43.95 kg/m²     Physical Exam:  General: Awake, alert, no distress, obese  HEENT: NC/AT, clear sclera, MMM  NECK: Supple  Cardiovascular: RRR, no murmurs. S1/S2  Respiratory: Diminished, no RRW or crackles. No distress  Abdomen: Soft, ND, non-tender  Extremities: Lymphedema, chronic venous stasis changes, edema improving  Neurological: No focal deficits  Skin: Warm and dry, no rashes  Psych: flat affect    I personally reviewed all imaging, labs and notes/ documentation.     Assessment & Plan:    Acute on chronic hypoxic resp failure  Decompensated HFpEF  Elevated troponin, suspect demand ischemia  Anemia of CKD & SONALI    DM  CKD 4  HTN, HLD  CAD  BPH  Gout  VELVET    Weaned to 3L NC, pt on 4L NC at baseline, monitor strict I&Os, electrolytes, fluid/ salt restricted diet, cards & nephro consulted, IV lasix 80mg TID  Nephro ordered IV iron replacement   Trop peaked at 682, no CP or concerning EKG changes  Cr near baseline, monitor labs  Pt needs new CPAP, will try to arrange prior to dc  Continue home meds, hold torsemide. Insulin regimen, monitor BG    DVT Prophylaxis: heparin, SCDs  Code Status: DNR and No Intubation confirmed with pt  Disposition: Stepdown       Jacquelyn Mcpherson DO  Hospitalist   "

## 2025-07-14 NOTE — PROGRESS NOTES
Subjective   Sleepy.  No issues overnight.  Heart rate in 60s.    Assessment and Plan:  Assessment and Plan:   [Problem List]     [Problem List]  Patient Active Problem List      Diagnosis    SOB (shortness of breath)     Acute on chronic hypoxic resp failure  Decompensated HFpEF  Elevated troponin     DM  CKD 4  HTN, HLD  CAD  BPH  Gout  VELVET     His EKG shows sinus rhythm with right bundle yaneli block. No significant ST-T changes  Echocardiogram in November 2024 done at [facility] is reported to show ejection fraction of 60 to 65% with impaired relaxation pattern with mild mitral regurgitation and elevated RVSP at 45 mmHg.  He was also reported to have mild aortic regurgitation with aortic valve sclerosis.  Patient troponin has increased from initial of 441 to now 682 from yesterday.  Currently denies having chest pain.  Patient does have significant CKD with creatinine around 3.4 now.  His sugar is low this morning at 51.  He is shivering and is in multiple blankets.     Will obtain an echocardiogram to reevaluate cardiac function and valves considering his shortness of breath and known valvular abnormalities and now also elevated troponin.  Will recheck troponin to see where the peak is.  Currently chest pain-free and it is likely that this troponin increase is type II myocardial infarction in the setting of demand ischemia mismatch especially with his significant CKD.     Will continue with baby aspirin and statin.  Agree to hold torsemide for now and giving IV Lasix and monitor renal function and volume status closely.  Recommend nephrology consultation to establish care and further evaluation of his CKD.  Pulmonary and diabetes management per primary team.  I will follow-up with the patient tomorrow.     Thank you for the cardiology consult. We will follow with you.     July 13, 2025  Appreciate nephrology consultation and recommendation.  I agree with aggressive IV diuresis while monitoring renal function  and urine output as he does have significant volume overload and lower extremity edema.  Will continue other medications from cardiac standpoint otherwise.    July 14, 2025  Creatinine is rather stable.  Continue aggressive diuresis.  Appreciate nephrology input.  Remains to be volume overloaded.       Jose Ríos MD, PhD, [facility], [facility]  Interventional Cardiology, Senior Attending Physician,  [facility] Catheterization Laboratory Medical Director,  [facility]  Associate Professor of Medicine,   [facility]  Office: [phone]                      I appreciate the opportunity to participate in this patient's care.      Jose Ríos MD, PhD, [facility], [facility]  Interventional Cardiology, Senior Attending Physician,  [facility] Catheterization Laboratory Medical Director,  [facility]  Associate Professor of Medicine,   [facility]  Office: [phone]               Medical History:   Medical History[1]  Surgical History:   Surgical History[2]   Social History:   Social Drivers of Health with Concerns     Physical Activity: Inactive (1/6/2023)    Received from [facility]    Exercise Vital Sign     Days of Exercise per Week: 0 days     Minutes of Exercise per Session: 0 min   Stress: Not on file   Social Connections: Not on file   [employer]: Not on file   Health Literacy: Not on file     Family History:   Family History[3]       Objective   Physical Exam  Vitals:    07/14/25 0430 07/14/25 0537 07/14/25 0811 07/14/25 1146   BP: 101/53  137/61 127/59   BP Location: Right arm  Right arm Right arm   Patient Position: Lying  Lying Lying   Pulse: 65  69 71   Resp: 18  16 20   Temp: 36.1 °C (97 °F)  36 °C (96.8 °F) 36.1 °C (97 °F)   TempSrc: Temporal  Temporal Temporal   SpO2: 97%  93% 93%   Weight:  124 kg (272 lb 4.3 oz)     Height:         Wt Readings from Last 3 Encounters:   07/14/25 124 kg (272  "lb 4.3 oz)     Physical Exam  Constitutional:       Appearance: Normal appearance.   HENT:      Head: Normocephalic and atraumatic.      Nose: Nose normal.      Mouth/Throat:      Mouth: Mucous membranes are moist.   Eyes:      Extraocular Movements: Extraocular movements intact.      Pupils: Pupils are equal, round, and reactive to light.   Cardiovascular:      Rate and Rhythm: Normal rate and regular rhythm.      Heart sounds: No murmur heard.  Pulmonary:      Effort: Pulmonary effort is normal.      Breath sounds: Normal breath sounds.   Abdominal:      General: Abdomen is flat.      Palpations: Abdomen is soft.   Musculoskeletal:         General: Normal range of motion.      Cervical back: Normal range of motion and neck supple.    3+ bilateral lower extremity edema  Skin:     General: Skin is warm.   Neurological:      General: No focal deficit present.      Mental Status: alert and oriented to person, place, and time.   Psychiatric:         Mood and Affect: Mood normal.         Behavior: Behavior normal.     Lab Results   Component Value Date    WBC 7.4 07/14/2025    HGB 9.6 (L) 07/14/2025    HCT 31.3 (L) 07/14/2025     07/14/2025    ALT 8 (L) 07/11/2025    AST 8 (L) 07/11/2025     07/14/2025    K 4.1 07/14/2025     07/14/2025    CREATININE 3.29 (H) 07/14/2025    BUN 62 (H) 07/14/2025    CO2 29 07/14/2025    HGBA1C 8.4 (H) 05/13/2024     No results found for: \"CKTOTAL\", \"CKMB\", \"CKMBINDEX\", \"TROPONINI\"     No results found for: \"INR\", \"PROTIME\"        Current Medications[4]          Macro dragon disclaimer: This note was dictated by speech recognition. Minor errors in transcription may be present.         [1] History reviewed. No pertinent past medical history.  [2] History reviewed. No pertinent surgical history.  [3] No family history on file.  [4]   Current Facility-Administered Medications   Medication Dose Route Frequency Provider Last Rate Last Admin    acetaminophen (Tylenol) tablet " 650 mg  650 mg oral q6h PRN Yuliya Fedchik, DO   650 mg at 07/13/25 0528    allopurinol (Zyloprim) tablet 100 mg  100 mg oral Daily Yuliya Fedchik, DO   100 mg at 07/14/25 0853    aspirin EC tablet 162 mg  162 mg oral Daily Yuliya Fedchik, DO   162 mg at 07/14/25 0853    cetirizine (ZyrTEC) tablet 10 mg  10 mg oral Daily Yuliya Fedchik, DO   10 mg at 07/14/25 0853    cholecalciferol (Vitamin D-3) tablet 25 mcg  25 mcg oral Daily Yuliya Fedchik, DO   25 mcg at 07/14/25 0853    dextrose 50 % injection 12.5 g  12.5 g intravenous q15 min PRN Yuliya Fedchik, DO        dextrose 50 % injection 25 g  25 g intravenous q15 min PRN Yuliya Fedchik, DO        finasteride (Proscar) tablet 5 mg  5 mg oral Daily Yuliya Fedchik, DO   5 mg at 07/14/25 0854    fluticasone (Flonase) nasal spray 2 spray  2 spray Each Nostril Nightly Yuliya Fedchik, DO   2 spray at 07/12/25 2121    furosemide (Lasix) injection 80 mg  80 mg intravenous q8h Christopher Ortiz MD   80 mg at 07/14/25 0854    glucagon (Glucagen) injection 1 mg  1 mg intramuscular q15 min PRN Yuliya Fedchik, DO        glucagon (Glucagen) injection 1 mg  1 mg intramuscular q15 min PRN Yuliya Fedchik, DO        heparin (porcine) injection 7,500 Units  7,500 Units subcutaneous q8h [facility] Yuliya Fedchik, DO   7,500 Units at 07/14/25 0538    insulin glargine (Lantus) injection 5 Units  5 Units subcutaneous Nightly Yuliya Fedchik, DO   5 Units at 07/13/25 2058    insulin lispro injection 0-5 Units  0-5 Units subcutaneous TID AC Yuliya Fedchik, DO   1 Units at 07/14/25 1200    iron sucrose (Venofer) 300 mg in sodium chloride 0.9% 282 mL IV  300 mg intravenous Daily Christopher Ortiz MD   Stopped at 07/14/25 0729    melatonin tablet 3 mg  3 mg oral Nightly PRN Jacquelyn Mcpherson DO        montelukast (Singulair) tablet 10 mg  10 mg oral Nightly Jacquelyn Mcpherson DO   10 mg at 07/13/25 2058    oxygen (O2) therapy   inhalation Continuous - Inhalation Jacquelyn Mcpherson DO   40 percent at 07/13/25 0321     polyethylene glycol (Glycolax, Miralax) packet 17 g  17 g oral Daily PRN Jacquelyn Mcpherson DO        rosuvastatin (Crestor) tablet 10 mg  10 mg oral Nightly Jacquelyn Mcpherson DO   10 mg at 07/13/25 2058    tamsulosin (Flomax) 24 hr capsule 0.8 mg  0.8 mg oral Nightly Jacquelyn Mcpherson DO   0.8 mg at 07/13/25 2057    [Held by provider] torsemide (Demadex) tablet 60 mg  60 mg oral Daily Jacquelyn Mcpherson,

## 2025-07-14 NOTE — CARE PLAN
The patient's goals for the shift include  rest    The clinical goals for the shift include maintain hemodynamically stable      Problem: Skin  Goal: Promote/optimize nutrition  Outcome: Progressing     Problem: Skin  Goal: Prevent/minimize sheer/friction injuries  Outcome: Progressing     Problem: Chronic Conditions and Co-morbidities  Goal: Patient's chronic conditions and co-morbidity symptoms are monitored and maintained or improved  Outcome: Progressing     Problem: Nutrition  Goal: Nutrient intake appropriate for maintaining nutritional needs  Outcome: Progressing

## 2025-07-14 NOTE — PROGRESS NOTES
Nephrology Consult Progress Note    Iban Bruce is a 75 y.o. male on day 3 of admission presenting with SOB (shortness of breath).      Subjective   No acute events overnight, remains on NC O2, tolerating po, nonoliguric       Objective          Physical Exam    Vitals 24HR  Heart Rate:  [65-89]   Temp:  [36 °C (96.8 °F)-36.3 °C (97.3 °F)]   Resp:  [16-20]   BP: (101-137)/(53-61)   Weight:  [124 kg (272 lb 4.3 oz)]   SpO2:  [90 %-97 %]        Awake, alert, on NC O2  Decreased AEBL  RRR abd soft, + BS  Chronic skin changes, edema+             I&O 24HR    Intake/Output Summary (Last 24 hours) at 7/14/2025 1220  Last data filed at 7/14/2025 0537  Gross per 24 hour   Intake --   Output 2200 ml   Net -2200 ml         Medications  Prescriptions Prior to Admission[1]   Scheduled medications  Scheduled Medications[2]  Continuous medications  Continuous Medications[3]  PRN medications  PRN Medications[4]    Relevant Results      Admission on 07/11/2025   Component Date Value Ref Range Status    WBC 07/11/2025 8.3  4.4 - 11.3 x10*3/uL Final    nRBC 07/11/2025 0.0  0.0 - 0.0 /100 WBCs Final    RBC 07/11/2025 3.81 (L)  4.50 - 5.90 x10*6/uL Final    Hemoglobin 07/11/2025 10.4 (L)  13.5 - 17.5 g/dL Final    Hematocrit 07/11/2025 35.3 (L)  41.0 - 52.0 % Final    MCV 07/11/2025 93  80 - 100 fL Final    MCH 07/11/2025 27.3  26.0 - 34.0 pg Final    MCHC 07/11/2025 29.5 (L)  32.0 - 36.0 g/dL Final    RDW 07/11/2025 16.1 (H)  11.5 - 14.5 % Final    Platelets 07/11/2025 233  150 - 450 x10*3/uL Final    Neutrophils % 07/11/2025 67.2  40.0 - 80.0 % Final    Immature Granulocytes %, Automated 07/11/2025 0.4  0.0 - 0.9 % Final    Immature Granulocyte Count (IG) includes promyelocytes, myelocytes and metamyelocytes but does not include bands. Percent differential counts (%) should be interpreted in the context of the absolute cell counts (cells/UL).    Lymphocytes % 07/11/2025 18.5  13.0 - 44.0 % Final    Monocytes % 07/11/2025 10.3  2.0  - 10.0 % Final    Eosinophils % 07/11/2025 3.0  0.0 - 6.0 % Final    Basophils % 07/11/2025 0.6  0.0 - 2.0 % Final    Neutrophils Absolute 07/11/2025 5.55 (H)  1.60 - 5.50 x10*3/uL Final    Percent differential counts (%) should be interpreted in the context of the absolute cell counts (cells/uL).    Immature Granulocytes Absolute, Au* 07/11/2025 0.03  0.00 - 0.50 x10*3/uL Final    Lymphocytes Absolute 07/11/2025 1.53  0.80 - 3.00 x10*3/uL Final    Monocytes Absolute 07/11/2025 0.85 (H)  0.05 - 0.80 x10*3/uL Final    Eosinophils Absolute 07/11/2025 0.25  0.00 - 0.40 x10*3/uL Final    Basophils Absolute 07/11/2025 0.05  0.00 - 0.10 x10*3/uL Final    Glucose 07/11/2025 255 (H)  74 - 99 mg/dL Final    Sodium 07/11/2025 138  136 - 145 mmol/L Final    Potassium 07/11/2025 4.2  3.5 - 5.3 mmol/L Final    Chloride 07/11/2025 103  98 - 107 mmol/L Final    Bicarbonate 07/11/2025 25  21 - 32 mmol/L Final    Anion Gap 07/11/2025 14  10 - 20 mmol/L Final    Urea Nitrogen 07/11/2025 53 (H)  6 - 23 mg/dL Final    Creatinine 07/11/2025 3.30 (H)  0.50 - 1.30 mg/dL Final    eGFR 07/11/2025 19 (L)  >60 mL/min/1.73m*2 Final    Calculations of estimated GFR are performed using the 2021 CKD-EPI Study Refit equation without the race variable for the IDMS-Traceable creatinine methods.  https://jasn.asnjournals.org/content/early/2021/09/22/ASN.8039223006    Calcium 07/11/2025 8.6  8.6 - 10.3 mg/dL Final    Albumin 07/11/2025 3.7  3.4 - 5.0 g/dL Final    Alkaline Phosphatase 07/11/2025 114  33 - 136 U/L Final    Total Protein 07/11/2025 7.7  6.4 - 8.2 g/dL Final    AST 07/11/2025 8 (L)  9 - 39 U/L Final    Bilirubin, Total 07/11/2025 0.7  0.0 - 1.2 mg/dL Final    ALT 07/11/2025 8 (L)  10 - 52 U/L Final    Patients treated with Sulfasalazine may generate falsely decreased results for ALT.    Magnesium 07/11/2025 2.37  1.60 - 2.40 mg/dL Final    Ventricular Rate 07/11/2025 83  BPM Final    Atrial Rate 07/11/2025 83  BPM Final    ME Interval  07/11/2025 188  ms Final    QRS Duration 07/11/2025 158  ms Final    QT Interval 07/11/2025 428  ms Final    QTC Calculation(Bazett) 07/11/2025 502  ms Final    P Axis 07/11/2025 25  degrees Final    R Axis 07/11/2025 -45  degrees Final    T Axis 07/11/2025 47  degrees Final    QRS Count 07/11/2025 14  beats Final    Q Onset 07/11/2025 191  ms Final    P Onset 07/11/2025 97  ms Final    P Offset 07/11/2025 154  ms Final    T Offset 07/11/2025 405  ms Final    QTC Fredericia 07/11/2025 477  ms Final    Troponin I, High Sensitivity 07/11/2025 441 (HH)  0 - 20 ng/L Final    BNP 07/11/2025 481 (H)  0 - 99 pg/mL Final    POCT pH, Venous 07/11/2025 7.32 (L)  7.33 - 7.43 pH Final    POCT pCO2, Venous 07/11/2025 51  41 - 51 mm Hg Final    POCT pO2, Venous 07/11/2025 38  35 - 45 mm Hg Final    POCT SO2, Venous 07/11/2025 64  45 - 75 % Final    POCT Oxy Hemoglobin, Venous 07/11/2025 61.4  45.0 - 75.0 % Final    POCT Hematocrit Calculated, Venous 07/11/2025 35.0 (L)  41.0 - 52.0 % Final    POCT Sodium, Venous 07/11/2025 138  136 - 145 mmol/L Final    POCT Potassium, Venous 07/11/2025 4.4  3.5 - 5.3 mmol/L Final    POCT Chloride, Venous 07/11/2025 103  98 - 107 mmol/L Final    POCT Ionized Calicum, Venous 07/11/2025 1.19  1.10 - 1.33 mmol/L Final    POCT Glucose, Venous 07/11/2025 259 (H)  74 - 99 mg/dL Final    POCT Lactate, Venous 07/11/2025 0.7  0.4 - 2.0 mmol/L Final    POCT Base Excess, Venous 07/11/2025 -0.4  -2.0 - 3.0 mmol/L Final    POCT HCO3 Calculated, Venous 07/11/2025 26.3 (H)  22.0 - 26.0 mmol/L Final    POCT Hemoglobin, Venous 07/11/2025 11.6 (L)  13.5 - 17.5 g/dL Final    POCT Anion Gap, Venous 07/11/2025 13.0  10.0 - 25.0 mmol/L Final    Patient Temperature 07/11/2025 37.0  degrees Celsius Final    FiO2 07/11/2025 44  % Final    D-Dimer Non VTE, Quant (ng/mL FEU) 07/11/2025 765 (H)  <=500 ng/mL FEU Final    Troponin I, High Sensitivity 07/11/2025 484 (HH)  0 - 20 ng/L Final    Previous result verified on  7/11/2025 1402 on specimen/case [id number] called with component TRPHS for procedure Troponin I, High Sensitivity with value 441 ng/L.    Flu A Result 07/11/2025 Not Detected  Not Detected Final    Flu B Result 07/11/2025 Not Detected  Not Detected Final    Coronavirus 2019, PCR 07/11/2025 Not Detected  Not Detected Final    Troponin I, High Sensitivity 07/11/2025 517 (HH)  0 - 20 ng/L Final    Previous result verified on 7/11/2025 1402 on specimen/case [id number] called with component TRPHS for procedure Troponin I, High Sensitivity with value 441 ng/L.    Troponin I, High Sensitivity 07/11/2025 530 (HH)  0 - 20 ng/L Final    Previous result verified on 7/11/2025 1402 on specimen/case [id number] called with component TRPHS for procedure Troponin I, High Sensitivity with value 441 ng/L.    Procalcitonin 07/11/2025 0.28 (H)  <=0.07 ng/mL Final    WBC 07/12/2025 6.9  4.4 - 11.3 x10*3/uL Final    nRBC 07/12/2025 0.0  0.0 - 0.0 /100 WBCs Final    RBC 07/12/2025 3.39 (L)  4.50 - 5.90 x10*6/uL Final    Hemoglobin 07/12/2025 9.3 (L)  13.5 - 17.5 g/dL Final    Hematocrit 07/12/2025 31.3 (L)  41.0 - 52.0 % Final    MCV 07/12/2025 92  80 - 100 fL Final    MCH 07/12/2025 27.4  26.0 - 34.0 pg Final    MCHC 07/12/2025 29.7 (L)  32.0 - 36.0 g/dL Final    RDW 07/12/2025 16.0 (H)  11.5 - 14.5 % Final    Platelets 07/12/2025 207  150 - 450 x10*3/uL Final    Glucose 07/12/2025 51 (LL)  74 - 99 mg/dL Final    Sodium 07/12/2025 143  136 - 145 mmol/L Final    Potassium 07/12/2025 4.4  3.5 - 5.3 mmol/L Final    Chloride 07/12/2025 107  98 - 107 mmol/L Final    Bicarbonate 07/12/2025 28  21 - 32 mmol/L Final    Anion Gap 07/12/2025 12  10 - 20 mmol/L Final    Urea Nitrogen 07/12/2025 54 (H)  6 - 23 mg/dL Final    Creatinine 07/12/2025 3.40 (H)  0.50 - 1.30 mg/dL Final    eGFR 07/12/2025 18 (L)  >60 mL/min/1.73m*2 Final    Calculations of estimated GFR are performed using the 2021 CKD-EPI Study Refit equation without the  race variable for the IDMS-Traceable creatinine methods.  https://jasn.asnjournals.org/content/early/2021/09/22/ASN.7076848667    Calcium 07/12/2025 8.3 (L)  8.6 - 10.3 mg/dL Final    Magnesium 07/12/2025 2.37  1.60 - 2.40 mg/dL Final    Troponin I, High Sensitivity 07/12/2025 682 (HH)  0 - 20 ng/L Final    Previous result verified on 7/11/2025 1402 on specimen/case [id number] called with component [facility] for procedure Troponin I, High Sensitivity with value 441 ng/L.    POCT Glucose 07/11/2025 150 (H)  74 - 99 mg/dL Final    POCT Glucose 07/12/2025 67 (L)  74 - 99 mg/dL Final    Troponin I, High Sensitivity 07/12/2025 544 (HH)  0 - 20 ng/L Final    POCT Glucose 07/12/2025 142 (H)  74 - 99 mg/dL Final    Albumin, Urine Random 07/12/2025 59.2  Not established mg/L Final    Creatinine, Urine Random 07/12/2025 32.9  20.0 - 370.0 mg/dL Final    Albumin/Creatinine Ratio 07/12/2025 179.9 (H)  <30.0 ug/mg Creat Final    Sodium, Urine Random 07/12/2025 97  mmol/L Final    Creatinine, Urine Random 07/12/2025 32.9  20.0 - 370.0 mg/dL Final    Sodium/Creatinine Ratio 07/12/2025 295  Not established. mmol/g Creat Final    POCT Glucose 07/12/2025 171 (H)  74 - 99 mg/dL Final    Magnesium 07/13/2025 2.20  1.60 - 2.40 mg/dL Final    Glucose 07/13/2025 63 (L)  74 - 99 mg/dL Final    Sodium 07/13/2025 142  136 - 145 mmol/L Final    Potassium 07/13/2025 4.0  3.5 - 5.3 mmol/L Final    Chloride 07/13/2025 102  98 - 107 mmol/L Final    Bicarbonate 07/13/2025 28  21 - 32 mmol/L Final    Anion Gap 07/13/2025 16  10 - 20 mmol/L Final    Urea Nitrogen 07/13/2025 55 (H)  6 - 23 mg/dL Final    Creatinine 07/13/2025 3.35 (H)  0.50 - 1.30 mg/dL Final    eGFR 07/13/2025 18 (L)  >60 mL/min/1.73m*2 Final    Calculations of estimated GFR are performed using the 2021 CKD-EPI Study Refit equation without the race variable for the IDMS-Traceable creatinine methods.  https://jasn.asnjournals.org/content/early/2021/09/22/ASN.2459164104    Calcium  07/13/2025 8.7  8.6 - 10.3 mg/dL Final    Phosphorus 07/13/2025 4.9  2.5 - 4.9 mg/dL Final    Albumin 07/13/2025 3.4  3.4 - 5.0 g/dL Final    Ferritin 07/13/2025 87  20 - 300 ng/mL Final    Iron 07/13/2025 21 (L)  35 - 150 ug/dL Final    UIBC 07/13/2025 249  110 - 370 ug/dL Final    TIBC 07/13/2025 270  240 - 445 ug/dL Final    % Saturation 07/13/2025 8 (L)  25 - 45 % Final    Parathyroid Hormone, Intact 07/13/2025 185.2 (H)  18.5 - 88.0 pg/mL Final    Vitamin D, 25-Hydroxy, Total 07/13/2025 79  30 - 100 ng/mL Final    Vitamin B12 07/13/2025 580  211 - 911 pg/mL Final    Folate, Serum 07/13/2025 14.0  >5.0 ng/mL Final    Uric Acid 07/13/2025 7.2  4.0 - 7.5 mg/dL Final    Venipuncture immediately after or during the administration of Metamizole may lead to falsely low results. Testing should be performed immediately  prior to Metamizole dosing.    WBC 07/13/2025 7.4  4.4 - 11.3 x10*3/uL Final    nRBC 07/13/2025 0.0  0.0 - 0.0 /100 WBCs Final    RBC 07/13/2025 3.54 (L)  4.50 - 5.90 x10*6/uL Final    Hemoglobin 07/13/2025 9.4 (L)  13.5 - 17.5 g/dL Final    Hematocrit 07/13/2025 32.5 (L)  41.0 - 52.0 % Final    MCV 07/13/2025 92  80 - 100 fL Final    MCH 07/13/2025 26.6  26.0 - 34.0 pg Final    MCHC 07/13/2025 28.9 (L)  32.0 - 36.0 g/dL Final    RDW 07/13/2025 15.7 (H)  11.5 - 14.5 % Final    Platelets 07/13/2025 245  150 - 450 x10*3/uL Final    POCT Glucose 07/12/2025 203 (H)  74 - 99 mg/dL Final    POCT Glucose 07/13/2025 73 (L)  74 - 99 mg/dL Final    POCT Glucose 07/13/2025 87  74 - 99 mg/dL Final    POCT Glucose 07/13/2025 137 (H)  74 - 99 mg/dL Final    POCT Glucose 07/13/2025 152 (H)  74 - 99 mg/dL Final    Glucose 07/14/2025 128 (H)  74 - 99 mg/dL Final    Sodium 07/14/2025 139  136 - 145 mmol/L Final    Potassium 07/14/2025 4.1  3.5 - 5.3 mmol/L Final    Chloride 07/14/2025 100  98 - 107 mmol/L Final    Bicarbonate 07/14/2025 29  21 - 32 mmol/L Final    Anion Gap 07/14/2025 14  10 - 20 mmol/L Final    Urea  Nitrogen 07/14/2025 62 (H)  6 - 23 mg/dL Final    Creatinine 07/14/2025 3.29 (H)  0.50 - 1.30 mg/dL Final    eGFR 07/14/2025 19 (L)  >60 mL/min/1.73m*2 Final    Calculations of estimated GFR are performed using the 2021 CKD-EPI Study Refit equation without the race variable for the IDMS-Traceable creatinine methods.  https://jasn.asnjournals.org/content/early/2021/09/22/ASN.1855124523    Calcium 07/14/2025 8.6  8.6 - 10.3 mg/dL Final    Phosphorus 07/14/2025 5.1 (H)  2.5 - 4.9 mg/dL Final    Albumin 07/14/2025 3.4  3.4 - 5.0 g/dL Final    Magnesium 07/14/2025 2.19  1.60 - 2.40 mg/dL Final    WBC 07/14/2025 7.4  4.4 - 11.3 x10*3/uL Final    nRBC 07/14/2025 0.0  0.0 - 0.0 /100 WBCs Final    RBC 07/14/2025 3.45 (L)  4.50 - 5.90 x10*6/uL Final    Hemoglobin 07/14/2025 9.6 (L)  13.5 - 17.5 g/dL Final    Hematocrit 07/14/2025 31.3 (L)  41.0 - 52.0 % Final    MCV 07/14/2025 91  80 - 100 fL Final    MCH 07/14/2025 27.8  26.0 - 34.0 pg Final    MCHC 07/14/2025 30.7 (L)  32.0 - 36.0 g/dL Final    RDW 07/14/2025 15.5 (H)  11.5 - 14.5 % Final    Platelets 07/14/2025 223  150 - 450 x10*3/uL Final    POCT Glucose 07/13/2025 196 (H)  74 - 99 mg/dL Final    POCT Glucose 07/14/2025 120 (H)  74 - 99 mg/dL Final    POCT Glucose 07/14/2025 174 (H)  74 - 99 mg/dL Final      Imaging  CT head wo IV contrast  Result Date: 7/11/2025  1.  No acute intracranial hemorrhage or acute territorial infarct. 2.  Diffuse parenchymal volume loss. Chronic microvascular ischemic changes.     MACRO: None.   Signed by: Kassandra Sterling 7/11/2025 9:54 PM Dictation workstation:   [id number]    XR chest 1 view  Result Date: 7/11/2025  Diffuse interstitial infiltrates bilaterally, as above. Clinical correlation and continued follow-up until clearing is recommended.   MACRO: None.   Signed by: Usama Jarvis 7/11/2025 12:44 PM Dictation workstation:   [id number]      Cardiology, Vascular, and Other Imaging  ECG 12 lead  Result Date: 7/12/2025  Normal sinus  rhythm Right bundle branch block Left anterior fascicular block ** Bifascicular block ** Possible Lateral infarct , age undetermined Abnormal ECG No previous ECGs available Confirmed by Jose Ríos ([id number]) on 7/12/2025 10:08:41 AM          ASSESSMENT AND PLAN    SAIMA, nonoliguric on CKD, creatinine 3.3 on admission, stabilizing  Volume overload, underlying EF 60%, RVSP 45 mm Hg/PHTN, on torsemide before admission  BPH  Hyperuricemia  Anemia, severe, chronic, documented iron def  Secondary hyperpara, renal with high  and D wnl 79      Plan  - continue diuresis with iv lasix q 8 hrs, please record IO s and UOP  - daily lytes and replete as needed for K>4 and mag>2  - electrolytes stable  - low salt diet and fluid restrictions  - iv iron course  - avoid hypotension and nephrotoxins  - daily allopurinol and flomax  - check renal US as mild hydro on last CT 10/24  - noted angiomyolipoma, not concerning      MARS reviewed  Thank you for the opportunity to assist in the care of this patient, please call with questions  Susan Costello MD PhD           [1]   Medications Prior to Admission   Medication Sig Dispense Refill Last Dose/Taking    allopurinol (Zyloprim) 100 mg tablet Take 1 tablet (100 mg) by mouth once daily.   7/10/2025 Morning    amLODIPine (Norvasc) 5 mg tablet Take 1 tablet (5 mg) by mouth once daily.   7/10/2025 Morning    aspirin 81 mg EC tablet Take 2 tablets (162 mg) by mouth once daily.   7/10/2025 Morning    cholecalciferol (Vitamin D-3) 25 mcg (1,000 units) tablet Take 1 tablet (25 mcg) by mouth once daily.   7/10/2025 Morning    finasteride (Proscar) 5 mg tablet Take 1 tablet (5 mg) by mouth once daily.   7/10/2025 Morning    fluticasone (Flonase) 50 mcg/actuation nasal spray Administer 2 sprays into each nostril once daily at bedtime.   7/10/2025 Evening    FreeStyle Evelyn 3 Plus Sensor device 1 each every 14 (fourteen) days.   Past Month    insulin aspart (NovoLOG Flexpen U-100 Insulin)  100 unit/mL (3 mL) pen Inject 35 Units under the skin 3 times a day before meals. as directed   7/10/2025 Evening    insulin glargine (Lantus) 100 unit/mL injection Inject 35 Units under the skin 2 times a day.   7/10/2025 Evening    loratadine (Claritin) 10 mg tablet Take 1 tablet (10 mg) by mouth once daily.   7/10/2025 Morning    montelukast (Singulair) 10 mg tablet Take 1 tablet (10 mg) by mouth once daily at bedtime.   7/10/2025 Evening    oxygen (O2) gas therapy Inhale 4 L/min at 240,000 mL/hr continuously.   7/11/2025    rosuvastatin (Crestor) 10 mg tablet Take 1 tablet (10 mg) by mouth once daily at bedtime. for high cholesterol   7/10/2025 Evening    sildenafil (Viagra) 50 mg tablet Take 1 tablet (50 mg) by mouth every 24 (twenty four) hours if needed for erectile dysfunction. Take one hour prior to sexual activity   Unknown    tamsulosin (Flomax) 0.4 mg 24 hr capsule Take 2 capsules (0.8 mg) by mouth once daily at bedtime.   7/10/2025 Evening    torsemide (Demadex) 20 mg tablet Take 3 tablets (60 mg) by mouth once daily.   7/10/2025 Morning   [2] allopurinol, 100 mg, oral, Daily  aspirin, 162 mg, oral, Daily  cetirizine, 10 mg, oral, Daily  cholecalciferol, 25 mcg, oral, Daily  finasteride, 5 mg, oral, Daily  fluticasone, 2 spray, Each Nostril, Nightly  furosemide, 80 mg, intravenous, q8h  heparin (porcine), 7,500 Units, subcutaneous, q8h JASON  insulin glargine, 5 Units, subcutaneous, Nightly  insulin lispro, 0-5 Units, subcutaneous, TID AC  iron sucrose, 300 mg, intravenous, Daily  montelukast, 10 mg, oral, Nightly  oxygen, , inhalation, Continuous - Inhalation  rosuvastatin, 10 mg, oral, Nightly  tamsulosin, 0.8 mg, oral, Nightly  [Held by provider] torsemide, 60 mg, oral, Daily  [3]    [4] PRN medications: acetaminophen **OR** [DISCONTINUED] acetaminophen **OR** [DISCONTINUED] acetaminophen, dextrose, dextrose, glucagon, glucagon, melatonin, polyethylene glycol

## 2025-07-14 NOTE — PROGRESS NOTES
Physical Therapy    Physical Therapy Evaluation    Patient Name: Iban Bruce  MRN: [id number]  Today's Date: 7/14/2025   Time Calculation  Start Time: 0905  Stop Time: 0931  Time Calculation (min): 26 min  820/820-A    Assessment/Plan   PT Assessment  PT Assessment Results: Decreased strength, Decreased endurance, Impaired balance, Decreased mobility, Decreased safety awareness, Obesity  Rehab Prognosis: Good  Barriers to Discharge Home: Caregiver assistance, Physical needs  Caregiver Assistance: Caregiver assistance needed per identified barriers - however, level of patient's required assistance exceeds assistance available at home  Physical Needs: Ambulating household distances limited by function/safety  Evaluation/Treatment Tolerance: Patient limited by fatigue  End of Session Communication: Bedside nurse  Assessment Comment: Continued skilled PT intervention indicated to facilitate increased strength, balance & gait stability  End of Session Patient Position: Bed, 2 rail up, Alarm on (hob elevated to comfort, call light in reach, all needs met)  IP OR SWING BED PT PLAN  Inpatient or Swing Bed: Inpatient  PT Plan  Treatment/Interventions: Bed mobility, Transfer training, Gait training, Balance training, Therapeutic exercise, Therapeutic activity  PT Plan: Ongoing PT  PT Frequency: 3 times per week (during this acute hospital stay)  PT Discharge Recommendations: Moderate intensity level of continued care (Based on current functional status & rehab potential, patient is anticipated to tolerate & benefit from 5 or more days per week of skilled reahbilitative therapy after discharge from this acute inpatient hospitalization)  PT Recommended Transfer Status: Assist x2  PT - OK to Discharge: Yes (to next level of care when cleared by medical team)    Subjective     Current Problem:  1. SOB (shortness of breath)        2. Hypoxia        3. Hypervolemia, unspecified hypervolemia type          Problem List[1]    General  Visit Information:  General  Reason for Referral: PT eval & treat/impaired mobility 7/11/25  le edema, sob, BIBA from nephrology appt for hypoxia;DX acute on chronic respiratory failure, HFpEF, elevated troponin, anemia  Referred By: Samir Mcpherson DO  Caregiver Feedback: Per conference w/ RN patient stable to participate in therapy  Co-Treatment: OT  Co-Treatment Reason: to maximize patient safety & mobility  Patient Position Received: Bed, 2 rail up, Alarm off, not on at start of session  General Comment: Pleasant & cooperative, receptive to mobility& instructions    Home Living:  Home Living  Home Living Comments: lives w/ 2 friends; ramp to enter house w/ basement level laundry managed by friends, 2nd fl bathroom w/ stairlift to access, 1st fl hospital bed/use of urinal throughout the day; tub shower w/ transfer bench & hand hled shower hose    Prior Level of Function:  Prior Function Per Pt/Caregiver Report  Hand Dominance: Right  Prior Function Comments: reports independent bed mobility & transfers from bed/chair, assist for tub transfers; independent WC mobility using u/le's, modified independent gait w/ rollator limited distances of ~10ft to access bathroom; h/o 6falls in past 6months due to le's giving out & falling out of bed, has med alert button; friend assists w/ shower, pt independent dressing wears slip on sandals; friends manage laundry/pt manages his own cooking/cleaning; patient drives/ friends shop    Precautions:  Precautions  Precautions Comment: fall, alarm, IV, 5LO2, telem purewick, fluid, I&O, [place]    Vital Signs:  Vital Signs  SpO2:  (resting 88%, w/ activity 83%, after session 2minutes to recover to 85%)  Objective     Pain:  Pain Assessment  Pain Assessment: 0-10  0-10 (Numeric) Pain Score:  (bilat hips ache 2-3/10)    Cognition:  Cognition  Overall Cognitive Status: Within Functional Limits    General Assessments:      Activity Tolerance  Endurance: Decreased tolerance for upright  activites   Functional Assessments:     Bed Mobility  Bed Mobility:  (mod assist x1 supine>sit hob elevated, use of rail; max assist x1 sit>supine heavy assist to manage le's)  Transfers  Transfer:  (mod assist x1 sit>stand bed elevated, difficulty w/ forward wt shift & le extension; min assist stand>sit w/ cues for safe hand plcmt & eccentric control)  Ambulation/Gait Training  Ambulation/Gait Training Performed:  (mod assist x1 w/ ww sidestepping 4ft LT toward hob, assist to manage ww, short shuffling steps, flexed posture, unstable/fall risk w/ decreased endurance, h/o le's buckling/falls)     Extremity/Trunk Assessments:        RLE   RLE :  (ble edema; end rom tightness heelcord & hamstrings but arom grossly wfl ; strength: hip flexion 4/5, knee ext 3+/5  ankle df 3+/5)  LLE   LLE :  (ble edema; end rom tightness heelcord & hamstrings but arom grossly wfl ; strength: hip flexion 4/5, knee ext 4/5 ankle df 3+/5)    Outcome Measures:     [facility] Basic Mobility  Turning from your back to your side while in a flat bed without using bedrails: A little  Moving from lying on your back to sitting on the side of a flat bed without using bedrails: Total  Moving to and from bed to chair (including a wheelchair): A lot  Standing up from a chair using your arms (e.g. wheelchair or bedside chair): A lot  To walk in hospital room: Total  Climbing 3-5 steps with railing: Total  Basic Mobility - Total Score: 10     Goals:  Encounter Problems       Encounter Problems (Active)       PT Problem       STG - Pt will transition supine <> sitting with cga  (Progressing)       Start:  07/14/25    Expected End:  07/28/25            STG - Pt will transfer STS with cga  (Progressing)       Start:  07/14/25    Expected End:  07/28/25            STG - Pt will amb >=10' using ww with min assist x1  (Progressing)       Start:  07/14/25    Expected End:  07/28/25            STG - Pt will perform 2-3 sets of BLE therex x10 to maximize functional  strength and independence  (Progressing)       Start:  07/14/25    Expected End:  07/28/25            Patient will maintain standing supported static  balance >=3minutes with sba  (Progressing)       Start:  07/14/25    Expected End:  07/28/25                 Education Documentation  Mobility Training, taught by Anjelica Baker PT at 7/14/2025  1:28 PM.  Learner: Patient  Readiness: Acceptance  Method: Explanation  Response: Verbalizes Understanding, Needs Reinforcement  Comment: safety, activity progression, use of ww              [1]   Patient Active Problem List  Diagnosis    SOB (shortness of breath)

## 2025-07-14 NOTE — PROGRESS NOTES
07/14/25 1120   Discharge Planning   Living Arrangements Friends   Support Systems Friends/neighbors   Assistance Needed independent, uses wheelchair   Type of Residence Private residence  (2 story home, bedroom on first floor)   Intensity of Service   Intensity of Service 0-30 min       Met with patient at bedside, introduced self and role on care transitions team.  Address and insurance verified with patient.  Patient states she lives at home with friends.   Patient states he has home oxygen.  PT/OT evaluation is pending.

## 2025-07-15 ENCOUNTER — APPOINTMENT (OUTPATIENT)
Dept: CARDIOLOGY | Facility: HOSPITAL | Age: 75
DRG: 189 | End: 2025-07-15
Payer: MEDICARE

## 2025-07-15 LAB
ALBUMIN SERPL BCP-MCNC: 3.4 G/DL (ref 3.4–5)
ANION GAP SERPL CALC-SCNC: 18 MMOL/L (ref 10–20)
BUN SERPL-MCNC: 65 MG/DL (ref 6–23)
CALCIUM SERPL-MCNC: 8.6 MG/DL (ref 8.6–10.3)
CHLORIDE SERPL-SCNC: 98 MMOL/L (ref 98–107)
CO2 SERPL-SCNC: 29 MMOL/L (ref 21–32)
CREAT SERPL-MCNC: 3.5 MG/DL (ref 0.5–1.3)
EGFRCR SERPLBLD CKD-EPI 2021: 17 ML/MIN/1.73M*2
GLUCOSE BLD MANUAL STRIP-MCNC: 131 MG/DL (ref 74–99)
GLUCOSE BLD MANUAL STRIP-MCNC: 139 MG/DL (ref 74–99)
GLUCOSE BLD MANUAL STRIP-MCNC: 172 MG/DL (ref 74–99)
GLUCOSE BLD MANUAL STRIP-MCNC: 207 MG/DL (ref 74–99)
GLUCOSE SERPL-MCNC: 132 MG/DL (ref 74–99)
MAGNESIUM SERPL-MCNC: 2.11 MG/DL (ref 1.6–2.4)
PHOSPHATE SERPL-MCNC: 4.4 MG/DL (ref 2.5–4.9)
POTASSIUM SERPL-SCNC: 4 MMOL/L (ref 3.5–5.3)
SODIUM SERPL-SCNC: 141 MMOL/L (ref 136–145)

## 2025-07-15 PROCEDURE — 83735 ASSAY OF MAGNESIUM: CPT | Performed by: INTERNAL MEDICINE

## 2025-07-15 PROCEDURE — 2060000001 HC INTERMEDIATE ICU ROOM DAILY

## 2025-07-15 PROCEDURE — 2500000002 HC RX 250 W HCPCS SELF ADMINISTERED DRUGS (ALT 637 FOR MEDICARE OP, ALT 636 FOR OP/ED): Performed by: INTERNAL MEDICINE

## 2025-07-15 PROCEDURE — 82947 ASSAY GLUCOSE BLOOD QUANT: CPT

## 2025-07-15 PROCEDURE — 99233 SBSQ HOSP IP/OBS HIGH 50: CPT | Performed by: FAMILY MEDICINE

## 2025-07-15 PROCEDURE — 80069 RENAL FUNCTION PANEL: CPT | Performed by: INTERNAL MEDICINE

## 2025-07-15 PROCEDURE — 93005 ELECTROCARDIOGRAM TRACING: CPT

## 2025-07-15 PROCEDURE — 99232 SBSQ HOSP IP/OBS MODERATE 35: CPT | Performed by: STUDENT IN AN ORGANIZED HEALTH CARE EDUCATION/TRAINING PROGRAM

## 2025-07-15 PROCEDURE — 2500000005 HC RX 250 GENERAL PHARMACY W/O HCPCS: Performed by: FAMILY MEDICINE

## 2025-07-15 PROCEDURE — 2500000004 HC RX 250 GENERAL PHARMACY W/ HCPCS (ALT 636 FOR OP/ED): Performed by: INTERNAL MEDICINE

## 2025-07-15 PROCEDURE — 2500000001 HC RX 250 WO HCPCS SELF ADMINISTERED DRUGS (ALT 637 FOR MEDICARE OP): Performed by: INTERNAL MEDICINE

## 2025-07-15 PROCEDURE — 2500000005 HC RX 250 GENERAL PHARMACY W/O HCPCS: Performed by: INTERNAL MEDICINE

## 2025-07-15 PROCEDURE — 93010 ELECTROCARDIOGRAM REPORT: CPT | Performed by: STUDENT IN AN ORGANIZED HEALTH CARE EDUCATION/TRAINING PROGRAM

## 2025-07-15 PROCEDURE — 36415 COLL VENOUS BLD VENIPUNCTURE: CPT | Performed by: INTERNAL MEDICINE

## 2025-07-15 RX ADMIN — FUROSEMIDE 80 MG: 10 INJECTION, SOLUTION INTRAMUSCULAR; INTRAVENOUS at 00:04

## 2025-07-15 RX ADMIN — Medication 60 PERCENT: at 02:05

## 2025-07-15 RX ADMIN — FLUTICASONE PROPIONATE 2 SPRAY: 50 SPRAY, METERED NASAL at 20:30

## 2025-07-15 RX ADMIN — Medication 25 MCG: at 08:41

## 2025-07-15 RX ADMIN — INSULIN LISPRO 2 UNITS: 100 INJECTION, SOLUTION INTRAVENOUS; SUBCUTANEOUS at 13:40

## 2025-07-15 RX ADMIN — FUROSEMIDE 80 MG: 10 INJECTION, SOLUTION INTRAMUSCULAR; INTRAVENOUS at 08:41

## 2025-07-15 RX ADMIN — Medication 50 PERCENT: at 18:47

## 2025-07-15 RX ADMIN — ASPIRIN 162 MG: 81 TABLET, COATED ORAL at 08:41

## 2025-07-15 RX ADMIN — IRON SUCROSE 300 MG: 20 INJECTION, SOLUTION INTRAVENOUS at 08:48

## 2025-07-15 RX ADMIN — HEPARIN SODIUM 7500 UNITS: 5000 INJECTION, SOLUTION INTRAVENOUS; SUBCUTANEOUS at 05:02

## 2025-07-15 RX ADMIN — Medication 60 PERCENT: at 02:04

## 2025-07-15 RX ADMIN — TAMSULOSIN HYDROCHLORIDE 0.8 MG: 0.4 CAPSULE ORAL at 20:27

## 2025-07-15 RX ADMIN — FUROSEMIDE 80 MG: 10 INJECTION, SOLUTION INTRAMUSCULAR; INTRAVENOUS at 20:27

## 2025-07-15 RX ADMIN — INSULIN GLARGINE 5 UNITS: 100 INJECTION, SOLUTION SUBCUTANEOUS at 20:27

## 2025-07-15 RX ADMIN — ROSUVASTATIN CALCIUM 10 MG: 10 TABLET, FILM COATED ORAL at 20:27

## 2025-07-15 RX ADMIN — HEPARIN SODIUM 7500 UNITS: 5000 INJECTION, SOLUTION INTRAVENOUS; SUBCUTANEOUS at 20:30

## 2025-07-15 RX ADMIN — MONTELUKAST 10 MG: 10 TABLET, FILM COATED ORAL at 20:27

## 2025-07-15 RX ADMIN — ALLOPURINOL 100 MG: 100 TABLET ORAL at 08:41

## 2025-07-15 RX ADMIN — HEPARIN SODIUM 7500 UNITS: 5000 INJECTION, SOLUTION INTRAVENOUS; SUBCUTANEOUS at 13:40

## 2025-07-15 RX ADMIN — CETIRIZINE HYDROCHLORIDE 10 MG: 10 TABLET, FILM COATED ORAL at 08:41

## 2025-07-15 RX ADMIN — Medication 60 PERCENT: at 02:03

## 2025-07-15 RX ADMIN — INSULIN LISPRO 1 UNITS: 100 INJECTION, SOLUTION INTRAVENOUS; SUBCUTANEOUS at 17:27

## 2025-07-15 RX ADMIN — ACETAMINOPHEN 650 MG: 325 TABLET ORAL at 23:20

## 2025-07-15 RX ADMIN — FINASTERIDE 5 MG: 5 TABLET, FILM COATED ORAL at 08:41

## 2025-07-15 ASSESSMENT — PAIN SCALES - GENERAL
PAINLEVEL_OUTOF10: 0 - NO PAIN
PAINLEVEL_OUTOF10: 3
PAINLEVEL_OUTOF10: 0 - NO PAIN

## 2025-07-15 ASSESSMENT — COGNITIVE AND FUNCTIONAL STATUS - GENERAL
TURNING FROM BACK TO SIDE WHILE IN FLAT BAD: A LITTLE
STANDING UP FROM CHAIR USING ARMS: A LOT
PERSONAL GROOMING: A LITTLE
TOILETING: A LITTLE
MOVING FROM LYING ON BACK TO SITTING ON SIDE OF FLAT BED WITH BEDRAILS: A LITTLE
CLIMB 3 TO 5 STEPS WITH RAILING: A LOT
DRESSING REGULAR UPPER BODY CLOTHING: A LITTLE
MOBILITY SCORE: 14
DRESSING REGULAR LOWER BODY CLOTHING: A LITTLE
DAILY ACTIVITIY SCORE: 19
HELP NEEDED FOR BATHING: A LITTLE
MOVING TO AND FROM BED TO CHAIR: A LOT
WALKING IN HOSPITAL ROOM: A LOT

## 2025-07-15 ASSESSMENT — PAIN DESCRIPTION - LOCATION: LOCATION: OTHER (COMMENT)

## 2025-07-15 ASSESSMENT — PAIN - FUNCTIONAL ASSESSMENT
PAIN_FUNCTIONAL_ASSESSMENT: 0-10
PAIN_FUNCTIONAL_ASSESSMENT: 0-10

## 2025-07-15 NOTE — CARE PLAN
Problem: Skin  Goal: Decreased wound size/increased tissue granulation at next dressing change  Outcome: Progressing   The patient's goals for the shift include      The clinical goals for the shift include   Problem: Skin  Goal: Decreased wound size/increased tissue granulation at next dressing change  Outcome: Progressing  Goal: Participates in plan/prevention/treatment measures  Outcome: Progressing  Goal: Prevent/manage excess moisture  Outcome: Progressing  Goal: Prevent/minimize sheer/friction injuries  Outcome: Progressing  Goal: Promote/optimize nutrition  Outcome: Progressing  Goal: Promote skin healing  Outcome: Progressing

## 2025-07-15 NOTE — CARE PLAN
The patient's goals for the shift include      The clinical goals for the shift include comfort    Problem: Skin  Goal: Decreased wound size/increased tissue granulation at next dressing change  Outcome: Progressing  Flowsheets (Taken 7/15/2025 1449)  Decreased wound size/increased tissue granulation at next dressing change: Promote sleep for wound healing  Goal: Participates in plan/prevention/treatment measures  Outcome: Progressing  Flowsheets (Taken 7/15/2025 1449)  Participates in plan/prevention/treatment measures:   Elevate heels   Discuss with provider PT/OT consult  Goal: Prevent/manage excess moisture  Outcome: Progressing  Flowsheets (Taken 7/15/2025 1449)  Prevent/manage excess moisture: Moisturize dry skin  Goal: Prevent/minimize sheer/friction injuries  Outcome: Progressing  Flowsheets (Taken 7/15/2025 1449)  Prevent/minimize sheer/friction injuries: HOB 30 degrees or less  Goal: Promote/optimize nutrition  Outcome: Progressing  Flowsheets (Taken 7/15/2025 1449)  Promote/optimize nutrition: Monitor/record intake including meals  Goal: Promote skin healing  Outcome: Progressing  Flowsheets (Taken 7/15/2025 1449)  Promote skin healing: Turn/reposition every 2 hours/use positioning/transfer devices

## 2025-07-15 NOTE — PROGRESS NOTES
Nephrology Consult Progress Note    Iban Bruce is a 75 y.o. male on day 4 of admission presenting with SOB (shortness of breath).      Subjective   No acute events overnight, remains on high flow O2, tolerating po, nonoliguric       Objective          Physical Exam    Vitals 24HR  Heart Rate:  [63-72]   Temp:  [36 °C (96.8 °F)-36.9 °C (98.4 °F)]   Resp:  [16-20]   BP: (101-130)/(52-61)   SpO2:  [88 %-99 %]        Awake, alert, on NC O2  Decreased AEBL  RRR abd soft, + BS  Chronic skin changes, edema+             I&O 24HR    Intake/Output Summary (Last 24 hours) at 7/15/2025 1000  Last data filed at 7/15/2025 0737  Gross per 24 hour   Intake --   Output 3300 ml   Net -3300 ml         Medications  Prescriptions Prior to Admission[1]   Scheduled medications  Scheduled Medications[2]  Continuous medications  Continuous Medications[3]  PRN medications  PRN Medications[4]    Relevant Results      Admission on 07/11/2025   Component Date Value Ref Range Status    WBC 07/11/2025 8.3  4.4 - 11.3 x10*3/uL Final    nRBC 07/11/2025 0.0  0.0 - 0.0 /100 WBCs Final    RBC 07/11/2025 3.81 (L)  4.50 - 5.90 x10*6/uL Final    Hemoglobin 07/11/2025 10.4 (L)  13.5 - 17.5 g/dL Final    Hematocrit 07/11/2025 35.3 (L)  41.0 - 52.0 % Final    MCV 07/11/2025 93  80 - 100 fL Final    MCH 07/11/2025 27.3  26.0 - 34.0 pg Final    MCHC 07/11/2025 29.5 (L)  32.0 - 36.0 g/dL Final    RDW 07/11/2025 16.1 (H)  11.5 - 14.5 % Final    Platelets 07/11/2025 233  150 - 450 x10*3/uL Final    Neutrophils % 07/11/2025 67.2  40.0 - 80.0 % Final    Immature Granulocytes %, Automated 07/11/2025 0.4  0.0 - 0.9 % Final    Immature Granulocyte Count (IG) includes promyelocytes, myelocytes and metamyelocytes but does not include bands. Percent differential counts (%) should be interpreted in the context of the absolute cell counts (cells/UL).    Lymphocytes % 07/11/2025 18.5  13.0 - 44.0 % Final    Monocytes % 07/11/2025 10.3  2.0 - 10.0 % Final    Eosinophils  % 07/11/2025 3.0  0.0 - 6.0 % Final    Basophils % 07/11/2025 0.6  0.0 - 2.0 % Final    Neutrophils Absolute 07/11/2025 5.55 (H)  1.60 - 5.50 x10*3/uL Final    Percent differential counts (%) should be interpreted in the context of the absolute cell counts (cells/uL).    Immature Granulocytes Absolute, Au* 07/11/2025 0.03  0.00 - 0.50 x10*3/uL Final    Lymphocytes Absolute 07/11/2025 1.53  0.80 - 3.00 x10*3/uL Final    Monocytes Absolute 07/11/2025 0.85 (H)  0.05 - 0.80 x10*3/uL Final    Eosinophils Absolute 07/11/2025 0.25  0.00 - 0.40 x10*3/uL Final    Basophils Absolute 07/11/2025 0.05  0.00 - 0.10 x10*3/uL Final    Glucose 07/11/2025 255 (H)  74 - 99 mg/dL Final    Sodium 07/11/2025 138  136 - 145 mmol/L Final    Potassium 07/11/2025 4.2  3.5 - 5.3 mmol/L Final    Chloride 07/11/2025 103  98 - 107 mmol/L Final    Bicarbonate 07/11/2025 25  21 - 32 mmol/L Final    Anion Gap 07/11/2025 14  10 - 20 mmol/L Final    Urea Nitrogen 07/11/2025 53 (H)  6 - 23 mg/dL Final    Creatinine 07/11/2025 3.30 (H)  0.50 - 1.30 mg/dL Final    eGFR 07/11/2025 19 (L)  >60 mL/min/1.73m*2 Final    Calculations of estimated GFR are performed using the 2021 CKD-EPI Study Refit equation without the race variable for the IDMS-Traceable creatinine methods.  https://jasn.asnjournals.org/content/early/2021/09/22/ASN.9625529796    Calcium 07/11/2025 8.6  8.6 - 10.3 mg/dL Final    Albumin 07/11/2025 3.7  3.4 - 5.0 g/dL Final    Alkaline Phosphatase 07/11/2025 114  33 - 136 U/L Final    Total Protein 07/11/2025 7.7  6.4 - 8.2 g/dL Final    AST 07/11/2025 8 (L)  9 - 39 U/L Final    Bilirubin, Total 07/11/2025 0.7  0.0 - 1.2 mg/dL Final    ALT 07/11/2025 8 (L)  10 - 52 U/L Final    Patients treated with Sulfasalazine may generate falsely decreased results for ALT.    Magnesium 07/11/2025 2.37  1.60 - 2.40 mg/dL Final    Ventricular Rate 07/11/2025 83  BPM Final    Atrial Rate 07/11/2025 83  BPM Final    ND Interval 07/11/2025 188  ms Final    QRS  Duration 07/11/2025 158  ms Final    QT Interval 07/11/2025 428  ms Final    QTC Calculation(Bazett) 07/11/2025 502  ms Final    P Axis 07/11/2025 25  degrees Final    R Axis 07/11/2025 -45  degrees Final    T Axis 07/11/2025 47  degrees Final    QRS Count 07/11/2025 14  beats Final    Q Onset 07/11/2025 191  ms Final    P Onset 07/11/2025 97  ms Final    P Offset 07/11/2025 154  ms Final    T Offset 07/11/2025 405  ms Final    QTC Fredericia 07/11/2025 477  ms Final    Troponin I, High Sensitivity 07/11/2025 441 (HH)  0 - 20 ng/L Final    BNP 07/11/2025 481 (H)  0 - 99 pg/mL Final    POCT pH, Venous 07/11/2025 7.32 (L)  7.33 - 7.43 pH Final    POCT pCO2, Venous 07/11/2025 51  41 - 51 mm Hg Final    POCT pO2, Venous 07/11/2025 38  35 - 45 mm Hg Final    POCT SO2, Venous 07/11/2025 64  45 - 75 % Final    POCT Oxy Hemoglobin, Venous 07/11/2025 61.4  45.0 - 75.0 % Final    POCT Hematocrit Calculated, Venous 07/11/2025 35.0 (L)  41.0 - 52.0 % Final    POCT Sodium, Venous 07/11/2025 138  136 - 145 mmol/L Final    POCT Potassium, Venous 07/11/2025 4.4  3.5 - 5.3 mmol/L Final    POCT Chloride, Venous 07/11/2025 103  98 - 107 mmol/L Final    POCT Ionized Calicum, Venous 07/11/2025 1.19  1.10 - 1.33 mmol/L Final    POCT Glucose, Venous 07/11/2025 259 (H)  74 - 99 mg/dL Final    POCT Lactate, Venous 07/11/2025 0.7  0.4 - 2.0 mmol/L Final    POCT Base Excess, Venous 07/11/2025 -0.4  -2.0 - 3.0 mmol/L Final    POCT HCO3 Calculated, Venous 07/11/2025 26.3 (H)  22.0 - 26.0 mmol/L Final    POCT Hemoglobin, Venous 07/11/2025 11.6 (L)  13.5 - 17.5 g/dL Final    POCT Anion Gap, Venous 07/11/2025 13.0  10.0 - 25.0 mmol/L Final    Patient Temperature 07/11/2025 37.0  degrees Celsius Final    FiO2 07/11/2025 44  % Final    D-Dimer Non VTE, Quant (ng/mL FEU) 07/11/2025 765 (H)  <=500 ng/mL FEU Final    Troponin I, High Sensitivity 07/11/2025 484 (HH)  0 - 20 ng/L Final    Previous result verified on 7/11/2025 1402 on specimen/case  [facility]-[id number] called with component TRPHS for procedure Troponin I, High Sensitivity with value 441 ng/L.    Flu A Result 07/11/2025 Not Detected  Not Detected Final    Flu B Result 07/11/2025 Not Detected  Not Detected Final    Coronavirus 2019, PCR 07/11/2025 Not Detected  Not Detected Final    Troponin I, High Sensitivity 07/11/2025 517 (HH)  0 - 20 ng/L Final    Previous result verified on 7/11/2025 1402 on specimen/case [facility]-[id number] called with component TRPHS for procedure Troponin I, High Sensitivity with value 441 ng/L.    Troponin I, High Sensitivity 07/11/2025 530 (HH)  0 - 20 ng/L Final    Previous result verified on 7/11/2025 1402 on specimen/case [facility]-[id number] called with component TRPHS for procedure Troponin I, High Sensitivity with value 441 ng/L.    Procalcitonin 07/11/2025 0.28 (H)  <=0.07 ng/mL Final    WBC 07/12/2025 6.9  4.4 - 11.3 x10*3/uL Final    nRBC 07/12/2025 0.0  0.0 - 0.0 /100 WBCs Final    RBC 07/12/2025 3.39 (L)  4.50 - 5.90 x10*6/uL Final    Hemoglobin 07/12/2025 9.3 (L)  13.5 - 17.5 g/dL Final    Hematocrit 07/12/2025 31.3 (L)  41.0 - 52.0 % Final    MCV 07/12/2025 92  80 - 100 fL Final    MCH 07/12/2025 27.4  26.0 - 34.0 pg Final    MCHC 07/12/2025 29.7 (L)  32.0 - 36.0 g/dL Final    RDW 07/12/2025 16.0 (H)  11.5 - 14.5 % Final    Platelets 07/12/2025 207  150 - 450 x10*3/uL Final    Glucose 07/12/2025 51 (LL)  74 - 99 mg/dL Final    Sodium 07/12/2025 143  136 - 145 mmol/L Final    Potassium 07/12/2025 4.4  3.5 - 5.3 mmol/L Final    Chloride 07/12/2025 107  98 - 107 mmol/L Final    Bicarbonate 07/12/2025 28  21 - 32 mmol/L Final    Anion Gap 07/12/2025 12  10 - 20 mmol/L Final    Urea Nitrogen 07/12/2025 54 (H)  6 - 23 mg/dL Final    Creatinine 07/12/2025 3.40 (H)  0.50 - 1.30 mg/dL Final    eGFR 07/12/2025 18 (L)  >60 mL/min/1.73m*2 Final    Calculations of estimated GFR are performed using the 2021 CKD-EPI Study Refit equation without the race variable for the  IDMS-Traceable creatinine methods.  https://jasn.asnjournals.org/content/early/2021/09/22/ASN.5114089988    Calcium 07/12/2025 8.3 (L)  8.6 - 10.3 mg/dL Final    Magnesium 07/12/2025 2.37  1.60 - 2.40 mg/dL Final    Troponin I, High Sensitivity 07/12/2025 682 (HH)  0 - 20 ng/L Final    Previous result verified on 7/11/2025 1402 on specimen/case [id number] called with component [facility] for procedure Troponin I, High Sensitivity with value 441 ng/L.    POCT Glucose 07/11/2025 150 (H)  74 - 99 mg/dL Final    POCT Glucose 07/12/2025 67 (L)  74 - 99 mg/dL Final    Troponin I, High Sensitivity 07/12/2025 544 (HH)  0 - 20 ng/L Final    POCT Glucose 07/12/2025 142 (H)  74 - 99 mg/dL Final    Albumin, Urine Random 07/12/2025 59.2  Not established mg/L Final    Creatinine, Urine Random 07/12/2025 32.9  20.0 - 370.0 mg/dL Final    Albumin/Creatinine Ratio 07/12/2025 179.9 (H)  <30.0 ug/mg Creat Final    Sodium, Urine Random 07/12/2025 97  mmol/L Final    Creatinine, Urine Random 07/12/2025 32.9  20.0 - 370.0 mg/dL Final    Sodium/Creatinine Ratio 07/12/2025 295  Not established. mmol/g Creat Final    POCT Glucose 07/12/2025 171 (H)  74 - 99 mg/dL Final    Magnesium 07/13/2025 2.20  1.60 - 2.40 mg/dL Final    Glucose 07/13/2025 63 (L)  74 - 99 mg/dL Final    Sodium 07/13/2025 142  136 - 145 mmol/L Final    Potassium 07/13/2025 4.0  3.5 - 5.3 mmol/L Final    Chloride 07/13/2025 102  98 - 107 mmol/L Final    Bicarbonate 07/13/2025 28  21 - 32 mmol/L Final    Anion Gap 07/13/2025 16  10 - 20 mmol/L Final    Urea Nitrogen 07/13/2025 55 (H)  6 - 23 mg/dL Final    Creatinine 07/13/2025 3.35 (H)  0.50 - 1.30 mg/dL Final    eGFR 07/13/2025 18 (L)  >60 mL/min/1.73m*2 Final    Calculations of estimated GFR are performed using the 2021 CKD-EPI Study Refit equation without the race variable for the IDMS-Traceable creatinine methods.  https://jasn.asnjournals.org/content/early/2021/09/22/ASN.4455651334    Calcium 07/13/2025 8.7  8.6 -  10.3 mg/dL Final    Phosphorus 07/13/2025 4.9  2.5 - 4.9 mg/dL Final    Albumin 07/13/2025 3.4  3.4 - 5.0 g/dL Final    Ferritin 07/13/2025 87  20 - 300 ng/mL Final    Iron 07/13/2025 21 (L)  35 - 150 ug/dL Final    UIBC 07/13/2025 249  110 - 370 ug/dL Final    TIBC 07/13/2025 270  240 - 445 ug/dL Final    % Saturation 07/13/2025 8 (L)  25 - 45 % Final    Parathyroid Hormone, Intact 07/13/2025 185.2 (H)  18.5 - 88.0 pg/mL Final    Vitamin D, 25-Hydroxy, Total 07/13/2025 79  30 - 100 ng/mL Final    Vitamin B12 07/13/2025 580  211 - 911 pg/mL Final    Folate, Serum 07/13/2025 14.0  >5.0 ng/mL Final    Uric Acid 07/13/2025 7.2  4.0 - 7.5 mg/dL Final    Venipuncture immediately after or during the administration of Metamizole may lead to falsely low results. Testing should be performed immediately  prior to Metamizole dosing.    WBC 07/13/2025 7.4  4.4 - 11.3 x10*3/uL Final    nRBC 07/13/2025 0.0  0.0 - 0.0 /100 WBCs Final    RBC 07/13/2025 3.54 (L)  4.50 - 5.90 x10*6/uL Final    Hemoglobin 07/13/2025 9.4 (L)  13.5 - 17.5 g/dL Final    Hematocrit 07/13/2025 32.5 (L)  41.0 - 52.0 % Final    MCV 07/13/2025 92  80 - 100 fL Final    MCH 07/13/2025 26.6  26.0 - 34.0 pg Final    MCHC 07/13/2025 28.9 (L)  32.0 - 36.0 g/dL Final    RDW 07/13/2025 15.7 (H)  11.5 - 14.5 % Final    Platelets 07/13/2025 245  150 - 450 x10*3/uL Final    POCT Glucose 07/12/2025 203 (H)  74 - 99 mg/dL Final    POCT Glucose 07/13/2025 73 (L)  74 - 99 mg/dL Final    POCT Glucose 07/13/2025 87  74 - 99 mg/dL Final    POCT Glucose 07/13/2025 137 (H)  74 - 99 mg/dL Final    POCT Glucose 07/13/2025 152 (H)  74 - 99 mg/dL Final    Glucose 07/14/2025 128 (H)  74 - 99 mg/dL Final    Sodium 07/14/2025 139  136 - 145 mmol/L Final    Potassium 07/14/2025 4.1  3.5 - 5.3 mmol/L Final    Chloride 07/14/2025 100  98 - 107 mmol/L Final    Bicarbonate 07/14/2025 29  21 - 32 mmol/L Final    Anion Gap 07/14/2025 14  10 - 20 mmol/L Final    Urea Nitrogen 07/14/2025 62 (H)   6 - 23 mg/dL Final    Creatinine 07/14/2025 3.29 (H)  0.50 - 1.30 mg/dL Final    eGFR 07/14/2025 19 (L)  >60 mL/min/1.73m*2 Final    Calculations of estimated GFR are performed using the 2021 CKD-EPI Study Refit equation without the race variable for the IDMS-Traceable creatinine methods.  https://jasn.asnjournals.org/content/early/2021/09/22/ASN.8116478904    Calcium 07/14/2025 8.6  8.6 - 10.3 mg/dL Final    Phosphorus 07/14/2025 5.1 (H)  2.5 - 4.9 mg/dL Final    Albumin 07/14/2025 3.4  3.4 - 5.0 g/dL Final    Magnesium 07/14/2025 2.19  1.60 - 2.40 mg/dL Final    WBC 07/14/2025 7.4  4.4 - 11.3 x10*3/uL Final    nRBC 07/14/2025 0.0  0.0 - 0.0 /100 WBCs Final    RBC 07/14/2025 3.45 (L)  4.50 - 5.90 x10*6/uL Final    Hemoglobin 07/14/2025 9.6 (L)  13.5 - 17.5 g/dL Final    Hematocrit 07/14/2025 31.3 (L)  41.0 - 52.0 % Final    MCV 07/14/2025 91  80 - 100 fL Final    MCH 07/14/2025 27.8  26.0 - 34.0 pg Final    MCHC 07/14/2025 30.7 (L)  32.0 - 36.0 g/dL Final    RDW 07/14/2025 15.5 (H)  11.5 - 14.5 % Final    Platelets 07/14/2025 223  150 - 450 x10*3/uL Final    POCT Glucose 07/13/2025 196 (H)  74 - 99 mg/dL Final    POCT Glucose 07/14/2025 120 (H)  74 - 99 mg/dL Final    POCT Glucose 07/14/2025 174 (H)  74 - 99 mg/dL Final    POCT Glucose 07/14/2025 245 (H)  74 - 99 mg/dL Final    Magnesium 07/15/2025 2.11  1.60 - 2.40 mg/dL Final    Glucose 07/15/2025 132 (H)  74 - 99 mg/dL Final    Sodium 07/15/2025 141  136 - 145 mmol/L Final    Potassium 07/15/2025 4.0  3.5 - 5.3 mmol/L Final    Chloride 07/15/2025 98  98 - 107 mmol/L Final    Bicarbonate 07/15/2025 29  21 - 32 mmol/L Final    Anion Gap 07/15/2025 18  10 - 20 mmol/L Final    Urea Nitrogen 07/15/2025 65 (H)  6 - 23 mg/dL Final    Creatinine 07/15/2025 3.50 (H)  0.50 - 1.30 mg/dL Final    eGFR 07/15/2025 17 (L)  >60 mL/min/1.73m*2 Final    Calculations of estimated GFR are performed using the 2021 CKD-EPI Study Refit equation without the race variable for the  IDMS-Traceable creatinine methods.  https://jasn.asnjournals.org/content/early/2021/09/22/ASN.6150718623    Calcium 07/15/2025 8.6  8.6 - 10.3 mg/dL Final    Phosphorus 07/15/2025 4.4  2.5 - 4.9 mg/dL Final    Albumin 07/15/2025 3.4  3.4 - 5.0 g/dL Final    POCT Glucose 07/15/2025 139 (H)  74 - 99 mg/dL Final      Imaging  US renal complete  Result Date: 7/15/2025  1. Moderate right-sided hydronephrosis. Advise further assessment by dedicated CT scan.   MACRO: Critical Finding:  See findings. Notification was initiated on 7/15/2025 at 8:01 am by  Juwan Apple.  (**-OCF-**) Instructions: See Findings.     Signed by: Juwan Apple 7/15/2025 8:01 AM Dictation workstation:   [id number]    CT head wo IV contrast  Result Date: 7/11/2025  1.  No acute intracranial hemorrhage or acute territorial infarct. 2.  Diffuse parenchymal volume loss. Chronic microvascular ischemic changes.     MACRO: None.   Signed by: Kassandra Sterling 7/11/2025 9:54 PM Dictation workstation:   [id number]    XR chest 1 view  Result Date: 7/11/2025  Diffuse interstitial infiltrates bilaterally, as above. Clinical correlation and continued follow-up until clearing is recommended.   MACRO: None.   Signed by: Usama Jarvis 7/11/2025 12:44 PM Dictation workstation:   [id number]      Cardiology, Vascular, and Other Imaging  ECG 12 lead  Result Date: 7/12/2025  Normal sinus rhythm Right bundle branch block Left anterior fascicular block ** Bifascicular block ** Possible Lateral infarct , age undetermined Abnormal ECG No previous ECGs available Confirmed by Jose Ríos ([id number]) on 7/12/2025 10:08:41 AM          ASSESSMENT AND PLAN    SAIMA, nonoliguric on CKD, creatinine 3.3 on admission, stabilizing  Volume overload, underlying EF 60%, RVSP 45 mm Hg/PHTN, on torsemide before admission  BPH  Hyperuricemia  Anemia, severe, chronic, documented iron def  Secondary hyperpara, renal with high  and D wnl 79      Plan  - repeat renal US with worsening  hydro c/w CT 10/24, gannon placed earlier, 600 ml out, continue to record IO s  - continue diuresis with iv lasix q 8 hrs, please UOP  - daily lytes and replete as needed for K>4 and mag>2  - electrolytes stable  - low salt diet and fluid restrictions  - iv iron course  - avoid hypotension and nephrotoxins  - daily allopurinol and flomax  - noted angiomyolipoma, not concerning      MARS reviewed  Thank you for the opportunity to assist in the care of this patient, please call with questions  Susan Costello MD PhD             [1]   Medications Prior to Admission   Medication Sig Dispense Refill Last Dose/Taking    allopurinol (Zyloprim) 100 mg tablet Take 1 tablet (100 mg) by mouth once daily.   7/10/2025 Morning    amLODIPine (Norvasc) 5 mg tablet Take 1 tablet (5 mg) by mouth once daily.   7/10/2025 Morning    aspirin 81 mg EC tablet Take 2 tablets (162 mg) by mouth once daily.   7/10/2025 Morning    cholecalciferol (Vitamin D-3) 25 mcg (1,000 units) tablet Take 1 tablet (25 mcg) by mouth once daily.   7/10/2025 Morning    finasteride (Proscar) 5 mg tablet Take 1 tablet (5 mg) by mouth once daily.   7/10/2025 Morning    fluticasone (Flonase) 50 mcg/actuation nasal spray Administer 2 sprays into each nostril once daily at bedtime.   7/10/2025 Evening    FreeStyle Evelyn 3 Plus Sensor device 1 each every 14 (fourteen) days.   Past Month    insulin aspart (NovoLOG Flexpen U-100 Insulin) 100 unit/mL (3 mL) pen Inject 35 Units under the skin 3 times a day before meals. as directed   7/10/2025 Evening    insulin glargine (Lantus) 100 unit/mL injection Inject 35 Units under the skin 2 times a day.   7/10/2025 Evening    loratadine (Claritin) 10 mg tablet Take 1 tablet (10 mg) by mouth once daily.   7/10/2025 Morning    montelukast (Singulair) 10 mg tablet Take 1 tablet (10 mg) by mouth once daily at bedtime.   7/10/2025 Evening    oxygen (O2) gas therapy Inhale 4 L/min at 240,000 mL/hr continuously.   7/11/2025     rosuvastatin (Crestor) 10 mg tablet Take 1 tablet (10 mg) by mouth once daily at bedtime. for high cholesterol   7/10/2025 Evening    sildenafil (Viagra) 50 mg tablet Take 1 tablet (50 mg) by mouth every 24 (twenty four) hours if needed for erectile dysfunction. Take one hour prior to sexual activity   Unknown    tamsulosin (Flomax) 0.4 mg 24 hr capsule Take 2 capsules (0.8 mg) by mouth once daily at bedtime.   7/10/2025 Evening    torsemide (Demadex) 20 mg tablet Take 3 tablets (60 mg) by mouth once daily.   7/10/2025 Morning   [2] allopurinol, 100 mg, oral, Daily  aspirin, 162 mg, oral, Daily  cetirizine, 10 mg, oral, Daily  cholecalciferol, 25 mcg, oral, Daily  finasteride, 5 mg, oral, Daily  fluticasone, 2 spray, Each Nostril, Nightly  furosemide, 80 mg, intravenous, q8h  heparin (porcine), 7,500 Units, subcutaneous, q8h JASON  insulin glargine, 5 Units, subcutaneous, Nightly  insulin lispro, 0-5 Units, subcutaneous, TID AC  iron sucrose, 300 mg, intravenous, Daily  montelukast, 10 mg, oral, Nightly  oxygen, , inhalation, Continuous - Inhalation  rosuvastatin, 10 mg, oral, Nightly  tamsulosin, 0.8 mg, oral, Nightly  [Held by provider] torsemide, 60 mg, oral, Daily     [3]    [4] PRN medications: acetaminophen **OR** [DISCONTINUED] acetaminophen **OR** [DISCONTINUED] acetaminophen, dextrose, dextrose, glucagon, glucagon, melatonin, polyethylene glycol

## 2025-07-15 NOTE — PROGRESS NOTES
Subjective   Sleepy.  No issues overnight.  Heart rate in 60s.    Assessment and Plan:  Assessment and Plan:   [Problem List]     [Problem List]  Patient Active Problem List      Diagnosis    SOB (shortness of breath)     Acute on chronic hypoxic resp failure  Decompensated HFpEF  Elevated troponin     DM  CKD 4  HTN, HLD  CAD  BPH  Gout  VELVET     His EKG shows sinus rhythm with right bundle yaneli block. No significant ST-T changes  Echocardiogram in November 2024 done at [facility] is reported to show ejection fraction of 60 to 65% with impaired relaxation pattern with mild mitral regurgitation and elevated RVSP at 45 mmHg.  He was also reported to have mild aortic regurgitation with aortic valve sclerosis.  Patient troponin has increased from initial of 441 to now 682 from yesterday.  Currently denies having chest pain.  Patient does have significant CKD with creatinine around 3.4 now.  His sugar is low this morning at 51.  He is shivering and is in multiple blankets.     Will obtain an echocardiogram to reevaluate cardiac function and valves considering his shortness of breath and known valvular abnormalities and now also elevated troponin.  Will recheck troponin to see where the peak is.  Currently chest pain-free and it is likely that this troponin increase is type II myocardial infarction in the setting of demand ischemia mismatch especially with his significant CKD.     Will continue with baby aspirin and statin.  Agree to hold torsemide for now and giving IV Lasix and monitor renal function and volume status closely.  Recommend nephrology consultation to establish care and further evaluation of his CKD.  Pulmonary and diabetes management per primary team.  I will follow-up with the patient tomorrow.     Thank you for the cardiology consult. We will follow with you.     July 13, 2025  Appreciate nephrology consultation and recommendation.  I agree with aggressive IV diuresis while monitoring renal function  and urine output as he does have significant volume overload and lower extremity edema.  Will continue other medications from cardiac standpoint otherwise.    July 14, 2025  Creatinine is rather stable.  Continue aggressive diuresis.  Appreciate nephrology input.  Remains to be volume overloaded.    July 15, 2025  Slightly elevated creatinine.  Still is volume overloaded.  Agree with diuresis     Jose Ríos MD, PhD, [facility], [facility]  Interventional Cardiology, Senior Attending Physician,  [facility] Catheterization Laboratory Medical Director,  [facility]  Associate Professor of Medicine,   [facility]  Office: [phone]                      I appreciate the opportunity to participate in this patient's care.      Jose Ríos MD, PhD, [facility], [facility]  Interventional Cardiology, Senior Attending Physician,  [facility] Catheterization Laboratory Medical Director,  [facility]  Associate Professor of Medicine,   [facility]  Office: [phone]               Medical History:   Medical History[1]  Surgical History:   Surgical History[2]   Social History:   Social Drivers of Health with Concerns     Physical Activity: Inactive (1/6/2023)    Received from [facility]    Exercise Vital Sign     Days of Exercise per Week: 0 days     Minutes of Exercise per Session: 0 min   Stress: Not on file   Social Connections: Not on file   Digital Equity: Not on file   Health Literacy: Not on file     Family History:   Family History[3]       Objective   Physical Exam  Vitals:    07/15/25 0656 07/15/25 0737 07/15/25 1133 07/15/25 1140   BP:  101/52  124/58   BP Location:  Right arm  Right arm   Patient Position:  Lying  Lying   Pulse: 63 67 68 70   Resp: 18 20  20   Temp:  36 °C (96.8 °F)  35.9 °C (96.6 °F)   TempSrc:  Temporal  Temporal   SpO2: 98% 93% 95% 94%   Weight:       Height:         Wt Readings from Last  "3 Encounters:   07/14/25 124 kg (272 lb 4.3 oz)     Physical Exam  Constitutional:       Appearance: Normal appearance.   HENT:      Head: Normocephalic and atraumatic.      Nose: Nose normal.      Mouth/Throat:      Mouth: Mucous membranes are moist.   Eyes:      Extraocular Movements: Extraocular movements intact.      Pupils: Pupils are equal, round, and reactive to light.   Cardiovascular:      Rate and Rhythm: Normal rate and regular rhythm.      Heart sounds: No murmur heard.  Pulmonary:      Effort: Pulmonary effort is normal.      Breath sounds: Normal breath sounds.   Abdominal:      General: Abdomen is flat.      Palpations: Abdomen is soft.   Musculoskeletal:         General: Normal range of motion.      Cervical back: Normal range of motion and neck supple.    3+ bilateral lower extremity edema  Skin:     General: Skin is warm.   Neurological:      General: No focal deficit present.      Mental Status: alert and oriented to person, place, and time.   Psychiatric:         Mood and Affect: Mood normal.         Behavior: Behavior normal.     Lab Results   Component Value Date    WBC 7.4 07/14/2025    HGB 9.6 (L) 07/14/2025    HCT 31.3 (L) 07/14/2025     07/14/2025    ALT 8 (L) 07/11/2025    AST 8 (L) 07/11/2025     07/15/2025    K 4.0 07/15/2025    CL 98 07/15/2025    CREATININE 3.50 (H) 07/15/2025    BUN 65 (H) 07/15/2025    CO2 29 07/15/2025    HGBA1C 8.4 (H) 05/13/2024     No results found for: \"CKTOTAL\", \"CKMB\", \"CKMBINDEX\", \"TROPONINI\"     No results found for: \"INR\", \"PROTIME\"        Current Medications[4]          Macro dragon disclaimer: This note was dictated by speech recognition. Minor errors in transcription may be present.           [1] History reviewed. No pertinent past medical history.  [2] History reviewed. No pertinent surgical history.  [3] No family history on file.  [4]   Current Facility-Administered Medications   Medication Dose Route Frequency Provider Last Rate Last " Admin    acetaminophen (Tylenol) tablet 650 mg  650 mg oral q6h PRN Yuliya Fedchik, DO   650 mg at 07/13/25 0528    allopurinol (Zyloprim) tablet 100 mg  100 mg oral Daily Yuliya Fedchik, DO   100 mg at 07/15/25 0841    aspirin EC tablet 162 mg  162 mg oral Daily Yuliya Fedchik, DO   162 mg at 07/15/25 0841    cetirizine (ZyrTEC) tablet 10 mg  10 mg oral Daily Yuliya Fedchik, DO   10 mg at 07/15/25 0841    cholecalciferol (Vitamin D-3) tablet 25 mcg  25 mcg oral Daily Yuliya Fedchik, DO   25 mcg at 07/15/25 0841    dextrose 50 % injection 12.5 g  12.5 g intravenous q15 min PRN Yuliya Fedchik, DO        dextrose 50 % injection 25 g  25 g intravenous q15 min PRN Yuliya Fedchik, DO        finasteride (Proscar) tablet 5 mg  5 mg oral Daily Yuliya Fedchik, DO   5 mg at 07/15/25 0841    fluticasone (Flonase) nasal spray 2 spray  2 spray Each Nostril Nightly Yuliya Fedchik, DO   2 spray at 07/14/25 2031    furosemide (Lasix) injection 80 mg  80 mg intravenous q8h Christopher Ortiz MD   80 mg at 07/15/25 0841    glucagon (Glucagen) injection 1 mg  1 mg intramuscular q15 min PRN Yuliya Fedchik, DO        glucagon (Glucagen) injection 1 mg  1 mg intramuscular q15 min PRN Yuliya Fedchik, DO        heparin (porcine) injection 7,500 Units  7,500 Units subcutaneous q8h [facility] Yuliya Fedchik, DO   7,500 Units at 07/15/25 1340    insulin glargine (Lantus) injection 5 Units  5 Units subcutaneous Nightly Yuliya Fedchik, DO   5 Units at 07/14/25 2014    insulin lispro injection 0-5 Units  0-5 Units subcutaneous TID AC Yuliya Fedchik, DO   2 Units at 07/15/25 1340    melatonin tablet 3 mg  3 mg oral Nightly PRN Yuliya Fedchik, DO        montelukast (Singulair) tablet 10 mg  10 mg oral Nightly Jacquelyn Mcpherson DO   10 mg at 07/14/25 2014    oxygen (O2) therapy   inhalation Continuous - Inhalation Jacquelyn Mcpherson DO        polyethylene glycol (Glycolax, Miralax) packet 17 g  17 g oral Daily PRN Jacquelyn Mcpherson DO        rosuvastatin (Crestor)  tablet 10 mg  10 mg oral Nightly Jacquelyn Fedalexandrak, DO   10 mg at 07/14/25 2014    tamsulosin (Flomax) 24 hr capsule 0.8 mg  0.8 mg oral Nightly Jacquelyn Mcpherson, DO   0.8 mg at 07/14/25 2014    [Held by provider] torsemide (Demadex) tablet 60 mg  60 mg oral Daily Jacquelyn Mcpherson DO

## 2025-07-15 NOTE — PROGRESS NOTES
07/15/25 1335   Discharge Planning   Home or Post Acute Services Post acute facilities (Rehab/SNF/etc)   Type of Post Acute Facility Services Skilled nursing   Expected Discharge Disposition SNF   Does the patient need discharge transport arranged? Yes   [facility] coordination needed? Yes   Has discharge transport been arranged? No   Patient Choice   Provider Choice list and CMS website (https://medicare.gov/care-compare#search) for post-acute Quality and Resource Measure Data were provided and reviewed with: Patient     [facility] PT: 10 OT: 15  Met with patient at bedside to follow up on discharge plan. Discussed skilled nursing facility at discharge and provided patient with SNF choice list.  Patient is currently on [employer].

## 2025-07-15 NOTE — CARE PLAN
The patient's goals for the shift include      The clinical goals for the shift include maintain hemodynamically stable      Problem: Skin  Goal: Decreased wound size/increased tissue granulation at next dressing change  Outcome: Progressing  Goal: Participates in plan/prevention/treatment measures  Outcome: Progressing  Goal: Prevent/manage excess moisture  Outcome: Progressing  Goal: Prevent/minimize sheer/friction injuries  Outcome: Progressing  Goal: Promote/optimize nutrition  Outcome: Progressing  Goal: Promote skin healing  Outcome: Progressing     Problem: Pain - Adult  Goal: Verbalizes/displays adequate comfort level or baseline comfort level  Outcome: Progressing

## 2025-07-15 NOTE — PROGRESS NOTES
"Hospital Medicine Progress Note    Subjective:  Iban Bruce is a 75 y.o. male, who is hospital day 4.     Patient feels okay overall, denies any acute complaints, he is on high flow nasal cannula  Objective:    /58 (BP Location: Right arm, Patient Position: Lying)   Pulse 70   Temp 35.9 °C (96.6 °F) (Temporal)   Resp 20   Ht 1.676 m (5' 6\")   Wt 124 kg (272 lb 4.3 oz)   SpO2 94%   BMI 43.95 kg/m²     Physical Exam:  General: Awake, alert, no distress, obese  HEENT: NC/AT, clear sclera, MMM  NECK: Supple  Cardiovascular: RRR, no murmurs. S1/S2  Respiratory: Diminished, no RRW or crackles. No distress  Abdomen: Soft, ND, non-tender  Extremities: Lymphedema, chronic venous stasis changes, edema improving  Neurological: No focal deficits  Skin: Warm and dry, no rashes  Psych: flat affect    I personally reviewed all imaging, labs and notes/ documentation.     Assessment & Plan:    Acute on chronic hypoxic resp failure  Decompensated HFpEF  Elevated troponin, suspect demand ischemia  Anemia of CKD & SONALI    DM  CKD 4  HTN, HLD  CAD  BPH  Gout  VELVET    He is on high flow nasal cannula, pt on 4L NC at baseline, monitor strict I&Os, electrolytes, fluid/ salt restricted diet, cards & nephro following, IV lasix 80mg TID  Trop peaked at 682, no CP or concerning EKG changes  Cr near baseline, monitor labs  IV and urine course per nephrology  Daily allopurinol and Flomax  Pt needs new CPAP, will try to arrange prior to dc  Continue home meds, hold torsemide. Insulin regimen, monitor BG  Repeat ultrasound of the kidneys showed worsening hydro c/w CT 10/24, gannon placed earlier, 600 ml out, continue to record IO s     DVT Prophylaxis: heparin, SCDs  Code Status: DNR and No Intubation confirmed with pt  Disposition: Will require short-term SNF      Quintin Mayer MD      "

## 2025-07-16 LAB
ALBUMIN SERPL BCP-MCNC: 3.2 G/DL (ref 3.4–5)
ANION GAP SERPL CALC-SCNC: 16 MMOL/L (ref 10–20)
BUN SERPL-MCNC: 74 MG/DL (ref 6–23)
CALCIUM SERPL-MCNC: 8.6 MG/DL (ref 8.6–10.3)
CHLORIDE SERPL-SCNC: 99 MMOL/L (ref 98–107)
CO2 SERPL-SCNC: 30 MMOL/L (ref 21–32)
CREAT SERPL-MCNC: 3.8 MG/DL (ref 0.5–1.3)
EGFRCR SERPLBLD CKD-EPI 2021: 16 ML/MIN/1.73M*2
GLUCOSE BLD MANUAL STRIP-MCNC: 137 MG/DL (ref 74–99)
GLUCOSE BLD MANUAL STRIP-MCNC: 204 MG/DL (ref 74–99)
GLUCOSE BLD MANUAL STRIP-MCNC: 216 MG/DL (ref 74–99)
GLUCOSE BLD MANUAL STRIP-MCNC: 217 MG/DL (ref 74–99)
GLUCOSE SERPL-MCNC: 141 MG/DL (ref 74–99)
HOLD SPECIMEN: NORMAL
MAGNESIUM SERPL-MCNC: 2.29 MG/DL (ref 1.6–2.4)
PHOSPHATE SERPL-MCNC: 4.4 MG/DL (ref 2.5–4.9)
POTASSIUM SERPL-SCNC: 4 MMOL/L (ref 3.5–5.3)
SODIUM SERPL-SCNC: 141 MMOL/L (ref 136–145)

## 2025-07-16 PROCEDURE — 80069 RENAL FUNCTION PANEL: CPT | Performed by: INTERNAL MEDICINE

## 2025-07-16 PROCEDURE — 99233 SBSQ HOSP IP/OBS HIGH 50: CPT | Performed by: STUDENT IN AN ORGANIZED HEALTH CARE EDUCATION/TRAINING PROGRAM

## 2025-07-16 PROCEDURE — 82947 ASSAY GLUCOSE BLOOD QUANT: CPT

## 2025-07-16 PROCEDURE — 83735 ASSAY OF MAGNESIUM: CPT | Performed by: INTERNAL MEDICINE

## 2025-07-16 PROCEDURE — 2500000001 HC RX 250 WO HCPCS SELF ADMINISTERED DRUGS (ALT 637 FOR MEDICARE OP): Performed by: INTERNAL MEDICINE

## 2025-07-16 PROCEDURE — 2500000004 HC RX 250 GENERAL PHARMACY W/ HCPCS (ALT 636 FOR OP/ED): Performed by: INTERNAL MEDICINE

## 2025-07-16 PROCEDURE — 2500000002 HC RX 250 W HCPCS SELF ADMINISTERED DRUGS (ALT 637 FOR MEDICARE OP, ALT 636 FOR OP/ED): Performed by: INTERNAL MEDICINE

## 2025-07-16 PROCEDURE — 2060000001 HC INTERMEDIATE ICU ROOM DAILY

## 2025-07-16 PROCEDURE — 2500000005 HC RX 250 GENERAL PHARMACY W/O HCPCS: Performed by: FAMILY MEDICINE

## 2025-07-16 PROCEDURE — 36415 COLL VENOUS BLD VENIPUNCTURE: CPT | Performed by: INTERNAL MEDICINE

## 2025-07-16 RX ADMIN — ALLOPURINOL 100 MG: 100 TABLET ORAL at 07:25

## 2025-07-16 RX ADMIN — FUROSEMIDE 80 MG: 10 INJECTION, SOLUTION INTRAMUSCULAR; INTRAVENOUS at 04:32

## 2025-07-16 RX ADMIN — INSULIN LISPRO 2 UNITS: 100 INJECTION, SOLUTION INTRAVENOUS; SUBCUTANEOUS at 12:26

## 2025-07-16 RX ADMIN — Medication 25 MCG: at 07:25

## 2025-07-16 RX ADMIN — Medication 50 PERCENT: at 18:57

## 2025-07-16 RX ADMIN — FINASTERIDE 5 MG: 5 TABLET, FILM COATED ORAL at 07:25

## 2025-07-16 RX ADMIN — HEPARIN SODIUM 7500 UNITS: 5000 INJECTION, SOLUTION INTRAVENOUS; SUBCUTANEOUS at 12:25

## 2025-07-16 RX ADMIN — INSULIN LISPRO 2 UNITS: 100 INJECTION, SOLUTION INTRAVENOUS; SUBCUTANEOUS at 16:17

## 2025-07-16 RX ADMIN — TAMSULOSIN HYDROCHLORIDE 0.8 MG: 0.4 CAPSULE ORAL at 20:47

## 2025-07-16 RX ADMIN — HEPARIN SODIUM 7500 UNITS: 5000 INJECTION, SOLUTION INTRAVENOUS; SUBCUTANEOUS at 20:47

## 2025-07-16 RX ADMIN — MONTELUKAST 10 MG: 10 TABLET, FILM COATED ORAL at 20:47

## 2025-07-16 RX ADMIN — Medication 50 L/MIN: at 08:00

## 2025-07-16 RX ADMIN — ROSUVASTATIN CALCIUM 10 MG: 10 TABLET, FILM COATED ORAL at 20:47

## 2025-07-16 RX ADMIN — FUROSEMIDE 80 MG: 10 INJECTION, SOLUTION INTRAMUSCULAR; INTRAVENOUS at 20:47

## 2025-07-16 RX ADMIN — ASPIRIN 162 MG: 81 TABLET, COATED ORAL at 07:25

## 2025-07-16 RX ADMIN — INSULIN GLARGINE 5 UNITS: 100 INJECTION, SOLUTION SUBCUTANEOUS at 21:13

## 2025-07-16 RX ADMIN — HEPARIN SODIUM 7500 UNITS: 5000 INJECTION, SOLUTION INTRAVENOUS; SUBCUTANEOUS at 04:32

## 2025-07-16 RX ADMIN — FUROSEMIDE 80 MG: 10 INJECTION, SOLUTION INTRAMUSCULAR; INTRAVENOUS at 12:25

## 2025-07-16 RX ADMIN — CETIRIZINE HYDROCHLORIDE 10 MG: 10 TABLET, FILM COATED ORAL at 07:25

## 2025-07-16 RX ADMIN — Medication 50 L/MIN: at 20:48

## 2025-07-16 ASSESSMENT — COGNITIVE AND FUNCTIONAL STATUS - GENERAL
CLIMB 3 TO 5 STEPS WITH RAILING: A LOT
DRESSING REGULAR LOWER BODY CLOTHING: A LITTLE
MOVING FROM LYING ON BACK TO SITTING ON SIDE OF FLAT BED WITH BEDRAILS: A LITTLE
HELP NEEDED FOR BATHING: A LITTLE
WALKING IN HOSPITAL ROOM: A LOT
TURNING FROM BACK TO SIDE WHILE IN FLAT BAD: A LITTLE
MOVING TO AND FROM BED TO CHAIR: A LOT
STANDING UP FROM CHAIR USING ARMS: A LOT
TOILETING: A LITTLE
DRESSING REGULAR UPPER BODY CLOTHING: A LITTLE
DAILY ACTIVITIY SCORE: 19
MOBILITY SCORE: 14
PERSONAL GROOMING: A LITTLE

## 2025-07-16 ASSESSMENT — PAIN SCALES - GENERAL
PAINLEVEL_OUTOF10: 0 - NO PAIN

## 2025-07-16 ASSESSMENT — PAIN - FUNCTIONAL ASSESSMENT
PAIN_FUNCTIONAL_ASSESSMENT: 0-10
PAIN_FUNCTIONAL_ASSESSMENT: 0-10

## 2025-07-16 NOTE — PROGRESS NOTES
Subjective   Sleepy.  No issues overnight.  Heart rate in 60s.    Assessment and Plan:  Assessment and Plan:   [Problem List]     [Problem List]  Patient Active Problem List      Diagnosis    SOB (shortness of breath)     Acute on chronic hypoxic resp failure  Decompensated HFpEF  Elevated troponin     DM  CKD 4  HTN, HLD  CAD  BPH  Gout  VELVET     His EKG shows sinus rhythm with right bundle yaneli block. No significant ST-T changes  Echocardiogram in November 2024 done at [facility] is reported to show ejection fraction of 60 to 65% with impaired relaxation pattern with mild mitral regurgitation and elevated RVSP at 45 mmHg.  He was also reported to have mild aortic regurgitation with aortic valve sclerosis.  Patient troponin has increased from initial of 441 to now 682 from yesterday.  Currently denies having chest pain.  Patient does have significant CKD with creatinine around 3.4 now.  His sugar is low this morning at 51.  He is shivering and is in multiple blankets.     Will obtain an echocardiogram to reevaluate cardiac function and valves considering his shortness of breath and known valvular abnormalities and now also elevated troponin.  Will recheck troponin to see where the peak is.  Currently chest pain-free and it is likely that this troponin increase is type II myocardial infarction in the setting of demand ischemia mismatch especially with his significant CKD.     Will continue with baby aspirin and statin.  Agree to hold torsemide for now and giving IV Lasix and monitor renal function and volume status closely.  Recommend nephrology consultation to establish care and further evaluation of his CKD.  Pulmonary and diabetes management per primary team.  I will follow-up with the patient tomorrow.     Thank you for the cardiology consult. We will follow with you.     July 13, 2025  Appreciate nephrology consultation and recommendation.  I agree with aggressive IV diuresis while monitoring renal function  and urine output as he does have significant volume overload and lower extremity edema.  Will continue other medications from cardiac standpoint otherwise.    July 14, 2025  Creatinine is rather stable.  Continue aggressive diuresis.  Appreciate nephrology input.  Remains to be volume overloaded.    July 15, 2025  Slightly elevated creatinine.  Still is volume overloaded.  Agree with diuresis    July 16, 2025  Creatinine relatively stable.  Responding well to current dose of Lasix.  Appreciate nephrology inputs.  Diuresis decision by nephrology.  Continue current medications from cardiac standpoint.  I will arrange a follow-up visit for him in cardiology office in couple of weeks.    Thank you for the consult. We will sign off. Please contact cardiology consult service with any additional questions.      Jose Ríos MD, PhD, FACC, [facility]  Interventional Cardiology, Senior Attending Physician,  [facility] Catheterization Laboratory Medical Director,  [facility]  Associate Professor of Medicine,   [facility]  Office: [phone]                                Medical History:   Medical History[1]  Surgical History:   Surgical History[2]   Social History:   Social Drivers of Health with Concerns     Physical Activity: Inactive (1/6/2023)    Received from [facility]    Exercise Vital Sign     On average, how many days per week do you engage in moderate to strenuous exercise (like a brisk walk)?: 0 days     On average, how many minutes do you engage in exercise at this level?: 0 min   Stress: Not on file   Social Connections: Not on file   Digital Equity: Not on file   Health Literacy: Not on file     Family History:   Family History[3]       Objective   Physical Exam  Vitals:    07/16/25 0436 07/16/25 0600 07/16/25 0700 07/16/25 0757   BP: 119/56   120/57   BP Location:       Patient Position:       Pulse: 64   68   Resp:       Temp:    36.5  "°C (97.7 °F)   TempSrc:       SpO2:   94% 97%   Weight:  122 kg (268 lb 15.4 oz)     Height:         Wt Readings from Last 3 Encounters:   07/16/25 122 kg (268 lb 15.4 oz)     Physical Exam  Constitutional:       Appearance: Normal appearance.   HENT:      Head: Normocephalic and atraumatic.      Nose: Nose normal.      Mouth/Throat:      Mouth: Mucous membranes are moist.   Eyes:      Extraocular Movements: Extraocular movements intact.      Pupils: Pupils are equal, round, and reactive to light.   Cardiovascular:      Rate and Rhythm: Normal rate and regular rhythm.      Heart sounds: No murmur heard.  Pulmonary:      Effort: Pulmonary effort is normal.      Breath sounds: Normal breath sounds.   Abdominal:      General: Abdomen is flat.      Palpations: Abdomen is soft.   Musculoskeletal:         General: Normal range of motion.      Cervical back: Normal range of motion and neck supple.    3+ bilateral lower extremity edema  Skin:     General: Skin is warm.   Neurological:      General: No focal deficit present.      Mental Status: alert and oriented to person, place, and time.   Psychiatric:         Mood and Affect: Mood normal.         Behavior: Behavior normal.     Lab Results   Component Value Date    WBC 7.4 07/14/2025    HGB 9.6 (L) 07/14/2025    HCT 31.3 (L) 07/14/2025     07/14/2025    ALT 8 (L) 07/11/2025    AST 8 (L) 07/11/2025     07/16/2025    K 4.0 07/16/2025    CL 99 07/16/2025    CREATININE 3.80 (H) 07/16/2025    BUN 74 (H) 07/16/2025    CO2 30 07/16/2025    HGBA1C 8.4 (H) 05/13/2024     No results found for: \"CKTOTAL\", \"CKMB\", \"CKMBINDEX\", \"TROPONINI\"     No results found for: \"INR\", \"PROTIME\"        Current Medications[4]          Macro dragon disclaimer: This note was dictated by speech recognition. Minor errors in transcription may be present.             [1] History reviewed. No pertinent past medical history.  [2] History reviewed. No pertinent surgical history.  [3] No family " history on file.  [4]   Current Facility-Administered Medications   Medication Dose Route Frequency Provider Last Rate Last Admin    acetaminophen (Tylenol) tablet 650 mg  650 mg oral q6h PRN Jacquelyn Fedalexandrak, DO   650 mg at 07/15/25 2320    allopurinol (Zyloprim) tablet 100 mg  100 mg oral Daily Yuliya Fedchik, DO   100 mg at 07/16/25 0725    aspirin EC tablet 162 mg  162 mg oral Daily Yuliya Fedchik, DO   162 mg at 07/16/25 0725    cetirizine (ZyrTEC) tablet 10 mg  10 mg oral Daily Yuliya Fedchik, DO   10 mg at 07/16/25 0725    cholecalciferol (Vitamin D-3) tablet 25 mcg  25 mcg oral Daily Yuliya Fedchik, DO   25 mcg at 07/16/25 0725    dextrose 50 % injection 12.5 g  12.5 g intravenous q15 min PRN Jacquelyn Fedalexandrak, DO        dextrose 50 % injection 25 g  25 g intravenous q15 min PRN Jacquelyn Antoninak, DO        finasteride (Proscar) tablet 5 mg  5 mg oral Daily Yuliya Fedchik, DO   5 mg at 07/16/25 0725    fluticasone (Flonase) nasal spray 2 spray  2 spray Each Nostril Nightly Yuliya Fedcarlie, DO   2 spray at 07/15/25 2030    furosemide (Lasix) injection 80 mg  80 mg intravenous q8h Christopher Ortiz MD   80 mg at 07/16/25 0432    glucagon (Glucagen) injection 1 mg  1 mg intramuscular q15 min PRN Jacquelynnorma Sarkark, DO        glucagon (Glucagen) injection 1 mg  1 mg intramuscular q15 min PRN Jacquelyn Fedalexandrak, DO        heparin (porcine) injection 7,500 Units  7,500 Units subcutaneous q8h [facility] Jacquelyn Fedalexandrak, DO   7,500 Units at 07/16/25 0432    insulin glargine (Lantus) injection 5 Units  5 Units subcutaneous Nightly Yuliya Antoninak, DO   5 Units at 07/15/25 2027    insulin lispro injection 0-5 Units  0-5 Units subcutaneous TID AC Jacquelyn Sarkark, DO   1 Units at 07/15/25 1727    melatonin tablet 3 mg  3 mg oral Nightly PRN Jacquelyn Sarkark, DO        montelukast (Singulair) tablet 10 mg  10 mg oral Nightly Jacquelyn Mcpherson DO   10 mg at 07/15/25 2027    oxygen (O2) therapy   inhalation Continuous - Inhalation Quintin Mayer MD   50  percent at 07/15/25 1847    polyethylene glycol (Glycolax, Miralax) packet 17 g  17 g oral Daily PRN Jacquelyn Mcpherson DO        rosuvastatin (Crestor) tablet 10 mg  10 mg oral Nightly Jacquelyn Mcpherson, DO   10 mg at 07/15/25 2027    tamsulosin (Flomax) 24 hr capsule 0.8 mg  0.8 mg oral Nightly Jacquelyn Mcpherson, DO   0.8 mg at 07/15/25 2027    [Held by provider] torsemide (Demadex) tablet 60 mg  60 mg oral Daily Jacquelyn Mcpherson, DO

## 2025-07-16 NOTE — CARE PLAN
Problem: Skin  Goal: Decreased wound size/increased tissue granulation at next dressing change  Outcome: Progressing  Goal: Participates in plan/prevention/treatment measures  Outcome: Progressing  Goal: Prevent/manage excess moisture  Outcome: Progressing  Goal: Prevent/minimize sheer/friction injuries  Outcome: Progressing  Goal: Promote/optimize nutrition  Outcome: Progressing  Goal: Promote skin healing  Outcome: Progressing     Problem: Pain - Adult  Goal: Verbalizes/displays adequate comfort level or baseline comfort level  Outcome: Progressing     Problem: Safety - Adult  Goal: Free from fall injury  Outcome: Progressing     Problem: Discharge Planning  Goal: Discharge to home or other facility with appropriate resources  Outcome: Progressing     Problem: Chronic Conditions and Co-morbidities  Goal: Patient's chronic conditions and co-morbidity symptoms are monitored and maintained or improved  Outcome: Progressing     Problem: Nutrition  Goal: Nutrient intake appropriate for maintaining nutritional needs  Outcome: Progressing

## 2025-07-16 NOTE — PROGRESS NOTES
Nephrology Consult Progress Note    Iban Bruce is a 75 y.o. male on day 5 of admission presenting with SOB (shortness of breath).      Subjective   No acute events overnight, improved O2 requirements, tolerating po, nonoliguric       Objective          Physical Exam    Vitals 24HR  Heart Rate:  [64-70]   Temp:  [35.1 °C (95.2 °F)-36.9 °C (98.4 °F)]   Resp:  [16-19]   BP: (112-127)/(56-61)   Weight:  [122 kg (268 lb 15.4 oz)]   SpO2:  [91 %-98 %]        Awake, alert, on NC O2  Decreased AEBL  RRR abd soft, + BS  Chronic skin changes, edema+             I&O 24HR    Intake/Output Summary (Last 24 hours) at 7/16/2025 1614  Last data filed at 7/16/2025 1537  Gross per 24 hour   Intake --   Output 1900 ml   Net -1900 ml         Medications  Prescriptions Prior to Admission[1]   Scheduled medications  Scheduled Medications[2]  Continuous medications  Continuous Medications[3]  PRN medications  PRN Medications[4]    Relevant Results      No results displayed because visit has over 200 results.         Imaging  US renal complete  Result Date: 7/15/2025  1. Moderate right-sided hydronephrosis. Advise further assessment by dedicated CT scan.   MACRO: Critical Finding:  See findings. Notification was initiated on 7/15/2025 at 8:01 am by  Juwan Apple.  (**-OCF-**) Instructions: See Findings.     Signed by: Juwan Apple 7/15/2025 8:01 AM Dictation workstation:   [id number]    CT head wo IV contrast  Result Date: 7/11/2025  1.  No acute intracranial hemorrhage or acute territorial infarct. 2.  Diffuse parenchymal volume loss. Chronic microvascular ischemic changes.     MACRO: None.   Signed by: Kassandra Sterling 7/11/2025 9:54 PM Dictation workstation:   [id number]    XR chest 1 view  Result Date: 7/11/2025  Diffuse interstitial infiltrates bilaterally, as above. Clinical correlation and continued follow-up until clearing is recommended.   MACRO: None.   Signed by: Usama Jarvis 7/11/2025 12:44 PM Dictation workstation:    [id number]      Cardiology, Vascular, and Other Imaging  Electrocardiogram, 12-lead PRN ACS symptoms  Result Date: 7/16/2025  Normal sinus rhythm Left axis deviation Right bundle branch block Abnormal ECG When compared with ECG of 15-JUL-2025 16:50, (unconfirmed) Sinus rhythm has replaced Wide QRS rhythm    ECG 12 lead  Result Date: 7/12/2025  Normal sinus rhythm Right bundle branch block Left anterior fascicular block ** Bifascicular block ** Possible Lateral infarct , age undetermined Abnormal ECG No previous ECGs available Confirmed by Jose Ríos ([id number]) on 7/12/2025 10:08:41 AM          ASSESSMENT AND PLAN    SAIMA, nonoliguric on CKD, creatinine 3.3 on admission, stabilizing  Volume overload, underlying EF 60%, RVSP 45 mm Hg/PHTN, on torsemide before admission  BPH  Hyperuricemia  Anemia, severe, chronic, documented iron def  Secondary hyperpara, renal with high  and D wnl 79      Plan  - repeat renal US with worsening hydro c/w CT 10/24, maintain gannon, continue to record IO s  - continue diuresis with iv lasix q 8 hrs, please record UOP  - daily lytes and replete as needed for K>4 and mag>2  - electrolytes stable  - low salt diet and fluid restrictions  - iv iron course  - avoid hypotension and nephrotoxins  - daily allopurinol and flomax  - noted angiomyolipoma, not concerning      MARS reviewed  Thank you for the opportunity to assist in the care of this patient, please call with questions  Susan Costello MD PhD               [1]   Medications Prior to Admission   Medication Sig Dispense Refill Last Dose/Taking    allopurinol (Zyloprim) 100 mg tablet Take 1 tablet (100 mg) by mouth once daily.   7/10/2025 Morning    amLODIPine (Norvasc) 5 mg tablet Take 1 tablet (5 mg) by mouth once daily.   7/10/2025 Morning    aspirin 81 mg EC tablet Take 2 tablets (162 mg) by mouth once daily.   7/10/2025 Morning    cholecalciferol (Vitamin D-3) 25 mcg (1,000 units) tablet Take 1 tablet (25 mcg) by mouth  once daily.   7/10/2025 Morning    finasteride (Proscar) 5 mg tablet Take 1 tablet (5 mg) by mouth once daily.   7/10/2025 Morning    fluticasone (Flonase) 50 mcg/actuation nasal spray Administer 2 sprays into each nostril once daily at bedtime.   7/10/2025 Evening    FreeStyle Evelyn 3 Plus Sensor device 1 each every 14 (fourteen) days.   Past Month    insulin aspart (NovoLOG Flexpen U-100 Insulin) 100 unit/mL (3 mL) pen Inject 35 Units under the skin 3 times a day before meals. as directed   7/10/2025 Evening    insulin glargine (Lantus) 100 unit/mL injection Inject 35 Units under the skin 2 times a day.   7/10/2025 Evening    loratadine (Claritin) 10 mg tablet Take 1 tablet (10 mg) by mouth once daily.   7/10/2025 Morning    montelukast (Singulair) 10 mg tablet Take 1 tablet (10 mg) by mouth once daily at bedtime.   7/10/2025 Evening    oxygen (O2) gas therapy Inhale 4 L/min at 240,000 mL/hr continuously.   7/11/2025    rosuvastatin (Crestor) 10 mg tablet Take 1 tablet (10 mg) by mouth once daily at bedtime. for high cholesterol   7/10/2025 Evening    sildenafil (Viagra) 50 mg tablet Take 1 tablet (50 mg) by mouth every 24 (twenty four) hours if needed for erectile dysfunction. Take one hour prior to sexual activity   Unknown    tamsulosin (Flomax) 0.4 mg 24 hr capsule Take 2 capsules (0.8 mg) by mouth once daily at bedtime.   7/10/2025 Evening    torsemide (Demadex) 20 mg tablet Take 3 tablets (60 mg) by mouth once daily.   7/10/2025 Morning   [2] allopurinol, 100 mg, oral, Daily  aspirin, 162 mg, oral, Daily  cetirizine, 10 mg, oral, Daily  cholecalciferol, 25 mcg, oral, Daily  finasteride, 5 mg, oral, Daily  fluticasone, 2 spray, Each Nostril, Nightly  furosemide, 80 mg, intravenous, q8h  heparin (porcine), 7,500 Units, subcutaneous, q8h JASON  insulin glargine, 5 Units, subcutaneous, Nightly  insulin lispro, 0-5 Units, subcutaneous, TID AC  montelukast, 10 mg, oral, Nightly  oxygen, , inhalation, Continuous -  Inhalation  rosuvastatin, 10 mg, oral, Nightly  tamsulosin, 0.8 mg, oral, Nightly  [Held by provider] torsemide, 60 mg, oral, Daily     [3]    [4] PRN medications: acetaminophen **OR** [DISCONTINUED] acetaminophen **OR** [DISCONTINUED] acetaminophen, dextrose, dextrose, glucagon, glucagon, melatonin, polyethylene glycol

## 2025-07-16 NOTE — PROGRESS NOTES
"Iban Bruce is a 75 y.o. male on day 5 of admission presenting with SOB (shortness of breath).    Subjective   Pt is still requiring airvo 50L 49%. He is still having SOB       Objective     Physical Exam  Vitals and nursing note reviewed.   Constitutional:       General: He is not in acute distress.     Appearance: He is ill-appearing. He is not toxic-appearing.   HENT:      Head: Normocephalic and atraumatic.      Nose: Nose normal.      Mouth/Throat:      Mouth: Mucous membranes are moist.     Eyes:      Extraocular Movements: Extraocular movements intact.      Conjunctiva/sclera: Conjunctivae normal.      Pupils: Pupils are equal, round, and reactive to light.       Cardiovascular:      Rate and Rhythm: Normal rate and regular rhythm.      Heart sounds: No murmur heard.     No gallop.   Pulmonary:      Effort: Pulmonary effort is normal. No respiratory distress.      Breath sounds: Rhonchi present. No wheezing or rales.   Abdominal:      General: Abdomen is flat. Bowel sounds are normal. There is no distension.      Palpations: Abdomen is soft. There is no mass.      Tenderness: There is no abdominal tenderness.     Musculoskeletal:         General: No swelling or tenderness. Normal range of motion.      Cervical back: Normal range of motion and neck supple.     Skin:     General: Skin is warm and dry.     Neurological:      Mental Status: He is alert and oriented to person, place, and time.      Motor: Weakness present.     Psychiatric:         Mood and Affect: Mood normal.         Behavior: Behavior normal.         Thought Content: Thought content normal.         Judgment: Judgment normal.         Last Recorded Vitals:  /57   Pulse 68   Temp 36.5 °C (97.7 °F)   Resp 18   Ht 1.676 m (5' 6\")   Wt 122 kg (268 lb 15.4 oz)   SpO2 97%   BMI 43.41 kg/m²      Scheduled medications:  Scheduled Medications[1]  Continuous medications:  Continuous Medications[2]  PRN medications:  PRN Medications[3] "     Relevant Results:  Results for orders placed or performed during the hospital encounter of 07/11/25 (from the past 24 hours)   POCT GLUCOSE   Result Value Ref Range    POCT Glucose 207 (H) 74 - 99 mg/dL   POCT GLUCOSE   Result Value Ref Range    POCT Glucose 172 (H) 74 - 99 mg/dL   Electrocardiogram, 12-lead PRN ACS symptoms   Result Value Ref Range    Ventricular Rate 64 BPM    Atrial Rate 64 BPM    [place] Interval 188 ms    QRS Duration 144 ms    QT Interval 466 ms    QTC Calculation(Bazett) 480 ms    P Axis -8 degrees    R Axis -42 degrees    T Axis 29 degrees    QRS Count 11 beats    Q Onset 193 ms    P Onset 99 ms    P Offset 149 ms    T Offset 426 ms    QTC Fredericia 476 ms   POCT GLUCOSE   Result Value Ref Range    POCT Glucose 131 (H) 74 - 99 mg/dL   Magnesium   Result Value Ref Range    Magnesium 2.29 1.60 - 2.40 mg/dL   Renal Function Panel   Result Value Ref Range    Glucose 141 (H) 74 - 99 mg/dL    Sodium 141 136 - 145 mmol/L    Potassium 4.0 3.5 - 5.3 mmol/L    Chloride 99 98 - 107 mmol/L    Bicarbonate 30 21 - 32 mmol/L    Anion Gap 16 10 - 20 mmol/L    Urea Nitrogen 74 (H) 6 - 23 mg/dL    Creatinine 3.80 (H) 0.50 - 1.30 mg/dL    eGFR 16 (L) >60 mL/min/1.73m*2    Calcium 8.6 8.6 - 10.3 mg/dL    Phosphorus 4.4 2.5 - 4.9 mg/dL    Albumin 3.2 (L) 3.4 - 5.0 g/dL   Lavender Top   Result Value Ref Range    Extra Tube Hold for add-ons.    POCT GLUCOSE   Result Value Ref Range    POCT Glucose 137 (H) 74 - 99 mg/dL       Imaging  US renal complete  Result Date: 7/15/2025  1. Moderate right-sided hydronephrosis. Advise further assessment by dedicated CT scan.   MACRO: Critical Finding:  See findings. Notification was initiated on 7/15/2025 at 8:01 am by  Juwan Apple.  (**-OCF-**) Instructions: See Findings.     Signed by: Juwan Apple 7/15/2025 8:01 AM Dictation workstation:   [id number]      Cardiology, Vascular, and Other Imaging  Electrocardiogram, 12-lead PRN ACS symptoms  Result Date: 7/16/2025  Normal  sinus rhythm Left axis deviation Right bundle branch block Abnormal ECG When compared with ECG of 15-JUL-2025 16:50, (unconfirmed) Sinus rhythm has replaced Wide QRS rhythm                       Assessment/Plan   Assessment & Plan  SOB (shortness of breath)    Acute on chronic hypoxic respiratory failure 2/2 HFpEF exacerbation   Hx VELVET  - on airvo 50L 49%, uses 4L NC at baseline  - cardio following  - IV lasix  - fluid restriction  - singulair    SAIMA/CKD4  Baseline cr is around 3  Urinary retention  - nephro following  - gannon in place  - flomax    Elevated trop 2/2 demand ischemia  Hx CAD  - cardio following  - statin/ASA    Normocytic anemia  - monitor    DM  - lantus  - ISS    Gout  - allopurinol    BPH  - flomax  - finasteride    DVT ppx: SQ hep  Dispo: monitor clinically, wean O2, continue diuresing          Yoli Amaya MD  Hospitalist         [1] allopurinol, 100 mg, oral, Daily  aspirin, 162 mg, oral, Daily  cetirizine, 10 mg, oral, Daily  cholecalciferol, 25 mcg, oral, Daily  finasteride, 5 mg, oral, Daily  fluticasone, 2 spray, Each Nostril, Nightly  furosemide, 80 mg, intravenous, q8h  heparin (porcine), 7,500 Units, subcutaneous, q8h JASON  insulin glargine, 5 Units, subcutaneous, Nightly  insulin lispro, 0-5 Units, subcutaneous, TID AC  montelukast, 10 mg, oral, Nightly  oxygen, , inhalation, Continuous - Inhalation  rosuvastatin, 10 mg, oral, Nightly  tamsulosin, 0.8 mg, oral, Nightly  [Held by provider] torsemide, 60 mg, oral, Daily  [2]    [3] PRN medications: acetaminophen **OR** [DISCONTINUED] acetaminophen **OR** [DISCONTINUED] acetaminophen, dextrose, dextrose, glucagon, glucagon, melatonin, polyethylene glycol

## 2025-07-16 NOTE — CARE PLAN
The patient's goals for the shift include      The clinical goals for the shift include Rest and comfort    Problem: Skin  Goal: Decreased wound size/increased tissue granulation at next dressing change  Outcome: Progressing  Flowsheets (Taken 7/16/2025 0928)  Decreased wound size/increased tissue granulation at next dressing change: Utilize specialty bed per algorithm  Goal: Participates in plan/prevention/treatment measures  Outcome: Progressing  Flowsheets (Taken 7/16/2025 0928)  Participates in plan/prevention/treatment measures:   Elevate heels   Increase activity/out of bed for meals  Goal: Prevent/manage excess moisture  Outcome: Progressing  Flowsheets (Taken 7/16/2025 0928)  Prevent/manage excess moisture: Moisturize dry skin  Goal: Prevent/minimize sheer/friction injuries  Outcome: Progressing  Flowsheets (Taken 7/16/2025 0928)  Prevent/minimize sheer/friction injuries: Turn/reposition every 2 hours/use positioning/transfer devices  Goal: Promote/optimize nutrition  Outcome: Progressing  Flowsheets (Taken 7/16/2025 0928)  Promote/optimize nutrition: Monitor/record intake including meals  Goal: Promote skin healing  Outcome: Progressing  Flowsheets (Taken 7/16/2025 0928)  Promote skin healing: Turn/reposition every 2 hours/use positioning/transfer devices

## 2025-07-17 LAB
ALBUMIN SERPL BCP-MCNC: 3.2 G/DL (ref 3.4–5)
ALP SERPL-CCNC: 104 U/L (ref 33–136)
ALT SERPL W P-5'-P-CCNC: 9 U/L (ref 10–52)
ANION GAP SERPL CALC-SCNC: 15 MMOL/L (ref 10–20)
AST SERPL W P-5'-P-CCNC: 9 U/L (ref 9–39)
ATRIAL RATE: 64 BPM
BASOPHILS # BLD AUTO: 0.03 X10*3/UL (ref 0–0.1)
BASOPHILS NFR BLD AUTO: 0.4 %
BILIRUB SERPL-MCNC: 0.4 MG/DL (ref 0–1.2)
BUN SERPL-MCNC: 73 MG/DL (ref 6–23)
CALCIUM SERPL-MCNC: 8.5 MG/DL (ref 8.6–10.3)
CHLORIDE SERPL-SCNC: 97 MMOL/L (ref 98–107)
CO2 SERPL-SCNC: 31 MMOL/L (ref 21–32)
CREAT SERPL-MCNC: 3.79 MG/DL (ref 0.5–1.3)
EGFRCR SERPLBLD CKD-EPI 2021: 16 ML/MIN/1.73M*2
EOSINOPHIL # BLD AUTO: 0.37 X10*3/UL (ref 0–0.4)
EOSINOPHIL NFR BLD AUTO: 5.4 %
ERYTHROCYTE [DISTWIDTH] IN BLOOD BY AUTOMATED COUNT: 15.5 % (ref 11.5–14.5)
GLUCOSE BLD MANUAL STRIP-MCNC: 172 MG/DL (ref 74–99)
GLUCOSE BLD MANUAL STRIP-MCNC: 183 MG/DL (ref 74–99)
GLUCOSE BLD MANUAL STRIP-MCNC: 199 MG/DL (ref 74–99)
GLUCOSE BLD MANUAL STRIP-MCNC: 213 MG/DL (ref 74–99)
GLUCOSE SERPL-MCNC: 173 MG/DL (ref 74–99)
HCT VFR BLD AUTO: 31 % (ref 41–52)
HGB BLD-MCNC: 9.2 G/DL (ref 13.5–17.5)
IMM GRANULOCYTES # BLD AUTO: 0.03 X10*3/UL (ref 0–0.5)
IMM GRANULOCYTES NFR BLD AUTO: 0.4 % (ref 0–0.9)
LYMPHOCYTES # BLD AUTO: 1.61 X10*3/UL (ref 0.8–3)
LYMPHOCYTES NFR BLD AUTO: 23.7 %
MCH RBC QN AUTO: 27.2 PG (ref 26–34)
MCHC RBC AUTO-ENTMCNC: 29.7 G/DL (ref 32–36)
MCV RBC AUTO: 92 FL (ref 80–100)
MONOCYTES # BLD AUTO: 0.75 X10*3/UL (ref 0.05–0.8)
MONOCYTES NFR BLD AUTO: 11 %
NEUTROPHILS # BLD AUTO: 4.01 X10*3/UL (ref 1.6–5.5)
NEUTROPHILS NFR BLD AUTO: 59.1 %
NRBC BLD-RTO: 0 /100 WBCS (ref 0–0)
P AXIS: -8 DEGREES
P OFFSET: 149 MS
P ONSET: 99 MS
PHOSPHATE SERPL-MCNC: 3.8 MG/DL (ref 2.5–4.9)
PLATELET # BLD AUTO: 210 X10*3/UL (ref 150–450)
POTASSIUM SERPL-SCNC: 3.7 MMOL/L (ref 3.5–5.3)
PR INTERVAL: 188 MS
PROT SERPL-MCNC: 6.6 G/DL (ref 6.4–8.2)
Q ONSET: 193 MS
QRS COUNT: 11 BEATS
QRS DURATION: 144 MS
QT INTERVAL: 466 MS
QTC CALCULATION(BAZETT): 480 MS
QTC FREDERICIA: 476 MS
R AXIS: -42 DEGREES
RBC # BLD AUTO: 3.38 X10*6/UL (ref 4.5–5.9)
SODIUM SERPL-SCNC: 139 MMOL/L (ref 136–145)
T AXIS: 29 DEGREES
T OFFSET: 426 MS
VENTRICULAR RATE: 64 BPM
WBC # BLD AUTO: 6.8 X10*3/UL (ref 4.4–11.3)

## 2025-07-17 PROCEDURE — 2500000004 HC RX 250 GENERAL PHARMACY W/ HCPCS (ALT 636 FOR OP/ED): Performed by: INTERNAL MEDICINE

## 2025-07-17 PROCEDURE — 2500000005 HC RX 250 GENERAL PHARMACY W/O HCPCS: Performed by: FAMILY MEDICINE

## 2025-07-17 PROCEDURE — 82947 ASSAY GLUCOSE BLOOD QUANT: CPT

## 2025-07-17 PROCEDURE — 99233 SBSQ HOSP IP/OBS HIGH 50: CPT | Performed by: HOSPITALIST

## 2025-07-17 PROCEDURE — 2060000001 HC INTERMEDIATE ICU ROOM DAILY

## 2025-07-17 PROCEDURE — 85025 COMPLETE CBC W/AUTO DIFF WBC: CPT | Performed by: STUDENT IN AN ORGANIZED HEALTH CARE EDUCATION/TRAINING PROGRAM

## 2025-07-17 PROCEDURE — 2500000002 HC RX 250 W HCPCS SELF ADMINISTERED DRUGS (ALT 637 FOR MEDICARE OP, ALT 636 FOR OP/ED): Performed by: INTERNAL MEDICINE

## 2025-07-17 PROCEDURE — 84100 ASSAY OF PHOSPHORUS: CPT | Performed by: INTERNAL MEDICINE

## 2025-07-17 PROCEDURE — 2500000001 HC RX 250 WO HCPCS SELF ADMINISTERED DRUGS (ALT 637 FOR MEDICARE OP): Performed by: INTERNAL MEDICINE

## 2025-07-17 PROCEDURE — 80053 COMPREHEN METABOLIC PANEL: CPT | Performed by: STUDENT IN AN ORGANIZED HEALTH CARE EDUCATION/TRAINING PROGRAM

## 2025-07-17 PROCEDURE — 36415 COLL VENOUS BLD VENIPUNCTURE: CPT | Performed by: STUDENT IN AN ORGANIZED HEALTH CARE EDUCATION/TRAINING PROGRAM

## 2025-07-17 RX ADMIN — INSULIN LISPRO 1 UNITS: 100 INJECTION, SOLUTION INTRAVENOUS; SUBCUTANEOUS at 12:30

## 2025-07-17 RX ADMIN — INSULIN GLARGINE 5 UNITS: 100 INJECTION, SOLUTION SUBCUTANEOUS at 21:40

## 2025-07-17 RX ADMIN — ROSUVASTATIN CALCIUM 10 MG: 10 TABLET, FILM COATED ORAL at 20:57

## 2025-07-17 RX ADMIN — INSULIN LISPRO 1 UNITS: 100 INJECTION, SOLUTION INTRAVENOUS; SUBCUTANEOUS at 16:31

## 2025-07-17 RX ADMIN — ALLOPURINOL 100 MG: 100 TABLET ORAL at 08:35

## 2025-07-17 RX ADMIN — Medication 50 PERCENT: at 07:03

## 2025-07-17 RX ADMIN — Medication 50 L/MIN: at 21:22

## 2025-07-17 RX ADMIN — INSULIN LISPRO 1 UNITS: 100 INJECTION, SOLUTION INTRAVENOUS; SUBCUTANEOUS at 08:35

## 2025-07-17 RX ADMIN — ACETAMINOPHEN 650 MG: 325 TABLET ORAL at 20:57

## 2025-07-17 RX ADMIN — Medication 25 MCG: at 08:35

## 2025-07-17 RX ADMIN — HEPARIN SODIUM 7500 UNITS: 5000 INJECTION, SOLUTION INTRAVENOUS; SUBCUTANEOUS at 12:30

## 2025-07-17 RX ADMIN — HEPARIN SODIUM 7500 UNITS: 5000 INJECTION, SOLUTION INTRAVENOUS; SUBCUTANEOUS at 05:00

## 2025-07-17 RX ADMIN — FUROSEMIDE 80 MG: 10 INJECTION, SOLUTION INTRAMUSCULAR; INTRAVENOUS at 12:30

## 2025-07-17 RX ADMIN — FUROSEMIDE 80 MG: 10 INJECTION, SOLUTION INTRAMUSCULAR; INTRAVENOUS at 04:59

## 2025-07-17 RX ADMIN — ASPIRIN 162 MG: 81 TABLET, COATED ORAL at 08:35

## 2025-07-17 RX ADMIN — HEPARIN SODIUM 7500 UNITS: 5000 INJECTION, SOLUTION INTRAVENOUS; SUBCUTANEOUS at 21:40

## 2025-07-17 RX ADMIN — FUROSEMIDE 80 MG: 10 INJECTION, SOLUTION INTRAMUSCULAR; INTRAVENOUS at 20:57

## 2025-07-17 RX ADMIN — CETIRIZINE HYDROCHLORIDE 10 MG: 10 TABLET, FILM COATED ORAL at 08:35

## 2025-07-17 RX ADMIN — TAMSULOSIN HYDROCHLORIDE 0.8 MG: 0.4 CAPSULE ORAL at 20:57

## 2025-07-17 RX ADMIN — ACETAMINOPHEN 650 MG: 325 TABLET ORAL at 01:00

## 2025-07-17 RX ADMIN — MONTELUKAST 10 MG: 10 TABLET, FILM COATED ORAL at 20:57

## 2025-07-17 RX ADMIN — FINASTERIDE 5 MG: 5 TABLET, FILM COATED ORAL at 08:35

## 2025-07-17 ASSESSMENT — PAIN - FUNCTIONAL ASSESSMENT
PAIN_FUNCTIONAL_ASSESSMENT: 0-10

## 2025-07-17 ASSESSMENT — PAIN SCALES - GENERAL
PAINLEVEL_OUTOF10: 0 - NO PAIN
PAINLEVEL_OUTOF10: 3

## 2025-07-17 ASSESSMENT — COGNITIVE AND FUNCTIONAL STATUS - GENERAL
TOILETING: A LITTLE
DAILY ACTIVITIY SCORE: 19
MOVING FROM LYING ON BACK TO SITTING ON SIDE OF FLAT BED WITH BEDRAILS: A LITTLE
DRESSING REGULAR UPPER BODY CLOTHING: A LITTLE
TURNING FROM BACK TO SIDE WHILE IN FLAT BAD: A LITTLE
HELP NEEDED FOR BATHING: A LITTLE
DRESSING REGULAR LOWER BODY CLOTHING: A LITTLE
STANDING UP FROM CHAIR USING ARMS: A LOT
CLIMB 3 TO 5 STEPS WITH RAILING: A LOT
WALKING IN HOSPITAL ROOM: A LOT
MOBILITY SCORE: 14
MOVING TO AND FROM BED TO CHAIR: A LOT
PERSONAL GROOMING: A LITTLE

## 2025-07-17 ASSESSMENT — PAIN DESCRIPTION - LOCATION: LOCATION: HIP

## 2025-07-17 ASSESSMENT — PAIN DESCRIPTION - ORIENTATION: ORIENTATION: LEFT

## 2025-07-17 ASSESSMENT — PAIN DESCRIPTION - DESCRIPTORS: DESCRIPTORS: ACHING

## 2025-07-17 NOTE — PROGRESS NOTES
Nephrology Consult Progress Note    Iban Bruce is a 75 y.o. male on day 6 of admission presenting with SOB (shortness of breath).      Subjective   No acute events overnight, improved O2 requirements, tolerating po, nonoliguric       Objective          Physical Exam    Vitals 24HR  Heart Rate:  [63-72]   Temp:  [35.9 °C (96.6 °F)-37.2 °C (99 °F)]   Resp:  [18]   BP: (108-125)/(53-60)   Weight:  [121 kg (266 lb 8.6 oz)]   SpO2:  [92 %-98 %]        Awake, alert, on NC O2  Decreased AEBL  RRR abd soft, + BS  Chronic skin changes, edema+             I&O 24HR    Intake/Output Summary (Last 24 hours) at 7/17/2025 1434  Last data filed at 7/17/2025 0900  Gross per 24 hour   Intake 120 ml   Output 3620 ml   Net -3500 ml         Medications  Prescriptions Prior to Admission[1]   Scheduled medications  Scheduled Medications[2]  Continuous medications  Continuous Medications[3]  PRN medications  PRN Medications[4]    Relevant Results      No results displayed because visit has over 200 results.         Imaging  US renal complete  Result Date: 7/15/2025  1. Moderate right-sided hydronephrosis. Advise further assessment by dedicated CT scan.   MACRO: Critical Finding:  See findings. Notification was initiated on 7/15/2025 at 8:01 am by  Juwan Apple.  (**-OCF-**) Instructions: See Findings.     Signed by: Juwan Apple 7/15/2025 8:01 AM Dictation workstation:   [id number]    CT head wo IV contrast  Result Date: 7/11/2025  1.  No acute intracranial hemorrhage or acute territorial infarct. 2.  Diffuse parenchymal volume loss. Chronic microvascular ischemic changes.     MACRO: None.   Signed by: Kassandra Sterling 7/11/2025 9:54 PM Dictation workstation:   [id number]    XR chest 1 view  Result Date: 7/11/2025  Diffuse interstitial infiltrates bilaterally, as above. Clinical correlation and continued follow-up until clearing is recommended.   MACRO: None.   Signed by: Usama Jarvis 7/11/2025 12:44 PM Dictation workstation:    [id number]      Cardiology, Vascular, and Other Imaging  Electrocardiogram, 12-lead PRN ACS symptoms  Result Date: 7/17/2025  Normal sinus rhythm Left axis deviation Right bundle branch block Abnormal ECG Confirmed by Jose Ríos ([id number]) on 7/17/2025 2:25:39 PM    ECG 12 lead  Result Date: 7/12/2025  Normal sinus rhythm Right bundle branch block Left anterior fascicular block ** Bifascicular block ** Possible Lateral infarct , age undetermined Abnormal ECG No previous ECGs available Confirmed by Jose Ríos ([id number]) on 7/12/2025 10:08:41 AM          ASSESSMENT AND PLAN    SAIMA, nonoliguric on CKD, creatinine 3.3 on admission, stabilizing  Volume overload, underlying EF 60%, RVSP 45 mm Hg/PHTN, on torsemide before admission  BPH  Hyperuricemia  Anemia, severe, chronic, documented iron def  Secondary hyperpara, renal with high  and D wnl 79      Plan  - repeat renal US with worsening hydro c/w CT 10/24, maintain gannon, continue to record IO s  - continue diuresis with iv lasix q 8 hrs as he remains on high flow O2, please record UOP  - daily lytes and replete as needed for K>4 and mag>2  - electrolytes stable  - low salt diet and fluid restrictions 1.2L/24 hrs  - iv iron course  - avoid hypotension and nephrotoxins  - daily allopurinol and flomax  - noted angiomyolipoma, not concerning      MARS reviewed  Thank you for the opportunity to assist in the care of this patient, please call with questions  Susan Costello MD PhD                 [1]   Medications Prior to Admission   Medication Sig Dispense Refill Last Dose/Taking    allopurinol (Zyloprim) 100 mg tablet Take 1 tablet (100 mg) by mouth once daily.   7/10/2025 Morning    amLODIPine (Norvasc) 5 mg tablet Take 1 tablet (5 mg) by mouth once daily.   7/10/2025 Morning    aspirin 81 mg EC tablet Take 2 tablets (162 mg) by mouth once daily.   7/10/2025 Morning    cholecalciferol (Vitamin D-3) 25 mcg (1,000 units) tablet Take 1 tablet (25 mcg) by  mouth once daily.   7/10/2025 Morning    finasteride (Proscar) 5 mg tablet Take 1 tablet (5 mg) by mouth once daily.   7/10/2025 Morning    fluticasone (Flonase) 50 mcg/actuation nasal spray Administer 2 sprays into each nostril once daily at bedtime.   7/10/2025 Evening    FreeStyle Evelyn 3 Plus Sensor device 1 each every 14 (fourteen) days.   Past Month    insulin aspart (NovoLOG Flexpen U-100 Insulin) 100 unit/mL (3 mL) pen Inject 35 Units under the skin 3 times a day before meals. as directed   7/10/2025 Evening    insulin glargine (Lantus) 100 unit/mL injection Inject 35 Units under the skin 2 times a day.   7/10/2025 Evening    loratadine (Claritin) 10 mg tablet Take 1 tablet (10 mg) by mouth once daily.   7/10/2025 Morning    montelukast (Singulair) 10 mg tablet Take 1 tablet (10 mg) by mouth once daily at bedtime.   7/10/2025 Evening    oxygen (O2) gas therapy Inhale 4 L/min at 240,000 mL/hr continuously.   7/11/2025    rosuvastatin (Crestor) 10 mg tablet Take 1 tablet (10 mg) by mouth once daily at bedtime. for high cholesterol   7/10/2025 Evening    sildenafil (Viagra) 50 mg tablet Take 1 tablet (50 mg) by mouth every 24 (twenty four) hours if needed for erectile dysfunction. Take one hour prior to sexual activity   Unknown    tamsulosin (Flomax) 0.4 mg 24 hr capsule Take 2 capsules (0.8 mg) by mouth once daily at bedtime.   7/10/2025 Evening    torsemide (Demadex) 20 mg tablet Take 3 tablets (60 mg) by mouth once daily.   7/10/2025 Morning   [2] allopurinol, 100 mg, oral, Daily  aspirin, 162 mg, oral, Daily  cetirizine, 10 mg, oral, Daily  cholecalciferol, 25 mcg, oral, Daily  finasteride, 5 mg, oral, Daily  fluticasone, 2 spray, Each Nostril, Nightly  furosemide, 80 mg, intravenous, q8h  heparin (porcine), 7,500 Units, subcutaneous, q8h JASON  insulin glargine, 5 Units, subcutaneous, Nightly  insulin lispro, 0-5 Units, subcutaneous, TID AC  montelukast, 10 mg, oral, Nightly  oxygen, , inhalation,  Continuous - Inhalation  rosuvastatin, 10 mg, oral, Nightly  tamsulosin, 0.8 mg, oral, Nightly  [Held by provider] torsemide, 60 mg, oral, Daily     [3]    [4] PRN medications: acetaminophen **OR** [DISCONTINUED] acetaminophen **OR** [DISCONTINUED] acetaminophen, dextrose, dextrose, glucagon, glucagon, melatonin, polyethylene glycol

## 2025-07-17 NOTE — PROGRESS NOTES
7/17/2025  Followed up with pt in room, introduced self and explained role.  Pt remains on airvo.  Asked about Snf.  Pt has not decided on facility.  Ct Team will continue to follow.   Lori Meng Rn [facility]

## 2025-07-17 NOTE — CARE PLAN
The patient's goals for the shift include      The clinical goals for the shift include rest and comfort      Problem: Skin  Goal: Decreased wound size/increased tissue granulation at next dressing change  Outcome: Progressing  Flowsheets (Taken 7/17/2025 1500)  Decreased wound size/increased tissue granulation at next dressing change: Promote sleep for wound healing  Goal: Participates in plan/prevention/treatment measures  Outcome: Progressing  Flowsheets (Taken 7/17/2025 1500)  Participates in plan/prevention/treatment measures:   Increase activity/out of bed for meals   Elevate heels  Goal: Prevent/manage excess moisture  Outcome: Progressing  Flowsheets (Taken 7/17/2025 1500)  Prevent/manage excess moisture: Moisturize dry skin  Goal: Prevent/minimize sheer/friction injuries  Outcome: Progressing  Flowsheets (Taken 7/17/2025 1500)  Prevent/minimize sheer/friction injuries: Turn/reposition every 2 hours/use positioning/transfer devices  Goal: Promote/optimize nutrition  Outcome: Progressing  Flowsheets (Taken 7/17/2025 1500)  Promote/optimize nutrition: Monitor/record intake including meals  Goal: Promote skin healing  Outcome: Progressing  Flowsheets (Taken 7/17/2025 1500)  Promote skin healing: Turn/reposition every 2 hours/use positioning/transfer devices

## 2025-07-17 NOTE — CARE PLAN
Problem: Skin  Goal: Prevent/minimize sheer/friction injuries  Outcome: Progressing  Flowsheets (Taken 7/16/2025 2116)  Prevent/minimize sheer/friction injuries:   Turn/reposition every 2 hours/use positioning/transfer devices   Complete micro-shifts as needed if patient unable. Adjust patient position to relieve pressure points, not a full turn     Problem: Pain - Adult  Goal: Verbalizes/displays adequate comfort level or baseline comfort level  Outcome: Progressing     Problem: Safety - Adult  Goal: Free from fall injury  Outcome: Progressing     Problem: Discharge Planning  Goal: Discharge to home or other facility with appropriate resources  Outcome: Progressing     Problem: Chronic Conditions and Co-morbidities  Goal: Patient's chronic conditions and co-morbidity symptoms are monitored and maintained or improved  Outcome: Progressing     Problem: Nutrition  Goal: Nutrient intake appropriate for maintaining nutritional needs  Outcome: Progressing   The patient's goals for the shift include  rest    The clinical goals for the shift include comfort

## 2025-07-17 NOTE — PROGRESS NOTES
"Iban Bruce is a 75 y.o. male on day 6 of admission presenting with SOB (shortness of breath).    Subjective   Vitals and chart notes from overnight reviewed.   No acute issues overnight.   Patient seen and evaluated at bedside.   Remains on AirVo at 50LPM 51% FIO2.        Objective     Physical Exam  Vitals and nursing note reviewed.   Constitutional:       Appearance: He is ill-appearing.   HENT:      Mouth/Throat:      Mouth: Mucous membranes are moist.      Pharynx: Oropharynx is clear.     Cardiovascular:      Rate and Rhythm: Normal rate and regular rhythm.   Pulmonary:      Effort: Pulmonary effort is normal.      Breath sounds: Rhonchi present.   Abdominal:      Palpations: Abdomen is soft.     Neurological:      Mental Status: He is alert.         Last Recorded Vitals:  /59   Pulse 61   Temp 36.6 °C (97.9 °F)   Resp 18   Ht 1.676 m (5' 6\")   Wt 121 kg (266 lb 8.6 oz)   SpO2 98%   BMI 43.02 kg/m²      Scheduled medications:  Scheduled Medications[1]  Continuous medications:  Continuous Medications[2]  PRN medications:  PRN Medications[3]     Relevant Results:  Results for orders placed or performed during the hospital encounter of 07/11/25 (from the past 24 hours)   POCT GLUCOSE   Result Value Ref Range    POCT Glucose 217 (H) 74 - 99 mg/dL   POCT GLUCOSE   Result Value Ref Range    POCT Glucose 172 (H) 74 - 99 mg/dL   Comprehensive Metabolic Panel   Result Value Ref Range    Glucose 173 (H) 74 - 99 mg/dL    Sodium 139 136 - 145 mmol/L    Potassium 3.7 3.5 - 5.3 mmol/L    Chloride 97 (L) 98 - 107 mmol/L    Bicarbonate 31 21 - 32 mmol/L    Anion Gap 15 10 - 20 mmol/L    Urea Nitrogen 73 (H) 6 - 23 mg/dL    Creatinine 3.79 (H) 0.50 - 1.30 mg/dL    eGFR 16 (L) >60 mL/min/1.73m*2    Calcium 8.5 (L) 8.6 - 10.3 mg/dL    Albumin 3.2 (L) 3.4 - 5.0 g/dL    Alkaline Phosphatase 104 33 - 136 U/L    Total Protein 6.6 6.4 - 8.2 g/dL    AST 9 9 - 39 U/L    Bilirubin, Total 0.4 0.0 - 1.2 mg/dL    ALT 9 (L) " 10 - 52 U/L   Phosphorus   Result Value Ref Range    Phosphorus 3.8 2.5 - 4.9 mg/dL   CBC and Auto Differential   Result Value Ref Range    WBC 6.8 4.4 - 11.3 x10*3/uL    nRBC 0.0 0.0 - 0.0 /100 WBCs    RBC 3.38 (L) 4.50 - 5.90 x10*6/uL    Hemoglobin 9.2 (L) 13.5 - 17.5 g/dL    Hematocrit 31.0 (L) 41.0 - 52.0 %    MCV 92 80 - 100 fL    MCH 27.2 26.0 - 34.0 pg    MCHC 29.7 (L) 32.0 - 36.0 g/dL    RDW 15.5 (H) 11.5 - 14.5 %    Platelets 210 150 - 450 x10*3/uL    Neutrophils % 59.1 40.0 - 80.0 %    Immature Granulocytes %, Automated 0.4 0.0 - 0.9 %    Lymphocytes % 23.7 13.0 - 44.0 %    Monocytes % 11.0 2.0 - 10.0 %    Eosinophils % 5.4 0.0 - 6.0 %    Basophils % 0.4 0.0 - 2.0 %    Neutrophils Absolute 4.01 1.60 - 5.50 x10*3/uL    Immature Granulocytes Absolute, Automated 0.03 0.00 - 0.50 x10*3/uL    Lymphocytes Absolute 1.61 0.80 - 3.00 x10*3/uL    Monocytes Absolute 0.75 0.05 - 0.80 x10*3/uL    Eosinophils Absolute 0.37 0.00 - 0.40 x10*3/uL    Basophils Absolute 0.03 0.00 - 0.10 x10*3/uL   POCT GLUCOSE   Result Value Ref Range    POCT Glucose 199 (H) 74 - 99 mg/dL   POCT GLUCOSE   Result Value Ref Range    POCT Glucose 183 (H) 74 - 99 mg/dL       Imaging  No results found.      Cardiology, Vascular, and Other Imaging  Electrocardiogram, 12-lead PRN ACS symptoms  Result Date: 7/17/2025  Normal sinus rhythm Left axis deviation Right bundle branch block Abnormal ECG Confirmed by Jose Ríos ([id number]) on 7/17/2025 2:25:39 PM                       Assessment/Plan   Assessment & Plan  SOB (shortness of breath)    Acute on chronic hypoxic respiratory failure 2/2 HFpEF exacerbation   Hx VELVET  - On airvo, wean for goal o2 sat 88=92%. Uses 4L NC at baseline  - cardio following  - IV lasix  - fluid restriction  - singulair    SAIMA/CKD4  Baseline cr is around 3  Urinary retention  - nephro following  - gannon in place  - flomax    Elevated trop 2/2 demand ischemia  Hx CAD  - cardio following  - statin/ASA    Normocytic  anemia  - monitor    DM  - lantus 5 units nightly with ssi    Gout  - allopurinol    BPH  - flomax  - finasteride    DVT ppx: SQ hep  Dispo: monitor clinically, wean O2, continue diuresing          Deep Brito, DO  Hospitalist           [1] allopurinol, 100 mg, oral, Daily  aspirin, 162 mg, oral, Daily  cetirizine, 10 mg, oral, Daily  cholecalciferol, 25 mcg, oral, Daily  finasteride, 5 mg, oral, Daily  fluticasone, 2 spray, Each Nostril, Nightly  furosemide, 80 mg, intravenous, q8h  heparin (porcine), 7,500 Units, subcutaneous, q8h JASON  insulin glargine, 5 Units, subcutaneous, Nightly  insulin lispro, 0-5 Units, subcutaneous, TID AC  montelukast, 10 mg, oral, Nightly  oxygen, , inhalation, Continuous - Inhalation  rosuvastatin, 10 mg, oral, Nightly  tamsulosin, 0.8 mg, oral, Nightly  [Held by provider] torsemide, 60 mg, oral, Daily     [2]    [3] PRN medications: acetaminophen **OR** [DISCONTINUED] acetaminophen **OR** [DISCONTINUED] acetaminophen, dextrose, dextrose, glucagon, glucagon, melatonin, polyethylene glycol

## 2025-07-18 LAB
ALBUMIN SERPL BCP-MCNC: 3.3 G/DL (ref 3.4–5)
ALP SERPL-CCNC: 108 U/L (ref 33–136)
ALT SERPL W P-5'-P-CCNC: 9 U/L (ref 10–52)
ANION GAP SERPL CALC-SCNC: 15 MMOL/L (ref 10–20)
AST SERPL W P-5'-P-CCNC: 11 U/L (ref 9–39)
BASOPHILS # BLD AUTO: 0.03 X10*3/UL (ref 0–0.1)
BASOPHILS NFR BLD AUTO: 0.5 %
BILIRUB SERPL-MCNC: 0.5 MG/DL (ref 0–1.2)
BUN SERPL-MCNC: 67 MG/DL (ref 6–23)
CALCIUM SERPL-MCNC: 8.9 MG/DL (ref 8.6–10.3)
CHLORIDE SERPL-SCNC: 96 MMOL/L (ref 98–107)
CO2 SERPL-SCNC: 32 MMOL/L (ref 21–32)
CREAT SERPL-MCNC: 3.72 MG/DL (ref 0.5–1.3)
EGFRCR SERPLBLD CKD-EPI 2021: 16 ML/MIN/1.73M*2
EOSINOPHIL # BLD AUTO: 0.37 X10*3/UL (ref 0–0.4)
EOSINOPHIL NFR BLD AUTO: 5.9 %
ERYTHROCYTE [DISTWIDTH] IN BLOOD BY AUTOMATED COUNT: 15.4 % (ref 11.5–14.5)
GLUCOSE BLD MANUAL STRIP-MCNC: 177 MG/DL (ref 74–99)
GLUCOSE BLD MANUAL STRIP-MCNC: 193 MG/DL (ref 74–99)
GLUCOSE BLD MANUAL STRIP-MCNC: 212 MG/DL (ref 74–99)
GLUCOSE BLD MANUAL STRIP-MCNC: 283 MG/DL (ref 74–99)
GLUCOSE SERPL-MCNC: 182 MG/DL (ref 74–99)
HCT VFR BLD AUTO: 33.4 % (ref 41–52)
HGB BLD-MCNC: 9.8 G/DL (ref 13.5–17.5)
IMM GRANULOCYTES # BLD AUTO: 0.02 X10*3/UL (ref 0–0.5)
IMM GRANULOCYTES NFR BLD AUTO: 0.3 % (ref 0–0.9)
LYMPHOCYTES # BLD AUTO: 1.46 X10*3/UL (ref 0.8–3)
LYMPHOCYTES NFR BLD AUTO: 23.1 %
MAGNESIUM SERPL-MCNC: 2.28 MG/DL (ref 1.6–2.4)
MCH RBC QN AUTO: 27.1 PG (ref 26–34)
MCHC RBC AUTO-ENTMCNC: 29.3 G/DL (ref 32–36)
MCV RBC AUTO: 92 FL (ref 80–100)
MONOCYTES # BLD AUTO: 0.7 X10*3/UL (ref 0.05–0.8)
MONOCYTES NFR BLD AUTO: 11.1 %
NEUTROPHILS # BLD AUTO: 3.74 X10*3/UL (ref 1.6–5.5)
NEUTROPHILS NFR BLD AUTO: 59.1 %
NRBC BLD-RTO: 0 /100 WBCS (ref 0–0)
PLATELET # BLD AUTO: 234 X10*3/UL (ref 150–450)
POTASSIUM SERPL-SCNC: 3.9 MMOL/L (ref 3.5–5.3)
PROT SERPL-MCNC: 7.1 G/DL (ref 6.4–8.2)
RBC # BLD AUTO: 3.62 X10*6/UL (ref 4.5–5.9)
SODIUM SERPL-SCNC: 139 MMOL/L (ref 136–145)
WBC # BLD AUTO: 6.3 X10*3/UL (ref 4.4–11.3)

## 2025-07-18 PROCEDURE — 97110 THERAPEUTIC EXERCISES: CPT | Mod: GP,CQ

## 2025-07-18 PROCEDURE — 80053 COMPREHEN METABOLIC PANEL: CPT | Performed by: STUDENT IN AN ORGANIZED HEALTH CARE EDUCATION/TRAINING PROGRAM

## 2025-07-18 PROCEDURE — 2500000004 HC RX 250 GENERAL PHARMACY W/ HCPCS (ALT 636 FOR OP/ED): Performed by: INTERNAL MEDICINE

## 2025-07-18 PROCEDURE — 99233 SBSQ HOSP IP/OBS HIGH 50: CPT | Performed by: HOSPITALIST

## 2025-07-18 PROCEDURE — 2060000001 HC INTERMEDIATE ICU ROOM DAILY

## 2025-07-18 PROCEDURE — 2500000005 HC RX 250 GENERAL PHARMACY W/O HCPCS: Performed by: HOSPITALIST

## 2025-07-18 PROCEDURE — 36415 COLL VENOUS BLD VENIPUNCTURE: CPT | Performed by: STUDENT IN AN ORGANIZED HEALTH CARE EDUCATION/TRAINING PROGRAM

## 2025-07-18 PROCEDURE — 97116 GAIT TRAINING THERAPY: CPT | Mod: GP,CQ

## 2025-07-18 PROCEDURE — 97535 SELF CARE MNGMENT TRAINING: CPT | Mod: CO,GO

## 2025-07-18 PROCEDURE — 85025 COMPLETE CBC W/AUTO DIFF WBC: CPT | Performed by: STUDENT IN AN ORGANIZED HEALTH CARE EDUCATION/TRAINING PROGRAM

## 2025-07-18 PROCEDURE — 2500000002 HC RX 250 W HCPCS SELF ADMINISTERED DRUGS (ALT 637 FOR MEDICARE OP, ALT 636 FOR OP/ED): Performed by: INTERNAL MEDICINE

## 2025-07-18 PROCEDURE — 2500000005 HC RX 250 GENERAL PHARMACY W/O HCPCS: Performed by: FAMILY MEDICINE

## 2025-07-18 PROCEDURE — 83735 ASSAY OF MAGNESIUM: CPT | Performed by: INTERNAL MEDICINE

## 2025-07-18 PROCEDURE — 82947 ASSAY GLUCOSE BLOOD QUANT: CPT

## 2025-07-18 PROCEDURE — 2500000001 HC RX 250 WO HCPCS SELF ADMINISTERED DRUGS (ALT 637 FOR MEDICARE OP): Performed by: INTERNAL MEDICINE

## 2025-07-18 RX ADMIN — INSULIN LISPRO 1 UNITS: 100 INJECTION, SOLUTION INTRAVENOUS; SUBCUTANEOUS at 12:14

## 2025-07-18 RX ADMIN — Medication 40 L/MIN: at 06:39

## 2025-07-18 RX ADMIN — HEPARIN SODIUM 7500 UNITS: 5000 INJECTION, SOLUTION INTRAVENOUS; SUBCUTANEOUS at 20:37

## 2025-07-18 RX ADMIN — MONTELUKAST 10 MG: 10 TABLET, FILM COATED ORAL at 20:38

## 2025-07-18 RX ADMIN — HEPARIN SODIUM 7500 UNITS: 5000 INJECTION, SOLUTION INTRAVENOUS; SUBCUTANEOUS at 05:27

## 2025-07-18 RX ADMIN — ASPIRIN 162 MG: 81 TABLET, COATED ORAL at 08:42

## 2025-07-18 RX ADMIN — ALLOPURINOL 100 MG: 100 TABLET ORAL at 08:42

## 2025-07-18 RX ADMIN — Medication 25 MCG: at 08:42

## 2025-07-18 RX ADMIN — Medication 5 L/MIN: at 20:32

## 2025-07-18 RX ADMIN — TAMSULOSIN HYDROCHLORIDE 0.8 MG: 0.4 CAPSULE ORAL at 20:38

## 2025-07-18 RX ADMIN — ROSUVASTATIN CALCIUM 10 MG: 10 TABLET, FILM COATED ORAL at 20:38

## 2025-07-18 RX ADMIN — INSULIN LISPRO 2 UNITS: 100 INJECTION, SOLUTION INTRAVENOUS; SUBCUTANEOUS at 16:31

## 2025-07-18 RX ADMIN — FUROSEMIDE 80 MG: 10 INJECTION, SOLUTION INTRAMUSCULAR; INTRAVENOUS at 20:37

## 2025-07-18 RX ADMIN — INSULIN LISPRO 1 UNITS: 100 INJECTION, SOLUTION INTRAVENOUS; SUBCUTANEOUS at 08:53

## 2025-07-18 RX ADMIN — ACETAMINOPHEN 650 MG: 325 TABLET ORAL at 22:05

## 2025-07-18 RX ADMIN — FUROSEMIDE 80 MG: 10 INJECTION, SOLUTION INTRAMUSCULAR; INTRAVENOUS at 04:07

## 2025-07-18 RX ADMIN — INSULIN GLARGINE 5 UNITS: 100 INJECTION, SOLUTION SUBCUTANEOUS at 20:38

## 2025-07-18 RX ADMIN — FUROSEMIDE 80 MG: 10 INJECTION, SOLUTION INTRAMUSCULAR; INTRAVENOUS at 12:15

## 2025-07-18 RX ADMIN — HEPARIN SODIUM 7500 UNITS: 5000 INJECTION, SOLUTION INTRAVENOUS; SUBCUTANEOUS at 12:15

## 2025-07-18 RX ADMIN — Medication 5 L/MIN: at 11:11

## 2025-07-18 RX ADMIN — FINASTERIDE 5 MG: 5 TABLET, FILM COATED ORAL at 08:42

## 2025-07-18 RX ADMIN — CETIRIZINE HYDROCHLORIDE 10 MG: 10 TABLET, FILM COATED ORAL at 08:42

## 2025-07-18 RX ADMIN — Medication 5 L/MIN: at 16:35

## 2025-07-18 ASSESSMENT — PAIN SCALES - GENERAL
PAINLEVEL_OUTOF10: 4
PAINLEVEL_OUTOF10: 0 - NO PAIN
PAINLEVEL_OUTOF10: 3

## 2025-07-18 ASSESSMENT — COGNITIVE AND FUNCTIONAL STATUS - GENERAL
MOVING TO AND FROM BED TO CHAIR: A LOT
WALKING IN HOSPITAL ROOM: A LOT
TOILETING: A LITTLE
DRESSING REGULAR LOWER BODY CLOTHING: A LOT
MOBILITY SCORE: 11
HELP NEEDED FOR BATHING: A LITTLE
CLIMB 3 TO 5 STEPS WITH RAILING: TOTAL
PERSONAL GROOMING: A LITTLE
CLIMB 3 TO 5 STEPS WITH RAILING: A LOT
TURNING FROM BACK TO SIDE WHILE IN FLAT BAD: TOTAL
STANDING UP FROM CHAIR USING ARMS: A LOT
DRESSING REGULAR LOWER BODY CLOTHING: A LITTLE
TURNING FROM BACK TO SIDE WHILE IN FLAT BAD: A LITTLE
DAILY ACTIVITIY SCORE: 19
STANDING UP FROM CHAIR USING ARMS: A LOT
WALKING IN HOSPITAL ROOM: A LOT
HELP NEEDED FOR BATHING: A LOT
MOBILITY SCORE: 14
MOVING FROM LYING ON BACK TO SITTING ON SIDE OF FLAT BED WITH BEDRAILS: A LITTLE
DRESSING REGULAR UPPER BODY CLOTHING: A LITTLE
DAILY ACTIVITIY SCORE: 16
PERSONAL GROOMING: A LITTLE
TOILETING: A LOT
MOVING TO AND FROM BED TO CHAIR: A LOT
MOVING FROM LYING ON BACK TO SITTING ON SIDE OF FLAT BED WITH BEDRAILS: A LITTLE
DRESSING REGULAR UPPER BODY CLOTHING: A LITTLE

## 2025-07-18 ASSESSMENT — PAIN - FUNCTIONAL ASSESSMENT
PAIN_FUNCTIONAL_ASSESSMENT: 0-10

## 2025-07-18 ASSESSMENT — PAIN DESCRIPTION - LOCATION: LOCATION: BACK

## 2025-07-18 ASSESSMENT — ACTIVITIES OF DAILY LIVING (ADL)
BATHING_LEVEL_OF_ASSISTANCE: MAXIMUM ASSISTANCE
HOME_MANAGEMENT_TIME_ENTRY: 24

## 2025-07-18 ASSESSMENT — PAIN DESCRIPTION - DESCRIPTORS: DESCRIPTORS: ACHING

## 2025-07-18 NOTE — PROGRESS NOTES
"Iban Bruce is a 75 y.o. male on day 7 of admission presenting with SOB (shortness of breath).    Subjective   Vitals and chart notes from overnight reviewed.   No acute issues overnight.   Patient seen and evaluated at bedside.   Remains on AirVo at time of my eval this AM.     Objective     Physical Exam  Vitals and nursing note reviewed.   Constitutional:       Appearance: He is ill-appearing.   HENT:      Mouth/Throat:      Mouth: Mucous membranes are moist.      Pharynx: Oropharynx is clear.     Cardiovascular:      Rate and Rhythm: Normal rate and regular rhythm.   Pulmonary:      Effort: Pulmonary effort is normal.      Breath sounds: Rhonchi present.   Abdominal:      Palpations: Abdomen is soft.     Neurological:      Mental Status: He is alert.         Last Recorded Vitals:  /58 (BP Location: Right arm, Patient Position: Sitting)   Pulse 64   Temp 36.4 °C (97.5 °F)   Resp 16   Ht 1.676 m (5' 6\")   Wt 120 kg (265 lb 3.4 oz)   SpO2 96%   BMI 42.81 kg/m²      Scheduled medications:  Scheduled Medications[1]  Continuous medications:  Continuous Medications[2]  PRN medications:  PRN Medications[3]     Relevant Results:  Results for orders placed or performed during the hospital encounter of 07/11/25 (from the past 24 hours)   POCT GLUCOSE   Result Value Ref Range    POCT Glucose 183 (H) 74 - 99 mg/dL   POCT GLUCOSE   Result Value Ref Range    POCT Glucose 213 (H) 74 - 99 mg/dL   Comprehensive Metabolic Panel   Result Value Ref Range    Glucose 182 (H) 74 - 99 mg/dL    Sodium 139 136 - 145 mmol/L    Potassium 3.9 3.5 - 5.3 mmol/L    Chloride 96 (L) 98 - 107 mmol/L    Bicarbonate 32 21 - 32 mmol/L    Anion Gap 15 10 - 20 mmol/L    Urea Nitrogen 67 (H) 6 - 23 mg/dL    Creatinine 3.72 (H) 0.50 - 1.30 mg/dL    eGFR 16 (L) >60 mL/min/1.73m*2    Calcium 8.9 8.6 - 10.3 mg/dL    Albumin 3.3 (L) 3.4 - 5.0 g/dL    Alkaline Phosphatase 108 33 - 136 U/L    Total Protein 7.1 6.4 - 8.2 g/dL    AST 11 9 - 39 " U/L    Bilirubin, Total 0.5 0.0 - 1.2 mg/dL    ALT 9 (L) 10 - 52 U/L   CBC and Auto Differential   Result Value Ref Range    WBC 6.3 4.4 - 11.3 x10*3/uL    nRBC 0.0 0.0 - 0.0 /100 WBCs    RBC 3.62 (L) 4.50 - 5.90 x10*6/uL    Hemoglobin 9.8 (L) 13.5 - 17.5 g/dL    Hematocrit 33.4 (L) 41.0 - 52.0 %    MCV 92 80 - 100 fL    MCH 27.1 26.0 - 34.0 pg    MCHC 29.3 (L) 32.0 - 36.0 g/dL    RDW 15.4 (H) 11.5 - 14.5 %    Platelets 234 150 - 450 x10*3/uL    Neutrophils % 59.1 40.0 - 80.0 %    Immature Granulocytes %, Automated 0.3 0.0 - 0.9 %    Lymphocytes % 23.1 13.0 - 44.0 %    Monocytes % 11.1 2.0 - 10.0 %    Eosinophils % 5.9 0.0 - 6.0 %    Basophils % 0.5 0.0 - 2.0 %    Neutrophils Absolute 3.74 1.60 - 5.50 x10*3/uL    Immature Granulocytes Absolute, Automated 0.02 0.00 - 0.50 x10*3/uL    Lymphocytes Absolute 1.46 0.80 - 3.00 x10*3/uL    Monocytes Absolute 0.70 0.05 - 0.80 x10*3/uL    Eosinophils Absolute 0.37 0.00 - 0.40 x10*3/uL    Basophils Absolute 0.03 0.00 - 0.10 x10*3/uL   Magnesium   Result Value Ref Range    Magnesium 2.28 1.60 - 2.40 mg/dL   POCT GLUCOSE   Result Value Ref Range    POCT Glucose 177 (H) 74 - 99 mg/dL   POCT GLUCOSE   Result Value Ref Range    POCT Glucose 193 (H) 74 - 99 mg/dL       Imaging  No results found.      Cardiology, Vascular, and Other Imaging  No other imaging results found for the past 2 days                       Assessment/Plan   Assessment & Plan  SOB (shortness of breath)    Acute on chronic hypoxic respiratory failure 2/2 HFpEF exacerbation   Hx VELVET  - On airvo, wean for goal o2 sat 88=92%. Uses 4L NC at baseline  - cardio following. Continue IV lasix  - fluid restriction  - singulair    SAIMA/CKD4  Baseline cr is around 3  Urinary retention  - nephro following, allowing some permissive azotemia give fluid overload state and respiratory failure  - gannon in place  - flomax    Elevated trop 2/2 demand ischemia  Hx CAD  - cardio following  - statin/ASA    Normocytic anemia  -  monitor    DM  - lantus 5 units nightly with ssi    Gout  - allopurinol    BPH  - flomax  - finasteride    DVT ppx: SQ hep  Dispo: monitor clinically, wean O2, continue diuresing          Deep Brito, DO  Hospitalist       [1] allopurinol, 100 mg, oral, Daily  aspirin, 162 mg, oral, Daily  cetirizine, 10 mg, oral, Daily  cholecalciferol, 25 mcg, oral, Daily  finasteride, 5 mg, oral, Daily  fluticasone, 2 spray, Each Nostril, Nightly  furosemide, 80 mg, intravenous, q8h  heparin (porcine), 7,500 Units, subcutaneous, q8h JASON  insulin glargine, 5 Units, subcutaneous, Nightly  insulin lispro, 0-5 Units, subcutaneous, TID AC  montelukast, 10 mg, oral, Nightly  oxygen, , inhalation, Continuous - Inhalation  rosuvastatin, 10 mg, oral, Nightly  tamsulosin, 0.8 mg, oral, Nightly  [Held by provider] torsemide, 60 mg, oral, Daily     [2]    [3] PRN medications: acetaminophen **OR** [DISCONTINUED] acetaminophen **OR** [DISCONTINUED] acetaminophen, dextrose, dextrose, glucagon, glucagon, melatonin, polyethylene glycol

## 2025-07-18 NOTE — CARE PLAN
Problem: Skin  Goal: Prevent/manage excess moisture  Outcome: Progressing  Flowsheets (Taken 7/17/2025 2211)  Prevent/manage excess moisture: Moisturize dry skin  Goal: Prevent/minimize sheer/friction injuries  Outcome: Progressing  Flowsheets (Taken 7/17/2025 2211)  Prevent/minimize sheer/friction injuries: Turn/reposition every 2 hours/use positioning/transfer devices     Problem: Skin  Goal: Prevent/minimize sheer/friction injuries  Outcome: Progressing  Flowsheets (Taken 7/17/2025 2211)  Prevent/minimize sheer/friction injuries: Turn/reposition every 2 hours/use positioning/transfer devices     Problem: Pain - Adult  Goal: Verbalizes/displays adequate comfort level or baseline comfort level  Outcome: Progressing     Problem: Safety - Adult  Goal: Free from fall injury  Outcome: Progressing     Problem: Nutrition  Goal: Nutrient intake appropriate for maintaining nutritional needs  Outcome: Progressing     Problem: Chronic Conditions and Co-morbidities  Goal: Patient's chronic conditions and co-morbidity symptoms are monitored and maintained or improved  Outcome: Progressing   The patient's goals for the shift include      The clinical goals for the shift include comfort

## 2025-07-18 NOTE — PROGRESS NOTES
07/18/25 1206   Discharge Planning   Home or Post Acute Services In home services   Expected Discharge Disposition Home H   Patient Choice   Provider Choice list and CMS website (https://medicare.gov/care-compare#search) for post-acute Quality and Resource Measure Data were provided and reviewed with: Patient     Met with patient at bedside to follow up on discharge plan.  Patient is declining skilled nursing facility at discharge. Patient states he is agreeable to home health care. Home health care choice list provided.  Patient lives at home with his friend, states his friends will be able to assist him. Patient states he has a walker, wheelchair and cane and states he has home oxygen.  [facility]

## 2025-07-18 NOTE — PROGRESS NOTES
Physical Therapy    Physical Therapy Treatment    Patient Name: Iban Bruce  MRN: [id number]  Department: [facility]  Room: [address] [place] [address]  Today's Date: 7/18/2025  Time Calculation  Start Time: 1351  Stop Time: 1418  Time Calculation (min): 27 min         Assessment/Plan   PT Assessment  End of Session Communication: Bedside nurse  End of Session Patient Position: Up in chair, Alarm on (Seated in recliner, call bell in reach.)     PT Plan  Treatment/Interventions: Bed mobility, Transfer training, Gait training, Balance training, Therapeutic exercise, Therapeutic activity  PT Plan: Ongoing PT  PT Frequency: 3 times per week (during this acute hospital stay)  PT Discharge Recommendations: Moderate intensity level of continued care (Based on current functional status & rehab potential, patient is anticipated to tolerate & benefit from 5 or more days per week of skilled reahbilitative therapy after discharge from this acute inpatient hospitalization)  PT Recommended Transfer Status: Assist x2  PT - OK to Discharge: Yes (to next level of care when cleared by medical team)    PT Visit Info:  PT Received On: 07/18/25     General Visit Information:   General  Prior to Session Communication: Bedside nurse  Patient Position Received: Up in chair, Alarm on  General Comment:  (Pt resting in recliner upon arrival; pleasant and agreeable to participate in PT session.)    Subjective   Precautions:  Precautions  Medical Precautions: Fall precautions, Oxygen therapy device and L/min  Precautions Comment: johanna Lee, 5LO2 via NC     Date/Time Vitals Session Patient Position Pulse Resp SpO2 BP MAP (mmHg)    07/18/25 15:30:43 --  --  61  18  98 %  130/82  100            Objective   Pain:  Pain Assessment  Pain Assessment: 0-10  0-10 (Numeric) Pain Score: 4 (Pt c/o 4/10 BLE)  Pain Interventions: Repositioned, Distraction  Cognition:  Cognition  Orientation Level: Oriented X4           Treatments:  Therapeutic Exercise  Therapeutic Exercise  Performed: Yes (Pt performed seated BLE therex consisting of: heel/toe raises, hip flexion, LAQ, HS, GS, hip ADD isometrics with pillow, ABD 2.10 reps each.)    Ambulation/Gait Training  Ambulation/Gait Training Performed: Yes (Pt able to amb fwd/bkwd 3'x2 and side-stepping 3'x2 each direction with FWW and Min/Modx1.  Denies dizziness and no LOB noted.)  Transfers  Transfer: Yes (Pt performed sit<>stand with FWW and MinAx1; v/c for proper hand placement safety.)    Outcome Measures:  [facility] Basic Mobility  Turning from your back to your side while in a flat bed without using bedrails: A little  Moving from lying on your back to sitting on the side of a flat bed without using bedrails: Total  Moving to and from bed to chair (including a wheelchair): A lot  Standing up from a chair using your arms (e.g. wheelchair or bedside chair): A lot  To walk in hospital room: A lot  Climbing 3-5 steps with railing: Total  Basic Mobility - Total Score: 11    Education Documentation  Mobility Training, taught by Gerry Espino PTA at 7/18/2025  4:30 PM.  Learner: Patient  Readiness: Acceptance  Method: Explanation  Response: Verbalizes Understanding    Education Comments  No comments found.        OP EDUCATION:       Encounter Problems       Encounter Problems (Active)       PT Problem       STG - Pt will transition supine <> sitting with cga  (Progressing)       Start:  07/14/25    Expected End:  07/28/25            STG - Pt will transfer STS with cga  (Progressing)       Start:  07/14/25    Expected End:  07/28/25            STG - Pt will amb >=10' using ww with min assist x1  (Progressing)       Start:  07/14/25    Expected End:  07/28/25            STG - Pt will perform 2-3 sets of BLE therex x10 to maximize functional strength and independence  (Progressing)       Start:  07/14/25    Expected End:  07/28/25            Patient will maintain standing supported static  balance >=3minutes with sba  (Progressing)       Start:   07/14/25    Expected End:  07/28/25

## 2025-07-18 NOTE — PROGRESS NOTES
"Occupational Therapy    OT Treatment    Patient Name: Iban Bruce  MRN: [id number]  Department: [facility]  Room: [address] [place] [address]  Today's Date: 7/18/2025  Time Calculation  Start Time: 1053  Stop Time: 1117  Time Calculation (min): 24 min        Assessment:  End of Session Communication: PCT/NA/CTA  End of Session Patient Position: Up in chair, Alarm on     Plan:  Treatment Interventions: ADL retraining, Functional transfer training, UE strengthening/ROM, Endurance training, Equipment evaluation/education, Patient/family training  OT Frequency: 3 times per week (during this acute inpatient hospitalization)  OT Discharge Recommendations: Moderate intensity level of continued care (Based on current functional status and rehab potential, patient is anticipated to tolerate and benefit from 5 or more days per week of skilled rehabilitative therapy after discharge from this acute inpatient hospitalization.)  OT Recommended Transfer Status: Moderate assist  OT - OK to Discharge: Yes (once cleared by medical team)  Treatment Interventions: ADL retraining, Functional transfer training, UE strengthening/ROM, Endurance training, Equipment evaluation/education, Patient/family training    Subjective   OT Visit Info:  OT Received On: 07/18/25  General Visit Info:  General  Prior to Session Communication: Bedside nurse  Patient Position Received: Bed, 2 rail up, Alarm off, not on at start of session  General Comment: pleasant and cooperative  Precautions:  Medical Precautions: Fall precautions, Oxygen therapy device and L/min (RT in room at start of session removing airvo and placing on NC)  Precautions Comment: gannon      Vital Signs Comment: VSS, pulse ox remained WNL throughout session     Pain:  Pain Assessment  Pain Assessment:  (c/o pain in R  shin at start of session but did not rate on pain scale and stated, \"stiffness\" in knees with mobility)    Objective    Cognition:  Cognition  Orientation Level: Oriented X4       Activities of " Daily Living: LE Bathing  LE Bathing Level of Assistance: Maximum assistance  LE Bathing Where Assessed:  (standing at FWW d/t slight incontinence)    UE Dressing  UE Dressing Level of Assistance: Minimum assistance  UE Dressing Where Assessed: Chair  UE Dressing Comments: doff/don gown    LE Dressing  LE Dressing: Yes  LE Dressing Where Assessed: Edge of bed  LE Dressing Comments: Max A to don socks seated EOB  Functional Standing Tolerance:  Time: 2:00 standing at W  Bed Mobility/Transfers: Bed Mobility  Bed Mobility: Yes  Bed Mobility 1  Bed Mobility 1: Supine to sitting  Level of Assistance 1: Close supervision    Transfers  Transfer: Yes  Transfer 1  Technique 1: Sit to stand, Stand to sit  Transfer Device 1: Walker  Transfer Level of Assistance 1: Minimum assistance  Trials/Comments 1: pt completed STS x3 trials      Functional Mobility:  Functional Mobility  Functional Mobility Performed: Yes  Functional Mobility 1  Device 1: Rolling walker  Assistance 1: Minimum assistance  Comments 1: pt ambulated short distance from EOB over to chair with arms      Outcome Measures:[facility] Daily Activity  Putting on and taking off regular lower body clothing: A lot  Bathing (including washing, rinsing, drying): A lot  Putting on and taking off regular upper body clothing: A little  Toileting, which includes using toilet, bedpan or urinal: A lot  Taking care of personal grooming such as brushing teeth: A little  Eating Meals: None  Daily Activity - Total Score: 16        Education Documentation  ADL Training, taught by WALLY Kauffman at 7/18/2025 12:52 PM.  Learner: Patient  Readiness: Acceptance  Method: Explanation  Response: Verbalizes Understanding    Education Comments  No comments found.             Goals:  Encounter Problems       Encounter Problems (Active)       OT Goals       Pt. will perform bathing and dressing with min. assist using adaptive equipment as needed.  (Progressing)       Start:  07/14/25     Expected End:  07/28/25            Pt. will perform toileting with min. assist using dme/adaptive equipment as needed.  (Progressing)       Start:  07/14/25    Expected End:  07/28/25            Pt. will perform bed mobility/chair/commode transfers with cga. (Progressing)       Start:  07/14/25    Expected End:  07/28/25            Pt. will perform functional mobility with rolling walker with cga to access bathroom. (Progressing)       Start:  07/14/25    Expected End:  07/28/25            Pt. will tolerate 5-7 min. of standing tasks with cga in preparation for grooming at sink.  (Progressing)       Start:  07/14/25    Expected End:  07/28/25

## 2025-07-18 NOTE — PROGRESS NOTES
Nephrology Consult Progress Note    Iban Bruce is a 75 y.o. male on day 7 of admission presenting with SOB (shortness of breath).      Subjective   No acute events overnight, improved O2 requirements, tolerating po, nonoliguric       Objective          Physical Exam    Vitals 24HR  Heart Rate:  [61-70]   Temp:  [36.2 °C (97.2 °F)-37.1 °C (98.8 °F)]   Resp:  [16-18]   BP: (109-131)/(55-60)   Weight:  [120 kg (265 lb 3.4 oz)]   SpO2:  [94 %-100 %]        Awake, alert, on NC O2  Decreased AEBL  RRR abd soft, + BS  Chronic skin changes, edema+             I&O 24HR    Intake/Output Summary (Last 24 hours) at 7/18/2025 1054  Last data filed at 7/18/2025 0500  Gross per 24 hour   Intake 120 ml   Output 1850 ml   Net -1730 ml         Medications  Prescriptions Prior to Admission[1]   Scheduled medications  Scheduled Medications[2]  Continuous medications  Continuous Medications[3]  PRN medications  PRN Medications[4]    Relevant Results      No results displayed because visit has over 200 results.         Imaging  US renal complete  Result Date: 7/15/2025  1. Moderate right-sided hydronephrosis. Advise further assessment by dedicated CT scan.   MACRO: Critical Finding:  See findings. Notification was initiated on 7/15/2025 at 8:01 am by  Juwan Apple.  (**-OCF-**) Instructions: See Findings.     Signed by: Juwan Apple 7/15/2025 8:01 AM Dictation workstation:   [id number]    CT head wo IV contrast  Result Date: 7/11/2025  1.  No acute intracranial hemorrhage or acute territorial infarct. 2.  Diffuse parenchymal volume loss. Chronic microvascular ischemic changes.     MACRO: None.   Signed by: Kassandra Sterling 7/11/2025 9:54 PM Dictation workstation:   [id number]    XR chest 1 view  Result Date: 7/11/2025  Diffuse interstitial infiltrates bilaterally, as above. Clinical correlation and continued follow-up until clearing is recommended.   MACRO: None.   Signed by: Usama Jarvsi 7/11/2025 12:44 PM Dictation workstation:    [id number]      Cardiology, Vascular, and Other Imaging  Electrocardiogram, 12-lead PRN ACS symptoms  Result Date: 7/17/2025  Normal sinus rhythm Left axis deviation Right bundle branch block Abnormal ECG Confirmed by Jose Ríos ([id number]) on 7/17/2025 2:25:39 PM    ECG 12 lead  Result Date: 7/12/2025  Normal sinus rhythm Right bundle branch block Left anterior fascicular block ** Bifascicular block ** Possible Lateral infarct , age undetermined Abnormal ECG No previous ECGs available Confirmed by Jose Ríos ([id number]) on 7/12/2025 10:08:41 AM          ASSESSMENT AND PLAN    SAIMA, nonoliguric on CKD, creatinine 3.3 on admission, peaked at 3.8, stabilizing  Volume overload, underlying EF 60%, RVSP 45 mm Hg/PHTN, on torsemide before admission  BPH  Hyperuricemia  Anemia, severe, chronic, documented iron def  Secondary hyperpara, renal with high  and D wnl 79      Plan  - repeat renal US with worsening hydro c/w CT 10/24, maintain gannon, continue to record IO s  - continue diuresis with iv lasix q 8 hrs, improved O2 requirements and creatinine stabilizing, please record UOP  - daily lytes and replete as needed for K>4 and mag>2  - electrolytes stable  - low salt diet and fluid restrictions 1.2L/24 hrs  - iv iron course  - avoid hypotension and nephrotoxins  - daily allopurinol and flomax  - noted angiomyolipoma, not concerning      MARS reviewed  Thank you for the opportunity to assist in the care of this patient, please call with questions  Susan Costello MD PhD                   [1]   Medications Prior to Admission   Medication Sig Dispense Refill Last Dose/Taking    allopurinol (Zyloprim) 100 mg tablet Take 1 tablet (100 mg) by mouth once daily.   7/10/2025 Morning    amLODIPine (Norvasc) 5 mg tablet Take 1 tablet (5 mg) by mouth once daily.   7/10/2025 Morning    aspirin 81 mg EC tablet Take 2 tablets (162 mg) by mouth once daily.   7/10/2025 Morning    cholecalciferol (Vitamin D-3) 25 mcg (1,000  units) tablet Take 1 tablet (25 mcg) by mouth once daily.   7/10/2025 Morning    finasteride (Proscar) 5 mg tablet Take 1 tablet (5 mg) by mouth once daily.   7/10/2025 Morning    fluticasone (Flonase) 50 mcg/actuation nasal spray Administer 2 sprays into each nostril once daily at bedtime.   7/10/2025 Evening    FreeStyle Evelyn 3 Plus Sensor device 1 each every 14 (fourteen) days.   Past Month    insulin aspart (NovoLOG Flexpen U-100 Insulin) 100 unit/mL (3 mL) pen Inject 35 Units under the skin 3 times a day before meals. as directed   7/10/2025 Evening    insulin glargine (Lantus) 100 unit/mL injection Inject 35 Units under the skin 2 times a day.   7/10/2025 Evening    loratadine (Claritin) 10 mg tablet Take 1 tablet (10 mg) by mouth once daily.   7/10/2025 Morning    montelukast (Singulair) 10 mg tablet Take 1 tablet (10 mg) by mouth once daily at bedtime.   7/10/2025 Evening    oxygen (O2) gas therapy Inhale 4 L/min at 240,000 mL/hr continuously.   7/11/2025    rosuvastatin (Crestor) 10 mg tablet Take 1 tablet (10 mg) by mouth once daily at bedtime. for high cholesterol   7/10/2025 Evening    sildenafil (Viagra) 50 mg tablet Take 1 tablet (50 mg) by mouth every 24 (twenty four) hours if needed for erectile dysfunction. Take one hour prior to sexual activity   Unknown    tamsulosin (Flomax) 0.4 mg 24 hr capsule Take 2 capsules (0.8 mg) by mouth once daily at bedtime.   7/10/2025 Evening    torsemide (Demadex) 20 mg tablet Take 3 tablets (60 mg) by mouth once daily.   7/10/2025 Morning   [2] allopurinol, 100 mg, oral, Daily  aspirin, 162 mg, oral, Daily  cetirizine, 10 mg, oral, Daily  cholecalciferol, 25 mcg, oral, Daily  finasteride, 5 mg, oral, Daily  fluticasone, 2 spray, Each Nostril, Nightly  furosemide, 80 mg, intravenous, q8h  heparin (porcine), 7,500 Units, subcutaneous, q8h JASON  insulin glargine, 5 Units, subcutaneous, Nightly  insulin lispro, 0-5 Units, subcutaneous, TID AC  montelukast, 10 mg, oral,  Nightly  oxygen, , inhalation, Continuous - Inhalation  rosuvastatin, 10 mg, oral, Nightly  tamsulosin, 0.8 mg, oral, Nightly  [Held by provider] torsemide, 60 mg, oral, Daily     [3]    [4] PRN medications: acetaminophen **OR** [DISCONTINUED] acetaminophen **OR** [DISCONTINUED] acetaminophen, dextrose, dextrose, glucagon, glucagon, melatonin, polyethylene glycol

## 2025-07-19 LAB
ALBUMIN SERPL BCP-MCNC: 3.4 G/DL (ref 3.4–5)
ALP SERPL-CCNC: 105 U/L (ref 33–136)
ALT SERPL W P-5'-P-CCNC: 12 U/L (ref 10–52)
ANION GAP SERPL CALC-SCNC: 12 MMOL/L (ref 10–20)
AST SERPL W P-5'-P-CCNC: 14 U/L (ref 9–39)
BASOPHILS # BLD AUTO: 0.02 X10*3/UL (ref 0–0.1)
BASOPHILS NFR BLD AUTO: 0.3 %
BILIRUB SERPL-MCNC: 0.4 MG/DL (ref 0–1.2)
BUN SERPL-MCNC: 73 MG/DL (ref 6–23)
CALCIUM SERPL-MCNC: 8.9 MG/DL (ref 8.6–10.3)
CHLORIDE SERPL-SCNC: 96 MMOL/L (ref 98–107)
CO2 SERPL-SCNC: 33 MMOL/L (ref 21–32)
CREAT SERPL-MCNC: 3.8 MG/DL (ref 0.5–1.3)
EGFRCR SERPLBLD CKD-EPI 2021: 16 ML/MIN/1.73M*2
EOSINOPHIL # BLD AUTO: 0.34 X10*3/UL (ref 0–0.4)
EOSINOPHIL NFR BLD AUTO: 5.1 %
ERYTHROCYTE [DISTWIDTH] IN BLOOD BY AUTOMATED COUNT: 15.5 % (ref 11.5–14.5)
GLUCOSE BLD MANUAL STRIP-MCNC: 242 MG/DL (ref 74–99)
GLUCOSE BLD MANUAL STRIP-MCNC: 327 MG/DL (ref 74–99)
GLUCOSE BLD MANUAL STRIP-MCNC: 335 MG/DL (ref 74–99)
GLUCOSE SERPL-MCNC: 230 MG/DL (ref 74–99)
HCT VFR BLD AUTO: 33.5 % (ref 41–52)
HGB BLD-MCNC: 9.9 G/DL (ref 13.5–17.5)
IMM GRANULOCYTES # BLD AUTO: 0.03 X10*3/UL (ref 0–0.5)
IMM GRANULOCYTES NFR BLD AUTO: 0.5 % (ref 0–0.9)
LYMPHOCYTES # BLD AUTO: 1.65 X10*3/UL (ref 0.8–3)
LYMPHOCYTES NFR BLD AUTO: 24.8 %
MCH RBC QN AUTO: 27.3 PG (ref 26–34)
MCHC RBC AUTO-ENTMCNC: 29.6 G/DL (ref 32–36)
MCV RBC AUTO: 92 FL (ref 80–100)
MONOCYTES # BLD AUTO: 0.8 X10*3/UL (ref 0.05–0.8)
MONOCYTES NFR BLD AUTO: 12 %
NEUTROPHILS # BLD AUTO: 3.82 X10*3/UL (ref 1.6–5.5)
NEUTROPHILS NFR BLD AUTO: 57.3 %
NRBC BLD-RTO: 0 /100 WBCS (ref 0–0)
PLATELET # BLD AUTO: 223 X10*3/UL (ref 150–450)
POTASSIUM SERPL-SCNC: 4 MMOL/L (ref 3.5–5.3)
PROT SERPL-MCNC: 7.2 G/DL (ref 6.4–8.2)
RBC # BLD AUTO: 3.63 X10*6/UL (ref 4.5–5.9)
SODIUM SERPL-SCNC: 137 MMOL/L (ref 136–145)
WBC # BLD AUTO: 6.7 X10*3/UL (ref 4.4–11.3)

## 2025-07-19 PROCEDURE — 2500000002 HC RX 250 W HCPCS SELF ADMINISTERED DRUGS (ALT 637 FOR MEDICARE OP, ALT 636 FOR OP/ED): Performed by: INTERNAL MEDICINE

## 2025-07-19 PROCEDURE — 2500000005 HC RX 250 GENERAL PHARMACY W/O HCPCS: Performed by: HOSPITALIST

## 2025-07-19 PROCEDURE — 2500000001 HC RX 250 WO HCPCS SELF ADMINISTERED DRUGS (ALT 637 FOR MEDICARE OP): Performed by: INTERNAL MEDICINE

## 2025-07-19 PROCEDURE — 85025 COMPLETE CBC W/AUTO DIFF WBC: CPT | Performed by: STUDENT IN AN ORGANIZED HEALTH CARE EDUCATION/TRAINING PROGRAM

## 2025-07-19 PROCEDURE — 80053 COMPREHEN METABOLIC PANEL: CPT | Performed by: STUDENT IN AN ORGANIZED HEALTH CARE EDUCATION/TRAINING PROGRAM

## 2025-07-19 PROCEDURE — 2500000004 HC RX 250 GENERAL PHARMACY W/ HCPCS (ALT 636 FOR OP/ED): Performed by: INTERNAL MEDICINE

## 2025-07-19 PROCEDURE — 36415 COLL VENOUS BLD VENIPUNCTURE: CPT | Performed by: STUDENT IN AN ORGANIZED HEALTH CARE EDUCATION/TRAINING PROGRAM

## 2025-07-19 PROCEDURE — 2060000001 HC INTERMEDIATE ICU ROOM DAILY

## 2025-07-19 PROCEDURE — 99233 SBSQ HOSP IP/OBS HIGH 50: CPT | Performed by: INTERNAL MEDICINE

## 2025-07-19 PROCEDURE — 82947 ASSAY GLUCOSE BLOOD QUANT: CPT

## 2025-07-19 RX ORDER — INSULIN GLARGINE 100 [IU]/ML
15 INJECTION, SOLUTION SUBCUTANEOUS NIGHTLY
Status: DISCONTINUED | OUTPATIENT
Start: 2025-07-19 | End: 2025-07-20

## 2025-07-19 RX ADMIN — FINASTERIDE 5 MG: 5 TABLET, FILM COATED ORAL at 08:13

## 2025-07-19 RX ADMIN — HEPARIN SODIUM 7500 UNITS: 5000 INJECTION, SOLUTION INTRAVENOUS; SUBCUTANEOUS at 04:05

## 2025-07-19 RX ADMIN — ASPIRIN 162 MG: 81 TABLET, COATED ORAL at 08:13

## 2025-07-19 RX ADMIN — TAMSULOSIN HYDROCHLORIDE 0.8 MG: 0.4 CAPSULE ORAL at 20:56

## 2025-07-19 RX ADMIN — MONTELUKAST 10 MG: 10 TABLET, FILM COATED ORAL at 20:56

## 2025-07-19 RX ADMIN — INSULIN GLARGINE 15 UNITS: 100 INJECTION, SOLUTION SUBCUTANEOUS at 20:56

## 2025-07-19 RX ADMIN — ROSUVASTATIN CALCIUM 10 MG: 10 TABLET, FILM COATED ORAL at 20:56

## 2025-07-19 RX ADMIN — INSULIN LISPRO 3 UNITS: 100 INJECTION, SOLUTION INTRAVENOUS; SUBCUTANEOUS at 08:13

## 2025-07-19 RX ADMIN — HEPARIN SODIUM 7500 UNITS: 5000 INJECTION, SOLUTION INTRAVENOUS; SUBCUTANEOUS at 12:35

## 2025-07-19 RX ADMIN — FUROSEMIDE 80 MG: 10 INJECTION, SOLUTION INTRAMUSCULAR; INTRAVENOUS at 04:05

## 2025-07-19 RX ADMIN — HEPARIN SODIUM 7500 UNITS: 5000 INJECTION, SOLUTION INTRAVENOUS; SUBCUTANEOUS at 21:01

## 2025-07-19 RX ADMIN — INSULIN HUMAN 8 UNITS: 100 INJECTION, SOLUTION PARENTERAL at 16:15

## 2025-07-19 RX ADMIN — INSULIN HUMAN 4 UNITS: 100 INJECTION, SOLUTION PARENTERAL at 12:18

## 2025-07-19 RX ADMIN — FUROSEMIDE 80 MG: 10 INJECTION, SOLUTION INTRAMUSCULAR; INTRAVENOUS at 20:56

## 2025-07-19 RX ADMIN — CETIRIZINE HYDROCHLORIDE 10 MG: 10 TABLET, FILM COATED ORAL at 08:13

## 2025-07-19 RX ADMIN — ACETAMINOPHEN 650 MG: 325 TABLET ORAL at 16:15

## 2025-07-19 RX ADMIN — FUROSEMIDE 80 MG: 10 INJECTION, SOLUTION INTRAMUSCULAR; INTRAVENOUS at 12:35

## 2025-07-19 RX ADMIN — ALLOPURINOL 100 MG: 100 TABLET ORAL at 08:13

## 2025-07-19 RX ADMIN — Medication 25 MCG: at 08:13

## 2025-07-19 RX ADMIN — Medication 5 L/MIN: at 08:16

## 2025-07-19 ASSESSMENT — PAIN SCALES - GENERAL: PAINLEVEL_OUTOF10: 0 - NO PAIN

## 2025-07-19 NOTE — PROGRESS NOTES
Iban Bruce is a 75 y.o. male on day 8 of admission presenting with SOB (shortness of breath).      Subjective   The patient is seen and evaluated this morning.  He is sitting in his chair and resting comfortably.  Denied any specific complaints.  Currently undergoing diuresis.       Objective     Last Recorded Vitals  /63   Pulse 72   Temp 36.2 °C (97.2 °F)   Resp 20   Wt 120 kg (264 lb 1.8 oz)   SpO2 96%   Intake/Output last 3 Shifts:    Intake/Output Summary (Last 24 hours) at 7/19/2025 1330  Last data filed at 7/19/2025 0408  Gross per 24 hour   Intake 240 ml   Output 2050 ml   Net -1810 ml       Admission Weight  Weight: 125 kg (275 lb 9.2 oz) (07/11/25 1232)    Daily Weight  07/19/25 : 120 kg (264 lb 1.8 oz)    Image Results  Electrocardiogram, 12-lead PRN ACS symptoms  Normal sinus rhythm  Left axis deviation  Right bundle branch block  Abnormal ECG    Confirmed by Jose Ríos ([id number]) on 7/17/2025 2:25:39 PM      Physical Exam    Relevant Results               This patient currently has cardiac telemetry ordered; if you would like to modify or discontinue the telemetry order, click here to go to the orders activity to modify/discontinue the order.      This patient has a urinary catheter   Reason for the urinary catheter remaining today? critically ill patient who need accurate urinary output measurements      Iban Bruce is a 75 y.o. male with DM, CKD 4, HFpEF, HTN, HLD, CAD, BPH, chronic respiratory failure on 4L NC, gout, VELVET, who presents with progressively worsening sob & nonproductive cough.     Assessment & Plan  SOB (shortness of breath)    Acute on chronic hypoxic respiratory failure 2/2 HFpEF exacerbation   - The patient was on airvo, wean for goal o2 sat 88=92%.  Improved back to 5 L when scan.  Baseline is 4 L.  - cardio following. Continue IV lasix  - fluid restriction.  Remains in negative fluid balance.  -Diuretics per nephrology currently on Lasix 80 mg 3 times daily.      SAIMA/CKD4  Urinary retention  - Baseline creatinine is around 3.  Nephro following, allowing some permissive azotemia give fluid overload state and respiratory failure  - gannon in place.  Repeat renal ultrasound with worsening hydronephrosis plans to maintain Gannon at this time.  - flomax     Elevated trop 2/2 demand ischemia  Hx CAD  - cardio following  - statin/ASA    Hx VELVET  Morbid obesity  -Continue with home PAP therapy with home settings     Normocytic anemia  - monitor  -IV iron course per nephrology.     DM  - Increase Lantus to 15 units nightly. Mid grade  sliding scale insulin     Gout  - allopurinol     BPH  - flomax  - finasteride     DVT ppx: SQ hep  Dispo: monitor clinically, wean O2, continue diuresing            Sosa Carmona MD

## 2025-07-19 NOTE — PROGRESS NOTES
Nephrology Consult Progress Note    Iban Bruce is a 75 y.o. male on day 8 of admission presenting with SOB (shortness of breath).      Subjective   No acute events overnight, improved O2 requirements, tolerating po, nonoliguric       Objective          Physical Exam    Vitals 24HR  Heart Rate:  [66-77]   Temp:  [36.2 °C (97.2 °F)-36.8 °C (98.2 °F)]   Resp:  [18-20]   BP: (117-133)/(56-63)   Weight:  [120 kg (264 lb 1.8 oz)]   SpO2:  [90 %-97 %]        Awake, alert, on NC O2  Decreased AEBL  RRR abd soft, + BS  Chronic skin changes, edema+             I&O 24HR    Intake/Output Summary (Last 24 hours) at 7/19/2025 1606  Last data filed at 7/19/2025 0408  Gross per 24 hour   Intake 240 ml   Output 2050 ml   Net -1810 ml         Medications  Prescriptions Prior to Admission[1]   Scheduled medications  Scheduled Medications[2]  Continuous medications  Continuous Medications[3]  PRN medications  PRN Medications[4]    Relevant Results      No results displayed because visit has over 200 results.         Imaging  US renal complete  Result Date: 7/15/2025  1. Moderate right-sided hydronephrosis. Advise further assessment by dedicated CT scan.   MACRO: Critical Finding:  See findings. Notification was initiated on 7/15/2025 at 8:01 am by  Juwan Apple.  (**-OCF-**) Instructions: See Findings.     Signed by: Juwan Apple 7/15/2025 8:01 AM Dictation workstation:   [id number]      Cardiology, Vascular, and Other Imaging  Electrocardiogram, 12-lead PRN ACS symptoms  Result Date: 7/17/2025  Normal sinus rhythm Left axis deviation Right bundle branch block Abnormal ECG Confirmed by Jose Ríos ([id number]) on 7/17/2025 2:25:39 PM          ASSESSMENT AND PLAN    SAIMA, nonoliguric on CKD, creatinine 3.3 on admission, peaked at 3.8, stabilizing  Volume overload, underlying EF 60%, RVSP 45 mm Hg/PHTN, on torsemide before admission  BPH  Hyperuricemia  Anemia, severe, chronic, documented iron def  Secondary hyperpara, renal with  high  and D wnl 79      Plan  - repeat renal US with worsening hydro c/w CT 10/24, maintain gannon, continue to record IO s  - continue diuresis with iv lasix q 8 hrs for another day, noted contraction alkalosis but improved O2 requirements and creatinine stabilizing, please record UOP  - repeat CXR in am  - daily lytes and replete as needed for K>4 and mag>2  - electrolytes stable  - low salt diet and fluid restrictions 1.2L/24 hrs  - iv iron course  - avoid hypotension and nephrotoxins  - daily allopurinol and flomax  - noted angiomyolipoma, not concerning      MARS reviewed  Thank you for the opportunity to assist in the care of this patient, please call with questions  Susan Costello MD PhD                     [1]   Medications Prior to Admission   Medication Sig Dispense Refill Last Dose/Taking    allopurinol (Zyloprim) 100 mg tablet Take 1 tablet (100 mg) by mouth once daily.   7/10/2025 Morning    amLODIPine (Norvasc) 5 mg tablet Take 1 tablet (5 mg) by mouth once daily.   7/10/2025 Morning    aspirin 81 mg EC tablet Take 2 tablets (162 mg) by mouth once daily.   7/10/2025 Morning    cholecalciferol (Vitamin D-3) 25 mcg (1,000 units) tablet Take 1 tablet (25 mcg) by mouth once daily.   7/10/2025 Morning    finasteride (Proscar) 5 mg tablet Take 1 tablet (5 mg) by mouth once daily.   7/10/2025 Morning    fluticasone (Flonase) 50 mcg/actuation nasal spray Administer 2 sprays into each nostril once daily at bedtime.   7/10/2025 Evening    FreeStyle Evelyn 3 Plus Sensor device 1 each every 14 (fourteen) days.   Past Month    insulin aspart (NovoLOG Flexpen U-100 Insulin) 100 unit/mL (3 mL) pen Inject 35 Units under the skin 3 times a day before meals. as directed   7/10/2025 Evening    insulin glargine (Lantus) 100 unit/mL injection Inject 35 Units under the skin 2 times a day.   7/10/2025 Evening    loratadine (Claritin) 10 mg tablet Take 1 tablet (10 mg) by mouth once daily.   7/10/2025 Morning     montelukast (Singulair) 10 mg tablet Take 1 tablet (10 mg) by mouth once daily at bedtime.   7/10/2025 Evening    oxygen (O2) gas therapy Inhale 4 L/min at 240,000 mL/hr continuously.   7/11/2025    rosuvastatin (Crestor) 10 mg tablet Take 1 tablet (10 mg) by mouth once daily at bedtime. for high cholesterol   7/10/2025 Evening    sildenafil (Viagra) 50 mg tablet Take 1 tablet (50 mg) by mouth every 24 (twenty four) hours if needed for erectile dysfunction. Take one hour prior to sexual activity   Unknown    tamsulosin (Flomax) 0.4 mg 24 hr capsule Take 2 capsules (0.8 mg) by mouth once daily at bedtime.   7/10/2025 Evening    torsemide (Demadex) 20 mg tablet Take 3 tablets (60 mg) by mouth once daily.   7/10/2025 Morning   [2] allopurinol, 100 mg, oral, Daily  aspirin, 162 mg, oral, Daily  cetirizine, 10 mg, oral, Daily  cholecalciferol, 25 mcg, oral, Daily  finasteride, 5 mg, oral, Daily  fluticasone, 2 spray, Each Nostril, Nightly  furosemide, 80 mg, intravenous, q8h  heparin (porcine), 7,500 Units, subcutaneous, q8h JASON  insulin glargine, 15 Units, subcutaneous, Nightly  insulin regular, 0-10 Units, subcutaneous, TID AC  montelukast, 10 mg, oral, Nightly  oxygen, , inhalation, Continuous - Inhalation  rosuvastatin, 10 mg, oral, Nightly  tamsulosin, 0.8 mg, oral, Nightly  [Held by provider] torsemide, 60 mg, oral, Daily     [3]    [4] PRN medications: acetaminophen **OR** [DISCONTINUED] acetaminophen **OR** [DISCONTINUED] acetaminophen, dextrose, dextrose, glucagon, glucagon, melatonin, polyethylene glycol

## 2025-07-19 NOTE — CARE PLAN
The patient's goals for the shift include      The clinical goals for the shift include rest      Problem: Skin  Goal: Decreased wound size/increased tissue granulation at next dressing change  Outcome: Progressing  Flowsheets (Taken 7/19/2025 1126)  Decreased wound size/increased tissue granulation at next dressing change: Utilize specialty bed per algorithm  Goal: Participates in plan/prevention/treatment measures  Outcome: Progressing  Flowsheets (Taken 7/19/2025 1126)  Participates in plan/prevention/treatment measures: Discuss with provider PT/OT consult  Goal: Prevent/manage excess moisture  Outcome: Progressing  Flowsheets (Taken 7/19/2025 1126)  Prevent/manage excess moisture: Moisturize dry skin  Goal: Prevent/minimize sheer/friction injuries  Outcome: Progressing  Flowsheets (Taken 7/19/2025 1126)  Prevent/minimize sheer/friction injuries: Turn/reposition every 2 hours/use positioning/transfer devices  Goal: Promote/optimize nutrition  Outcome: Progressing  Flowsheets (Taken 7/19/2025 1126)  Promote/optimize nutrition: Monitor/record intake including meals  Goal: Promote skin healing  Outcome: Progressing  Flowsheets (Taken 7/19/2025 1126)  Promote skin healing:   Assess skin/pad under line(s)/device(s)   Turn/reposition every 2 hours/use positioning/transfer devices

## 2025-07-19 NOTE — CARE PLAN
Problem: Skin  Goal: Prevent/minimize sheer/friction injuries  Outcome: Progressing  Flowsheets (Taken 7/18/2025 8375)  Prevent/minimize sheer/friction injuries: HOB 30 degrees or less     Problem: Pain - Adult  Goal: Verbalizes/displays adequate comfort level or baseline comfort level  Outcome: Progressing     Problem: Safety - Adult  Goal: Free from fall injury  Outcome: Progressing     Problem: Discharge Planning  Goal: Discharge to home or other facility with appropriate resources  Outcome: Progressing     Problem: Chronic Conditions and Co-morbidities  Goal: Patient's chronic conditions and co-morbidity symptoms are monitored and maintained or improved  Outcome: Progressing   The patient's goals for the shift include      The clinical goals for the shift include comfort

## 2025-07-20 ENCOUNTER — APPOINTMENT (OUTPATIENT)
Dept: RADIOLOGY | Facility: HOSPITAL | Age: 75
DRG: 189 | End: 2025-07-20
Payer: MEDICARE

## 2025-07-20 VITALS
RESPIRATION RATE: 18 BRPM | DIASTOLIC BLOOD PRESSURE: 58 MMHG | SYSTOLIC BLOOD PRESSURE: 120 MMHG | HEIGHT: 66 IN | OXYGEN SATURATION: 96 % | WEIGHT: 264.55 LBS | TEMPERATURE: 97.7 F | HEART RATE: 67 BPM | BODY MASS INDEX: 42.52 KG/M2

## 2025-07-20 LAB
ALBUMIN SERPL BCP-MCNC: 3.5 G/DL (ref 3.4–5)
ALP SERPL-CCNC: 103 U/L (ref 33–136)
ALT SERPL W P-5'-P-CCNC: 13 U/L (ref 10–52)
ANION GAP SERPL CALC-SCNC: 14 MMOL/L (ref 10–20)
AST SERPL W P-5'-P-CCNC: 14 U/L (ref 9–39)
BASOPHILS # BLD AUTO: 0.04 X10*3/UL (ref 0–0.1)
BASOPHILS NFR BLD AUTO: 0.6 %
BILIRUB SERPL-MCNC: 0.4 MG/DL (ref 0–1.2)
BUN SERPL-MCNC: 76 MG/DL (ref 6–23)
CALCIUM SERPL-MCNC: 8.9 MG/DL (ref 8.6–10.3)
CHLORIDE SERPL-SCNC: 96 MMOL/L (ref 98–107)
CO2 SERPL-SCNC: 32 MMOL/L (ref 21–32)
CREAT SERPL-MCNC: 3.94 MG/DL (ref 0.5–1.3)
EGFRCR SERPLBLD CKD-EPI 2021: 15 ML/MIN/1.73M*2
EOSINOPHIL # BLD AUTO: 0.29 X10*3/UL (ref 0–0.4)
EOSINOPHIL NFR BLD AUTO: 4.7 %
ERYTHROCYTE [DISTWIDTH] IN BLOOD BY AUTOMATED COUNT: 15.7 % (ref 11.5–14.5)
GLUCOSE BLD MANUAL STRIP-MCNC: 237 MG/DL (ref 74–99)
GLUCOSE BLD MANUAL STRIP-MCNC: 300 MG/DL (ref 74–99)
GLUCOSE BLD MANUAL STRIP-MCNC: 323 MG/DL (ref 74–99)
GLUCOSE SERPL-MCNC: 241 MG/DL (ref 74–99)
HCT VFR BLD AUTO: 34.2 % (ref 41–52)
HGB BLD-MCNC: 10.3 G/DL (ref 13.5–17.5)
IMM GRANULOCYTES # BLD AUTO: 0.03 X10*3/UL (ref 0–0.5)
IMM GRANULOCYTES NFR BLD AUTO: 0.5 % (ref 0–0.9)
LYMPHOCYTES # BLD AUTO: 1.52 X10*3/UL (ref 0.8–3)
LYMPHOCYTES NFR BLD AUTO: 24.7 %
MCH RBC QN AUTO: 27.5 PG (ref 26–34)
MCHC RBC AUTO-ENTMCNC: 30.1 G/DL (ref 32–36)
MCV RBC AUTO: 91 FL (ref 80–100)
MONOCYTES # BLD AUTO: 0.76 X10*3/UL (ref 0.05–0.8)
MONOCYTES NFR BLD AUTO: 12.3 %
NEUTROPHILS # BLD AUTO: 3.52 X10*3/UL (ref 1.6–5.5)
NEUTROPHILS NFR BLD AUTO: 57.2 %
NRBC BLD-RTO: 0 /100 WBCS (ref 0–0)
PLATELET # BLD AUTO: 232 X10*3/UL (ref 150–450)
POTASSIUM SERPL-SCNC: 4 MMOL/L (ref 3.5–5.3)
PROT SERPL-MCNC: 7.4 G/DL (ref 6.4–8.2)
RBC # BLD AUTO: 3.74 X10*6/UL (ref 4.5–5.9)
SODIUM SERPL-SCNC: 138 MMOL/L (ref 136–145)
WBC # BLD AUTO: 6.2 X10*3/UL (ref 4.4–11.3)

## 2025-07-20 PROCEDURE — 71046 X-RAY EXAM CHEST 2 VIEWS: CPT

## 2025-07-20 PROCEDURE — 2500000001 HC RX 250 WO HCPCS SELF ADMINISTERED DRUGS (ALT 637 FOR MEDICARE OP): Performed by: INTERNAL MEDICINE

## 2025-07-20 PROCEDURE — 2500000004 HC RX 250 GENERAL PHARMACY W/ HCPCS (ALT 636 FOR OP/ED): Performed by: INTERNAL MEDICINE

## 2025-07-20 PROCEDURE — 85025 COMPLETE CBC W/AUTO DIFF WBC: CPT | Performed by: STUDENT IN AN ORGANIZED HEALTH CARE EDUCATION/TRAINING PROGRAM

## 2025-07-20 PROCEDURE — 36415 COLL VENOUS BLD VENIPUNCTURE: CPT | Performed by: STUDENT IN AN ORGANIZED HEALTH CARE EDUCATION/TRAINING PROGRAM

## 2025-07-20 PROCEDURE — 2060000001 HC INTERMEDIATE ICU ROOM DAILY

## 2025-07-20 PROCEDURE — 80053 COMPREHEN METABOLIC PANEL: CPT | Performed by: STUDENT IN AN ORGANIZED HEALTH CARE EDUCATION/TRAINING PROGRAM

## 2025-07-20 PROCEDURE — 2500000002 HC RX 250 W HCPCS SELF ADMINISTERED DRUGS (ALT 637 FOR MEDICARE OP, ALT 636 FOR OP/ED): Performed by: INTERNAL MEDICINE

## 2025-07-20 PROCEDURE — 2500000005 HC RX 250 GENERAL PHARMACY W/O HCPCS: Performed by: HOSPITALIST

## 2025-07-20 PROCEDURE — 99233 SBSQ HOSP IP/OBS HIGH 50: CPT | Performed by: INTERNAL MEDICINE

## 2025-07-20 PROCEDURE — 82947 ASSAY GLUCOSE BLOOD QUANT: CPT

## 2025-07-20 RX ORDER — INSULIN GLARGINE 100 [IU]/ML
25 INJECTION, SOLUTION SUBCUTANEOUS
Status: DISCONTINUED | OUTPATIENT
Start: 2025-07-20 | End: 2025-07-20

## 2025-07-20 RX ORDER — INSULIN GLARGINE 100 [IU]/ML
25 INJECTION, SOLUTION SUBCUTANEOUS
Status: DISCONTINUED | OUTPATIENT
Start: 2025-07-20 | End: 2025-07-21 | Stop reason: HOSPADM

## 2025-07-20 RX ADMIN — INSULIN HUMAN 8 UNITS: 100 INJECTION, SOLUTION PARENTERAL at 11:25

## 2025-07-20 RX ADMIN — FUROSEMIDE 80 MG: 10 INJECTION, SOLUTION INTRAMUSCULAR; INTRAVENOUS at 03:49

## 2025-07-20 RX ADMIN — INSULIN GLARGINE 25 UNITS: 100 INJECTION, SOLUTION SUBCUTANEOUS at 11:16

## 2025-07-20 RX ADMIN — FINASTERIDE 5 MG: 5 TABLET, FILM COATED ORAL at 08:25

## 2025-07-20 RX ADMIN — HEPARIN SODIUM 7500 UNITS: 5000 INJECTION, SOLUTION INTRAVENOUS; SUBCUTANEOUS at 15:54

## 2025-07-20 RX ADMIN — Medication 4 L/MIN: at 08:28

## 2025-07-20 RX ADMIN — ALLOPURINOL 100 MG: 100 TABLET ORAL at 08:25

## 2025-07-20 RX ADMIN — INSULIN GLARGINE 25 UNITS: 100 INJECTION, SOLUTION SUBCUTANEOUS at 15:56

## 2025-07-20 RX ADMIN — HEPARIN SODIUM 7500 UNITS: 5000 INJECTION, SOLUTION INTRAVENOUS; SUBCUTANEOUS at 06:34

## 2025-07-20 RX ADMIN — Medication 25 MCG: at 08:25

## 2025-07-20 RX ADMIN — TAMSULOSIN HYDROCHLORIDE 0.8 MG: 0.4 CAPSULE ORAL at 20:25

## 2025-07-20 RX ADMIN — HEPARIN SODIUM 7500 UNITS: 5000 INJECTION, SOLUTION INTRAVENOUS; SUBCUTANEOUS at 22:20

## 2025-07-20 RX ADMIN — CETIRIZINE HYDROCHLORIDE 10 MG: 10 TABLET, FILM COATED ORAL at 08:25

## 2025-07-20 RX ADMIN — INSULIN HUMAN 6 UNITS: 100 INJECTION, SOLUTION PARENTERAL at 15:55

## 2025-07-20 RX ADMIN — MONTELUKAST 10 MG: 10 TABLET, FILM COATED ORAL at 20:25

## 2025-07-20 RX ADMIN — ROSUVASTATIN CALCIUM 10 MG: 10 TABLET, FILM COATED ORAL at 20:25

## 2025-07-20 RX ADMIN — FUROSEMIDE 80 MG: 10 INJECTION, SOLUTION INTRAMUSCULAR; INTRAVENOUS at 20:26

## 2025-07-20 RX ADMIN — ASPIRIN 162 MG: 81 TABLET, COATED ORAL at 08:25

## 2025-07-20 RX ADMIN — FUROSEMIDE 80 MG: 10 INJECTION, SOLUTION INTRAMUSCULAR; INTRAVENOUS at 11:21

## 2025-07-20 ASSESSMENT — COGNITIVE AND FUNCTIONAL STATUS - GENERAL
MOVING TO AND FROM BED TO CHAIR: A LITTLE
WALKING IN HOSPITAL ROOM: A LITTLE
PERSONAL GROOMING: A LITTLE
TURNING FROM BACK TO SIDE WHILE IN FLAT BAD: A LITTLE
HELP NEEDED FOR BATHING: A LITTLE
MOVING FROM LYING ON BACK TO SITTING ON SIDE OF FLAT BED WITH BEDRAILS: A LITTLE
WALKING IN HOSPITAL ROOM: A LITTLE
STANDING UP FROM CHAIR USING ARMS: A LOT
DRESSING REGULAR UPPER BODY CLOTHING: A LITTLE
DAILY ACTIVITIY SCORE: 23
STANDING UP FROM CHAIR USING ARMS: A LOT
CLIMB 3 TO 5 STEPS WITH RAILING: A LOT
TOILETING: A LITTLE
DRESSING REGULAR LOWER BODY CLOTHING: A LITTLE
WALKING IN HOSPITAL ROOM: A LOT
CLIMB 3 TO 5 STEPS WITH RAILING: A LOT
MOVING TO AND FROM BED TO CHAIR: A LOT
MOVING FROM LYING ON BACK TO SITTING ON SIDE OF FLAT BED WITH BEDRAILS: A LITTLE
TURNING FROM BACK TO SIDE WHILE IN FLAT BAD: A LITTLE
MOVING FROM LYING ON BACK TO SITTING ON SIDE OF FLAT BED WITH BEDRAILS: A LITTLE
MOBILITY SCORE: 14
TURNING FROM BACK TO SIDE WHILE IN FLAT BAD: A LITTLE
STANDING UP FROM CHAIR USING ARMS: A LOT
DRESSING REGULAR LOWER BODY CLOTHING: A LITTLE
MOVING TO AND FROM BED TO CHAIR: A LITTLE
DAILY ACTIVITIY SCORE: 23
DRESSING REGULAR LOWER BODY CLOTHING: A LITTLE
MOBILITY SCORE: 16
CLIMB 3 TO 5 STEPS WITH RAILING: A LOT
MOBILITY SCORE: 16
DAILY ACTIVITIY SCORE: 19

## 2025-07-20 ASSESSMENT — PAIN SCALES - GENERAL
PAINLEVEL_OUTOF10: 0 - NO PAIN

## 2025-07-20 ASSESSMENT — PAIN - FUNCTIONAL ASSESSMENT: PAIN_FUNCTIONAL_ASSESSMENT: 0-10

## 2025-07-20 NOTE — CARE PLAN
The patient's goals for the shift include      The clinical goals for the shift include safety and comfort      Problem: Skin  Goal: Decreased wound size/increased tissue granulation at next dressing change  Outcome: Progressing  Flowsheets (Taken 7/20/2025 0043)  Decreased wound size/increased tissue granulation at next dressing change: Promote sleep for wound healing  Goal: Participates in plan/prevention/treatment measures  Outcome: Progressing  Flowsheets (Taken 7/19/2025 1126 by Collette Smith, RN)  Participates in plan/prevention/treatment measures: Discuss with provider PT/OT consult  Goal: Prevent/manage excess moisture  Outcome: Progressing  Flowsheets (Taken 7/19/2025 1126 by Collette Smith, RN)  Prevent/manage excess moisture: Moisturize dry skin  Goal: Prevent/minimize sheer/friction injuries  Outcome: Progressing  Flowsheets (Taken 7/19/2025 1126 by Collette Smith, RN)  Prevent/minimize sheer/friction injuries: Turn/reposition every 2 hours/use positioning/transfer devices  Goal: Promote/optimize nutrition  Outcome: Progressing  Flowsheets (Taken 7/19/2025 1126 by Collette Smith, RN)  Promote/optimize nutrition: Monitor/record intake including meals  Goal: Promote skin healing  Outcome: Progressing  Flowsheets (Taken 7/20/2025 0043)  Promote skin healing: Protective dressings over bony prominences     Problem: Safety - Adult  Goal: Free from fall injury  Outcome: Progressing

## 2025-07-20 NOTE — PROGRESS NOTES
Iban Bruce is a 75 y.o. male on day 9 of admission presenting with SOB (shortness of breath).      Subjective   The patient is seen and evaluated this morning.  He is sitting in his chair and resting comfortably.  Denied any specific complaints.  Currently undergoing diuresis.       Objective     Last Recorded Vitals  /52 (BP Location: Right arm, Patient Position: Lying)   Pulse 67   Temp 37.4 °C (99.3 °F) (Temporal)   Resp 18   Wt 120 kg (264 lb 8.8 oz)   SpO2 91%   Intake/Output last 3 Shifts:    Intake/Output Summary (Last 24 hours) at 7/20/2025 1540  Last data filed at 7/20/2025 0842  Gross per 24 hour   Intake 250 ml   Output 1050 ml   Net -800 ml       Admission Weight  Weight: 125 kg (275 lb 9.2 oz) (07/11/25 1232)    Daily Weight  07/20/25 : 120 kg (264 lb 8.8 oz)    Image Results  Electrocardiogram, 12-lead PRN ACS symptoms  Normal sinus rhythm  Left axis deviation  Right bundle branch block  Abnormal ECG    Confirmed by Jose Ríos ([id number]) on 7/17/2025 2:25:39 PM      Physical Exam  Constitutional:       Appearance: He is obese.   HENT:      Head: Normocephalic and atraumatic.      Nose: Nose normal.      Mouth/Throat:      Mouth: Mucous membranes are dry.     Eyes:      Extraocular Movements: Extraocular movements intact.      Conjunctiva/sclera: Conjunctivae normal.       Cardiovascular:      Rate and Rhythm: Normal rate and regular rhythm.      Pulses: Normal pulses.      Heart sounds: Normal heart sounds.   Pulmonary:      Effort: Pulmonary effort is normal.      Breath sounds: Normal breath sounds.   Abdominal:      General: Bowel sounds are normal.      Palpations: Abdomen is soft.     Musculoskeletal:         General: Normal range of motion.      Cervical back: Normal range of motion and neck supple.      Right lower leg: Edema present.      Left lower leg: Edema present.     Skin:     General: Skin is warm and dry.     Neurological:      General: No focal deficit present.       Mental Status: He is alert and oriented to person, place, and time. Mental status is at baseline.     Psychiatric:         Mood and Affect: Mood normal.         Relevant Results               This patient currently has cardiac telemetry ordered; if you would like to modify or discontinue the telemetry order, click here to go to the orders activity to modify/discontinue the order.      This patient has a urinary catheter   Reason for the urinary catheter remaining today? critically ill patient who need accurate urinary output measurements      Iban Bruce is a 75 y.o. male with DM, CKD 4, HFpEF, HTN, HLD, CAD, BPH, chronic respiratory failure on 4L NC, gout, VELVET, who presents with progressively worsening sob & nonproductive cough.     Assessment & Plan  SOB (shortness of breath)    Acute on chronic hypoxic respiratory failure 2/2 HFpEF exacerbation   - The patient was on airvo, wean for goal o2 sat 88=92%.  Improved back to 5 L when scan.  Baseline is 4 L.  - cardio following. Continue IV lasix  - fluid restriction.  Remains in negative fluid balance.  -Diuretics per nephrology currently on Lasix 80 mg 3 times daily.  Plans to transition to oral tomorrow.     SAIMA/CKD4  Urinary retention  - Baseline creatinine is around 3.  Nephro following, allowing some permissive azotemia give fluid overload state and respiratory failure  - gannon in place.  Repeat renal ultrasound with worsening hydronephrosis plans to maintain Gannon at this time.  - flomax     Elevated trop 2/2 demand ischemia  Hx CAD  - cardio following  - statin/ASA    Hx VELVET  Morbid obesity  -Continue with home PAP therapy with home settings     Normocytic anemia  - monitor  -IV iron course per nephrology.     DM  - Increase Lantus to 25 units twice daily. Mid grade  sliding scale insulin     Gout  - allopurinol     BPH  - flomax  - finasteride     DVT ppx: SQ hep  Dispo: monitor clinically, wean O2, continue diuresing            Sosa Carmona MD

## 2025-07-20 NOTE — PROGRESS NOTES
Nephrology Consult Progress Note    Iban Bruce is a 75 y.o. male on day 9 of admission presenting with SOB (shortness of breath).      Subjective   No acute events overnight, improved O2 requirements, tolerating po, nonoliguric       Objective          Physical Exam    Vitals 24HR  Heart Rate:  [47-72]   Temp:  [36.2 °C (97.2 °F)-37.4 °C (99.3 °F)]   Resp:  [16-18]   BP: ()/(52-59)   Weight:  [120 kg (264 lb 8.8 oz)]   SpO2:  [91 %-98 %]        Awake, alert, on NC O2  Decreased AEBL  RRR abd soft, + BS  Chronic skin changes, edema+             I&O 24HR    Intake/Output Summary (Last 24 hours) at 7/20/2025 1528  Last data filed at 7/20/2025 0842  Gross per 24 hour   Intake 250 ml   Output 1050 ml   Net -800 ml         Medications  Prescriptions Prior to Admission[1]   Scheduled medications  Scheduled Medications[2]  Continuous medications  Continuous Medications[3]  PRN medications  PRN Medications[4]    Relevant Results      No results displayed because visit has over 200 results.         Imaging  US renal complete  Result Date: 7/15/2025  1. Moderate right-sided hydronephrosis. Advise further assessment by dedicated CT scan.   MACRO: Critical Finding:  See findings. Notification was initiated on 7/15/2025 at 8:01 am by  Juwan Apple.  (**-OCF-**) Instructions: See Findings.     Signed by: Juwan Apple 7/15/2025 8:01 AM Dictation workstation:   [id number]      Cardiology, Vascular, and Other Imaging  Electrocardiogram, 12-lead PRN ACS symptoms  Result Date: 7/17/2025  Normal sinus rhythm Left axis deviation Right bundle branch block Abnormal ECG Confirmed by Jose Ríos ([id number]) on 7/17/2025 2:25:39 PM          ASSESSMENT AND PLAN    SAIMA, nonoliguric on CKD, creatinine 3.3 on admission, peaked at 3.8, stabilizing  Volume overload, underlying EF 60%, RVSP 45 mm Hg/PHTN, on torsemide before admission  BPH  Hyperuricemia  Anemia, severe, chronic, documented iron def  Secondary hyperpara, renal with  high  and D wnl 79      Plan  - repeat renal US on admission with worsening hydro c/w CT 10/24, maintain gannon, continue to record IO s  - continue diuresis with iv lasix q 8 hrs for another day, noted contraction alkalosis but improved O2 requirements and creatinine stabilizing, please record UOP  - repeat CXR pending  - daily lytes and replete as needed for K>4 and mag>2  - electrolytes stable  - low salt diet and fluid restrictions 1.2L/24 hrs  - iv iron course  - avoid hypotension and nephrotoxins  - daily allopurinol and flomax  - noted angiomyolipoma, not concerning      MARS reviewed  Thank you for the opportunity to assist in the care of this patient, please call with questions  Susan Costello MD PhD                       [1]   Medications Prior to Admission   Medication Sig Dispense Refill Last Dose/Taking    allopurinol (Zyloprim) 100 mg tablet Take 1 tablet (100 mg) by mouth once daily.   7/10/2025 Morning    amLODIPine (Norvasc) 5 mg tablet Take 1 tablet (5 mg) by mouth once daily.   7/10/2025 Morning    aspirin 81 mg EC tablet Take 2 tablets (162 mg) by mouth once daily.   7/10/2025 Morning    cholecalciferol (Vitamin D-3) 25 mcg (1,000 units) tablet Take 1 tablet (25 mcg) by mouth once daily.   7/10/2025 Morning    finasteride (Proscar) 5 mg tablet Take 1 tablet (5 mg) by mouth once daily.   7/10/2025 Morning    fluticasone (Flonase) 50 mcg/actuation nasal spray Administer 2 sprays into each nostril once daily at bedtime.   7/10/2025 Evening    FreeStyle Evelyn 3 Plus Sensor device 1 each every 14 (fourteen) days.   Past Month    insulin aspart (NovoLOG Flexpen U-100 Insulin) 100 unit/mL (3 mL) pen Inject 35 Units under the skin 3 times a day before meals. as directed   7/10/2025 Evening    insulin glargine (Lantus) 100 unit/mL injection Inject 35 Units under the skin 2 times a day.   7/10/2025 Evening    loratadine (Claritin) 10 mg tablet Take 1 tablet (10 mg) by mouth once daily.   7/10/2025  Morning    montelukast (Singulair) 10 mg tablet Take 1 tablet (10 mg) by mouth once daily at bedtime.   7/10/2025 Evening    oxygen (O2) gas therapy Inhale 4 L/min at 240,000 mL/hr continuously.   7/11/2025    rosuvastatin (Crestor) 10 mg tablet Take 1 tablet (10 mg) by mouth once daily at bedtime. for high cholesterol   7/10/2025 Evening    sildenafil (Viagra) 50 mg tablet Take 1 tablet (50 mg) by mouth every 24 (twenty four) hours if needed for erectile dysfunction. Take one hour prior to sexual activity   Unknown    tamsulosin (Flomax) 0.4 mg 24 hr capsule Take 2 capsules (0.8 mg) by mouth once daily at bedtime.   7/10/2025 Evening    torsemide (Demadex) 20 mg tablet Take 3 tablets (60 mg) by mouth once daily.   7/10/2025 Morning   [2] allopurinol, 100 mg, oral, Daily  aspirin, 162 mg, oral, Daily  cetirizine, 10 mg, oral, Daily  cholecalciferol, 25 mcg, oral, Daily  finasteride, 5 mg, oral, Daily  fluticasone, 2 spray, Each Nostril, Nightly  furosemide, 80 mg, intravenous, q8h  heparin (porcine), 7,500 Units, subcutaneous, q8h JASON  insulin glargine, 25 Units, subcutaneous, BID AC  insulin regular, 0-10 Units, subcutaneous, TID AC  montelukast, 10 mg, oral, Nightly  oxygen, , inhalation, Continuous - Inhalation  rosuvastatin, 10 mg, oral, Nightly  tamsulosin, 0.8 mg, oral, Nightly  [Held by provider] torsemide, 60 mg, oral, Daily     [3]    [4] PRN medications: acetaminophen **OR** [DISCONTINUED] acetaminophen **OR** [DISCONTINUED] acetaminophen, dextrose, dextrose, glucagon, glucagon, melatonin, polyethylene glycol

## 2025-07-20 NOTE — CARE PLAN
Problem: Skin  Goal: Prevent/manage excess moisture  Outcome: Progressing     Problem: Skin  Goal: Participates in plan/prevention/treatment measures  Outcome: Progressing     Problem: Pain - Adult  Goal: Verbalizes/displays adequate comfort level or baseline comfort level  Outcome: Progressing

## 2025-07-21 VITALS
WEIGHT: 264.55 LBS | SYSTOLIC BLOOD PRESSURE: 125 MMHG | RESPIRATION RATE: 17 BRPM | OXYGEN SATURATION: 96 % | HEART RATE: 66 BPM | DIASTOLIC BLOOD PRESSURE: 60 MMHG | BODY MASS INDEX: 42.52 KG/M2 | HEIGHT: 66 IN | TEMPERATURE: 96.4 F

## 2025-07-21 LAB
ALBUMIN SERPL BCP-MCNC: 3.5 G/DL (ref 3.4–5)
ALP SERPL-CCNC: 100 U/L (ref 33–136)
ALT SERPL W P-5'-P-CCNC: 12 U/L (ref 10–52)
ANION GAP SERPL CALC-SCNC: 14 MMOL/L (ref 10–20)
AST SERPL W P-5'-P-CCNC: 12 U/L (ref 9–39)
BILIRUB SERPL-MCNC: 0.4 MG/DL (ref 0–1.2)
BUN SERPL-MCNC: 79 MG/DL (ref 6–23)
CALCIUM SERPL-MCNC: 8.9 MG/DL (ref 8.6–10.3)
CHLORIDE SERPL-SCNC: 96 MMOL/L (ref 98–107)
CO2 SERPL-SCNC: 31 MMOL/L (ref 21–32)
CREAT SERPL-MCNC: 3.91 MG/DL (ref 0.5–1.3)
EGFRCR SERPLBLD CKD-EPI 2021: 15 ML/MIN/1.73M*2
GLUCOSE BLD MANUAL STRIP-MCNC: 176 MG/DL (ref 74–99)
GLUCOSE BLD MANUAL STRIP-MCNC: 247 MG/DL (ref 74–99)
GLUCOSE SERPL-MCNC: 197 MG/DL (ref 74–99)
HOLD SPECIMEN: NORMAL
POTASSIUM SERPL-SCNC: 3.9 MMOL/L (ref 3.5–5.3)
PROT SERPL-MCNC: 7.3 G/DL (ref 6.4–8.2)
SODIUM SERPL-SCNC: 137 MMOL/L (ref 136–145)

## 2025-07-21 PROCEDURE — 2500000005 HC RX 250 GENERAL PHARMACY W/O HCPCS: Performed by: HOSPITALIST

## 2025-07-21 PROCEDURE — 99239 HOSP IP/OBS DSCHRG MGMT >30: CPT | Performed by: INTERNAL MEDICINE

## 2025-07-21 PROCEDURE — 36415 COLL VENOUS BLD VENIPUNCTURE: CPT | Performed by: STUDENT IN AN ORGANIZED HEALTH CARE EDUCATION/TRAINING PROGRAM

## 2025-07-21 PROCEDURE — 2500000001 HC RX 250 WO HCPCS SELF ADMINISTERED DRUGS (ALT 637 FOR MEDICARE OP): Performed by: INTERNAL MEDICINE

## 2025-07-21 PROCEDURE — 82947 ASSAY GLUCOSE BLOOD QUANT: CPT

## 2025-07-21 PROCEDURE — 80053 COMPREHEN METABOLIC PANEL: CPT | Performed by: STUDENT IN AN ORGANIZED HEALTH CARE EDUCATION/TRAINING PROGRAM

## 2025-07-21 PROCEDURE — 2500000001 HC RX 250 WO HCPCS SELF ADMINISTERED DRUGS (ALT 637 FOR MEDICARE OP): Performed by: STUDENT IN AN ORGANIZED HEALTH CARE EDUCATION/TRAINING PROGRAM

## 2025-07-21 PROCEDURE — 2500000004 HC RX 250 GENERAL PHARMACY W/ HCPCS (ALT 636 FOR OP/ED): Performed by: INTERNAL MEDICINE

## 2025-07-21 PROCEDURE — 2500000002 HC RX 250 W HCPCS SELF ADMINISTERED DRUGS (ALT 637 FOR MEDICARE OP, ALT 636 FOR OP/ED): Performed by: INTERNAL MEDICINE

## 2025-07-21 RX ORDER — TORSEMIDE 20 MG/1
100 TABLET ORAL DAILY
Qty: 70 TABLET | Refills: 0 | Status: SHIPPED | OUTPATIENT
Start: 2025-07-21 | End: 2025-08-04

## 2025-07-21 RX ORDER — TORSEMIDE 20 MG/1
100 TABLET ORAL DAILY
Status: DISCONTINUED | OUTPATIENT
Start: 2025-07-21 | End: 2025-07-21 | Stop reason: HOSPADM

## 2025-07-21 RX ORDER — OXYMETAZOLINE HCL 0.05 %
3 SPRAY, NON-AEROSOL (ML) NASAL ONCE
Status: COMPLETED | OUTPATIENT
Start: 2025-07-21 | End: 2025-07-21

## 2025-07-21 RX ADMIN — FUROSEMIDE 80 MG: 10 INJECTION, SOLUTION INTRAMUSCULAR; INTRAVENOUS at 04:21

## 2025-07-21 RX ADMIN — INSULIN GLARGINE 25 UNITS: 100 INJECTION, SOLUTION SUBCUTANEOUS at 07:00

## 2025-07-21 RX ADMIN — ALLOPURINOL 100 MG: 100 TABLET ORAL at 09:21

## 2025-07-21 RX ADMIN — HEPARIN SODIUM 7500 UNITS: 5000 INJECTION, SOLUTION INTRAVENOUS; SUBCUTANEOUS at 05:28

## 2025-07-21 RX ADMIN — HEPARIN SODIUM 7500 UNITS: 5000 INJECTION, SOLUTION INTRAVENOUS; SUBCUTANEOUS at 13:10

## 2025-07-21 RX ADMIN — Medication 5 L/MIN: at 09:32

## 2025-07-21 RX ADMIN — INSULIN HUMAN 6 UNITS: 100 INJECTION, SOLUTION PARENTERAL at 08:00

## 2025-07-21 RX ADMIN — Medication 25 MCG: at 09:22

## 2025-07-21 RX ADMIN — INSULIN HUMAN 4 UNITS: 100 INJECTION, SOLUTION PARENTERAL at 12:00

## 2025-07-21 RX ADMIN — CETIRIZINE HYDROCHLORIDE 10 MG: 10 TABLET, FILM COATED ORAL at 09:21

## 2025-07-21 RX ADMIN — TORSEMIDE 100 MG: 20 TABLET ORAL at 13:10

## 2025-07-21 RX ADMIN — OXYMETAZOLINE HYDROCHLORIDE 3 SPRAY: 0.5 SPRAY NASAL at 05:27

## 2025-07-21 RX ADMIN — FINASTERIDE 5 MG: 5 TABLET, FILM COATED ORAL at 09:22

## 2025-07-21 RX ADMIN — ASPIRIN 162 MG: 81 TABLET, COATED ORAL at 09:21

## 2025-07-21 ASSESSMENT — COGNITIVE AND FUNCTIONAL STATUS - GENERAL
CLIMB 3 TO 5 STEPS WITH RAILING: A LOT
TURNING FROM BACK TO SIDE WHILE IN FLAT BAD: A LITTLE
WALKING IN HOSPITAL ROOM: A LITTLE
MOVING TO AND FROM BED TO CHAIR: A LITTLE
STANDING UP FROM CHAIR USING ARMS: A LOT
MOVING FROM LYING ON BACK TO SITTING ON SIDE OF FLAT BED WITH BEDRAILS: A LITTLE
DRESSING REGULAR LOWER BODY CLOTHING: A LITTLE
DAILY ACTIVITIY SCORE: 23
MOBILITY SCORE: 16

## 2025-07-21 ASSESSMENT — PAIN - FUNCTIONAL ASSESSMENT: PAIN_FUNCTIONAL_ASSESSMENT: 0-10

## 2025-07-21 ASSESSMENT — PAIN SCALES - GENERAL: PAINLEVEL_OUTOF10: 0 - NO PAIN

## 2025-07-21 NOTE — PROGRESS NOTES
7/21/2025  Followed up with pt in room.  Pt is from home with friends. Stated they are supportive.  Has home O2. Pt prefers [facility], stated he has used them in the past.  Referral placed. (Will need orders)  Reviewed IMM with pt, copy to pt.   Lori Meng Rn TCC    1440  [facility] orders in.  Pt stated PCP is Dr Christiansen.    C orders sent to [facility]. They have accepted.    Lori Meng Rn TCC

## 2025-07-21 NOTE — CARE PLAN
The patient's goals for the shift include      The clinical goals for the shift include maintain adequate oxygenation      Problem: Skin  Goal: Decreased wound size/increased tissue granulation at next dressing change  Outcome: Progressing  Flowsheets (Taken 7/20/2025 0043)  Decreased wound size/increased tissue granulation at next dressing change: Promote sleep for wound healing  Goal: Participates in plan/prevention/treatment measures  Outcome: Progressing  Flowsheets (Taken 7/20/2025 1319 by Dudley Morrison RN)  Participates in plan/prevention/treatment measures: Increase activity/out of bed for meals  Goal: Prevent/manage excess moisture  Outcome: Progressing  Flowsheets (Taken 7/19/2025 1126 by Collette Smith, RN)  Prevent/manage excess moisture: Moisturize dry skin  Goal: Prevent/minimize sheer/friction injuries  Outcome: Progressing  Flowsheets (Taken 7/19/2025 1126 by Collette Smith, RN)  Prevent/minimize sheer/friction injuries: Turn/reposition every 2 hours/use positioning/transfer devices  Goal: Promote/optimize nutrition  Outcome: Progressing  Flowsheets (Taken 7/20/2025 2144)  Promote/optimize nutrition: Assist with feeding  Goal: Promote skin healing  Outcome: Progressing  Flowsheets (Taken 7/20/2025 2144)  Promote skin healing: Turn/reposition every 2 hours/use positioning/transfer devices     Problem: Safety - Adult  Goal: Free from fall injury  Outcome: Progressing

## 2025-07-21 NOTE — PROGRESS NOTES
NEPHROLOGY PROGRESS NOTE    REASON FOR CONSULT: SAIMA on CKD stage IV    SUBJECTIVE:  Patient is sleepy.  [facility]  He came in with volume overload.  He has a normal EF but has pulmonary hypertension.  Has longstanding history of diabetes and hypertension.  Back in December 2024 creatinine was 2.96.      OBJECTIVE:    Visit Vitals  /54 (BP Location: Right arm, Patient Position: Lying)   Pulse 61   Temp 36.3 °C (97.3 °F) (Temporal)   Resp 17          Intake/Output Summary (Last 24 hours) at 7/21/2025 0952  Last data filed at 7/21/2025 0728  Gross per 24 hour   Intake 250 ml   Output 1050 ml   Net -800 ml        General: Awake and Alert, obese, in no distress, Cooperative  HEENT: Oral mucosa moist, EOMI  NECK: Supple  CHEST: No crackles, no wheeze, no tachypnea  CVS: S1,S2 heard, no rubs, RRR  ABD: Soft, Non Tender, BS present  EXT: Wrinkling of the skin of the lower extremity, no edema    MEDICATION:    Scheduled medications  Scheduled Medications[1]  Continuous medications  Continuous Medications[2]  PRN medications  PRN Medications[3]     RESULTS:    Lab Results   Component Value Date    WBC 6.2 07/20/2025    HGB 10.3 (L) 07/20/2025    HCT 34.2 (L) 07/20/2025    MCV 91 07/20/2025     07/20/2025        Lab Results   Component Value Date    CREATININE 3.91 (H) 07/21/2025    BUN 79 (H) 07/21/2025     07/21/2025    K 3.9 07/21/2025    CL 96 (L) 07/21/2025    CO2 31 07/21/2025        Radiology Imaging reviewed      ASSESSMENT/PLAN:     1.  SAIMA: Patient's creatinine is around 3 and has diabetic nephropathy with proteinuria.  He came in with volume overload and is on Lasix 80 mg IV every 8 hours for the fluid overload.  Creatinine rise due to necessary diuresis.  Will transition him to torsemide 100 mg daily.    2.  CKD stage IV: Baseline creatinine around 3.  [facility]  Has longstanding history of diabetes and hypertension and has proteinuria.    3.  Fluid  overload: RVSP of 45 mmHg with an EF of 60%.  Currently on Lasix 80 mg IV 3 times daily with good diuresis.  Overall volume status has improved.  Will transition to oral diuretics.    4.  Anemia: Likely in the setting of advanced CKD.  Hemoglobin currently at 10.3.      Thank You very much for allowing me to participate in the care of this Patient    This document was created using dragon dictation and may contain unintended error    Giancarlo Desai MD   07/21/25              [1] allopurinol, 100 mg, oral, Daily  aspirin, 162 mg, oral, Daily  cetirizine, 10 mg, oral, Daily  cholecalciferol, 25 mcg, oral, Daily  finasteride, 5 mg, oral, Daily  fluticasone, 2 spray, Each Nostril, Nightly  furosemide, 80 mg, intravenous, q8h  heparin (porcine), 7,500 Units, subcutaneous, q8h JASON  insulin glargine, 25 Units, subcutaneous, BID AC  insulin regular, 0-10 Units, subcutaneous, TID AC  montelukast, 10 mg, oral, Nightly  oxygen, , inhalation, Continuous - Inhalation  rosuvastatin, 10 mg, oral, Nightly  tamsulosin, 0.8 mg, oral, Nightly  [Held by provider] torsemide, 60 mg, oral, Daily  [2]    [3] PRN medications: acetaminophen **OR** [DISCONTINUED] acetaminophen **OR** [DISCONTINUED] acetaminophen, dextrose, dextrose, glucagon, glucagon, melatonin, polyethylene glycol

## 2025-07-21 NOTE — CARE PLAN
The patient's goals for the shift include      The clinical goals for the shift include Plan for discharge      Problem: Skin  Goal: Decreased wound size/increased tissue granulation at next dressing change  Outcome: Progressing  Goal: Participates in plan/prevention/treatment measures  Outcome: Progressing  Flowsheets (Taken 7/21/2025 6667)  Participates in plan/prevention/treatment measures: Elevate heels  Goal: Prevent/manage excess moisture  Outcome: Progressing  Goal: Prevent/minimize sheer/friction injuries  Outcome: Progressing  Goal: Promote/optimize nutrition  Outcome: Progressing  Goal: Promote skin healing  Outcome: Progressing

## 2025-07-21 NOTE — DISCHARGE SUMMARY
Discharge Diagnosis  SOB (shortness of breath)           Issues Requiring Follow-Up  Follow-up with PCP in a week.  Follow-up with cardiology and nephrology in 2 weeks    Discharge Meds     Medication List      PAUSE taking these medications     amLODIPine 5 mg tablet; Wait to take this until your doctor or other   care provider tells you to start again.; Commonly known as: Norvasc     CHANGE how you take these medications     torsemide 20 mg tablet; Commonly known as: Demadex; Take 5 tablets (100   mg) by mouth once daily for 14 days.; What changed: how much to take     CONTINUE taking these medications     allopurinol 100 mg tablet; Commonly known as: Zyloprim   aspirin 81 mg EC tablet   cholecalciferol 25 mcg (1,000 units) tablet; Commonly known as: Vitamin   D-3   finasteride 5 mg tablet; Commonly known as: Proscar   fluticasone 50 mcg/actuation nasal spray; Commonly known as: Flonase   FreeStyle Evelyn 3 Plus Sensor device; Generic drug: blood-glucose sensor   insulin glargine 100 unit/mL injection; Commonly known as: Lantus   loratadine 10 mg tablet; Commonly known as: Claritin   montelukast 10 mg tablet; Commonly known as: Singulair   NovoLOG Flexpen U-100 Insulin 100 unit/mL (3 mL) pen; Generic drug:   insulin aspart   oxygen gas therapy; Commonly known as: O2   rosuvastatin 10 mg tablet; Commonly known as: Crestor   sildenafil 50 mg tablet; Commonly known as: Viagra   tamsulosin 0.4 mg 24 hr capsule; Commonly known as: Flomax       Test Results Pending At Discharge  Pending Labs       No current pending labs.            Hospital Course   Iban Bruce is a 75 y.o. male with DM, CKD 4, HFpEF, HTN, HLD, CAD, BPH, chronic respiratory failure on 4L NC, gout, VELVET, who presents with progressively worsening sob & nonproductive cough.  Patient was initially on Airvo was started with IV diuresis.  The patient is placed on oxygen requirement is 4 L by nasal cannula.  Cardiology and nephrology were consulted.  The  patient diuresed well.  Nephrology allowed some permissive azotemia to maintain appropriate volume status and improvement in respiratory status.  During this admission the patient's respiratory status improved the patient is now back to his baseline and his swelling has significantly improved.  Elevated troponin was thought to be secondary to demand ischemia cardiology also saw the patient during this admission.  Patient also received IV iron.  The patient overall improved and is requesting discharge back to home.  He is otherwise being discharged home with outpatient follow-up with PCP cardiology and nephrology.    Pertinent Physical Exam At Time of Discharge  Physical Exam  Constitutional:       Appearance: He is obese.   HENT:      Head: Normocephalic and atraumatic.      Nose: Nose normal.      Mouth/Throat:      Mouth: Mucous membranes are dry.     Eyes:      Extraocular Movements: Extraocular movements intact.      Conjunctiva/sclera: Conjunctivae normal.       Cardiovascular:      Rate and Rhythm: Normal rate and regular rhythm.      Pulses: Normal pulses.      Heart sounds: Normal heart sounds.   Pulmonary:      Effort: Pulmonary effort is normal.      Breath sounds: Normal breath sounds.   Abdominal:      General: Bowel sounds are normal.      Palpations: Abdomen is soft.     Musculoskeletal:         General: Normal range of motion.      Cervical back: Normal range of motion and neck supple.      Right lower leg: Edema present.      Left lower leg: Edema present.     Skin:     General: Skin is warm and dry.     Neurological:      General: No focal deficit present.      Mental Status: He is alert and oriented to person, place, and time. Mental status is at baseline.     Psychiatric:         Mood and Affect: Mood normal.         Outpatient Follow-Up  Future Appointments   Date Time Provider Department Center   8/18/2025 11:30 AM Heather Nunez, JOVANNY-CNP [id number] [place]         Sosa Carmona MD

## 2025-08-18 ENCOUNTER — APPOINTMENT (OUTPATIENT)
Dept: CARDIOLOGY | Facility: CLINIC | Age: 75
End: 2025-08-18
Payer: MEDICARE